# Patient Record
Sex: FEMALE | Race: WHITE | NOT HISPANIC OR LATINO | Employment: FULL TIME | URBAN - NONMETROPOLITAN AREA
[De-identification: names, ages, dates, MRNs, and addresses within clinical notes are randomized per-mention and may not be internally consistent; named-entity substitution may affect disease eponyms.]

---

## 2017-01-02 ENCOUNTER — OFFICE VISIT (OUTPATIENT)
Dept: RETAIL CLINIC | Facility: CLINIC | Age: 62
End: 2017-01-02

## 2017-01-02 VITALS
SYSTOLIC BLOOD PRESSURE: 120 MMHG | TEMPERATURE: 97.7 F | OXYGEN SATURATION: 99 % | DIASTOLIC BLOOD PRESSURE: 66 MMHG | HEART RATE: 63 BPM

## 2017-01-02 DIAGNOSIS — J01.00 ACUTE MAXILLARY SINUSITIS, RECURRENCE NOT SPECIFIED: Primary | ICD-10-CM

## 2017-01-02 PROCEDURE — 99213 OFFICE O/P EST LOW 20 MIN: CPT | Performed by: NURSE PRACTITIONER

## 2017-01-02 RX ORDER — AZITHROMYCIN 250 MG/1
TABLET, FILM COATED ORAL
Qty: 6 TABLET | Refills: 0 | Status: SHIPPED | OUTPATIENT
Start: 2017-01-02 | End: 2017-04-28

## 2017-01-02 RX ORDER — FLUTICASONE PROPIONATE 50 MCG
2 SPRAY, SUSPENSION (ML) NASAL DAILY
Qty: 1 EACH | Refills: 0 | Status: SHIPPED | OUTPATIENT
Start: 2017-01-02 | End: 2017-02-01

## 2017-01-02 NOTE — MR AVS SNAPSHOT
Kenyatta JOZEF Jay   1/2/2017 4:45 PM   Office Visit    Dept Phone:  [phone]   Encounter #:  [id number]    Provider:  PROVIDER B[facility] PAD ELIZABETH CARVER   Department:  [facility]                Your Full Care Plan              Today's Medication Changes          These changes are accurate as of: 1/2/17  5:09 PM.  If you have any questions, ask your nurse or doctor.               New Medication(s)Ordered:     fluticasone 50 MCG/ACT nasal spray   Commonly known as:  FLONASE   2 sprays into each nostril Daily for 30 days.            Where to Get Your Medications      These medications were sent to [facility] - [place], [place] - [address] - [phone]  - [phone]   [address], [facility] [address]     Phone:  [phone]     azithromycin 250 MG tablet    fluticasone 50 MCG/ACT nasal spray                  Your Updated Medication List          This list is accurate as of: 1/2/17  5:09 PM.  Always use your most recent med list.                albuterol 108 (90 BASE) MCG/ACT inhaler   Commonly known as:  PROVENTIL HFA;VENTOLIN HFA   Inhale 2 puffs Every 4 (Four) Hours As Needed for wheezing.       azithromycin 250 MG tablet   Commonly known as:  ZITHROMAX Z-ISIDORO   Take 2 tablets the first day, then 1 tablet daily for 4 days.       busPIRone 5 MG tablet   Commonly known as:  BUSPAR   Take 1 tablet by mouth 3 (Three) Times a Day.       diclofenac 50 MG EC tablet   Commonly known as:  VOLTAREN   Take 1 tablet by mouth 2 (Two) Times a Day As Needed (mild pain). MUST MAKE AN APPT FOR LAB WORK AND OFFICE VISIT.       fluticasone 50 MCG/ACT nasal spray   Commonly known as:  FLONASE   2 sprays into each nostril Daily for 30 days.       glimepiride 2 MG tablet   Commonly known as:  AMARYL   Take 1 tablet by mouth Every Morning Before Breakfast. MUST MAKE AN APPT FOR LAB WORK AND OFFICE VISIT.       levothyroxine 137 MCG tablet   Commonly known as:  SYNTHROID,  LEVOTHROID   Take 1 tablet by mouth Daily.       lisinopril 20 MG tablet   Commonly known as:  PRINIVIL,ZESTRIL   Take 1 tablet by mouth Daily.       metFORMIN 1000 MG tablet   Commonly known as:  GLUCOPHAGE   Take 1 tablet by mouth 2 (Two) Times a Day With Meals.       sertraline 100 MG tablet   Commonly known as:  ZOLOFT   Take 1 tablet by mouth Daily.       simvastatin 10 MG tablet   Commonly known as:  ZOCOR   Take 1 tablet by mouth Every Night.               You Were Diagnosed With        Codes Comments    Acute maxillary sinusitis, recurrence not specified    -  Primary ICD-10-CM: J01.00  ICD-9-CM: 461.0       Instructions    Increase fluid intake  Warm salt water gargles as needed for sore throat  Do not over suppress cough  If no improvement over next 2-3 days or symptoms worsen, follow up with PCP  Sinusitis, Adult  Sinusitis is redness, soreness, and inflammation of the paranasal sinuses. Paranasal sinuses are air pockets within the bones of your face. They are located beneath your eyes, in the middle of your forehead, and above your eyes. In healthy paranasal sinuses, mucus is able to drain out, and air is able to circulate through them by way of your nose. However, when your paranasal sinuses are inflamed, mucus and air can become trapped. This can allow bacteria and other germs to grow and cause infection.  Sinusitis can develop quickly and last only a short time (acute) or continue over a long period (chronic). Sinusitis that lasts for more than 12 weeks is considered chronic.  CAUSES  Causes of sinusitis include:  · Allergies.  · Structural abnormalities, such as displacement of the cartilage that separates your nostrils (deviated septum), which can decrease the air flow through your nose and sinuses and affect sinus drainage.  · Functional abnormalities, such as when the small hairs (cilia) that line your sinuses and help remove mucus do not work properly or are not present.  SIGNS AND  SYMPTOMS  Symptoms of acute and chronic sinusitis are the same. The primary symptoms are pain and pressure around the affected sinuses. Other symptoms include:  · Upper toothache.  · Earache.  · Headache.  · Bad breath.  · Decreased sense of smell and taste.  · A cough, which worsens when you are lying flat.  · Fatigue.  · Fever.  · Thick drainage from your nose, which often is green and may contain pus (purulent).  · Swelling and warmth over the affected sinuses.  DIAGNOSIS  Your health care provider will perform a physical exam. During your exam, your health care provider may perform any of the following to help determine if you have acute sinusitis or chronic sinusitis:  · Look in your nose for signs of abnormal growths in your nostrils (nasal polyps).  · Tap over the affected sinus to check for signs of infection.  · View the inside of your sinuses using an imaging device that has a light attached (endoscope).  If your health care provider suspects that you have chronic sinusitis, one or more of the following tests may be recommended:  · Allergy tests.  · Nasal culture. A sample of mucus is taken from your nose, sent to a lab, and screened for bacteria.  · Nasal cytology. A sample of mucus is taken from your nose and examined by your health care provider to determine if your sinusitis is related to an allergy.  TREATMENT  Most cases of acute sinusitis are related to a viral infection and will resolve on their own within 10 days. Sometimes, medicines are prescribed to help relieve symptoms of both acute and chronic sinusitis. These may include pain medicines, decongestants, nasal steroid sprays, or saline sprays.  However, for sinusitis related to a bacterial infection, your health care provider will prescribe antibiotic medicines. These are medicines that will help kill the bacteria causing the infection.  Rarely, sinusitis is caused by a fungal infection. In these cases, your health care provider will prescribe  antifungal medicine.  For some cases of chronic sinusitis, surgery is needed. Generally, these are cases in which sinusitis recurs more than 3 times per year, despite other treatments.  HOME CARE INSTRUCTIONS  · Drink plenty of water. Water helps thin the mucus so your sinuses can drain more easily.  · Use a humidifier.  · Inhale steam 3-4 times a day (for example, sit in the bathroom with the shower running).  · Apply a warm, moist washcloth to your face 3-4 times a day, or as directed by your health care provider.  · Use saline nasal sprays to help moisten and clean your sinuses.  · Take medicines only as directed by your health care provider.  · If you were prescribed either an antibiotic or antifungal medicine, finish it all even if you start to feel better.  SEEK IMMEDIATE MEDICAL CARE IF:  · You have increasing pain or severe headaches.  · You have nausea, vomiting, or drowsiness.  · You have swelling around your face.  · You have vision problems.  · You have a stiff neck.  · You have difficulty breathing.     This information is not intended to replace advice given to you by your health care provider. Make sure you discuss any questions you have with your health care provider.     Document Released: 12/18/2006 Document Revised: 01/08/2016 Document Reviewed: 01/01/2013  [employer] Interactive Patient Education ©2016 [employer]         Patient Instructions History      Upcoming Appointments     Visit Type Date Time Department    OFFICE VISIT 1/2/2017  4:45 PM [facility] P[facility] ELIZABETH CARVER    FOLLOW UP 4/28/2017  1:30 PM [facility] BENJA WILDE      [email or web address] Signup     [employer] allows you to send messages to your doctor, view your test results, renew your prescriptions, schedule appointments, and more. To sign up, go to [employer] and click on the Sign Up Now link in the New User? box. Enter your [employer] Activation Code exactly as it appears below along with the last four digits of your Social  Security Number and your Date of Birth () to complete the sign-up process. If you do not sign up before the expiration date, you must request a new code.    [employer] Activation Code: [email or web address]  Expires: 2017  5:09 PM    If you have questions, you can email [email or web address] or call [phone] to talk to our [employer] staff. Remember, [employer] is NOT to be used for urgent needs. For medical emergencies, dial 911.               Other Info from Your Visit           Your Appointments     2017  1:30 PM CDT   Follow Up with TAMARA Mcdonald   [facility] (--)    [address] [place] [id number] [phone]           Arrive 15 minutes prior to appointment.              Allergies     Penicillins        Reason for Visit     Sinusitis           Vital Signs     Blood Pressure Pulse Temperature Oxygen Saturation Smoking Status       120/66 (BP Location: Left arm, Patient Position: Sitting, Cuff Size: Large Adult) 63 97.7 °F (36.5 °C) (Temporal Artery ) 99% Never Smoker       Problems and Diagnoses Noted     Acute maxillary sinusitis    -  Primary

## 2017-01-02 NOTE — PATIENT INSTRUCTIONS
Increase fluid intake  Warm salt water gargles as needed for sore throat  Do not over suppress cough  If no improvement over next 2-3 days or symptoms worsen, follow up with PCP  Sinusitis, Adult  Sinusitis is redness, soreness, and inflammation of the paranasal sinuses. Paranasal sinuses are air pockets within the bones of your face. They are located beneath your eyes, in the middle of your forehead, and above your eyes. In healthy paranasal sinuses, mucus is able to drain out, and air is able to circulate through them by way of your nose. However, when your paranasal sinuses are inflamed, mucus and air can become trapped. This can allow bacteria and other germs to grow and cause infection.  Sinusitis can develop quickly and last only a short time (acute) or continue over a long period (chronic). Sinusitis that lasts for more than 12 weeks is considered chronic.  CAUSES  Causes of sinusitis include:  · Allergies.  · Structural abnormalities, such as displacement of the cartilage that separates your nostrils (deviated septum), which can decrease the air flow through your nose and sinuses and affect sinus drainage.  · Functional abnormalities, such as when the small hairs (cilia) that line your sinuses and help remove mucus do not work properly or are not present.  SIGNS AND SYMPTOMS  Symptoms of acute and chronic sinusitis are the same. The primary symptoms are pain and pressure around the affected sinuses. Other symptoms include:  · Upper toothache.  · Earache.  · Headache.  · Bad breath.  · Decreased sense of smell and taste.  · A cough, which worsens when you are lying flat.  · Fatigue.  · Fever.  · Thick drainage from your nose, which often is green and may contain pus (purulent).  · Swelling and warmth over the affected sinuses.  DIAGNOSIS  Your health care provider will perform a physical exam. During your exam, your health care provider may perform any of the following to help determine if you have acute  sinusitis or chronic sinusitis:  · Look in your nose for signs of abnormal growths in your nostrils (nasal polyps).  · Tap over the affected sinus to check for signs of infection.  · View the inside of your sinuses using an imaging device that has a light attached (endoscope).  If your health care provider suspects that you have chronic sinusitis, one or more of the following tests may be recommended:  · Allergy tests.  · Nasal culture. A sample of mucus is taken from your nose, sent to a lab, and screened for bacteria.  · Nasal cytology. A sample of mucus is taken from your nose and examined by your health care provider to determine if your sinusitis is related to an allergy.  TREATMENT  Most cases of acute sinusitis are related to a viral infection and will resolve on their own within 10 days. Sometimes, medicines are prescribed to help relieve symptoms of both acute and chronic sinusitis. These may include pain medicines, decongestants, nasal steroid sprays, or saline sprays.  However, for sinusitis related to a bacterial infection, your health care provider will prescribe antibiotic medicines. These are medicines that will help kill the bacteria causing the infection.  Rarely, sinusitis is caused by a fungal infection. In these cases, your health care provider will prescribe antifungal medicine.  For some cases of chronic sinusitis, surgery is needed. Generally, these are cases in which sinusitis recurs more than 3 times per year, despite other treatments.  HOME CARE INSTRUCTIONS  · Drink plenty of water. Water helps thin the mucus so your sinuses can drain more easily.  · Use a humidifier.  · Inhale steam 3-4 times a day (for example, sit in the bathroom with the shower running).  · Apply a warm, moist washcloth to your face 3-4 times a day, or as directed by your health care provider.  · Use saline nasal sprays to help moisten and clean your sinuses.  · Take medicines only as directed by your health care  provider.  · If you were prescribed either an antibiotic or antifungal medicine, finish it all even if you start to feel better.  SEEK IMMEDIATE MEDICAL CARE IF:  · You have increasing pain or severe headaches.  · You have nausea, vomiting, or drowsiness.  · You have swelling around your face.  · You have vision problems.  · You have a stiff neck.  · You have difficulty breathing.     This information is not intended to replace advice given to you by your health care provider. Make sure you discuss any questions you have with your health care provider.     Document Released: 12/18/2006 Document Revised: 01/08/2016 Document Reviewed: 01/01/2013  [employer] Interactive Patient Education ©2016 [employer]

## 2017-01-02 NOTE — PROGRESS NOTES
"Subjective   Kenyatta Jay is a 61 y.o. female.     Sinusitis   This is a new problem. The current episode started in the past 7 days (2 days). There has been no fever. Associated symptoms include congestion, coughing (Worse at night), ear pain, sinus pressure and a sore throat. Treatments tried: Dayquil yesterday. The treatment provided no relief.        The following portions of the patient's history were reviewed and updated as appropriate: allergies, current medications, past family history, past medical history, past social history, past surgical history and problem list.    Review of Systems   Constitutional: Positive for activity change. Negative for fever.   HENT: Positive for congestion, ear pain, postnasal drip, rhinorrhea, sinus pressure and sore throat.    Eyes: Positive for discharge (Watery/burning).   Respiratory: Positive for cough (Worse at night). Negative for wheezing.    Cardiovascular: Positive for chest pain (\"Burns with cough\"). Negative for palpitations.   Gastrointestinal: Negative for diarrhea, nausea and vomiting.   Allergic/Immunologic: Negative for environmental allergies.       Objective   Physical Exam   Constitutional: She appears well-developed and well-nourished. No distress.   HENT:   Right Ear: External ear normal. Tympanic membrane is not erythematous and not bulging. A middle ear effusion (Cloudy appearance) is present.   Left Ear: External ear normal. Tympanic membrane is not erythematous and not bulging. A middle ear effusion (Cloudy appearance) is present.   Nose: Right sinus exhibits maxillary sinus tenderness and frontal sinus tenderness. Left sinus exhibits maxillary sinus tenderness and frontal sinus tenderness.   Mouth/Throat: Oropharynx is clear and moist. No oropharyngeal exudate or posterior oropharyngeal erythema (Some PND noted ).   Eyes: Conjunctivae are normal. Pupils are equal, round, and reactive to light. Right eye exhibits no discharge. Left eye exhibits " no discharge.   Neck: Neck supple.   Cardiovascular: Normal rate and regular rhythm.  Exam reveals no gallop and no friction rub.    Murmur heard.   Systolic murmur is present with a grade of 1/6   Patient states not new finding   Pulmonary/Chest: Effort normal and breath sounds normal. No respiratory distress. She has no wheezes. She has no rales. She exhibits no tenderness.   Lymphadenopathy:     She has no cervical adenopathy (Tenderness with palpation).   Neurological: She is alert.   Skin: Skin is warm. She is not diaphoretic.   Psychiatric: She has a normal mood and affect. Her behavior is normal.       Assessment/Plan   Kenyatta was seen today for sinusitis.    Diagnoses and all orders for this visit:    Acute maxillary sinusitis, recurrence not specified  -     fluticasone (FLONASE) 50 MCG/ACT nasal spray; 2 sprays into each nostril Daily for 30 days.  -     azithromycin (ZITHROMAX Z-ISIDORO) 250 MG tablet; Take 2 tablets the first day, then 1 tablet daily for 4 days.      Increase fluid intake  Warm salt water gargles as needed for sore throat  Do not over suppress cough  If no improvement over next 2-3 days or symptoms worsen, follow up with PCP

## 2017-01-11 ENCOUNTER — APPOINTMENT (OUTPATIENT)
Dept: GENERAL RADIOLOGY | Facility: HOSPITAL | Age: 62
End: 2017-01-11

## 2017-01-11 PROCEDURE — 71020 HC CHEST PA AND LATERAL: CPT

## 2017-03-18 DIAGNOSIS — E03.9 HYPOTHYROIDISM, UNSPECIFIED TYPE: ICD-10-CM

## 2017-03-20 RX ORDER — LEVOTHYROXINE SODIUM 137 UG/1
TABLET ORAL
Qty: 30 TABLET | Refills: 0 | Status: SHIPPED | OUTPATIENT
Start: 2017-03-20 | End: 2017-04-20 | Stop reason: SDUPTHER

## 2017-04-02 DIAGNOSIS — F41.9 ANXIETY AND DEPRESSION: ICD-10-CM

## 2017-04-02 DIAGNOSIS — E11.9 CONTROLLED TYPE 2 DIABETES MELLITUS WITHOUT COMPLICATION, WITHOUT LONG-TERM CURRENT USE OF INSULIN (HCC): ICD-10-CM

## 2017-04-02 DIAGNOSIS — F32.A ANXIETY AND DEPRESSION: ICD-10-CM

## 2017-04-02 RX ORDER — GLIMEPIRIDE 2 MG/1
TABLET ORAL
Qty: 30 TABLET | Refills: 0 | Status: CANCELLED | OUTPATIENT
Start: 2017-04-02

## 2017-04-02 RX ORDER — BUSPIRONE HYDROCHLORIDE 5 MG/1
TABLET ORAL
Qty: 90 TABLET | Refills: 0 | Status: CANCELLED | OUTPATIENT
Start: 2017-04-02

## 2017-04-06 DIAGNOSIS — F41.9 ANXIETY AND DEPRESSION: ICD-10-CM

## 2017-04-06 DIAGNOSIS — E11.9 CONTROLLED TYPE 2 DIABETES MELLITUS WITHOUT COMPLICATION, WITHOUT LONG-TERM CURRENT USE OF INSULIN (HCC): ICD-10-CM

## 2017-04-06 DIAGNOSIS — F32.A ANXIETY AND DEPRESSION: ICD-10-CM

## 2017-04-06 RX ORDER — GLIMEPIRIDE 2 MG/1
2 TABLET ORAL
Qty: 30 TABLET | Refills: 3 | Status: SHIPPED | OUTPATIENT
Start: 2017-04-06 | End: 2017-08-21 | Stop reason: SDUPTHER

## 2017-04-06 RX ORDER — BUSPIRONE HYDROCHLORIDE 5 MG/1
5 TABLET ORAL 3 TIMES DAILY
Qty: 90 TABLET | Refills: 3 | Status: SHIPPED | OUTPATIENT
Start: 2017-04-06 | End: 2017-08-10 | Stop reason: SDUPTHER

## 2017-04-20 DIAGNOSIS — E03.9 HYPOTHYROIDISM, UNSPECIFIED TYPE: ICD-10-CM

## 2017-04-20 RX ORDER — LEVOTHYROXINE SODIUM 137 UG/1
137 TABLET ORAL DAILY
Qty: 30 TABLET | Refills: 1 | Status: SHIPPED | OUTPATIENT
Start: 2017-04-20 | End: 2017-06-23 | Stop reason: SDUPTHER

## 2017-04-28 ENCOUNTER — OFFICE VISIT (OUTPATIENT)
Dept: INTERNAL MEDICINE | Facility: CLINIC | Age: 62
End: 2017-04-28

## 2017-04-28 VITALS
WEIGHT: 246.2 LBS | HEART RATE: 66 BPM | HEIGHT: 65 IN | OXYGEN SATURATION: 98 % | RESPIRATION RATE: 16 BRPM | BODY MASS INDEX: 41.02 KG/M2 | SYSTOLIC BLOOD PRESSURE: 110 MMHG | DIASTOLIC BLOOD PRESSURE: 74 MMHG

## 2017-04-28 DIAGNOSIS — F32.A ANXIETY AND DEPRESSION: ICD-10-CM

## 2017-04-28 DIAGNOSIS — I10 ESSENTIAL HYPERTENSION: ICD-10-CM

## 2017-04-28 DIAGNOSIS — F41.9 ANXIETY AND DEPRESSION: ICD-10-CM

## 2017-04-28 DIAGNOSIS — Z12.31 ENCOUNTER FOR SCREENING MAMMOGRAM FOR MALIGNANT NEOPLASM OF BREAST: ICD-10-CM

## 2017-04-28 DIAGNOSIS — E78.2 MIXED HYPERLIPIDEMIA: ICD-10-CM

## 2017-04-28 DIAGNOSIS — E66.01 MORBID OBESITY DUE TO EXCESS CALORIES (HCC): ICD-10-CM

## 2017-04-28 DIAGNOSIS — F33.41 RECURRENT MAJOR DEPRESSIVE DISORDER, IN PARTIAL REMISSION (HCC): ICD-10-CM

## 2017-04-28 DIAGNOSIS — Z78.0 POSTMENOPAUSAL: ICD-10-CM

## 2017-04-28 DIAGNOSIS — E11.42 TYPE 2 DIABETES MELLITUS WITH DIABETIC POLYNEUROPATHY, WITHOUT LONG-TERM CURRENT USE OF INSULIN (HCC): Primary | ICD-10-CM

## 2017-04-28 LAB — HBA1C MFR BLD: 6.3 %

## 2017-04-28 PROCEDURE — 83036 HEMOGLOBIN GLYCOSYLATED A1C: CPT | Performed by: INTERNAL MEDICINE

## 2017-04-28 PROCEDURE — 99214 OFFICE O/P EST MOD 30 MIN: CPT | Performed by: INTERNAL MEDICINE

## 2017-04-28 RX ORDER — SERTRALINE HYDROCHLORIDE 100 MG/1
100 TABLET, FILM COATED ORAL DAILY
Qty: 30 TABLET | Refills: 5 | Status: SHIPPED | OUTPATIENT
Start: 2017-04-28 | End: 2017-10-30 | Stop reason: SDUPTHER

## 2017-04-28 RX ORDER — MELATONIN
1000 DAILY
COMMUNITY

## 2017-04-28 RX ORDER — SIMVASTATIN 10 MG
10 TABLET ORAL NIGHTLY
Qty: 30 TABLET | Refills: 5 | Status: SHIPPED | OUTPATIENT
Start: 2017-04-28 | End: 2017-10-30 | Stop reason: SDUPTHER

## 2017-04-28 RX ORDER — LISINOPRIL 20 MG/1
20 TABLET ORAL DAILY
Qty: 30 TABLET | Refills: 5 | Status: SHIPPED | OUTPATIENT
Start: 2017-04-28 | End: 2017-10-30 | Stop reason: SDUPTHER

## 2017-04-28 NOTE — PROGRESS NOTES
CC: follow-up for diabetes and hypertension    History:  Kenyatta Jay is a 61 y.o. female who presents today for follow-up for evaluation of the above:  She reports she has been doing reasonably well without any acute illness.  She continues on metformin and glimepiride related to her diabetes and reports no symptomatic hyperglycemia or hypoglycemia, though she admits she checks her blood sugars only intermittently.  She continues on simvastatin, which has controlled her blood sugars well.  She continues on levothyroxine and reports no symptoms of dysfunctional thyroid at this time and no side effects on medication.  She continues on Zoloft which she feels is controlling her mood reasonably well without side effects.    ROS:  Review of Systems   Constitutional: Negative for chills and fever.   HENT: Negative for congestion and sore throat.    Respiratory: Negative for cough and shortness of breath.    Cardiovascular: Negative for chest pain and palpitations.   Gastrointestinal: Negative for abdominal pain, constipation and nausea.       Ms. Jay  reports that she has never smoked. She has never used smokeless tobacco. She reports that she does not drink alcohol or use illicit drugs.      Current Outpatient Prescriptions:   •  busPIRone (BUSPAR) 5 MG tablet, Take 1 tablet by mouth 3 (Three) Times a Day., Disp: 90 tablet, Rfl: 3  •  cholecalciferol (VITAMIN D3) 1000 UNITS tablet, Take 1,000 Units by mouth Daily., Disp: , Rfl:   •  diclofenac (VOLTAREN) 50 MG EC tablet, Take 1 tablet by mouth 2 (Two) Times a Day As Needed (mild pain). MUST MAKE AN APPT FOR LAB WORK AND OFFICE VISIT., Disp: 60 tablet, Rfl: 3  •  glimepiride (AMARYL) 2 MG tablet, Take 1 tablet by mouth Every Morning Before Breakfast. MUST MAKE AN APPT FOR LAB WORK AND OFFICE VISIT., Disp: 30 tablet, Rfl: 3  •  levothyroxine (SYNTHROID, LEVOTHROID) 137 MCG tablet, Take 1 tablet by mouth Daily., Disp: 30 tablet, Rfl: 1  •  lisinopril  "(PRINIVIL,ZESTRIL) 20 MG tablet, Take 1 tablet by mouth Daily., Disp: 30 tablet, Rfl: 3  •  metFORMIN (GLUCOPHAGE) 1000 MG tablet, Take 1 tablet by mouth 2 (Two) Times a Day With Meals., Disp: 60 tablet, Rfl: 3  •  sertraline (ZOLOFT) 100 MG tablet, Take 1 tablet by mouth Daily., Disp: 30 tablet, Rfl: 3  •  simvastatin (ZOCOR) 10 MG tablet, Take 1 tablet by mouth Every Night., Disp: 30 tablet, Rfl: 3  •  traMADol (ULTRAM) 50 MG tablet, Take 50 mg by mouth Every 6 (Six) Hours As Needed for moderate pain (4-6)., Disp: , Rfl:       OBJECTIVE:  /74 (BP Location: Left arm, Patient Position: Sitting, Cuff Size: Adult)  Pulse 66  Resp 16  Ht 65\" (165.1 cm)  Wt 246 lb 3.2 oz (112 kg)  SpO2 98%  BMI 40.97 kg/m2   Physical Exam   Constitutional: She is oriented to person, place, and time. She appears well-nourished. No distress.   Cardiovascular: Normal rate, regular rhythm and normal heart sounds.    No murmur heard.  Pulmonary/Chest: Effort normal and breath sounds normal. She has no wheezes.   Abdominal: Soft. There is no tenderness.   Neurological: She is alert and oriented to person, place, and time.   Psychiatric: She has a normal mood and affect.       Assessment/Plan    Diagnoses and all orders for this visit:    Type 2 diabetes mellitus with diabetic polyneuropathy, without long-term current use of insulin  -     POC Glycosylated Hemoglobin (Hb A1C)  A1c is 6.3% in the office today, which represents an elevation from 5.7% on her most recent visit in October 2016.  However, this remains well-controlled on her current medications, which we will continue.  She is on an ACE inhibitor and statin therapy, though based on future lipid numbers, we could consider increasing the intensity of her therapy.    Morbid obesity due to excess calories  Recommended ongoing attention to portion control and being careful about the types and timing of her meals for the purpose of weight and glycemic management.  She admits " she could do better with this.    Essential hypertension  -     lisinopril (PRINIVIL,ZESTRIL) 20 MG tablet; Take 1 tablet by mouth Daily.  Well controlled, BP goal for age is <140/90 per JNC 8 guidelines and continue current medications    Recurrent major depressive disorder, in partial remission  Stable and well-controlled on Zoloft, which we will continue.  -     sertraline (ZOLOFT) 100 MG tablet; Take 1 tablet by mouth Daily.    Encounter for screening mammogram for malignant neoplasm of breast  -     Mammo Screening Digital Tomosynthesis Bilateral With CAD; Future  -     DEXA Bone Density Axial; Future  We have ordered mammogram and DEXA for evaluation at [facility].  She declines colonoscopy, so she was given stool cards for completion.    Mixed hyperlipidemia  -     simvastatin (ZOCOR) 10 MG tablet; Take 1 tablet by mouth Every Night.  Stable on simvastatin, which we will continue at this time.  We will check lipids on a yearly basis and consider increasing intensity of her therapy per ACC/AHA guidelines based on her 10 year ASCVD risk.    An After Visit Summary was printed and given to the patient at discharge.  Return in about 6 months (around 10/28/2017) for Recheck. Sooner if problems arise.         Kyle Cole D.O. 4/28/2017

## 2017-04-30 PROBLEM — M81.0 OSTEOPOROSIS: Status: ACTIVE | Noted: 2017-04-30

## 2017-05-16 ENCOUNTER — SPECIMEN COLLECTION (OUTPATIENT)
Dept: INTERNAL MEDICINE | Facility: CLINIC | Age: 62
End: 2017-05-16

## 2017-05-19 DIAGNOSIS — Z12.11 COLON CANCER SCREENING: Primary | ICD-10-CM

## 2017-05-19 LAB — HEMOCCULT STL QL IA: NEGATIVE

## 2017-05-19 PROCEDURE — 82270 OCCULT BLOOD FECES: CPT | Performed by: INTERNAL MEDICINE

## 2017-06-06 ENCOUNTER — TELEPHONE (OUTPATIENT)
Dept: INTERNAL MEDICINE | Facility: CLINIC | Age: 62
End: 2017-06-06

## 2017-06-08 DIAGNOSIS — G89.4 CHRONIC PAIN SYNDROME: ICD-10-CM

## 2017-06-21 DIAGNOSIS — E03.9 HYPOTHYROIDISM, UNSPECIFIED TYPE: ICD-10-CM

## 2017-06-21 RX ORDER — LEVOTHYROXINE SODIUM 137 UG/1
TABLET ORAL
Qty: 30 TABLET | Refills: 0 | Status: CANCELLED | OUTPATIENT
Start: 2017-06-21

## 2017-06-23 DIAGNOSIS — E03.9 HYPOTHYROIDISM, UNSPECIFIED TYPE: ICD-10-CM

## 2017-06-23 RX ORDER — LEVOTHYROXINE SODIUM 137 UG/1
137 TABLET ORAL DAILY
Qty: 30 TABLET | Refills: 11 | Status: SHIPPED | OUTPATIENT
Start: 2017-06-23

## 2017-08-09 DIAGNOSIS — F32.A ANXIETY AND DEPRESSION: ICD-10-CM

## 2017-08-09 DIAGNOSIS — F41.9 ANXIETY AND DEPRESSION: ICD-10-CM

## 2017-08-09 DIAGNOSIS — E11.9 CONTROLLED TYPE 2 DIABETES MELLITUS WITHOUT COMPLICATION, WITHOUT LONG-TERM CURRENT USE OF INSULIN (HCC): ICD-10-CM

## 2017-08-09 RX ORDER — BUSPIRONE HYDROCHLORIDE 5 MG/1
TABLET ORAL
Qty: 90 TABLET | Refills: 3 | Status: CANCELLED | OUTPATIENT
Start: 2017-08-09

## 2017-08-10 DIAGNOSIS — E11.9 CONTROLLED TYPE 2 DIABETES MELLITUS WITHOUT COMPLICATION, WITHOUT LONG-TERM CURRENT USE OF INSULIN (HCC): ICD-10-CM

## 2017-08-10 DIAGNOSIS — F41.9 ANXIETY AND DEPRESSION: ICD-10-CM

## 2017-08-10 DIAGNOSIS — F32.A ANXIETY AND DEPRESSION: ICD-10-CM

## 2017-08-10 RX ORDER — BUSPIRONE HYDROCHLORIDE 5 MG/1
5 TABLET ORAL 3 TIMES DAILY
Qty: 90 TABLET | Refills: 5 | Status: SHIPPED | OUTPATIENT
Start: 2017-08-10 | End: 2017-10-30 | Stop reason: SDUPTHER

## 2017-08-19 DIAGNOSIS — E11.9 CONTROLLED TYPE 2 DIABETES MELLITUS WITHOUT COMPLICATION, WITHOUT LONG-TERM CURRENT USE OF INSULIN (HCC): ICD-10-CM

## 2017-08-19 RX ORDER — GLIMEPIRIDE 2 MG/1
TABLET ORAL
Qty: 30 TABLET | Refills: 3 | Status: CANCELLED | OUTPATIENT
Start: 2017-08-19

## 2017-08-21 DIAGNOSIS — E11.9 CONTROLLED TYPE 2 DIABETES MELLITUS WITHOUT COMPLICATION, WITHOUT LONG-TERM CURRENT USE OF INSULIN (HCC): ICD-10-CM

## 2017-08-21 RX ORDER — GLIMEPIRIDE 2 MG/1
2 TABLET ORAL
Qty: 30 TABLET | Refills: 5 | Status: SHIPPED | OUTPATIENT
Start: 2017-08-21

## 2017-10-06 DIAGNOSIS — G89.4 CHRONIC PAIN SYNDROME: ICD-10-CM

## 2017-10-10 DIAGNOSIS — G89.4 CHRONIC PAIN SYNDROME: ICD-10-CM

## 2017-10-30 ENCOUNTER — OFFICE VISIT (OUTPATIENT)
Dept: INTERNAL MEDICINE | Facility: CLINIC | Age: 62
End: 2017-10-30

## 2017-10-30 ENCOUNTER — LAB (OUTPATIENT)
Dept: LAB | Facility: HOSPITAL | Age: 62
End: 2017-10-30
Attending: INTERNAL MEDICINE

## 2017-10-30 VITALS
RESPIRATION RATE: 16 BRPM | WEIGHT: 233.2 LBS | BODY MASS INDEX: 38.85 KG/M2 | SYSTOLIC BLOOD PRESSURE: 128 MMHG | HEIGHT: 65 IN | DIASTOLIC BLOOD PRESSURE: 88 MMHG | OXYGEN SATURATION: 98 % | HEART RATE: 66 BPM

## 2017-10-30 DIAGNOSIS — F33.41 RECURRENT MAJOR DEPRESSIVE DISORDER, IN PARTIAL REMISSION (HCC): ICD-10-CM

## 2017-10-30 DIAGNOSIS — E03.9 ACQUIRED HYPOTHYROIDISM: ICD-10-CM

## 2017-10-30 DIAGNOSIS — I10 ESSENTIAL HYPERTENSION: ICD-10-CM

## 2017-10-30 DIAGNOSIS — E78.2 MIXED HYPERLIPIDEMIA: ICD-10-CM

## 2017-10-30 DIAGNOSIS — E66.01 MORBID OBESITY DUE TO EXCESS CALORIES (HCC): ICD-10-CM

## 2017-10-30 DIAGNOSIS — E11.42 TYPE 2 DIABETES MELLITUS WITH DIABETIC POLYNEUROPATHY, WITHOUT LONG-TERM CURRENT USE OF INSULIN (HCC): ICD-10-CM

## 2017-10-30 DIAGNOSIS — E11.42 TYPE 2 DIABETES MELLITUS WITH DIABETIC POLYNEUROPATHY, WITHOUT LONG-TERM CURRENT USE OF INSULIN (HCC): Primary | ICD-10-CM

## 2017-10-30 DIAGNOSIS — Z00.00 ENCOUNTER FOR PREVENTIVE HEALTH EXAMINATION: ICD-10-CM

## 2017-10-30 DIAGNOSIS — M79.641 RIGHT HAND PAIN: ICD-10-CM

## 2017-10-30 DIAGNOSIS — F43.21 GRIEF: ICD-10-CM

## 2017-10-30 DIAGNOSIS — F41.9 ANXIETY AND DEPRESSION: ICD-10-CM

## 2017-10-30 DIAGNOSIS — F32.A ANXIETY AND DEPRESSION: ICD-10-CM

## 2017-10-30 LAB
ALBUMIN SERPL-MCNC: 4.4 G/DL (ref 3.5–5)
ALBUMIN/GLOB SERPL: 1.4 G/DL (ref 1.1–2.5)
ALP SERPL-CCNC: 90 U/L (ref 24–120)
ALT SERPL W P-5'-P-CCNC: 39 U/L (ref 0–54)
ANION GAP SERPL CALCULATED.3IONS-SCNC: 13 MMOL/L (ref 4–13)
ARTICHOKE IGE QN: 113 MG/DL (ref 0–99)
AST SERPL-CCNC: 33 U/L (ref 7–45)
BILIRUB SERPL-MCNC: 0.4 MG/DL (ref 0.1–1)
BUN BLD-MCNC: 27 MG/DL (ref 5–21)
BUN/CREAT SERPL: 33.8 (ref 7–25)
CALCIUM SPEC-SCNC: 9.8 MG/DL (ref 8.4–10.4)
CHLORIDE SERPL-SCNC: 101 MMOL/L (ref 98–110)
CHOLEST SERPL-MCNC: 186 MG/DL (ref 130–200)
CO2 SERPL-SCNC: 28 MMOL/L (ref 24–31)
CREAT BLD-MCNC: 0.8 MG/DL (ref 0.5–1.4)
GFR SERPL CREATININE-BSD FRML MDRD: 73 ML/MIN/1.73
GLOBULIN UR ELPH-MCNC: 3.1 GM/DL
GLUCOSE BLD-MCNC: 123 MG/DL (ref 70–100)
HBA1C MFR BLD: 6.9 %
HCV AB SER DONR QL: NEGATIVE
HCV S/C RATIO: 0.08 (ref 0–0.99)
HDLC SERPL-MCNC: 56 MG/DL
LDLC/HDLC SERPL: 2 {RATIO}
POTASSIUM BLD-SCNC: 4.4 MMOL/L (ref 3.5–5.3)
PROT SERPL-MCNC: 7.5 G/DL (ref 6.3–8.7)
SODIUM BLD-SCNC: 142 MMOL/L (ref 135–145)
TRIGL SERPL-MCNC: 89 MG/DL (ref 0–149)
TSH SERPL DL<=0.05 MIU/L-ACNC: 2.11 MIU/ML (ref 0.47–4.68)

## 2017-10-30 PROCEDURE — 80061 LIPID PANEL: CPT | Performed by: INTERNAL MEDICINE

## 2017-10-30 PROCEDURE — 83036 HEMOGLOBIN GLYCOSYLATED A1C: CPT | Performed by: INTERNAL MEDICINE

## 2017-10-30 PROCEDURE — 99214 OFFICE O/P EST MOD 30 MIN: CPT | Performed by: INTERNAL MEDICINE

## 2017-10-30 PROCEDURE — 86803 HEPATITIS C AB TEST: CPT | Performed by: INTERNAL MEDICINE

## 2017-10-30 PROCEDURE — 84443 ASSAY THYROID STIM HORMONE: CPT | Performed by: INTERNAL MEDICINE

## 2017-10-30 PROCEDURE — 36415 COLL VENOUS BLD VENIPUNCTURE: CPT

## 2017-10-30 PROCEDURE — 80053 COMPREHEN METABOLIC PANEL: CPT | Performed by: INTERNAL MEDICINE

## 2017-10-30 RX ORDER — SERTRALINE HYDROCHLORIDE 100 MG/1
100 TABLET, FILM COATED ORAL DAILY
Qty: 30 TABLET | Refills: 5 | Status: SHIPPED | OUTPATIENT
Start: 2017-10-30

## 2017-10-30 RX ORDER — LISINOPRIL 20 MG/1
20 TABLET ORAL DAILY
Qty: 30 TABLET | Refills: 5 | Status: SHIPPED | OUTPATIENT
Start: 2017-10-30

## 2017-10-30 RX ORDER — SIMVASTATIN 10 MG
10 TABLET ORAL NIGHTLY
Qty: 30 TABLET | Refills: 5 | Status: SHIPPED | OUTPATIENT
Start: 2017-10-30 | End: 2018-07-05 | Stop reason: SDUPTHER

## 2017-10-30 RX ORDER — BUSPIRONE HYDROCHLORIDE 10 MG/1
10 TABLET ORAL 2 TIMES DAILY
Qty: 60 TABLET | Refills: 5 | Status: SHIPPED | OUTPATIENT
Start: 2017-10-30 | End: 2018-07-05 | Stop reason: SDUPTHER

## 2017-10-30 NOTE — PROGRESS NOTES
"CC: anxiety    History:  Kenyatta Jay is a 61 y.o. female who presents today for follow-up for evaluation of the above:  \"Emotions and nerves are everywhere\"; she found her boyfriend of 4 years dead on her couch 4 weeks ago of liver failure and  her uncle passed away 2 weeks after that incident. States her mood/depression is doing well given the circumstances. HTN is doing well on today's visit, states she does not measure her BP on a regular basis at home, but is in the 120s/80s whenever she measures it at [facility]. Diabetes is fairly well controlled, states she does not measure her glucose levels regularly but notes 190 as being the highest reading and 117 the lowest. Sinuses have been an issue for her this season, but is controlled well with over the counter medicine and is getting better with the colder weather. Would like to restart tramadol; states she has been diagnosed with RA and her hands are numb in the morning and her right foot is swollen after her work shift.   a  ROS:  Review of Systems   Eyes: Negative for photophobia, pain and visual disturbance.   Respiratory: Positive for cough. Negative for shortness of breath and wheezing.    Endocrine: Positive for polydipsia and polyuria. Negative for polyphagia.   Psychiatric/Behavioral: Positive for agitation. Negative for decreased concentration. The patient is nervous/anxious.        Ms. Jay  reports that she has never smoked. She has never used smokeless tobacco. She reports that she does not drink alcohol or use illicit drugs.      Current Outpatient Prescriptions:   •  busPIRone (BUSPAR) 5 MG tablet, Take 1 tablet by mouth 3 (Three) Times a Day., Disp: 90 tablet, Rfl: 5  •  cholecalciferol (VITAMIN D3) 1000 UNITS tablet, Take 1,000 Units by mouth Daily., Disp: , Rfl:   •  diclofenac (VOLTAREN) 50 MG EC tablet, Take 1 tablet by mouth 2 (Two) Times a Day As Needed (mild pain). MUST MAKE AN APPT FOR LAB WORK AND OFFICE VISIT., Disp: 60 tablet, " "Rfl: 3  •  glimepiride (AMARYL) 2 MG tablet, Take 1 tablet by mouth Every Morning Before Breakfast. MUST MAKE AN APPT FOR LAB WORK AND OFFICE VISIT., Disp: 30 tablet, Rfl: 5  •  levothyroxine (SYNTHROID, LEVOTHROID) 137 MCG tablet, Take 1 tablet by mouth Daily., Disp: 30 tablet, Rfl: 11  •  lisinopril (PRINIVIL,ZESTRIL) 20 MG tablet, Take 1 tablet by mouth Daily., Disp: 30 tablet, Rfl: 5  •  metFORMIN (GLUCOPHAGE) 1000 MG tablet, Take 1 tablet by mouth 2 (Two) Times a Day With Meals., Disp: 60 tablet, Rfl: 5  •  sertraline (ZOLOFT) 100 MG tablet, Take 1 tablet by mouth Daily., Disp: 30 tablet, Rfl: 5  •  simvastatin (ZOCOR) 10 MG tablet, Take 1 tablet by mouth Every Night., Disp: 30 tablet, Rfl: 5  •  traMADol (ULTRAM) 50 MG tablet, Take 50 mg by mouth Every 6 (Six) Hours As Needed for moderate pain (4-6)., Disp: , Rfl:       OBJECTIVE:  /88 (BP Location: Left arm, Patient Position: Sitting, Cuff Size: Adult)  Pulse 66  Resp 16  Ht 65\" (165.1 cm)  Wt 233 lb 3.2 oz (106 kg)  SpO2 98%  BMI 38.81 kg/m2   Physical Exam   Constitutional: She appears well-developed and well-nourished.   Cardiovascular: Normal rate and regular rhythm.  Exam reveals no gallop and no friction rub.    No murmur heard.  Pulmonary/Chest: Effort normal. No respiratory distress. She has no wheezes. She has no rales. She exhibits no tenderness.   Abdominal: Bowel sounds are normal. She exhibits no distension. There is no tenderness.    Kenyatta had a diabetic foot exam performed today.   During the foot exam she had a monofilament test performed.    Neurological Sensory Findings -  No right ankle/foot altered proprioception and no left ankle/foot altered proprioception    Vascular Status -  Her exam exhibits right foot vasculature normal. Her exam exhibits no right foot edema. Her exam exhibits left foot vasculature normal. Her exam exhibits no left foot edema.   Skin Integrity  -  Her right foot skin is intact.     Kenyatta 's left foot " skin is intact. .      Assessment/Plan    Diagnoses and all orders for this visit:    Type 2 diabetes mellitus with diabetic polyneuropathy, without long-term current use of insulin  -     Hemoglobin A1c; Future  -     Ambulatory Referral to Optometry  We will check A1c, though she is previously very well controlled.  She may continue metformin and glimepiride at this time.  She is on an ACE inhibitor and statin therapy.  Foot exam performed today and she will be referred for an eye exam.  Blood pressure is well controlled.    Anxiety and depression  Grief  Recurrent major depressive disorder, in partial remission  -     sertraline (ZOLOFT) 100 MG tablet; Take 1 tablet by mouth Daily.  -     busPIRone (BUSPAR) 10 MG tablet; Take 1 tablet by mouth 2 (Two) Times a Day.  She reports she is having normal grief regarding the losses she has had recently.  Given uncontrolled anxiety, we will increase BuSpar to 10 mg, though she admits she is only using this morning and evening.  Continue Zoloft.    Essential hypertension  -     lisinopril (PRINIVIL,ZESTRIL) 20 MG tablet; Take 1 tablet by mouth Daily.  -     Comprehensive Metabolic Panel; Future  Well controlled, BP goal for age is <140/90 per JNC 8 guidelines and continue current medications    Mixed hyperlipidemia  -     simvastatin (ZOCOR) 10 MG tablet; Take 1 tablet by mouth Every Night.  -     Lipid panel; Future  Continue statin therapy.    Acquired hypothyroidism  -     TSH; Future  We will check TSH to determine the effectiveness of her replacement therapy.     Morbid obesity due to excess calories  Recommended attention to portion control and being careful about the types and timing of meals for the purpose of weight management.    Right hand pain  I suspect arthritis at the base of her thumb.  We will perform x-ray for further evaluation.  I do not believe the chronic opioid therapy would be of benefit to her for suspected arthritis, but if this is present and her  symptoms remain uncontrolled, we would consider referral for orthopedic evaluation and possible intervention.    Encounter for preventive health examination  -     Hepatitis C Antibody; Future    An After Visit Summary was printed and given to the patient at discharge.  Return in about 6 months (around 4/30/2018). Sooner if problems arise.         Kyle Cole D.O. 10/30/2017

## 2018-02-23 DIAGNOSIS — G89.4 CHRONIC PAIN SYNDROME: ICD-10-CM

## 2018-03-05 ENCOUNTER — OFFICE VISIT (OUTPATIENT)
Dept: PRIMARY CARE CLINIC | Age: 63
End: 2018-03-05
Payer: COMMERCIAL

## 2018-03-05 VITALS
DIASTOLIC BLOOD PRESSURE: 78 MMHG | OXYGEN SATURATION: 98 % | SYSTOLIC BLOOD PRESSURE: 132 MMHG | HEART RATE: 63 BPM | TEMPERATURE: 97.7 F | BODY MASS INDEX: 39.82 KG/M2 | WEIGHT: 239 LBS | HEIGHT: 65 IN | RESPIRATION RATE: 18 BRPM

## 2018-03-05 DIAGNOSIS — Z13.1 SCREENING FOR DIABETES MELLITUS: ICD-10-CM

## 2018-03-05 DIAGNOSIS — M17.12 PRIMARY LOCALIZED OSTEOARTHROSIS OF LEFT LOWER LEG: ICD-10-CM

## 2018-03-05 DIAGNOSIS — Z79.899 MEDICATION MANAGEMENT: ICD-10-CM

## 2018-03-05 DIAGNOSIS — R00.1 BRADYCARDIA: ICD-10-CM

## 2018-03-05 DIAGNOSIS — E11.9 TYPE 2 DIABETES MELLITUS WITHOUT COMPLICATION, WITHOUT LONG-TERM CURRENT USE OF INSULIN (HCC): Primary | ICD-10-CM

## 2018-03-05 DIAGNOSIS — Z83.3 FAMILY HISTORY OF DIABETES MELLITUS IN BROTHER: ICD-10-CM

## 2018-03-05 DIAGNOSIS — Z13.220 ENCOUNTER FOR LIPID SCREENING FOR CARDIOVASCULAR DISEASE: ICD-10-CM

## 2018-03-05 DIAGNOSIS — Z00.00 ENCOUNTER FOR MEDICAL EXAMINATION TO ESTABLISH CARE: ICD-10-CM

## 2018-03-05 DIAGNOSIS — Z13.6 ENCOUNTER FOR LIPID SCREENING FOR CARDIOVASCULAR DISEASE: ICD-10-CM

## 2018-03-05 LAB
BILIRUBIN, POC: NORMAL
BLOOD URINE, POC: NORMAL
CLARITY, POC: CLEAR
COLOR, POC: YELLOW
GLUCOSE BLD-MCNC: 115 MG/DL
GLUCOSE URINE, POC: NORMAL
KETONES, POC: NORMAL
LEUKOCYTE EST, POC: NORMAL
NITRITE, POC: NORMAL
PH, POC: 5
PROTEIN, POC: NORMAL
SPECIFIC GRAVITY, POC: 1010
UROBILINOGEN, POC: 0.2

## 2018-03-05 PROCEDURE — 93000 ELECTROCARDIOGRAM COMPLETE: CPT | Performed by: NURSE PRACTITIONER

## 2018-03-05 PROCEDURE — 81002 URINALYSIS NONAUTO W/O SCOPE: CPT | Performed by: NURSE PRACTITIONER

## 2018-03-05 PROCEDURE — 99204 OFFICE O/P NEW MOD 45 MIN: CPT | Performed by: NURSE PRACTITIONER

## 2018-03-05 PROCEDURE — 82962 GLUCOSE BLOOD TEST: CPT | Performed by: NURSE PRACTITIONER

## 2018-03-05 RX ORDER — BUSPIRONE HYDROCHLORIDE 10 MG/1
10 TABLET ORAL 2 TIMES DAILY
COMMUNITY
End: 2018-09-06 | Stop reason: SDUPTHER

## 2018-03-05 NOTE — PROGRESS NOTES
History of blood transfusion     1976 post childbirth    Hypertension     Thyroid disease       Past Surgical History:   Procedure Laterality Date    CHOLECYSTECTOMY      HYSTERECTOMY      TOTAL KNEE ARTHROPLASTY Left 8/30/2016    KNEE TOTAL ARTHROPLASTY performed by More Navarro MD at [address] 3/16/2018 3/5/2018 8/30/2016 8/30/2016 8/30/2016 5/80/0569   SYSTOLIC 198 [phone]   DIASTOLIC 83 78 [phone]   Site Right Arm Right Arm - - - -   Position Sitting Sitting - - - -   Cuff Size Medium Adult Large Adult - - - -   Pulse 54 63 - - - -   Temp 97.6 97.7 - - - -   Resp 18 18 3 5 17 21   Weight 239 lb 239 lb - - - -   Height 5' 5\" 5' 5\" - - - -   BMI (wt*703/ht~2) 39.77 kg/m2 39.77 kg/m2 - - - -   Some recent data might be hidden       Family History   Problem Relation Age of Onset    Cancer Mother      uterine    High Blood Pressure Father      aneurysm abd    Other Sister      aneurysm    Diabetes Brother     Breast Cancer Paternal Aunt        Social History   Substance Use Topics    Smoking status: Never Smoker    Smokeless tobacco: Never Used    Alcohol use No      Current Outpatient Prescriptions   Medication Sig Dispense Refill    busPIRone (BUSPAR) 10 MG tablet Take 10 mg by mouth 2 times daily      metFORMIN (GLUCOPHAGE) 1000 MG tablet Take 1 tablet by mouth 2 times daily (with meals) 60 tablet 5    tiZANidine (ZANAFLEX) 4 MG tablet Take 4 mg by mouth nightly Indications: Reversal of Nondepolarizing Muscle Relaxants      glimepiride (AMARYL) 2 MG tablet Take 2 mg by mouth every morning (before breakfast) Indications: Diabetes      simvastatin (ZOCOR) 10 MG tablet Take 10 mg by mouth nightly Indications: High Amount of Cholesterol in the Blood      lisinopril (PRINIVIL;ZESTRIL) 20 MG tablet Take 20 mg by mouth daily Indications: High Blood Pressure       levothyroxine (SYNTHROID) 125 MCG tablet Take 125 mcg by mouth Daily Indications: Underactive Thyroid  sertraline (ZOLOFT) 100 MG tablet Take 100 mg by mouth daily Indications: Depression       indomethacin (INDOCIN) 50 MG capsule Take 1 capsule by mouth 3 times daily for 7 days For gout 21 capsule 1     No current facility-administered medications for this visit. Allergies   Allergen Reactions    Pcn [Penicillins] Rash       Health Maintenance   Topic Date Due    Hepatitis C screen  1955    Diabetic foot exam  11/22/1967    Diabetic retinal exam  11/22/1967    Lipid screen  11/22/1967    HIV screen  11/22/1972    Diabetic microalbuminuria test  11/22/1975    DTaP/Tdap/Td vaccine (1 - Tdap) 11/22/1976    Pneumococcal med risk (1 of 1 - PPSV23) 11/22/1976    Cervical cancer screen  11/22/1978    Breast cancer screen  11/22/2007    Shingles Vaccine (1 of 2 - 2 Dose Series) 11/22/2007    Colon cancer screen colonoscopy  11/22/2007    A1C test (Diabetic or Prediabetic)  08/31/2017    Potassium monitoring  09/02/2017    Creatinine monitoring  09/02/2017    Flu vaccine (1) 03/16/2019 (Originally 9/1/2017)       Subjective:      Review of Systems   Endocrine:        Diabetes   Neurological: Positive for dizziness and light-headedness. Objective:     Physical Exam   Constitutional: She is oriented to person, place, and time. She appears well-developed and well-nourished. HENT:   Head: Normocephalic. Right Ear: External ear normal.   Left Ear: External ear normal.   Eyes: Pupils are equal, round, and reactive to light. Neck: Normal range of motion. Cardiovascular: Normal rate, regular rhythm, normal heart sounds and intact distal pulses. Pulmonary/Chest: Effort normal and breath sounds normal.   Neurological: She is alert and oriented to person, place, and time. Skin: Skin is warm and dry. Psychiatric: She has a normal mood and affect. Her behavior is normal. Judgment and thought content normal.   Nursing note and vitals reviewed.     /78 (Site: Right Arm, Position:

## 2018-03-16 ENCOUNTER — OFFICE VISIT (OUTPATIENT)
Dept: PRIMARY CARE CLINIC | Age: 63
End: 2018-03-16
Payer: COMMERCIAL

## 2018-03-16 VITALS
SYSTOLIC BLOOD PRESSURE: 132 MMHG | TEMPERATURE: 97.6 F | HEART RATE: 54 BPM | HEIGHT: 65 IN | WEIGHT: 239 LBS | RESPIRATION RATE: 18 BRPM | DIASTOLIC BLOOD PRESSURE: 83 MMHG | BODY MASS INDEX: 39.82 KG/M2 | OXYGEN SATURATION: 98 %

## 2018-03-16 DIAGNOSIS — Z13.220 ENCOUNTER FOR LIPID SCREENING FOR CARDIOVASCULAR DISEASE: ICD-10-CM

## 2018-03-16 DIAGNOSIS — Z13.6 ENCOUNTER FOR LIPID SCREENING FOR CARDIOVASCULAR DISEASE: ICD-10-CM

## 2018-03-16 DIAGNOSIS — M10.9 ACUTE GOUT INVOLVING TOE OF RIGHT FOOT, UNSPECIFIED CAUSE: Primary | ICD-10-CM

## 2018-03-16 DIAGNOSIS — Z13.1 SCREENING FOR DIABETES MELLITUS: ICD-10-CM

## 2018-03-16 DIAGNOSIS — Z12.11 ENCOUNTER FOR SCREENING COLONOSCOPY: ICD-10-CM

## 2018-03-16 DIAGNOSIS — Z12.31 SCREENING MAMMOGRAM, ENCOUNTER FOR: ICD-10-CM

## 2018-03-16 DIAGNOSIS — E11.9 TYPE 2 DIABETES MELLITUS WITHOUT COMPLICATION, WITHOUT LONG-TERM CURRENT USE OF INSULIN (HCC): ICD-10-CM

## 2018-03-16 DIAGNOSIS — Z79.899 MEDICATION MANAGEMENT: ICD-10-CM

## 2018-03-16 PROCEDURE — 99213 OFFICE O/P EST LOW 20 MIN: CPT | Performed by: NURSE PRACTITIONER

## 2018-03-16 RX ORDER — TRIAMCINOLONE ACETONIDE 40 MG/ML
40 INJECTION, SUSPENSION INTRA-ARTICULAR; INTRAMUSCULAR ONCE
Status: COMPLETED | OUTPATIENT
Start: 2018-03-16 | End: 2018-03-16

## 2018-03-16 RX ORDER — INDOMETHACIN 50 MG/1
50 CAPSULE ORAL 3 TIMES DAILY
Qty: 21 CAPSULE | Refills: 1 | Status: SHIPPED | OUTPATIENT
Start: 2018-03-16 | End: 2018-05-08 | Stop reason: SDUPTHER

## 2018-03-16 RX ADMIN — TRIAMCINOLONE ACETONIDE 40 MG: 40 INJECTION, SUSPENSION INTRA-ARTICULAR; INTRAMUSCULAR at 14:39

## 2018-03-16 NOTE — PROGRESS NOTES
1 [facility]  [address]  [address]  Dept: [phone]  Dept Fax: [phone]  Loc: [phone]    Rain Kellogg is a 63 y.o. female who presents today for her medical conditions/complaints as noted below. Rain Kellogg is c/o of Foot Pain (patient presents today with c/o right foot pain. Patient states that she rolled over in bed this morning and had a sharp pain. She states that she got up and noticied there was some swelling so she layed on the couch and propped it up. Patient states that she tried to give it a while to go away but it hasnt. She is a  and has to work tomorrow so she decided she needed to come in. Patient describes the pain as throbbing pain. )        HPI:     HPI   Chief Complaint   Patient presents with    Foot Pain     patient presents today with c/o right foot pain. Patient states that she rolled over in bed this morning and had a sharp pain. She states that she got up and noticied there was some swelling so she layed on the couch and propped it up. Patient states that she tried to give it a while to go away but it hasnt. She is a  and has to work tomorrow so she decided she needed to come in. Patient describes the pain as throbbing pain. she has never had gout before but is diabetic . She says her sugars are good. She has not had any injury.     Past Medical History:   Diagnosis Date    Diabetes mellitus ([address]     History of blood transfusion     1976 post childbirth    Hypertension     Thyroid disease       Past Surgical History:   Procedure Laterality Date    CHOLECYSTECTOMY      HYSTERECTOMY      TOTAL KNEE ARTHROPLASTY Left 8/30/2016    KNEE TOTAL ARTHROPLASTY performed by Amee Purcell MD at [address] [facility] 3/16/2018 3/5/2018 8/30/2016 8/30/2016 8/30/2016 5/80/9480   SYSTOLIC [phone]   DIASTOLIC 83 78 [phone]   Site Right Arm Right Arm - - - -   Position unspecified cause  Uric Acid   2. Screening mammogram, encounter for  Mammography screening bilateral   3. Encounter for screening colonoscopy  [facility] [place], [place],    4. Encounter for lipid screening for cardiovascular disease  Lipid Panel   5. Screening for diabetes mellitus  Comprehensive Metabolic Panel   6. Medication management  CBC   7. Type 2 diabetes mellitus without complication, without long-term current use of insulin (HCC)  Hemoglobin A1C       Plan:        Patient given educational materials - see patient instructions. Discussed use, benefit, and side effects of prescribed medications. All patient questions answered. Pt voiced understanding. Reviewed health maintenance. Instructed to continue current medications, diet and exercise. Patient agreed with treatment plan. Follow up as directed. MEDICATIONS:  Orders Placed This Encounter   Medications    triamcinolone acetonide (KENALOG-40) injection 40 mg    indomethacin (INDOCIN) 50 MG capsule     Sig: Take 1 capsule by mouth 3 times daily for 7 days For gout     Dispense:  21 capsule     Refill:  1         ORDERS:  Orders Placed This Encounter   Procedures    Mammography screening bilateral    Uric Acid    CBC    Comprehensive Metabolic Panel    Hemoglobin A1C    Lipid Panel   [facility]- Julio Post, DO       Follow-up:  Return if symptoms worsen or fail to improve. PATIENT INSTRUCTIONS:  Patient Instructions       Patient Education      start the indomethacin tomorrow  Return next week for labs  We may need to put you a prevention meds. Gout: Care Instructions  Your Care Instructions    Gout is a form of arthritis caused by a buildup of uric acid crystals in a joint. It causes sudden attacks of pain, swelling, redness, and stiffness, usually in one joint, especially the big toe. Gout usually comes on without a cause.  But it can be brought on by drinking alcohol (especially beer) or eating seafood

## 2018-03-16 NOTE — PATIENT INSTRUCTIONS
care for yourself at home? · Plan your meals and snacks around foods that are low in purines and are safe for you to eat. These foods include:  ¨ Green vegetables and tomatoes. ¨ Fruits. ¨ Whole-grain breads, rice, and cereals. ¨ Eggs, peanut butter, and nuts. ¨ Low-fat milk, cheese, and other milk products. ¨ Popcorn. ¨ Gelatin desserts, chocolate, cocoa, and cakes and sweets, in small amounts. · You can eat certain foods that are medium-high in purines, but eat them only once in a while. These foods include:  ¨ Legumes, such as dried beans and dried peas. You can have 1 cup cooked legumes each day. ¨ Asparagus, cauliflower, spinach, mushrooms, and green peas. ¨ Fish and seafood (other than very high-purine seafood). ¨ Oatmeal, wheat bran, and wheat germ. · Limit very high-purine foods, including:  ¨ Organ meats, such as liver, kidneys, sweetbreads, and brains. ¨ Meats, including clemens, beef, pork, and lamb. ¨ Game meats and any other meats in large amounts. ¨ Anchovies, sardines, herring, mackerel, and scallops. ¨ Gravy. ¨ Beer. Where can you learn more? Go to [email or web address] and sign in to your [employer] account. Enter F448 in the [facility] box to learn more about \"Purine-Restricted Diet: Care Instructions. \"     If you do not have an account, please click on the \"Sign Up Now\" link. Current as of: May 12, 2017  Content Version: 11.5  © 0064-1051 [employer], [employer]. Care instructions adapted under license by [facility] ([facility]). If you have questions about a medical condition or this instruction, always ask your healthcare professional. Shan Grant any warranty or liability for your use of this information.

## 2018-03-27 ENCOUNTER — TELEPHONE (OUTPATIENT)
Dept: GASTROENTEROLOGY | Age: 63
End: 2018-03-27

## 2018-03-29 ENCOUNTER — NURSE ONLY (OUTPATIENT)
Dept: PRIMARY CARE CLINIC | Age: 63
End: 2018-03-29
Payer: COMMERCIAL

## 2018-03-29 DIAGNOSIS — Z79.899 MEDICATION MANAGEMENT: ICD-10-CM

## 2018-03-29 DIAGNOSIS — M10.9 ACUTE GOUT INVOLVING TOE OF RIGHT FOOT, UNSPECIFIED CAUSE: ICD-10-CM

## 2018-03-29 DIAGNOSIS — Z13.220 ENCOUNTER FOR LIPID SCREENING FOR CARDIOVASCULAR DISEASE: ICD-10-CM

## 2018-03-29 DIAGNOSIS — E11.9 TYPE 2 DIABETES MELLITUS WITHOUT COMPLICATION, WITHOUT LONG-TERM CURRENT USE OF INSULIN (HCC): ICD-10-CM

## 2018-03-29 DIAGNOSIS — Z13.1 SCREENING FOR DIABETES MELLITUS: ICD-10-CM

## 2018-03-29 DIAGNOSIS — Z13.6 ENCOUNTER FOR LIPID SCREENING FOR CARDIOVASCULAR DISEASE: ICD-10-CM

## 2018-03-29 LAB
ALBUMIN SERPL-MCNC: 4.2 G/DL (ref 3.5–5.2)
ALP BLD-CCNC: 90 U/L (ref 35–104)
ALT SERPL-CCNC: 19 U/L (ref 5–33)
ANION GAP SERPL CALCULATED.3IONS-SCNC: 13 MMOL/L (ref 7–19)
AST SERPL-CCNC: 16 U/L (ref 5–32)
BILIRUB SERPL-MCNC: 0.4 MG/DL (ref 0.2–1.2)
BUN BLDV-MCNC: 26 MG/DL (ref 8–23)
CALCIUM SERPL-MCNC: 9.2 MG/DL (ref 8.8–10.2)
CHLORIDE BLD-SCNC: 101 MMOL/L (ref 98–111)
CHOLESTEROL, TOTAL: 151 MG/DL (ref 160–199)
CO2: 26 MMOL/L (ref 22–29)
CREAT SERPL-MCNC: 0.7 MG/DL (ref 0.5–0.9)
GFR NON-AFRICAN AMERICAN: >60
GLUCOSE BLD-MCNC: 144 MG/DL (ref 74–109)
HBA1C MFR BLD: 6.8 %
HCT VFR BLD CALC: 45.6 % (ref 37–47)
HDLC SERPL-MCNC: 64 MG/DL (ref 65–121)
HEMOGLOBIN: 14.6 G/DL (ref 12–16)
LDL CHOLESTEROL CALCULATED: 75 MG/DL
MCH RBC QN AUTO: 28.1 PG (ref 27–31)
MCHC RBC AUTO-ENTMCNC: 32 G/DL (ref 33–37)
MCV RBC AUTO: 87.9 FL (ref 81–99)
PDW BLD-RTO: 13.8 % (ref 11.5–14.5)
PLATELET # BLD: 300 K/UL (ref 130–400)
PMV BLD AUTO: 10.3 FL (ref 9.4–12.3)
POTASSIUM SERPL-SCNC: 4.5 MMOL/L (ref 3.5–5)
RBC # BLD: 5.19 M/UL (ref 4.2–5.4)
SODIUM BLD-SCNC: 140 MMOL/L (ref 136–145)
TOTAL PROTEIN: 7.2 G/DL (ref 6.6–8.7)
TRIGL SERPL-MCNC: 60 MG/DL (ref 0–149)
URIC ACID, SERUM: 4.1 MG/DL (ref 2.4–5.7)
WBC # BLD: 7.8 K/UL (ref 4.8–10.8)

## 2018-03-29 PROCEDURE — 36415 COLL VENOUS BLD VENIPUNCTURE: CPT | Performed by: NURSE PRACTITIONER

## 2018-04-10 ENCOUNTER — APPOINTMENT (OUTPATIENT)
Dept: GENERAL RADIOLOGY | Age: 63
End: 2018-04-10
Payer: COMMERCIAL

## 2018-04-10 ENCOUNTER — HOSPITAL ENCOUNTER (EMERGENCY)
Age: 63
Discharge: HOME | End: 2018-04-10
Payer: COMMERCIAL

## 2018-04-10 VITALS
BODY MASS INDEX: 39.82 KG/M2 | OXYGEN SATURATION: 96 % | SYSTOLIC BLOOD PRESSURE: 132 MMHG | WEIGHT: 239 LBS | DIASTOLIC BLOOD PRESSURE: 77 MMHG | TEMPERATURE: 98.1 F | RESPIRATION RATE: 18 BRPM | HEIGHT: 65 IN | HEART RATE: 71 BPM

## 2018-04-10 DIAGNOSIS — S80.02XA CONTUSION OF LEFT KNEE, INITIAL ENCOUNTER: Primary | ICD-10-CM

## 2018-04-10 PROCEDURE — 73560 X-RAY EXAM OF KNEE 1 OR 2: CPT

## 2018-04-10 PROCEDURE — 99283 EMERGENCY DEPT VISIT LOW MDM: CPT

## 2018-04-10 PROCEDURE — 99283 EMERGENCY DEPT VISIT LOW MDM: CPT | Performed by: NURSE PRACTITIONER

## 2018-04-10 ASSESSMENT — ENCOUNTER SYMPTOMS
GASTROINTESTINAL NEGATIVE: 1
RESPIRATORY NEGATIVE: 1
COLOR CHANGE: 1

## 2018-04-10 ASSESSMENT — PAIN SCALES - GENERAL: PAINLEVEL_OUTOF10: 10

## 2018-04-10 ASSESSMENT — PAIN DESCRIPTION - LOCATION: LOCATION: KNEE

## 2018-04-10 ASSESSMENT — PAIN DESCRIPTION - ORIENTATION: ORIENTATION: LEFT

## 2018-04-10 ASSESSMENT — PAIN DESCRIPTION - DESCRIPTORS: DESCRIPTORS: CONSTANT

## 2018-05-08 ENCOUNTER — OFFICE VISIT (OUTPATIENT)
Dept: PRIMARY CARE CLINIC | Age: 63
End: 2018-05-08
Payer: COMMERCIAL

## 2018-05-08 VITALS
SYSTOLIC BLOOD PRESSURE: 110 MMHG | DIASTOLIC BLOOD PRESSURE: 70 MMHG | TEMPERATURE: 96.6 F | HEIGHT: 65 IN | OXYGEN SATURATION: 96 % | WEIGHT: 239 LBS | HEART RATE: 65 BPM | BODY MASS INDEX: 39.82 KG/M2 | RESPIRATION RATE: 16 BRPM

## 2018-05-08 DIAGNOSIS — Z82.49 FAMILY HISTORY OF ABDOMINAL AORTIC ANEURYSM: ICD-10-CM

## 2018-05-08 DIAGNOSIS — M54.6 LEFT-SIDED THORACIC BACK PAIN, UNSPECIFIED CHRONICITY: Primary | ICD-10-CM

## 2018-05-08 LAB
BILIRUBIN, POC: NORMAL
BLOOD URINE, POC: NORMAL
CLARITY, POC: CLEAR
COLOR, POC: YELLOW
GLUCOSE URINE, POC: NORMAL
KETONES, POC: NORMAL
LEUKOCYTE EST, POC: NORMAL
NITRITE, POC: NORMAL
PH, POC: 5
PROTEIN, POC: NORMAL
SPECIFIC GRAVITY, POC: 1010
UROBILINOGEN, POC: 0.2

## 2018-05-08 PROCEDURE — 81002 URINALYSIS NONAUTO W/O SCOPE: CPT | Performed by: NURSE PRACTITIONER

## 2018-05-08 PROCEDURE — 99213 OFFICE O/P EST LOW 20 MIN: CPT | Performed by: NURSE PRACTITIONER

## 2018-05-08 RX ORDER — BLOOD-GLUCOSE METER
1 KIT MISCELLANEOUS
Qty: 1 KIT | Refills: 0 | Status: SHIPPED | OUTPATIENT
Start: 2018-05-08 | End: 2019-08-20 | Stop reason: SDUPTHER

## 2018-05-08 RX ORDER — INDOMETHACIN 50 MG/1
50 CAPSULE ORAL 3 TIMES DAILY
Qty: 21 CAPSULE | Refills: 1 | Status: SHIPPED | OUTPATIENT
Start: 2018-05-08 | End: 2018-09-06

## 2018-05-08 RX ORDER — TRIAMCINOLONE ACETONIDE 40 MG/ML
40 INJECTION, SUSPENSION INTRA-ARTICULAR; INTRAMUSCULAR ONCE
Status: COMPLETED | OUTPATIENT
Start: 2018-05-08 | End: 2018-05-08

## 2018-05-08 RX ADMIN — TRIAMCINOLONE ACETONIDE 40 MG: 40 INJECTION, SUSPENSION INTRA-ARTICULAR; INTRAMUSCULAR at 16:30

## 2018-05-08 ASSESSMENT — ENCOUNTER SYMPTOMS: BACK PAIN: 1

## 2018-05-09 DIAGNOSIS — M54.6 LEFT-SIDED THORACIC BACK PAIN, UNSPECIFIED CHRONICITY: ICD-10-CM

## 2018-05-09 DIAGNOSIS — Z82.49 FAMILY HISTORY OF ABDOMINAL AORTIC ANEURYSM: ICD-10-CM

## 2018-05-13 ASSESSMENT — ENCOUNTER SYMPTOMS
CONSTIPATION: 0
NAUSEA: 0
BLOOD IN STOOL: 0
DIARRHEA: 0
ABDOMINAL PAIN: 1
RECTAL PAIN: 0
VOMITING: 0
ABDOMINAL DISTENTION: 0
ANAL BLEEDING: 0

## 2018-05-15 DIAGNOSIS — E11.9 CONTROLLED TYPE 2 DIABETES MELLITUS WITHOUT COMPLICATION, WITHOUT LONG-TERM CURRENT USE OF INSULIN (HCC): ICD-10-CM

## 2018-05-28 DIAGNOSIS — I10 ESSENTIAL HYPERTENSION: ICD-10-CM

## 2018-05-29 RX ORDER — LISINOPRIL 20 MG/1
20 TABLET ORAL DAILY
Qty: 30 TABLET | Refills: 5 | OUTPATIENT
Start: 2018-05-29

## 2018-05-29 RX ORDER — LISINOPRIL 20 MG/1
20 TABLET ORAL DAILY
Qty: 30 TABLET | Refills: 3 | Status: SHIPPED | OUTPATIENT
Start: 2018-05-29 | End: 2018-09-06 | Stop reason: SDUPTHER

## 2018-05-29 RX ORDER — SERTRALINE HYDROCHLORIDE 100 MG/1
100 TABLET, FILM COATED ORAL DAILY
Qty: 30 TABLET | Refills: 3 | Status: SHIPPED | OUTPATIENT
Start: 2018-05-29 | End: 2018-09-06 | Stop reason: SDUPTHER

## 2018-07-05 DIAGNOSIS — F32.A ANXIETY AND DEPRESSION: ICD-10-CM

## 2018-07-05 DIAGNOSIS — F41.9 ANXIETY AND DEPRESSION: ICD-10-CM

## 2018-07-05 DIAGNOSIS — E78.2 MIXED HYPERLIPIDEMIA: ICD-10-CM

## 2018-07-05 RX ORDER — BUSPIRONE HYDROCHLORIDE 10 MG/1
10 TABLET ORAL 2 TIMES DAILY
Qty: 60 TABLET | Refills: 0 | Status: SHIPPED | OUTPATIENT
Start: 2018-07-05

## 2018-07-05 RX ORDER — SIMVASTATIN 10 MG
10 TABLET ORAL EVERY EVENING
Qty: 30 TABLET | Refills: 0 | Status: SHIPPED | OUTPATIENT
Start: 2018-07-05

## 2018-07-11 RX ORDER — LEVOTHYROXINE SODIUM 137 UG/1
137 TABLET ORAL DAILY
Qty: 30 TABLET | Refills: 3 | Status: SHIPPED | OUTPATIENT
Start: 2018-07-11 | End: 2018-12-31 | Stop reason: SDUPTHER

## 2018-07-11 RX ORDER — LEVOTHYROXINE SODIUM 137 UG/1
137 TABLET ORAL DAILY
COMMUNITY
End: 2018-07-11 | Stop reason: SDUPTHER

## 2018-08-06 RX ORDER — DICLOFENAC SODIUM 75 MG/1
75 TABLET, DELAYED RELEASE ORAL 2 TIMES DAILY
Qty: 60 TABLET | Refills: 3 | Status: SHIPPED | OUTPATIENT
Start: 2018-08-06 | End: 2018-09-06 | Stop reason: ALTCHOICE

## 2018-08-06 RX ORDER — DICLOFENAC SODIUM 75 MG/1
75 TABLET, DELAYED RELEASE ORAL 2 TIMES DAILY
COMMUNITY
End: 2018-08-06 | Stop reason: SDUPTHER

## 2018-09-06 ENCOUNTER — OFFICE VISIT (OUTPATIENT)
Dept: PRIMARY CARE CLINIC | Age: 63
End: 2018-09-06
Payer: COMMERCIAL

## 2018-09-06 VITALS
WEIGHT: 249 LBS | SYSTOLIC BLOOD PRESSURE: 126 MMHG | BODY MASS INDEX: 41.48 KG/M2 | HEART RATE: 52 BPM | HEIGHT: 65 IN | TEMPERATURE: 96.9 F | DIASTOLIC BLOOD PRESSURE: 82 MMHG | RESPIRATION RATE: 16 BRPM | OXYGEN SATURATION: 98 %

## 2018-09-06 DIAGNOSIS — E11.9 TYPE 2 DIABETES MELLITUS WITHOUT COMPLICATION, WITHOUT LONG-TERM CURRENT USE OF INSULIN (HCC): Primary | ICD-10-CM

## 2018-09-06 DIAGNOSIS — E78.00 HYPERCHOLESTEREMIA: ICD-10-CM

## 2018-09-06 DIAGNOSIS — M19.90 OSTEOARTHRITIS, UNSPECIFIED OSTEOARTHRITIS TYPE, UNSPECIFIED SITE: ICD-10-CM

## 2018-09-06 DIAGNOSIS — Z79.899 MEDICATION MANAGEMENT: ICD-10-CM

## 2018-09-06 DIAGNOSIS — M13.811 OTHER SPECIFIED ARTHRITIS, RIGHT SHOULDER: ICD-10-CM

## 2018-09-06 DIAGNOSIS — R25.2 LEG CRAMPS: ICD-10-CM

## 2018-09-06 DIAGNOSIS — E03.9 ACQUIRED HYPOTHYROIDISM: ICD-10-CM

## 2018-09-06 LAB
ALBUMIN SERPL-MCNC: 4.3 G/DL (ref 3.5–5.2)
ALP BLD-CCNC: 96 U/L (ref 35–104)
ALT SERPL-CCNC: 22 U/L (ref 5–33)
ANION GAP SERPL CALCULATED.3IONS-SCNC: 18 MMOL/L (ref 7–19)
AST SERPL-CCNC: 18 U/L (ref 5–32)
BILIRUB SERPL-MCNC: 0.4 MG/DL (ref 0.2–1.2)
BUN BLDV-MCNC: 19 MG/DL (ref 8–23)
CALCIUM SERPL-MCNC: 10.1 MG/DL (ref 8.8–10.2)
CHLORIDE BLD-SCNC: 98 MMOL/L (ref 98–111)
CHOLESTEROL, TOTAL: 165 MG/DL (ref 160–199)
CO2: 24 MMOL/L (ref 22–29)
CREAT SERPL-MCNC: 0.7 MG/DL (ref 0.5–0.9)
CREATININE URINE: 89.2 MG/DL (ref 4.2–622)
GFR NON-AFRICAN AMERICAN: >60
GLUCOSE BLD-MCNC: 186 MG/DL (ref 74–109)
HBA1C MFR BLD: 8.7 % (ref 4–6)
HDLC SERPL-MCNC: 55 MG/DL (ref 65–121)
LDL CHOLESTEROL CALCULATED: 92 MG/DL
MAGNESIUM: 1.9 MG/DL (ref 1.6–2.4)
MICROALBUMIN UR-MCNC: <1.2 MG/DL (ref 0–19)
MICROALBUMIN/CREAT UR-RTO: NORMAL MG/G
POTASSIUM SERPL-SCNC: 4.6 MMOL/L (ref 3.5–5)
SODIUM BLD-SCNC: 140 MMOL/L (ref 136–145)
T4 FREE: 1.5 NG/DL (ref 0.9–1.7)
TOTAL CK: 128 U/L (ref 26–192)
TOTAL PROTEIN: 7.2 G/DL (ref 6.6–8.7)
TRIGL SERPL-MCNC: 92 MG/DL (ref 0–149)
TSH SERPL DL<=0.05 MIU/L-ACNC: 3.78 UIU/ML (ref 0.27–4.2)

## 2018-09-06 PROCEDURE — 20610 DRAIN/INJ JOINT/BURSA W/O US: CPT | Performed by: NURSE PRACTITIONER

## 2018-09-06 PROCEDURE — 99214 OFFICE O/P EST MOD 30 MIN: CPT | Performed by: NURSE PRACTITIONER

## 2018-09-06 PROCEDURE — 36415 COLL VENOUS BLD VENIPUNCTURE: CPT | Performed by: NURSE PRACTITIONER

## 2018-09-06 RX ORDER — BUSPIRONE HYDROCHLORIDE 10 MG/1
10 TABLET ORAL 2 TIMES DAILY
Qty: 60 TABLET | Refills: 5 | Status: SHIPPED | OUTPATIENT
Start: 2018-09-06 | End: 2019-03-07 | Stop reason: SDUPTHER

## 2018-09-06 RX ORDER — TIZANIDINE 4 MG/1
4 TABLET ORAL NIGHTLY
Qty: 30 TABLET | Refills: 5 | Status: SHIPPED | OUTPATIENT
Start: 2018-09-06 | End: 2020-01-15

## 2018-09-06 RX ORDER — CELECOXIB 100 MG/1
100 CAPSULE ORAL 2 TIMES DAILY
Qty: 60 CAPSULE | Refills: 5 | Status: SHIPPED | OUTPATIENT
Start: 2018-09-06 | End: 2018-11-05

## 2018-09-06 RX ORDER — SERTRALINE HYDROCHLORIDE 100 MG/1
100 TABLET, FILM COATED ORAL DAILY
Qty: 30 TABLET | Refills: 5 | Status: SHIPPED | OUTPATIENT
Start: 2018-09-06 | End: 2019-03-07 | Stop reason: SDUPTHER

## 2018-09-06 RX ORDER — LISINOPRIL 20 MG/1
20 TABLET ORAL DAILY
Qty: 30 TABLET | Refills: 5 | Status: SHIPPED | OUTPATIENT
Start: 2018-09-06 | End: 2019-03-07 | Stop reason: SDUPTHER

## 2018-09-06 ASSESSMENT — PATIENT HEALTH QUESTIONNAIRE - PHQ9
SUM OF ALL RESPONSES TO PHQ9 QUESTIONS 1 & 2: 0
1. LITTLE INTEREST OR PLEASURE IN DOING THINGS: 0
SUM OF ALL RESPONSES TO PHQ QUESTIONS 1-9: 0
SUM OF ALL RESPONSES TO PHQ QUESTIONS 1-9: 0
2. FEELING DOWN, DEPRESSED OR HOPELESS: 0

## 2018-09-06 ASSESSMENT — ENCOUNTER SYMPTOMS
EYES NEGATIVE: 1
GASTROINTESTINAL NEGATIVE: 1
RESPIRATORY NEGATIVE: 1

## 2018-09-06 NOTE — PATIENT INSTRUCTIONS
We will base labs on thyroid meds  Stop the diclofenac we will try to get Celebrex approved for arthritis  If labs normal we will hold the cholesterol medicine to see if cramps go away

## 2018-09-06 NOTE — PROGRESS NOTES
screen  03/29/2019    Potassium monitoring  03/29/2019    Creatinine monitoring  03/29/2019    Breast cancer screen  05/04/2020       Subjective:      Review of Systems   Constitutional: Positive for activity change (Right shoulder pain). HENT: Negative. Eyes: Negative. Respiratory: Negative. Cardiovascular: Negative. Gastrointestinal: Negative. Endocrine: Negative. Diabetes, thyroid problems   Genitourinary: Negative. Musculoskeletal: Positive for myalgias. Shoulder pain   Neurological: Negative. Hematological: Negative. Psychiatric/Behavioral: Negative. Objective:     Physical Exam   Constitutional: She is oriented to person, place, and time. She appears well-developed and well-nourished. HENT:   Head: Normocephalic. Right Ear: External ear normal.   Left Ear: External ear normal.   Eyes: Pupils are equal, round, and reactive to light. Neck: Normal range of motion. Cardiovascular: Normal rate, regular rhythm, normal heart sounds and intact distal pulses. Pulmonary/Chest: Effort normal and breath sounds normal.   Abdominal: Soft. Bowel sounds are normal.   Genitourinary:   Genitourinary Comments: declined  exam   Musculoskeletal:        Right shoulder: She exhibits decreased range of motion and tenderness. She exhibits no bony tenderness, no swelling, no effusion, no crepitus, no deformity, no laceration, no pain, no spasm, normal pulse and normal strength. Neurological: She is alert and oriented to person, place, and time. Skin: Skin is warm and dry. Psychiatric: She has a normal mood and affect. Her behavior is normal. Judgment and thought content normal.   Nursing note and vitals reviewed.     /82   Pulse 52   Temp 96.9 °F (36.1 °C)   Resp 16   Ht 5' 5\" (1.651 m)   Wt 249 lb (112.9 kg)   SpO2 98%   BMI 41.44 kg/m²    Procedure: Shoulder Injection  Indication: right Shoulder pain related to arthritis  Consent: Verbal consent obtained from the patient    Palpation of posterior shoulder and area marked when the sulcus was identified between the humerus and acromion. Using sterile technique the area was cleansed with betadine. Ethyl Chloride was used for topical anesthetic. Using Kenalog 40mg and lidocaine 1% plain 2ml, 18 ga 1.5 inch needle, inserted approx 2 cm inferior and medial to posterolateral corner of the acromion and directed it anteriorly toward the coracoid process. Aspirated with no return of blood and the medication was injected into the joint space. Pt tolerated well. Pressure held for 2 min after injection. I discussed with the patient that she may have some temporary relief followed by 1-2 days of worsening pain. She should get some relief over the next few days from the arthritic pain. She may continue to take anti-inflammatories for the pain. Assessment:       Diagnosis Orders   1. Type 2 diabetes mellitus without complication, without long-term current use of insulin ([facility])  Microalbumin / Creatinine Urine Ratio    Hemoglobin A1C   2. Acquired hypothyroidism  TSH without Reflex    T4, Free   3. Medication management  Comprehensive Metabolic Panel    Lipid Panel   4. Hypercholesteremia  Lipid Panel   5. Leg cramps  Magnesium    CK   6. Osteoarthritis, unspecified osteoarthritis type, unspecified site  Cyclic Citrul Peptide Antibody, IgG   7. Other specified arthritis, right shoulder  MO ARTHROCENTESIS ASPIR&/INJ MAJOR JT/BURSA W/O US       Plan:   More than 50% of the time was spent counseling and coordinating care for a total time of 25 min face to face. Patient given educational materials - see patient instructions. Discussed use, benefit, and side effects of prescribed medications. All patient questions answered. Pt voiced understanding. Reviewed health maintenance. Instructed to continue current medications, diet and exercise. Patient agreed with treatment plan. Follow up as directed.

## 2018-09-08 LAB — CCP IGG ANTIBODIES: 15 UNITS (ref 0–19)

## 2018-11-05 ENCOUNTER — OFFICE VISIT (OUTPATIENT)
Dept: PRIMARY CARE CLINIC | Age: 63
End: 2018-11-05
Payer: COMMERCIAL

## 2018-11-05 VITALS
TEMPERATURE: 96.5 F | DIASTOLIC BLOOD PRESSURE: 93 MMHG | HEIGHT: 65 IN | HEART RATE: 70 BPM | RESPIRATION RATE: 18 BRPM | OXYGEN SATURATION: 98 % | BODY MASS INDEX: 41.15 KG/M2 | SYSTOLIC BLOOD PRESSURE: 142 MMHG | WEIGHT: 247 LBS

## 2018-11-05 DIAGNOSIS — M54.42 ACUTE LEFT-SIDED LOW BACK PAIN WITH LEFT-SIDED SCIATICA: Primary | ICD-10-CM

## 2018-11-05 PROCEDURE — 99213 OFFICE O/P EST LOW 20 MIN: CPT | Performed by: NURSE PRACTITIONER

## 2018-11-05 RX ORDER — PREDNISONE 10 MG/1
TABLET ORAL
Qty: 18 TABLET | Refills: 0 | Status: SHIPPED | OUTPATIENT
Start: 2018-11-05 | End: 2019-03-07 | Stop reason: CLARIF

## 2018-11-05 RX ORDER — TIZANIDINE 4 MG/1
TABLET ORAL
Qty: 30 TABLET | Refills: 0 | Status: SHIPPED | OUTPATIENT
Start: 2018-11-05 | End: 2019-03-07 | Stop reason: CLARIF

## 2018-11-05 ASSESSMENT — ENCOUNTER SYMPTOMS: BACK PAIN: 1

## 2018-11-05 NOTE — PROGRESS NOTES
[address] [place] [address]  [address] [address] [place]  [id number]  Dept: [phone]  Dept Fax: [phone]  Loc: [phone]    Raheel Fragoso is a 63 y.o. female who presents today for her medical conditions/complaints as noted below. Raheel Fragoso is c/o of Hip Pain (Patient presents today with c/o pain in L hip. )        HPI:     HPI   Chief Complaint   Patient presents with    Hip Pain     Patient presents today with c/o pain in L hip. She is a  and is on her feet a lot. The pain starts in her left lower back and goes down her left hip and around the front of her left eye. It has been bothering her a lot especially when she is up trying to walk.   sHe is not taking anything other than the diclofenac she is been on in the past    Past Medical History:   Diagnosis Date    Diabetes mellitus ([address]     History of blood transfusion     1976 post childbirth    Hypertension     Thyroid disease       Past Surgical History:   Procedure Laterality Date    CHOLECYSTECTOMY      HYSTERECTOMY      TOTAL KNEE ARTHROPLASTY Left 8/30/2016    KNEE TOTAL ARTHROPLASTY performed by Talita Arroyo MD at [facility] 11/5/2018 9/6/2018 5/8/2018 4/10/2018 3/16/2018 9/0/2503   SYSTOLIC [phone] [phone]   DIASTOLIC 93 82 70 77 83 78   Site - - Left Arm - Right Arm Right Arm   Position - - Sitting - Sitting Sitting   Cuff Size - - Large Adult - Medium Adult Large Adult   Pulse 70 52 65 71 54 63   Temp 96.5 96.9 96.6 98.1 97.6 97.7   Resp 18 16 16 18 18 18   Weight 247 lb 249 lb 239 lb 239 lb 239 lb 239 lb   Height 5' 5\" 5' 5\" 5' 5\" 5' 5\" 5' 5\" 5' 5\"   BMI (wt*703/ht~2) 41.1 kg/m2 41.43 kg/m2 39.77 kg/m2 39.77 kg/m2 39.77 kg/m2 39.77 kg/m2   Pain Level - - - 10 - -   Some recent data might be hidden       Family History   Problem Relation Age of Onset    Cancer Mother         uterine    High Blood Pressure Father         aneurysm abd    Other Sister         aneurysm brain    Diabetes Brother     Breast Cancer Paternal Aunt        Social History   Substance Use Topics    Smoking status: Never Smoker    Smokeless tobacco: Never Used    Alcohol use No      Current Outpatient Prescriptions   Medication Sig Dispense Refill    predniSONE (DELTASONE) 10 MG tablet Days 1-3 take 1 PO TID, days 4-6 take 1 PO BID, days 7-9 take 1 PO daily. Stop diclofenac 18 tablet 0    tiZANidine (ZANAFLEX) 4 MG tablet 1 PO HS muscle relaxer prn 30 tablet 0    lisinopril (PRINIVIL;ZESTRIL) 20 MG tablet Take 1 tablet by mouth daily 30 tablet 5    sertraline (ZOLOFT) 100 MG tablet Take 1 tablet by mouth daily 30 tablet 5    busPIRone (BUSPAR) 10 MG tablet Take 1 tablet by mouth 2 times daily 60 tablet 5    metFORMIN (GLUCOPHAGE) 1000 MG tablet Take 1 tablet by mouth 2 times daily (with meals) 60 tablet 5    tiZANidine (ZANAFLEX) 4 MG tablet Take 1 tablet by mouth nightly 30 tablet 5    levothyroxine (SYNTHROID) 137 MCG tablet Take 1 tablet by mouth Daily 30 tablet 3    glucose monitoring kit (FREESTYLE) monitoring kit 1 kit by Does not apply route 3 times daily (before meals) 1 kit 0    glucose blood VI test strips (ASCENSIA AUTODISC VI;ONE TOUCH ULTRA TEST VI) strip Test blood sugar 3 times a day before meals and as needed 100 each 3    vitamin D (CHOLECALCIFEROL) 1000 UNIT TABS tablet Take 1,000 Units by mouth daily       No current facility-administered medications for this visit.       Allergies   Allergen Reactions    Pcn [Penicillins] Rash       Health Maintenance   Topic Date Due    Hepatitis C screen  1955    Diabetic foot exam  11/22/1967    Diabetic retinal exam  11/22/1967    HIV screen  11/22/1972    DTaP/Tdap/Td vaccine (1 - Tdap) 11/22/1976    Pneumococcal med risk (1 of 1 - PPSV23) 11/22/1976    Cervical cancer screen  11/22/1978    Shingles Vaccine (1 of 2 - 2 Dose Series) 11/22/2007    Colon cancer screen colonoscopy  11/22/2007 have any pain. The pain is actually originating from her left lower back     Patient given educational materials -see patient instructions. Discussed use, benefit, and side effects of prescribed medications. All patient questions answered. Pt voiced understanding. Reviewed health maintenance. Instructed to continue currentmedications, diet and exercise. Patient agreed with treatment plan. Follow up as directed. MEDICATIONS:  Orders Placed This Encounter   Medications    predniSONE (DELTASONE) 10 MG tablet     Sig: Days 1-3 take 1 PO TID, days 4-6 take 1 PO BID, days 7-9 take 1 PO daily. Stop diclofenac     Dispense:  18 tablet     Refill:  0    tiZANidine (ZANAFLEX) 4 MG tablet     Si PO HS muscle relaxer prn     Dispense:  30 tablet     Refill:  0         ORDERS:  No orders of the defined types were placed in this encounter. Follow-up:  No Follow-up on file. PATIENT INSTRUCTIONS:  Patient Instructions   See exercises below  Start steroids and hold diclofenac while taking steroids. If sugar gets over 300 cut the steroid in half and if still high will have to stop them. See exercises below, use warm moist heat to back and stretch as much as possible  If not better plain xray and Phyiscal therapy. Patient Education        Sciatica: Exercises  Your Care Instructions  Here are some examples of typical rehabilitation exercises for your condition. Start each exercise slowly. Ease off the exercise if you start to have pain. Your doctor or physical therapist will tell you when you can start these exercises and which ones will work best for you. When you are not being active, find a comfortable position for rest. Some people are comfortable on the floor or a medium-firm bed with a small pillow under their head and another under their knees. Some people prefer to lie on their side with a pillow between their knees. Don't stay in one position for too long.   Take short walks (10 to 20 minutes) every 2 feel a stretch in the upper thigh of your rear leg. 3. Hold the stretch for at least 15 to 30 seconds. Repeat with your other leg. 4. Do 2 to 4 times on each side. Wall sit    1. Stand with your back 10 to 12 inches away from a wall. 2. Lean into the wall until your back is flat against it. 3. Slowly slide down until your knees are slightly bent, pressing your lower back into the wall. 4. Hold for about 6 seconds, then slide back up the wall. 5. Repeat 8 to 12 times. Follow-up care is a key part of your treatment and safety. Be sure to make and go to all appointments, and call your doctor if you are having problems. It's also a good idea to know your test results and keep a list of the medicines you take. Where can you learn more? Go to [email or web address] and sign in to your [employer] account. Enter Y027 in the [employer] box to learn more about \"Low Back Pain: Exercises. \"     If you do not have an account, please click on the \"Sign Up Now\" link. Current as of: November 29, 2017  Content Version: 11.7  © 3663-7357 [employer], [employer]. Care instructions adapted under license by [facility] ([facility]). If you have questions about a medical condition or this instruction, always ask your healthcare professional. [address] any warranty or liability for your use of this information. Electronically signed by MIKEY Austin CNP on 11/5/2018 at 5:48 PM    EMR Dragon/transcription disclaimer:  Much of thisencounter note is electronic transcription/translation of spoken language to printed texts. The electronic translation of spoken language may be erroneous, or at times, nonsensical words or phrases may be inadvertentlytranscribed.   Although I have reviewed the note for such errors, some may still exist.

## 2018-12-31 RX ORDER — DICLOFENAC SODIUM 75 MG/1
TABLET, DELAYED RELEASE ORAL
Qty: 60 TABLET | Refills: 3 | Status: SHIPPED | OUTPATIENT
Start: 2018-12-31 | End: 2019-03-07 | Stop reason: SDUPTHER

## 2018-12-31 RX ORDER — LEVOTHYROXINE SODIUM 137 UG/1
TABLET ORAL
Qty: 30 TABLET | Refills: 3 | Status: SHIPPED | OUTPATIENT
Start: 2018-12-31 | End: 2019-03-07 | Stop reason: SDUPTHER

## 2019-03-07 ENCOUNTER — OFFICE VISIT (OUTPATIENT)
Dept: PRIMARY CARE CLINIC | Age: 64
End: 2019-03-07
Payer: COMMERCIAL

## 2019-03-07 VITALS
TEMPERATURE: 96.7 F | WEIGHT: 243 LBS | BODY MASS INDEX: 40.48 KG/M2 | HEIGHT: 65 IN | DIASTOLIC BLOOD PRESSURE: 77 MMHG | HEART RATE: 60 BPM | SYSTOLIC BLOOD PRESSURE: 129 MMHG | OXYGEN SATURATION: 98 %

## 2019-03-07 DIAGNOSIS — Z12.31 SCREENING MAMMOGRAM, ENCOUNTER FOR: ICD-10-CM

## 2019-03-07 DIAGNOSIS — E03.9 ACQUIRED HYPOTHYROIDISM: ICD-10-CM

## 2019-03-07 DIAGNOSIS — E11.9 TYPE 2 DIABETES MELLITUS WITHOUT COMPLICATION, WITHOUT LONG-TERM CURRENT USE OF INSULIN (HCC): Primary | ICD-10-CM

## 2019-03-07 DIAGNOSIS — B37.31 VAGINAL YEAST INFECTION: ICD-10-CM

## 2019-03-07 LAB
ALBUMIN SERPL-MCNC: 4.2 G/DL (ref 3.5–5.2)
ALP BLD-CCNC: 124 U/L (ref 35–104)
ALT SERPL-CCNC: 29 U/L (ref 5–33)
ANION GAP SERPL CALCULATED.3IONS-SCNC: 15 MMOL/L (ref 7–19)
AST SERPL-CCNC: 18 U/L (ref 5–32)
BILIRUB SERPL-MCNC: 0.3 MG/DL (ref 0.2–1.2)
BUN BLDV-MCNC: 19 MG/DL (ref 8–23)
CALCIUM SERPL-MCNC: 9.5 MG/DL (ref 8.8–10.2)
CHLORIDE BLD-SCNC: 98 MMOL/L (ref 98–111)
CHOLESTEROL, TOTAL: 183 MG/DL (ref 160–199)
CO2: 25 MMOL/L (ref 22–29)
CREAT SERPL-MCNC: 0.6 MG/DL (ref 0.5–0.9)
GFR NON-AFRICAN AMERICAN: >60
GLUCOSE BLD-MCNC: 256 MG/DL (ref 74–109)
HDLC SERPL-MCNC: 47 MG/DL (ref 65–121)
LDL CHOLESTEROL CALCULATED: 116 MG/DL
POTASSIUM SERPL-SCNC: 4.7 MMOL/L (ref 3.5–5)
SODIUM BLD-SCNC: 138 MMOL/L (ref 136–145)
T4 FREE: 0.8 NG/DL (ref 0.9–1.7)
TOTAL PROTEIN: 7.3 G/DL (ref 6.6–8.7)
TRIGL SERPL-MCNC: 101 MG/DL (ref 0–149)
TSH SERPL DL<=0.05 MIU/L-ACNC: 19.9 UIU/ML (ref 0.27–4.2)

## 2019-03-07 PROCEDURE — 36415 COLL VENOUS BLD VENIPUNCTURE: CPT | Performed by: NURSE PRACTITIONER

## 2019-03-07 PROCEDURE — 99213 OFFICE O/P EST LOW 20 MIN: CPT | Performed by: NURSE PRACTITIONER

## 2019-03-07 RX ORDER — LISINOPRIL 20 MG/1
20 TABLET ORAL DAILY
Qty: 30 TABLET | Refills: 5 | Status: SHIPPED | OUTPATIENT
Start: 2019-03-07 | End: 2019-09-19 | Stop reason: ALTCHOICE

## 2019-03-07 RX ORDER — BUSPIRONE HYDROCHLORIDE 10 MG/1
10 TABLET ORAL 2 TIMES DAILY
Qty: 60 TABLET | Refills: 5 | Status: SHIPPED | OUTPATIENT
Start: 2019-03-07 | End: 2020-05-08

## 2019-03-07 RX ORDER — LEVOTHYROXINE SODIUM 137 UG/1
TABLET ORAL
Qty: 30 TABLET | Refills: 3 | Status: SHIPPED | OUTPATIENT
Start: 2019-03-07 | End: 2019-03-08 | Stop reason: DRUGHIGH

## 2019-03-07 RX ORDER — SERTRALINE HYDROCHLORIDE 100 MG/1
100 TABLET, FILM COATED ORAL DAILY
Qty: 30 TABLET | Refills: 5 | Status: SHIPPED | OUTPATIENT
Start: 2019-03-07 | End: 2019-10-04 | Stop reason: SDUPTHER

## 2019-03-07 RX ORDER — DICLOFENAC SODIUM 75 MG/1
TABLET, DELAYED RELEASE ORAL
Qty: 60 TABLET | Refills: 3 | Status: ON HOLD | OUTPATIENT
Start: 2019-03-07 | End: 2019-11-21 | Stop reason: HOSPADM

## 2019-03-07 RX ORDER — FLUCONAZOLE 150 MG/1
TABLET ORAL
Qty: 2 TABLET | Refills: 0 | Status: SHIPPED | OUTPATIENT
Start: 2019-03-07 | End: 2019-08-20

## 2019-03-07 ASSESSMENT — PATIENT HEALTH QUESTIONNAIRE - PHQ9
2. FEELING DOWN, DEPRESSED OR HOPELESS: 0
SUM OF ALL RESPONSES TO PHQ QUESTIONS 1-9: 0
1. LITTLE INTEREST OR PLEASURE IN DOING THINGS: 0
SUM OF ALL RESPONSES TO PHQ QUESTIONS 1-9: 0
SUM OF ALL RESPONSES TO PHQ9 QUESTIONS 1 & 2: 0

## 2019-03-07 ASSESSMENT — ENCOUNTER SYMPTOMS: RESPIRATORY NEGATIVE: 1

## 2019-03-08 LAB — HBA1C MFR BLD: 11.3 % (ref 4–6)

## 2019-03-08 RX ORDER — LEVOTHYROXINE SODIUM 0.15 MG/1
150 TABLET ORAL DAILY
Qty: 30 TABLET | Refills: 5 | Status: SHIPPED | OUTPATIENT
Start: 2019-03-08 | End: 2019-10-04 | Stop reason: SDUPTHER

## 2019-08-16 ENCOUNTER — APPOINTMENT (OUTPATIENT)
Dept: CT IMAGING | Facility: HOSPITAL | Age: 64
End: 2019-08-16

## 2019-08-16 ENCOUNTER — HOSPITAL ENCOUNTER (EMERGENCY)
Facility: HOSPITAL | Age: 64
Discharge: HOME | End: 2019-08-16
Admitting: EMERGENCY MEDICINE

## 2019-08-16 ENCOUNTER — APPOINTMENT (OUTPATIENT)
Dept: GENERAL RADIOLOGY | Facility: HOSPITAL | Age: 64
End: 2019-08-16

## 2019-08-16 VITALS
HEIGHT: 65 IN | WEIGHT: 224 LBS | OXYGEN SATURATION: 96 % | TEMPERATURE: 98.1 F | RESPIRATION RATE: 16 BRPM | HEART RATE: 66 BPM | SYSTOLIC BLOOD PRESSURE: 108 MMHG | DIASTOLIC BLOOD PRESSURE: 42 MMHG | BODY MASS INDEX: 37.32 KG/M2

## 2019-08-16 DIAGNOSIS — J21.9 BRONCHIOLITIS: Primary | ICD-10-CM

## 2019-08-16 LAB
ACETONE BLD QL: NEGATIVE
ALBUMIN SERPL-MCNC: 4.3 G/DL (ref 3.5–5)
ALBUMIN/GLOB SERPL: 1.2 G/DL (ref 1.1–2.5)
ALP SERPL-CCNC: 178 U/L (ref 24–120)
ALT SERPL W P-5'-P-CCNC: 26 U/L (ref 0–54)
ANION GAP SERPL CALCULATED.3IONS-SCNC: 10 MMOL/L (ref 4–13)
ARTERIAL PATENCY WRIST A: ABNORMAL
AST SERPL-CCNC: 24 U/L (ref 7–45)
ATMOSPHERIC PRESS: 749 MMHG
BASE EXCESS BLDA CALC-SCNC: 5.7 MMOL/L (ref 0–2)
BASOPHILS # BLD AUTO: 0.05 10*3/MM3 (ref 0–0.2)
BASOPHILS NFR BLD AUTO: 0.5 % (ref 0–1.5)
BDY SITE: ABNORMAL
BILIRUB SERPL-MCNC: 0.5 MG/DL (ref 0.1–1)
BILIRUB UR QL STRIP: NEGATIVE
BODY TEMPERATURE: 37 C
BUN BLD-MCNC: 22 MG/DL (ref 5–21)
BUN/CREAT SERPL: 28.9 (ref 7–25)
CALCIUM SPEC-SCNC: 9.9 MG/DL (ref 8.4–10.4)
CHLORIDE SERPL-SCNC: 96 MMOL/L (ref 98–110)
CLARITY UR: CLEAR
CO2 SERPL-SCNC: 29 MMOL/L (ref 24–31)
COLOR UR: YELLOW
CREAT BLD-MCNC: 0.76 MG/DL (ref 0.5–1.4)
D DIMER PPP FEU-MCNC: 0.58 MG/L (FEU) (ref 0–0.5)
D-LACTATE SERPL-SCNC: 1.3 MMOL/L (ref 0.5–2)
DEPRECATED RDW RBC AUTO: 38.9 FL (ref 37–54)
EOSINOPHIL # BLD AUTO: 0.17 10*3/MM3 (ref 0–0.4)
EOSINOPHIL NFR BLD AUTO: 1.7 % (ref 0.3–6.2)
ERYTHROCYTE [DISTWIDTH] IN BLOOD BY AUTOMATED COUNT: 13 % (ref 12.3–15.4)
GFR SERPL CREATININE-BSD FRML MDRD: 77 ML/MIN/1.73
GLOBULIN UR ELPH-MCNC: 3.5 GM/DL
GLUCOSE BLD-MCNC: 410 MG/DL (ref 70–100)
GLUCOSE BLDC GLUCOMTR-MCNC: 324 MG/DL (ref 70–130)
GLUCOSE UR STRIP-MCNC: ABNORMAL MG/DL
HCO3 BLDA-SCNC: 29.4 MMOL/L (ref 20–26)
HCT VFR BLD AUTO: 43.3 % (ref 34–46.6)
HGB BLD-MCNC: 14.6 G/DL (ref 12–15.9)
HGB UR QL STRIP.AUTO: NEGATIVE
IMM GRANULOCYTES # BLD AUTO: 0.06 10*3/MM3 (ref 0–0.05)
IMM GRANULOCYTES NFR BLD AUTO: 0.6 % (ref 0–0.5)
KETONES UR QL STRIP: NEGATIVE
LEUKOCYTE ESTERASE UR QL STRIP.AUTO: NEGATIVE
LIPASE SERPL-CCNC: 99 U/L (ref 23–203)
LYMPHOCYTES # BLD AUTO: 1.53 10*3/MM3 (ref 0.7–3.1)
LYMPHOCYTES NFR BLD AUTO: 15.5 % (ref 19.6–45.3)
Lab: ABNORMAL
MCH RBC QN AUTO: 28.1 PG (ref 26.6–33)
MCHC RBC AUTO-ENTMCNC: 33.7 G/DL (ref 31.5–35.7)
MCV RBC AUTO: 83.3 FL (ref 79–97)
MODALITY: ABNORMAL
MONOCYTES # BLD AUTO: 0.8 10*3/MM3 (ref 0.1–0.9)
MONOCYTES NFR BLD AUTO: 8.1 % (ref 5–12)
NEUTROPHILS # BLD AUTO: 7.25 10*3/MM3 (ref 1.7–7)
NEUTROPHILS NFR BLD AUTO: 73.6 % (ref 42.7–76)
NITRITE UR QL STRIP: NEGATIVE
NRBC BLD AUTO-RTO: 0 /100 WBC (ref 0–0.2)
NT-PROBNP SERPL-MCNC: 247 PG/ML (ref 0–900)
PCO2 BLDA: 38.5 MM HG (ref 35–45)
PH BLDA: 7.49 PH UNITS (ref 7.35–7.45)
PH UR STRIP.AUTO: 8 [PH] (ref 5–8)
PLATELET # BLD AUTO: 326 10*3/MM3 (ref 140–450)
PMV BLD AUTO: 10.2 FL (ref 6–12)
PO2 BLDA: 73.8 MM HG (ref 83–108)
POTASSIUM BLD-SCNC: 4.6 MMOL/L (ref 3.5–5.3)
PROT SERPL-MCNC: 7.8 G/DL (ref 6.3–8.7)
PROT UR QL STRIP: NEGATIVE
RBC # BLD AUTO: 5.2 10*6/MM3 (ref 3.77–5.28)
SAO2 % BLDCOA: 95.3 % (ref 94–99)
SODIUM BLD-SCNC: 135 MMOL/L (ref 135–145)
SP GR UR STRIP: >1.03 (ref 1–1.03)
TROPONIN I SERPL-MCNC: <0.012 NG/ML (ref 0–0.03)
UROBILINOGEN UR QL STRIP: ABNORMAL
VENTILATOR MODE: ABNORMAL
WBC NRBC COR # BLD: 9.86 10*3/MM3 (ref 3.4–10.8)

## 2019-08-16 PROCEDURE — 36600 WITHDRAWAL OF ARTERIAL BLOOD: CPT

## 2019-08-16 PROCEDURE — 85379 FIBRIN DEGRADATION QUANT: CPT | Performed by: NURSE PRACTITIONER

## 2019-08-16 PROCEDURE — 81003 URINALYSIS AUTO W/O SCOPE: CPT | Performed by: NURSE PRACTITIONER

## 2019-08-16 PROCEDURE — 70450 CT HEAD/BRAIN W/O DYE: CPT

## 2019-08-16 PROCEDURE — 84484 ASSAY OF TROPONIN QUANT: CPT | Performed by: NURSE PRACTITIONER

## 2019-08-16 PROCEDURE — 71046 X-RAY EXAM CHEST 2 VIEWS: CPT

## 2019-08-16 PROCEDURE — 93010 ELECTROCARDIOGRAM REPORT: CPT | Performed by: INTERNAL MEDICINE

## 2019-08-16 PROCEDURE — 99283 EMERGENCY DEPT VISIT LOW MDM: CPT

## 2019-08-16 PROCEDURE — 83690 ASSAY OF LIPASE: CPT | Performed by: NURSE PRACTITIONER

## 2019-08-16 PROCEDURE — 93005 ELECTROCARDIOGRAM TRACING: CPT | Performed by: NURSE PRACTITIONER

## 2019-08-16 PROCEDURE — 82803 BLOOD GASES ANY COMBINATION: CPT

## 2019-08-16 PROCEDURE — 0 IOPAMIDOL PER 1 ML: Performed by: NURSE PRACTITIONER

## 2019-08-16 PROCEDURE — 85025 COMPLETE CBC W/AUTO DIFF WBC: CPT | Performed by: NURSE PRACTITIONER

## 2019-08-16 PROCEDURE — 71275 CT ANGIOGRAPHY CHEST: CPT

## 2019-08-16 PROCEDURE — 80053 COMPREHEN METABOLIC PANEL: CPT | Performed by: NURSE PRACTITIONER

## 2019-08-16 PROCEDURE — 96374 THER/PROPH/DIAG INJ IV PUSH: CPT

## 2019-08-16 PROCEDURE — 82962 GLUCOSE BLOOD TEST: CPT

## 2019-08-16 PROCEDURE — 82009 KETONE BODYS QUAL: CPT | Performed by: NURSE PRACTITIONER

## 2019-08-16 PROCEDURE — 83880 ASSAY OF NATRIURETIC PEPTIDE: CPT | Performed by: NURSE PRACTITIONER

## 2019-08-16 PROCEDURE — 25010000002 ONDANSETRON PER 1 MG: Performed by: NURSE PRACTITIONER

## 2019-08-16 PROCEDURE — 83605 ASSAY OF LACTIC ACID: CPT | Performed by: NURSE PRACTITIONER

## 2019-08-16 RX ORDER — ONDANSETRON 2 MG/ML
4 INJECTION INTRAMUSCULAR; INTRAVENOUS ONCE
Status: COMPLETED | OUTPATIENT
Start: 2019-08-16 | End: 2019-08-16

## 2019-08-16 RX ORDER — AZITHROMYCIN 250 MG/1
TABLET, FILM COATED ORAL
Qty: 6 TABLET | Refills: 0 | Status: SHIPPED | OUTPATIENT
Start: 2019-08-16

## 2019-08-16 RX ORDER — ALBUTEROL SULFATE 90 UG/1
2 AEROSOL, METERED RESPIRATORY (INHALATION) EVERY 4 HOURS PRN
Qty: 1 INHALER | Refills: 0 | Status: SHIPPED | OUTPATIENT
Start: 2019-08-16 | End: 2019-08-23

## 2019-08-16 RX ORDER — BENZONATATE 200 MG/1
200 CAPSULE ORAL 3 TIMES DAILY PRN
Qty: 9 CAPSULE | Refills: 0 | Status: SHIPPED | OUTPATIENT
Start: 2019-08-16 | End: 2019-08-19

## 2019-08-16 RX ORDER — SODIUM CHLORIDE 0.9 % (FLUSH) 0.9 %
10 SYRINGE (ML) INJECTION AS NEEDED
Status: DISCONTINUED | OUTPATIENT
Start: 2019-08-16 | End: 2019-08-16 | Stop reason: HOSPADM

## 2019-08-16 RX ADMIN — SODIUM CHLORIDE 1000 ML: 9 INJECTION, SOLUTION INTRAVENOUS at 17:46

## 2019-08-16 RX ADMIN — IOPAMIDOL 74 ML: 755 INJECTION, SOLUTION INTRAVENOUS at 18:53

## 2019-08-16 RX ADMIN — ONDANSETRON HYDROCHLORIDE 4 MG: 2 SOLUTION INTRAMUSCULAR; INTRAVENOUS at 17:47

## 2019-08-16 NOTE — ED PROVIDER NOTES
Subjective   Patient is a 63-year-old female that presents to the ER today with complaint of cough, congestion, shortness of breath, and vomiting as well as dizziness.  Patient reports that she has had a nonproductive cough for the past 3 weeks.  She reports that she has had some sinus congestion as well.  She denies any earache or sore throat.  She reports that today she went to work became dizzy.  States that she was having to hold onto objects in order to walk around.  She states that she did vomit twice a day.  She denies any abdominal pain or chest pain.  She denies fever.  Patient is a non-smoker.  She denies any previous history of COPD or asthma in the past.  Patient has no previous history of PE or DVT in the past.  The patient reports that she has not followed up with her primary care provider for her symptoms.  She has not taken any medications for this in the past.  She reports that she has been breaking out in sweats at home.  The patient states that her friend convinced her to come to the ER today to be evaluated.  She reports that she is a type II diabetic and has hypertension.  She denies any other medical history.  She presents the ER today for further evaluation.        History provided by:  Patient   used: No    Shortness of Breath   Severity:  Mild  Onset quality:  Sudden  Duration:  3 weeks  Timing:  Intermittent  Progression:  Unchanged  Chronicity:  New  Context: not activity, not animal exposure, not emotional upset, not fumes, not known allergens, not occupational exposure, not pollens, not smoke exposure, not strong odors, not URI and not weather changes    Relieved by:  Nothing  Worsened by:  Nothing  Ineffective treatments:  None tried  Associated symptoms: no abdominal pain, no chest pain, no claudication, no cough, no diaphoresis, no ear pain, no fever, no headaches, no hemoptysis, no neck pain, no PND, no rash, no sore throat, no sputum production, no syncope, no  swollen glands, no vomiting and no wheezing    Risk factors: no recent alcohol use, no family hx of DVT, no hx of cancer, no hx of PE/DVT, no obesity, no oral contraceptive use, no prolonged immobilization, no recent surgery and no tobacco use        Review of Systems   Constitutional: Negative for diaphoresis and fever.   HENT: Negative for ear pain and sore throat.    Respiratory: Positive for shortness of breath. Negative for cough, hemoptysis, sputum production and wheezing.    Cardiovascular: Negative for chest pain, claudication, syncope and PND.   Gastrointestinal: Negative for abdominal pain and vomiting.   Musculoskeletal: Negative for neck pain.   Skin: Negative for rash.   Neurological: Negative for headaches.   All other systems reviewed and are negative.      Past Medical History:   Diagnosis Date   • Cervical spondylosis with radiculopathy    • Cervicalgia    • Diabetes mellitus, type 2 (CMS/HCC)    • Hypertension    • Obesity due to excess calories    • Prescription refill    • Radiculopathy, cervical        Allergies   Allergen Reactions   • Penicillins        Past Surgical History:   Procedure Laterality Date   • CHOLECYSTECTOMY     • HYSTERECTOMY     • REPLACEMENT TOTAL KNEE Left 08/2016       Family History   Problem Relation Age of Onset   • Cancer Mother    • Hypertension Father    • Stroke Father    • Arthritis Paternal Grandfather        Social History     Socioeconomic History   • Marital status:      Spouse name: Not on file   • Number of children: Not on file   • Years of education: Not on file   • Highest education level: Not on file   Tobacco Use   • Smoking status: Never Smoker   • Smokeless tobacco: Never Used   Substance and Sexual Activity   • Alcohol use: No   • Drug use: No   • Sexual activity: Yes     Partners: Male     Birth control/protection: None           Objective   Physical Exam   Constitutional: She is oriented to person, place, and time. She appears well-developed  and well-nourished.   HENT:   Head: Normocephalic and atraumatic.   Eyes: Conjunctivae are normal. Pupils are equal, round, and reactive to light.   Cardiovascular: Normal rate, regular rhythm and normal heart sounds.   Pulmonary/Chest: Effort normal and breath sounds normal.   Abdominal: Soft. Bowel sounds are normal.   Neurological: She is alert and oriented to person, place, and time.   Skin: Skin is warm and dry. Capillary refill takes less than 2 seconds.   Psychiatric: She has a normal mood and affect.   Nursing note and vitals reviewed.      Procedures           ED Course  ED Course as of Aug 21 0837   Wed Aug 21, 2019   0826 Patient's labs were reviewed.  Her white blood cell count was normal.  Lactate was normal.  The patient's ABG showed a pH 7.4, PCO2 of 38.5, PO2 of 73, bicarb of 29.  The patient did have a CT chest ordered that showed bronchiolitis, no evidence of Pe.  CT head shows no acute findings.  Patient was ambulated and her oxygen remained between 96 to 98% on room air.  She did become short of breath.  [LF]   0827 I did offer the patient admission on 2 occasions.  She has declined this.  The patient will be discharged home at this time in stable condition.  I will give her prescription for Zithromax, pro-air inhaler, Tessalon Perles.  Would not want to give steroids at this time as the patient has uncontrolled blood sugars with her Dm.   [LF]   0836 Advised patient to push p.o. fluids at home.  Advised her to follow-up with her primary care provider on Monday.  Patient advised return to ER for any new or worsening symptoms.  To be discharged home at this time in stable condition.  [LF]      ED Course User Index  [LF] Lily Smith, APRN        CT Angiogram Chest With Contrast   Final Result   1. No evidence of pulmonary embolus.   2. Left heart enlargement with evidence for pulmonary arterial   hypertension.   3. Inflammatory nodularity in the left lower lobe with peribronchial    thickening along the left lower lobe basal segmental airways, suspect an   infectious bronchiolitis.           This report was finalized on 08/16/2019 19:18 by Dr Rosendo Bright, .      CT Head Without Contrast   Final Result   Impression:     1. No acute intracranial process.   2. Acute on chronic paranasal sinus disease.   This report was finalized on 08/16/2019 19:13 by Dr Rosendo Bright, .      XR Chest 2 View   Final Result   1. Stable chest exam without acute process. No visualized infiltrate.           This report was finalized on 08/16/2019 17:38 by Dr Rosendo Bright, .        Labs Reviewed   COMPREHENSIVE METABOLIC PANEL - Abnormal; Notable for the following components:       Result Value    Glucose 410 (*)     BUN 22 (*)     Chloride 96 (*)     Alkaline Phosphatase 178 (*)     BUN/Creatinine Ratio 28.9 (*)     All other components within normal limits    Narrative:     GFR Normal >60  Chronic Kidney Disease <60  Kidney Failure <15   URINALYSIS W/ CULTURE IF INDICATED - Abnormal; Notable for the following components:    Specific Gravity, UA >1.030 (*)     Glucose, UA >=1000 mg/dL (3+) (*)     All other components within normal limits    Narrative:     Urine microscopic not indicated.   CBC WITH AUTO DIFFERENTIAL - Abnormal; Notable for the following components:    Lymphocyte % 15.5 (*)     Immature Grans % 0.6 (*)     Neutrophils, Absolute 7.25 (*)     Immature Grans, Absolute 0.06 (*)     All other components within normal limits   D-DIMER, QUANTITATIVE - Abnormal; Notable for the following components:    D-Dimer, Quantitative 0.58 (*)     All other components within normal limits    Narrative:     Reference Range is 0-0.50 mg/L FEU. However, results <0.50 mg/L FEU tends to rule out DVT or PE. Results >0.50 mg/L FEU are not useful in predicting absence or presence of DVT or PE.   BLOOD GAS, ARTERIAL - Abnormal; Notable for the following components:    pH, Arterial 7.491 (*)     pO2, Arterial 73.8 (*)      HCO3, Arterial 29.4 (*)     Base Excess, Arterial 5.7 (*)     All other components within normal limits   POCT GLUCOSE FINGERSTICK - Abnormal; Notable for the following components:    Glucose 324 (*)     All other components within normal limits   LIPASE - Normal   ACETONE - Normal   TROPONIN (IN-HOUSE) - Normal   BNP (IN-HOUSE) - Normal   LACTIC ACID, PLASMA - Normal   BLOOD GAS, ARTERIAL   POCT GLUCOSE FINGERSTICK   CBC AND DIFFERENTIAL    Narrative:     The following orders were created for panel order CBC & Differential.  Procedure                               Abnormality         Status                     ---------                               -----------         ------                     CBC Auto Differential[[id number]]        Abnormal            Final result                 Please view results for these tests on the individual orders.               MDM  Number of Diagnoses or Management Options  Bronchiolitis: new and requires workup     Amount and/or Complexity of Data Reviewed  Clinical lab tests: ordered and reviewed  Tests in the radiology section of CPT®: ordered and reviewed  Tests in the medicine section of CPT®: ordered and reviewed  Discuss the patient with other providers: yes    Patient Progress  Patient progress: stable        Final diagnoses:   Bronchiolitis            Lily Smith, APRN  08/21/19 0837

## 2019-08-17 NOTE — ED NOTES
PT AMBULATED WITH O2 PROBE. WAITING FOR FURTHER TREATMENT PLAN     Sylvia Whitfield, RN  08/16/19 [id number]

## 2019-08-20 ENCOUNTER — OFFICE VISIT (OUTPATIENT)
Dept: PRIMARY CARE CLINIC | Age: 64
End: 2019-08-20
Payer: COMMERCIAL

## 2019-08-20 VITALS
HEART RATE: 81 BPM | BODY MASS INDEX: 36.82 KG/M2 | SYSTOLIC BLOOD PRESSURE: 90 MMHG | HEIGHT: 65 IN | WEIGHT: 221 LBS | TEMPERATURE: 96.9 F | DIASTOLIC BLOOD PRESSURE: 60 MMHG | RESPIRATION RATE: 16 BRPM | OXYGEN SATURATION: 98 %

## 2019-08-20 DIAGNOSIS — R42 DIZZINESS: ICD-10-CM

## 2019-08-20 DIAGNOSIS — E03.9 ACQUIRED HYPOTHYROIDISM: ICD-10-CM

## 2019-08-20 DIAGNOSIS — J20.9 ACUTE BRONCHITIS, UNSPECIFIED ORGANISM: ICD-10-CM

## 2019-08-20 DIAGNOSIS — E11.9 TYPE 2 DIABETES MELLITUS WITHOUT COMPLICATION, WITHOUT LONG-TERM CURRENT USE OF INSULIN (HCC): Primary | ICD-10-CM

## 2019-08-20 LAB
ALBUMIN SERPL-MCNC: 4.3 G/DL (ref 3.5–5.2)
ALP BLD-CCNC: 106 U/L (ref 35–104)
ALT SERPL-CCNC: 19 U/L (ref 5–33)
ANION GAP SERPL CALCULATED.3IONS-SCNC: 16 MMOL/L (ref 7–19)
AST SERPL-CCNC: 17 U/L (ref 5–32)
BILIRUB SERPL-MCNC: 0.5 MG/DL (ref 0.2–1.2)
BUN BLDV-MCNC: 15 MG/DL (ref 8–23)
CALCIUM SERPL-MCNC: 9.8 MG/DL (ref 8.8–10.2)
CHLORIDE BLD-SCNC: 95 MMOL/L (ref 98–111)
CO2: 24 MMOL/L (ref 22–29)
CREAT SERPL-MCNC: 1 MG/DL (ref 0.5–0.9)
GFR NON-AFRICAN AMERICAN: 56
GLUCOSE BLD-MCNC: 383 MG/DL (ref 74–109)
HBA1C MFR BLD: 11.1 % (ref 4–6)
HCT VFR BLD CALC: 48 % (ref 37–47)
HEMOGLOBIN: 15.5 G/DL (ref 12–16)
MCH RBC QN AUTO: 28.2 PG (ref 27–31)
MCHC RBC AUTO-ENTMCNC: 32.3 G/DL (ref 33–37)
MCV RBC AUTO: 87.3 FL (ref 81–99)
PDW BLD-RTO: 13.3 % (ref 11.5–14.5)
PLATELET # BLD: 367 K/UL (ref 130–400)
PMV BLD AUTO: 10.6 FL (ref 9.4–12.3)
POTASSIUM SERPL-SCNC: 4.3 MMOL/L (ref 3.5–5)
RBC # BLD: 5.5 M/UL (ref 4.2–5.4)
SODIUM BLD-SCNC: 135 MMOL/L (ref 136–145)
T4 FREE: 1.3 NG/DL (ref 0.9–1.7)
TOTAL PROTEIN: 8 G/DL (ref 6.6–8.7)
TSH SERPL DL<=0.05 MIU/L-ACNC: 6.19 UIU/ML (ref 0.27–4.2)
WBC # BLD: 9.5 K/UL (ref 4.8–10.8)

## 2019-08-20 PROCEDURE — 36415 COLL VENOUS BLD VENIPUNCTURE: CPT | Performed by: NURSE PRACTITIONER

## 2019-08-20 PROCEDURE — 99214 OFFICE O/P EST MOD 30 MIN: CPT | Performed by: NURSE PRACTITIONER

## 2019-08-20 PROCEDURE — 82962 GLUCOSE BLOOD TEST: CPT | Performed by: NURSE PRACTITIONER

## 2019-08-20 RX ORDER — GUAIFENESIN 600 MG/1
600 TABLET, EXTENDED RELEASE ORAL 2 TIMES DAILY
Qty: 60 TABLET | Refills: 2 | Status: SHIPPED | OUTPATIENT
Start: 2019-08-20 | End: 2019-09-19

## 2019-08-20 RX ORDER — BLOOD-GLUCOSE METER
1 KIT MISCELLANEOUS
Qty: 1 KIT | Refills: 0 | Status: SHIPPED | OUTPATIENT
Start: 2019-08-20

## 2019-08-20 RX ORDER — AZITHROMYCIN 250 MG/1
TABLET, FILM COATED ORAL
COMMUNITY
Start: 2019-08-16 | End: 2019-09-19

## 2019-08-20 RX ORDER — METHYLPREDNISOLONE 4 MG/1
TABLET ORAL
COMMUNITY
Start: 2017-11-20 | End: 2019-08-20

## 2019-08-20 RX ORDER — BENZONATATE 200 MG/1
CAPSULE ORAL
Refills: 0 | COMMUNITY
Start: 2019-08-17 | End: 2019-08-23

## 2019-08-20 RX ORDER — ALBUTEROL SULFATE 90 UG/1
2 AEROSOL, METERED RESPIRATORY (INHALATION)
COMMUNITY
Start: 2019-08-16 | End: 2019-08-20

## 2019-08-20 NOTE — PROGRESS NOTES
MEDICATIONS:  Orders Placed This Encounter   Medications    glucose monitoring kit (FREESTYLE) monitoring kit     Si kit by Does not apply route 3 times daily (before meals) Dx E11.8     Dispense:  1 kit     Refill:  0     One touch if covered.  Dulaglutide (TRULICITY) 6.65 FD/4.2GM SOPN     Sig: Inject 0.75 mg into the skin once a week     Dispense:  4 pen     Refill:  3    guaiFENesin (MUCINEX) 600 MG extended release tablet     Sig: Take 1 tablet by mouth 2 times daily     Dispense:  60 tablet     Refill:  2         ORDERS:  Orders Placed This Encounter   Procedures    CBC    Comprehensive Metabolic Panel    TSH without Reflex    T4, Free    Hemoglobin A1C    POCT Glucose       Follow-up:  Return for 3 days. PATIENT INSTRUCTIONS:  Patient Instructions   dulera 2 puffs twice a day  Add mucinex twice a day for secretions and lots of water  Discuss colonscopy next visit. Metformin is twice a day  Add trulicity 4.78 weekly to meds  Work hard on diet and exercise. Cut the lisinopril in half to 10 mg    Electronically signed by MIKEY Pak CNP on 2019 at 11:35 AM    EMR Dragon/transcription disclaimer:  Much of thisencounter note is electronic transcription/translation of spoken language to printed texts. The electronic translation of spoken language may be erroneous, or at times, nonsensical words or phrases may be inadvertentlytranscribed.   Although I have reviewed the note for such errors, some may still exist.

## 2019-08-21 ASSESSMENT — ENCOUNTER SYMPTOMS
COUGH: 1
GASTROINTESTINAL NEGATIVE: 1

## 2019-08-22 RX ORDER — GLUCOSAMINE HCL/CHONDROITIN SU 500-400 MG
CAPSULE ORAL
Qty: 100 STRIP | Refills: 3 | Status: SHIPPED | OUTPATIENT
Start: 2019-08-22

## 2019-08-22 NOTE — ED NOTES
"ED Call Back Questions    1. How are you doing since leaving the Emergency Department?  I am doing ok    2. Do you have any questions about your discharge instructions? No     3. Have you filled your new prescriptions yet? Yes   a. Do you have any questions about those medications? No     4. Were you able to make a follow-up appointment with the physician? Yes     5. Do you have a primary care physician? Yes   a. If No, would you like for me to set you up with one? No   i. If Yes, “I will have our ED  give you a call right back at this number to work with you on the best time for an appointment.”    6. We are always looking to get better at what we do. Do you have any suggestions for what we can do to be even better? N/A  a. If Yes, \"Thank you for sharing your concerns. I apologize. I will follow up with our manager and patient . Would you like someone to call you back?\" N/A    7. Is there anything else I can do for you? N/A visit was Chau Davalos  08/22/19 1203    "

## 2019-08-23 ENCOUNTER — OFFICE VISIT (OUTPATIENT)
Dept: PRIMARY CARE CLINIC | Age: 64
End: 2019-08-23
Payer: COMMERCIAL

## 2019-08-23 VITALS
HEART RATE: 78 BPM | RESPIRATION RATE: 16 BRPM | DIASTOLIC BLOOD PRESSURE: 80 MMHG | HEIGHT: 65 IN | SYSTOLIC BLOOD PRESSURE: 124 MMHG | WEIGHT: 218 LBS | TEMPERATURE: 97 F | BODY MASS INDEX: 36.32 KG/M2

## 2019-08-23 DIAGNOSIS — R63.4 WEIGHT LOSS: ICD-10-CM

## 2019-08-23 DIAGNOSIS — E11.9 TYPE 2 DIABETES MELLITUS WITHOUT COMPLICATION, WITHOUT LONG-TERM CURRENT USE OF INSULIN (HCC): Primary | ICD-10-CM

## 2019-08-23 LAB
CHP ED QC CHECK: ABNORMAL
GLUCOSE BLD-MCNC: 218 MG/DL

## 2019-08-23 PROCEDURE — 99213 OFFICE O/P EST LOW 20 MIN: CPT | Performed by: NURSE PRACTITIONER

## 2019-08-23 PROCEDURE — 82962 GLUCOSE BLOOD TEST: CPT | Performed by: NURSE PRACTITIONER

## 2019-08-23 RX ORDER — ONDANSETRON 4 MG/1
TABLET, FILM COATED ORAL
Qty: 60 TABLET | Refills: 1 | Status: SHIPPED | OUTPATIENT
Start: 2019-08-23 | End: 2020-07-07

## 2019-08-23 RX ORDER — ONDANSETRON 4 MG/1
TABLET, FILM COATED ORAL
Qty: 60 TABLET | Refills: 1 | Status: SHIPPED | OUTPATIENT
Start: 2019-08-23 | End: 2019-08-23 | Stop reason: SDUPTHER

## 2019-08-23 RX ORDER — BLOOD-GLUCOSE METER
1 KIT MISCELLANEOUS DAILY
Refills: 0 | COMMUNITY
Start: 2019-08-21

## 2019-08-23 ASSESSMENT — ENCOUNTER SYMPTOMS
COUGH: 1
NAUSEA: 1
CONSTIPATION: 0
DIARRHEA: 0

## 2019-08-23 NOTE — PROGRESS NOTES
Test blood sugar 3 times a day before meals and as needed 100 each 3     No current facility-administered medications for this visit. Allergies   Allergen Reactions    Pcn [Penicillins] Rash       Health Maintenance   Topic Date Due    Hepatitis C screen  1955    Diabetic retinal exam  11/22/1967    HIV screen  11/22/1972    DTaP/Tdap/Td vaccine (1 - Tdap) 11/22/1976    Cervical cancer screen  11/22/1978    Colon cancer screen colonoscopy  11/22/2007    Diabetic microalbuminuria test  09/06/2019    Pneumococcal 0-64 years Vaccine (1 of 1 - PPSV23) 03/02/2020 (Originally 11/22/1961)    Shingles Vaccine (1 of 2) 03/07/2020 (Originally 11/22/2007)    Flu vaccine (1) 09/01/2019    A1C test (Diabetic or Prediabetic)  11/20/2019    Diabetic foot exam  03/07/2020    Lipid screen  03/07/2020    TSH testing  08/20/2020    Potassium monitoring  08/20/2020    Creatinine monitoring  08/20/2020    Breast cancer screen  05/06/2021       Subjective:      Review of Systems   Constitutional: Positive for fatigue. Negative for fever. HENT: Negative. Respiratory: Positive for cough. Gastrointestinal: Positive for nausea. Negative for constipation and diarrhea. Endocrine:        Diabetes   Psychiatric/Behavioral: Negative. Objective:     Physical Exam   Constitutional: She is oriented to person, place, and time. She appears well-developed and well-nourished. HENT:   Head: Normocephalic. Cardiovascular: Normal rate, regular rhythm, normal heart sounds and intact distal pulses. Pulmonary/Chest: Effort normal and breath sounds normal.   Neurological: She is alert and oriented to person, place, and time. Skin: Skin is warm and dry. Psychiatric: She has a normal mood and affect. Her behavior is normal. Judgment and thought content normal.   Nursing note and vitals reviewed.     /80   Pulse 78   Temp 97 °F (36.1 °C) (Temporal)   Resp 16   Ht 5' 5\" (1.651 m)   Wt 218 lb (98.9

## 2019-09-19 ENCOUNTER — OFFICE VISIT (OUTPATIENT)
Dept: PRIMARY CARE CLINIC | Age: 64
End: 2019-09-19
Payer: COMMERCIAL

## 2019-09-19 VITALS
DIASTOLIC BLOOD PRESSURE: 88 MMHG | WEIGHT: 223.2 LBS | BODY MASS INDEX: 37.19 KG/M2 | SYSTOLIC BLOOD PRESSURE: 125 MMHG | HEIGHT: 65 IN | TEMPERATURE: 97 F | RESPIRATION RATE: 18 BRPM | HEART RATE: 74 BPM | OXYGEN SATURATION: 97 %

## 2019-09-19 DIAGNOSIS — E11.9 TYPE 2 DIABETES MELLITUS WITHOUT COMPLICATION, WITHOUT LONG-TERM CURRENT USE OF INSULIN (HCC): Primary | ICD-10-CM

## 2019-09-19 LAB
CREATININE URINE: 121.7 MG/DL (ref 4.2–622)
MICROALBUMIN UR-MCNC: <1.2 MG/DL (ref 0–19)
MICROALBUMIN/CREAT UR-RTO: NORMAL MG/G

## 2019-09-19 PROCEDURE — 99213 OFFICE O/P EST LOW 20 MIN: CPT | Performed by: NURSE PRACTITIONER

## 2019-09-19 RX ORDER — AMLODIPINE BESYLATE 5 MG/1
5 TABLET ORAL DAILY
Qty: 30 TABLET | Refills: 3 | Status: SHIPPED | OUTPATIENT
Start: 2019-09-19 | End: 2020-02-03

## 2019-10-04 RX ORDER — DICLOFENAC SODIUM 75 MG/1
TABLET, DELAYED RELEASE ORAL
Qty: 60 TABLET | Refills: 3 | Status: SHIPPED | OUTPATIENT
Start: 2019-10-04 | End: 2019-11-13

## 2019-10-04 RX ORDER — LEVOTHYROXINE SODIUM 0.15 MG/1
TABLET ORAL
Qty: 30 TABLET | Refills: 5 | Status: SHIPPED | OUTPATIENT
Start: 2019-10-04 | End: 2020-05-08

## 2019-10-04 RX ORDER — SERTRALINE HYDROCHLORIDE 100 MG/1
TABLET, FILM COATED ORAL
Qty: 30 TABLET | Refills: 5 | Status: SHIPPED | OUTPATIENT
Start: 2019-10-04 | End: 2020-04-16

## 2019-11-07 ENCOUNTER — NURSE ONLY (OUTPATIENT)
Dept: PRIMARY CARE CLINIC | Age: 64
End: 2019-11-07
Payer: COMMERCIAL

## 2019-11-07 DIAGNOSIS — E03.9 ACQUIRED HYPOTHYROIDISM: ICD-10-CM

## 2019-11-07 DIAGNOSIS — E11.9 TYPE 2 DIABETES MELLITUS WITHOUT COMPLICATION, WITHOUT LONG-TERM CURRENT USE OF INSULIN (HCC): Primary | ICD-10-CM

## 2019-11-07 LAB
ALBUMIN SERPL-MCNC: 4.3 G/DL (ref 3.5–5.2)
ALP BLD-CCNC: 93 U/L (ref 35–104)
ALT SERPL-CCNC: 18 U/L (ref 5–33)
ANION GAP SERPL CALCULATED.3IONS-SCNC: 15 MMOL/L (ref 7–19)
AST SERPL-CCNC: 16 U/L (ref 5–32)
BASOPHILS ABSOLUTE: 0.1 K/UL (ref 0–0.2)
BASOPHILS RELATIVE PERCENT: 0.7 % (ref 0–1)
BILIRUB SERPL-MCNC: 0.3 MG/DL (ref 0.2–1.2)
BUN BLDV-MCNC: 20 MG/DL (ref 8–23)
CALCIUM SERPL-MCNC: 9.6 MG/DL (ref 8.8–10.2)
CHLORIDE BLD-SCNC: 101 MMOL/L (ref 98–111)
CHOLESTEROL, TOTAL: 174 MG/DL (ref 160–199)
CO2: 26 MMOL/L (ref 22–29)
CREAT SERPL-MCNC: 0.7 MG/DL (ref 0.5–0.9)
EOSINOPHILS ABSOLUTE: 0.2 K/UL (ref 0–0.6)
EOSINOPHILS RELATIVE PERCENT: 2.8 % (ref 0–5)
GFR NON-AFRICAN AMERICAN: >60
GLUCOSE BLD-MCNC: 116 MG/DL (ref 74–109)
HBA1C MFR BLD: 7.6 % (ref 4–6)
HCT VFR BLD CALC: 45.7 % (ref 37–47)
HDLC SERPL-MCNC: 47 MG/DL (ref 65–121)
HEMOGLOBIN: 14.1 G/DL (ref 12–16)
IMMATURE GRANULOCYTES #: 0 K/UL
LDL CHOLESTEROL CALCULATED: 109 MG/DL
LYMPHOCYTES ABSOLUTE: 1.8 K/UL (ref 1.1–4.5)
LYMPHOCYTES RELATIVE PERCENT: 24 % (ref 20–40)
MCH RBC QN AUTO: 28.1 PG (ref 27–31)
MCHC RBC AUTO-ENTMCNC: 30.9 G/DL (ref 33–37)
MCV RBC AUTO: 91 FL (ref 81–99)
MONOCYTES ABSOLUTE: 0.8 K/UL (ref 0–0.9)
MONOCYTES RELATIVE PERCENT: 10.7 % (ref 0–10)
NEUTROPHILS ABSOLUTE: 4.6 K/UL (ref 1.5–7.5)
NEUTROPHILS RELATIVE PERCENT: 61.5 % (ref 50–65)
PDW BLD-RTO: 14.1 % (ref 11.5–14.5)
PLATELET # BLD: 293 K/UL (ref 130–400)
PMV BLD AUTO: 10.3 FL (ref 9.4–12.3)
POTASSIUM SERPL-SCNC: 4.2 MMOL/L (ref 3.5–5)
RBC # BLD: 5.02 M/UL (ref 4.2–5.4)
SODIUM BLD-SCNC: 142 MMOL/L (ref 136–145)
T4 FREE: 1.7 NG/DL (ref 0.9–1.7)
TOTAL PROTEIN: 7.3 G/DL (ref 6.6–8.7)
TRIGL SERPL-MCNC: 90 MG/DL (ref 0–149)
TSH SERPL DL<=0.05 MIU/L-ACNC: 1.57 UIU/ML (ref 0.27–4.2)
WBC # BLD: 7.5 K/UL (ref 4.8–10.8)

## 2019-11-07 PROCEDURE — 36415 COLL VENOUS BLD VENIPUNCTURE: CPT | Performed by: NURSE PRACTITIONER

## 2019-11-13 ENCOUNTER — OFFICE VISIT (OUTPATIENT)
Dept: GASTROENTEROLOGY | Age: 64
End: 2019-11-13
Payer: COMMERCIAL

## 2019-11-13 VITALS
BODY MASS INDEX: 36.92 KG/M2 | DIASTOLIC BLOOD PRESSURE: 82 MMHG | HEIGHT: 65 IN | SYSTOLIC BLOOD PRESSURE: 140 MMHG | WEIGHT: 221.6 LBS | HEART RATE: 62 BPM | OXYGEN SATURATION: 99 %

## 2019-11-13 DIAGNOSIS — R63.4 WEIGHT LOSS: Primary | ICD-10-CM

## 2019-11-13 DIAGNOSIS — K59.1 FUNCTIONAL DIARRHEA: ICD-10-CM

## 2019-11-13 DIAGNOSIS — R12 HEARTBURN: ICD-10-CM

## 2019-11-13 PROCEDURE — 99204 OFFICE O/P NEW MOD 45 MIN: CPT | Performed by: NURSE PRACTITIONER

## 2019-11-13 RX ORDER — GUAIFENESIN 400 MG/1
400 TABLET ORAL 2 TIMES DAILY
COMMUNITY
End: 2020-07-07 | Stop reason: ALTCHOICE

## 2019-11-13 ASSESSMENT — ENCOUNTER SYMPTOMS
BLOOD IN STOOL: 0
ABDOMINAL PAIN: 0
COUGH: 0
CHOKING: 0
SHORTNESS OF BREATH: 0
CONSTIPATION: 0
ABDOMINAL DISTENTION: 0
NAUSEA: 0
TROUBLE SWALLOWING: 0
ANAL BLEEDING: 0
RECTAL PAIN: 0
DIARRHEA: 1
VOMITING: 0

## 2019-11-21 ENCOUNTER — ANESTHESIA EVENT (OUTPATIENT)
Dept: OPERATING ROOM | Age: 64
End: 2019-11-21

## 2019-11-21 ENCOUNTER — HOSPITAL ENCOUNTER (OUTPATIENT)
Age: 64
Setting detail: SPECIMEN
Discharge: HOME | End: 2019-11-21
Payer: COMMERCIAL

## 2019-11-21 ENCOUNTER — HOSPITAL ENCOUNTER (OUTPATIENT)
Age: 64
Setting detail: OUTPATIENT SURGERY
Discharge: HOME | End: 2019-11-21
Attending: INTERNAL MEDICINE | Admitting: INTERNAL MEDICINE
Payer: COMMERCIAL

## 2019-11-21 ENCOUNTER — ANESTHESIA (OUTPATIENT)
Dept: OPERATING ROOM | Age: 64
End: 2019-11-21

## 2019-11-21 ENCOUNTER — APPOINTMENT (OUTPATIENT)
Dept: OPERATING ROOM | Age: 64
End: 2019-11-21

## 2019-11-21 VITALS — SYSTOLIC BLOOD PRESSURE: 117 MMHG | DIASTOLIC BLOOD PRESSURE: 72 MMHG | OXYGEN SATURATION: 98 %

## 2019-11-21 VITALS
OXYGEN SATURATION: 100 % | SYSTOLIC BLOOD PRESSURE: 139 MMHG | DIASTOLIC BLOOD PRESSURE: 78 MMHG | TEMPERATURE: 98 F | RESPIRATION RATE: 16 BRPM | WEIGHT: 217 LBS | HEART RATE: 60 BPM | BODY MASS INDEX: 36.15 KG/M2 | HEIGHT: 65 IN

## 2019-11-21 PROCEDURE — 43239 EGD BIOPSY SINGLE/MULTIPLE: CPT | Performed by: INTERNAL MEDICINE

## 2019-11-21 PROCEDURE — G8918 PT W/O PREOP ORDER IV AB PRO: HCPCS

## 2019-11-21 PROCEDURE — 45380 COLONOSCOPY AND BIOPSY: CPT

## 2019-11-21 PROCEDURE — 43239 EGD BIOPSY SINGLE/MULTIPLE: CPT

## 2019-11-21 PROCEDURE — G8907 PT DOC NO EVENTS ON DISCHARG: HCPCS

## 2019-11-21 PROCEDURE — 88342 IMHCHEM/IMCYTCHM 1ST ANTB: CPT

## 2019-11-21 PROCEDURE — 45380 COLONOSCOPY AND BIOPSY: CPT | Performed by: INTERNAL MEDICINE

## 2019-11-21 PROCEDURE — 88305 TISSUE EXAM BY PATHOLOGIST: CPT

## 2019-11-21 RX ORDER — SODIUM CHLORIDE 9 MG/ML
INJECTION, SOLUTION INTRAVENOUS CONTINUOUS PRN
Status: DISCONTINUED | OUTPATIENT
Start: 2019-11-21 | End: 2019-11-21 | Stop reason: SDUPTHER

## 2019-11-21 RX ORDER — PROPOFOL 10 MG/ML
INJECTION, EMULSION INTRAVENOUS PRN
Status: DISCONTINUED | OUTPATIENT
Start: 2019-11-21 | End: 2019-11-21 | Stop reason: SDUPTHER

## 2019-11-21 RX ORDER — GLYCOPYRROLATE 0.2 MG/ML
INJECTION INTRAMUSCULAR; INTRAVENOUS PRN
Status: DISCONTINUED | OUTPATIENT
Start: 2019-11-21 | End: 2019-11-21 | Stop reason: SDUPTHER

## 2019-11-21 RX ORDER — LIDOCAINE HYDROCHLORIDE 10 MG/ML
INJECTION, SOLUTION INFILTRATION; PERINEURAL PRN
Status: DISCONTINUED | OUTPATIENT
Start: 2019-11-21 | End: 2019-11-21 | Stop reason: SDUPTHER

## 2019-11-21 RX ORDER — SODIUM CHLORIDE 9 MG/ML
INJECTION, SOLUTION INTRAVENOUS CONTINUOUS
Status: DISCONTINUED | OUTPATIENT
Start: 2019-11-21 | End: 2019-11-21 | Stop reason: HOSPADM

## 2019-11-21 RX ORDER — FENTANYL CITRATE 50 UG/ML
INJECTION, SOLUTION INTRAMUSCULAR; INTRAVENOUS PRN
Status: DISCONTINUED | OUTPATIENT
Start: 2019-11-21 | End: 2019-11-21 | Stop reason: SDUPTHER

## 2019-11-21 RX ADMIN — SODIUM CHLORIDE: 9 INJECTION, SOLUTION INTRAVENOUS at 10:50

## 2019-11-21 RX ADMIN — LIDOCAINE HYDROCHLORIDE 40 MG: 10 INJECTION, SOLUTION INFILTRATION; PERINEURAL at 10:53

## 2019-11-21 RX ADMIN — PROPOFOL 300 MG: 10 INJECTION, EMULSION INTRAVENOUS at 10:53

## 2019-11-21 RX ADMIN — SODIUM CHLORIDE: 9 INJECTION, SOLUTION INTRAVENOUS at 10:46

## 2019-11-21 RX ADMIN — FENTANYL CITRATE 50 MCG: 50 INJECTION, SOLUTION INTRAMUSCULAR; INTRAVENOUS at 10:53

## 2019-11-21 RX ADMIN — GLYCOPYRROLATE 0.2 MG: 0.2 INJECTION INTRAMUSCULAR; INTRAVENOUS at 10:53

## 2019-11-21 ASSESSMENT — PAIN SCALES - GENERAL
PAINLEVEL_OUTOF10: 0
PAINLEVEL_OUTOF10: 0

## 2019-11-22 ENCOUNTER — HOSPITAL ENCOUNTER (OUTPATIENT)
Age: 64
Setting detail: SPECIMEN
Discharge: HOME | End: 2019-11-22
Payer: COMMERCIAL

## 2019-12-27 RX ORDER — DULAGLUTIDE 0.75 MG/.5ML
INJECTION, SOLUTION SUBCUTANEOUS
Qty: 4 PEN | Refills: 3 | Status: SHIPPED | OUTPATIENT
Start: 2019-12-27 | End: 2020-02-04 | Stop reason: SDUPTHER

## 2020-01-15 ENCOUNTER — OFFICE VISIT (OUTPATIENT)
Dept: PRIMARY CARE CLINIC | Age: 65
End: 2020-01-15
Payer: COMMERCIAL

## 2020-01-15 VITALS
DIASTOLIC BLOOD PRESSURE: 78 MMHG | OXYGEN SATURATION: 95 % | WEIGHT: 215 LBS | TEMPERATURE: 97.5 F | BODY MASS INDEX: 35.82 KG/M2 | RESPIRATION RATE: 22 BRPM | HEIGHT: 65 IN | HEART RATE: 62 BPM | SYSTOLIC BLOOD PRESSURE: 114 MMHG

## 2020-01-15 PROCEDURE — 99213 OFFICE O/P EST LOW 20 MIN: CPT | Performed by: FAMILY MEDICINE

## 2020-01-15 RX ORDER — AZITHROMYCIN 250 MG/1
TABLET, FILM COATED ORAL
Qty: 6 TABLET | Refills: 0 | Status: SHIPPED | OUTPATIENT
Start: 2020-01-15 | End: 2020-02-04 | Stop reason: ALTCHOICE

## 2020-01-15 RX ORDER — CEFDINIR 300 MG/1
300 CAPSULE ORAL 2 TIMES DAILY
Qty: 14 CAPSULE | Refills: 0 | Status: SHIPPED | OUTPATIENT
Start: 2020-01-15 | End: 2020-01-22

## 2020-01-15 ASSESSMENT — ENCOUNTER SYMPTOMS
DIARRHEA: 0
EYE DISCHARGE: 0
EYE REDNESS: 0
SINUS PRESSURE: 0
CHEST TIGHTNESS: 0
ABDOMINAL PAIN: 0
RHINORRHEA: 0
NAUSEA: 0
WHEEZING: 0
COUGH: 1
EYE ITCHING: 0
COLOR CHANGE: 0
VOMITING: 0

## 2020-01-15 ASSESSMENT — PATIENT HEALTH QUESTIONNAIRE - PHQ9
1. LITTLE INTEREST OR PLEASURE IN DOING THINGS: 0
SUM OF ALL RESPONSES TO PHQ QUESTIONS 1-9: 0
2. FEELING DOWN, DEPRESSED OR HOPELESS: 0
SUM OF ALL RESPONSES TO PHQ9 QUESTIONS 1 & 2: 0
SUM OF ALL RESPONSES TO PHQ QUESTIONS 1-9: 0

## 2020-01-16 ENCOUNTER — TELEPHONE (OUTPATIENT)
Dept: PRIMARY CARE CLINIC | Age: 65
End: 2020-01-16

## 2020-01-16 RX ORDER — LEVOFLOXACIN 500 MG/1
500 TABLET, FILM COATED ORAL DAILY
Qty: 7 TABLET | Refills: 0 | Status: SHIPPED | OUTPATIENT
Start: 2020-01-16 | End: 2020-01-23

## 2020-02-03 RX ORDER — AMLODIPINE BESYLATE 5 MG/1
TABLET ORAL
Qty: 30 TABLET | Refills: 3 | Status: SHIPPED | OUTPATIENT
Start: 2020-02-03 | End: 2020-02-04 | Stop reason: SDUPTHER

## 2020-02-04 ENCOUNTER — OFFICE VISIT (OUTPATIENT)
Dept: PRIMARY CARE CLINIC | Age: 65
End: 2020-02-04
Payer: COMMERCIAL

## 2020-02-04 VITALS
DIASTOLIC BLOOD PRESSURE: 90 MMHG | HEART RATE: 57 BPM | BODY MASS INDEX: 36.99 KG/M2 | WEIGHT: 222 LBS | OXYGEN SATURATION: 98 % | TEMPERATURE: 96.8 F | SYSTOLIC BLOOD PRESSURE: 173 MMHG | RESPIRATION RATE: 16 BRPM | HEIGHT: 65 IN

## 2020-02-04 PROCEDURE — 20610 DRAIN/INJ JOINT/BURSA W/O US: CPT | Performed by: NURSE PRACTITIONER

## 2020-02-04 RX ORDER — AMLODIPINE BESYLATE 5 MG/1
TABLET ORAL
Qty: 30 TABLET | Refills: 11 | Status: SHIPPED | OUTPATIENT
Start: 2020-02-04 | End: 2020-07-07 | Stop reason: SDUPTHER

## 2020-02-04 RX ORDER — DICLOFENAC SODIUM 75 MG/1
TABLET, DELAYED RELEASE ORAL
COMMUNITY
Start: 2020-01-19 | End: 2020-02-24

## 2020-02-04 RX ORDER — POLYETHYLENE GLYCOL 3350, SODIUM CHLORIDE, SODIUM BICARBONATE, POTASSIUM CHLORIDE 420; 11.2; 5.72; 1.48 G/4L; G/4L; G/4L; G/4L
POWDER, FOR SOLUTION ORAL
Refills: 0 | COMMUNITY
Start: 2019-11-20 | End: 2020-07-07

## 2020-02-04 NOTE — PROGRESS NOTES
-   Pain Level - - - 0 0 -   Some recent data might be hidden       Family History   Problem Relation Age of Onset    Cancer Mother         uterine    High Blood Pressure Father         aneurysm abd    Other Sister         aneurysm brain    Diabetes Brother     Breast Cancer Paternal Aunt     Colon Cancer Neg Hx     Colon Polyps Neg Hx     Esophageal Cancer Neg Hx     Liver Disease Neg Hx     Liver Cancer Neg Hx     Rectal Cancer Neg Hx     Stomach Cancer Neg Hx        Social History     Tobacco Use    Smoking status: Never Smoker    Smokeless tobacco: Never Used   Substance Use Topics    Alcohol use: No      Current Outpatient Medications   Medication Sig Dispense Refill    amLODIPine (NORVASC) 5 MG tablet TAKE 1 TABLET BY MOUTH ONCE DAILY 30 tablet 11    Dulaglutide (TRULICITY) 9.98 WALSH/1.7NL SOPN INJECT 0.75 MG INTO THE SKIN ONCE A WEEK 4 pen 11    diclofenac (VOLTAREN) 75 MG EC tablet TAKE 1 TABLET BY MOUTH TWICE DAILY      polyethylene glycol-electrolytes (NULYTELY) 420 g solution USE AS DIRECTED PER PHYSICIAN INSTRUCTION  0    metFORMIN (GLUCOPHAGE) 1000 MG tablet TAKE 1 TABLET BY MOUTH TWICE DAILY WITH MEALS FOR DIABETES 60 tablet 5    guaiFENesin 400 MG tablet Take 400 mg by mouth 2 times daily      levothyroxine (SYNTHROID) 150 MCG tablet TAKE 1 TABLET BY MOUTH ONCE DAILY 30 tablet 5    sertraline (ZOLOFT) 100 MG tablet TAKE 1 TABLET BY MOUTH ONCE DAILY FOR DEPRESSION 30 tablet 5    Blood Glucose Monitoring Suppl (FREESTYLE LITE) ROBEL USE TO CHECK GLUCOSE THREE TIMES DAILY BEFORE MEAL(S)  0    ondansetron (ZOFRAN) 4 MG tablet 1 PO BID prn nausea 60 tablet 1    blood glucose monitor strips Test 3 times a day & as needed for symptoms of irregular blood glucose.  Freestyle lite 100 strip 3    glucose monitoring kit (FREESTYLE) monitoring kit 1 kit by Does not apply route 3 times daily (before meals) Dx E11.8 1 kit 0    busPIRone (BUSPAR) 10 MG tablet Take 1 tablet by mouth 2 times Using sterile technique the area was cleansed with betadine. Ethyl Chloride was used for topical anesthetic. Using Kenalog 40mg and lidocaine 1% plain 2ml, 18 ga 1.5 inch needle, inserted approx 2 cm inferior and medial to posterolateral corner of the acromion and directed it anteriorly toward the coracoid process. Aspirated with no return of blood and the medication was injected into the joint space. Pt tolerated well. Pressure held for 2 min after injection. I discussed with the patient that she may have some temporary relief followed by 1-2 days of worsening pain. She should get some relief over the next few days from the arthritic pain. She may continue to take anti-inflammatories for the pain. Assessment:       Diagnosis Orders   1. Arthritis of right shoulder region  VT ARTHROCENTESIS ASPIR&/INJ MAJOR JT/BURSA W/O US         Plan:          Patient given educational materials -see patient instructions. Discussed use, benefit, and side effects of prescribed medications. All patient questions answered. Pt voiced understanding. Reviewed health maintenance. Instructed to continue currentmedications, diet and exercise. Patient agreed with treatment plan. Follow up as directed. MEDICATIONS:  Orders Placed This Encounter   Medications    amLODIPine (NORVASC) 5 MG tablet     Sig: TAKE 1 TABLET BY MOUTH ONCE DAILY     Dispense:  30 tablet     Refill:  11    Dulaglutide (TRULICITY) 4.41 JM/5.8JD SOPN     Sig: INJECT 0.75 MG INTO THE SKIN ONCE A WEEK     Dispense:  4 pen     Refill:  11         ORDERS:  Orders Placed This Encounter   Procedures    VT ARTHROCENTESIS ASPIR&/INJ MAJOR JT/BURSA W/O US       Follow-up:  Return for april for diabetes and labs. PATIENT INSTRUCTIONS:  There are no Patient Instructions on file for this visit.   Electronically signed by MIKEY Diaz CNP on 2/4/2020 at 10:35 AM    EMR Dragon/transcription disclaimer:  Much of thisencounter note is electronic transcription/translation of spoken language to printed texts. The electronic translation of spoken language may be erroneous, or at times, nonsensical words or phrases may be inadvertentlytranscribed.   Although I have reviewed the note for such errors, some may still exist.

## 2020-04-16 RX ORDER — SERTRALINE HYDROCHLORIDE 100 MG/1
TABLET, FILM COATED ORAL
Qty: 30 TABLET | Refills: 0 | Status: SHIPPED | OUTPATIENT
Start: 2020-04-16 | End: 2020-05-18

## 2020-05-08 RX ORDER — LEVOTHYROXINE SODIUM 150 UG/1
TABLET ORAL
Qty: 30 TABLET | Refills: 0 | Status: SHIPPED | OUTPATIENT
Start: 2020-05-08 | End: 2020-06-17

## 2020-05-08 RX ORDER — BUSPIRONE HYDROCHLORIDE 10 MG/1
TABLET ORAL
Qty: 60 TABLET | Refills: 0 | Status: SHIPPED | OUTPATIENT
Start: 2020-05-08 | End: 2020-07-07 | Stop reason: SDUPTHER

## 2020-05-12 ENCOUNTER — TELEPHONE (OUTPATIENT)
Dept: PRIMARY CARE CLINIC | Age: 65
End: 2020-05-12

## 2020-05-12 NOTE — TELEPHONE ENCOUNTER
Patient called inquiring about starting Gabapentin, I told her since that is a new controlled medication she would have to have an in office or virtual appt with Gabbie and explanied how the procedure worked for taking it and she decided it wasn't for her.  A friend of hers took it and recommended she try it

## 2020-05-18 RX ORDER — SERTRALINE HYDROCHLORIDE 100 MG/1
TABLET, FILM COATED ORAL
Qty: 30 TABLET | Refills: 5 | Status: SHIPPED | OUTPATIENT
Start: 2020-05-18 | End: 2020-11-30

## 2020-07-07 ENCOUNTER — OFFICE VISIT (OUTPATIENT)
Dept: PRIMARY CARE CLINIC | Age: 65
End: 2020-07-07
Payer: COMMERCIAL

## 2020-07-07 VITALS
HEART RATE: 67 BPM | DIASTOLIC BLOOD PRESSURE: 86 MMHG | WEIGHT: 234.4 LBS | BODY MASS INDEX: 39.05 KG/M2 | RESPIRATION RATE: 18 BRPM | SYSTOLIC BLOOD PRESSURE: 130 MMHG | HEIGHT: 65 IN | OXYGEN SATURATION: 98 % | TEMPERATURE: 97.3 F

## 2020-07-07 LAB
ALBUMIN SERPL-MCNC: 4.2 G/DL (ref 3.5–5.2)
ALP BLD-CCNC: 95 U/L (ref 35–104)
ALT SERPL-CCNC: 25 U/L (ref 5–33)
ANION GAP SERPL CALCULATED.3IONS-SCNC: 11 MMOL/L (ref 7–19)
AST SERPL-CCNC: 19 U/L (ref 5–32)
BILIRUB SERPL-MCNC: 0.4 MG/DL (ref 0.2–1.2)
BUN BLDV-MCNC: 17 MG/DL (ref 8–23)
CALCIUM SERPL-MCNC: 9.6 MG/DL (ref 8.8–10.2)
CHLORIDE BLD-SCNC: 99 MMOL/L (ref 98–111)
CHOLESTEROL, TOTAL: 174 MG/DL (ref 160–199)
CO2: 27 MMOL/L (ref 22–29)
CREAT SERPL-MCNC: 0.6 MG/DL (ref 0.5–0.9)
GFR NON-AFRICAN AMERICAN: >60
GLUCOSE BLD-MCNC: 188 MG/DL (ref 74–109)
HBA1C MFR BLD: 8.3 % (ref 4–6)
HDLC SERPL-MCNC: 43 MG/DL (ref 65–121)
LDL CHOLESTEROL CALCULATED: 115 MG/DL
POTASSIUM SERPL-SCNC: 4.2 MMOL/L (ref 3.5–5)
SODIUM BLD-SCNC: 137 MMOL/L (ref 136–145)
TOTAL PROTEIN: 7.2 G/DL (ref 6.6–8.7)
TRIGL SERPL-MCNC: 81 MG/DL (ref 0–149)

## 2020-07-07 PROCEDURE — 36415 COLL VENOUS BLD VENIPUNCTURE: CPT | Performed by: NURSE PRACTITIONER

## 2020-07-07 PROCEDURE — 99214 OFFICE O/P EST MOD 30 MIN: CPT | Performed by: NURSE PRACTITIONER

## 2020-07-07 RX ORDER — DICLOFENAC SODIUM 75 MG/1
TABLET, DELAYED RELEASE ORAL
Qty: 60 TABLET | Refills: 3 | Status: SHIPPED | OUTPATIENT
Start: 2020-07-07 | End: 2020-11-23

## 2020-07-07 RX ORDER — AMLODIPINE BESYLATE 5 MG/1
TABLET ORAL
Qty: 90 TABLET | Refills: 3 | Status: SHIPPED | OUTPATIENT
Start: 2020-07-07 | End: 2021-07-09 | Stop reason: SDUPTHER

## 2020-07-07 RX ORDER — BUSPIRONE HYDROCHLORIDE 10 MG/1
10 TABLET ORAL 3 TIMES DAILY
Qty: 90 TABLET | Refills: 3 | Status: SHIPPED | OUTPATIENT
Start: 2020-07-07 | End: 2021-07-09 | Stop reason: SDUPTHER

## 2020-07-07 RX ORDER — LANCETS 30 GAUGE
1 EACH MISCELLANEOUS 2 TIMES DAILY
Qty: 100 EACH | Refills: 5 | Status: SHIPPED | OUTPATIENT
Start: 2020-07-07

## 2020-07-07 ASSESSMENT — ENCOUNTER SYMPTOMS
GASTROINTESTINAL NEGATIVE: 1
RESPIRATORY NEGATIVE: 1

## 2020-07-07 NOTE — PROGRESS NOTES
[address] [place] [address] [place] [address]  [address] [place] [address]  Dept: [phone]  Dept Fax: [phone]  Loc: [phone]    Angelique Laughlin is a 60 y.o. female who presents today for her medical conditions/complaints as noted below. Angelique Laughlin is c/o of Diabetes and Knee Pain (right knee, been going on for a while but has got worse in the last 2 weeks )        HPI:     HPI   Chief Complaint   Patient presents with    Diabetes    Knee Pain     right knee, been going on for a while but has got worse in the last 2 weeks    she is still on diabetes meds. She needs surgery on her right knee but she is putting it off. She is trying to get closer to custodial. She says she rarely checks her blood sugar but when she has it has been much better. She has gained some weight over this COVID-19 staying at home. She was doing well on the Zoloft for a while but thinks it might not be as good. She takes the BuSpar twice a day but has had to take it 3 times a day on occasion.   Past Medical History:   Diagnosis Date    Diabetes mellitus ([address]     History of blood transfusion     1976 post childbirth    Hypertension     Thyroid disease       Past Surgical History:   Procedure Laterality Date    CHOLECYSTECTOMY      COLONOSCOPY N/A 11/21/2019    Dr Patience Ogden    HYSTERECTOMY      TOTAL KNEE ARTHROPLASTY Left 8/30/2016    KNEE TOTAL ARTHROPLASTY performed by Shelli Garland [facility] at [facility] ENDOSCOPY N/A 11/21/2019    Dr KATELYN Rosas-Gastritis, duodenitis       Vitals 7/7/2020 2/4/2020 1/15/2020 1/15/2020 11/21/2019 63/26/2236   SYSTOLIC 129 883 [phone]   DIASTOLIC 86 90 78 86 78 64   Site Left Upper Arm - Right Upper Arm Right Upper Arm - -   Position Sitting - - Sitting - -   Cuff Size Large Adult - - Large Adult - -   Pulse 67 57 - 62 60 62   Temp 97.3 96.8 - 97.5 - 98   Resp 18 16 - 22 16 16   SpO2 98 98 - 95 100 97   Weight 234 lb 6.4 oz 222 lb - 215 lb - -   Height 5' 5\" 5' 5\" - 5' 5\" - -   BMI (wt*703/ht~2) 39 kg/m2 36.94 kg/m2 - 35.77 kg/m2 - -   Pain Level - - - - 0 0   Some recent data might be hidden       Family History   Problem Relation Age of Onset    Cancer Mother         uterine    High Blood Pressure Father         aneurysm abd    Other Sister         aneurysm brain    Diabetes Brother     Breast Cancer Paternal Aunt     Colon Cancer Neg Hx     Colon Polyps Neg Hx     Esophageal Cancer Neg Hx     Liver Disease Neg Hx     Liver Cancer Neg Hx     Rectal Cancer Neg Hx     Stomach Cancer Neg Hx        Social History     Tobacco Use    Smoking status: Never Smoker    Smokeless tobacco: Never Used   Substance Use Topics    Alcohol use: No      Current Outpatient Medications   Medication Sig Dispense Refill    diclofenac (VOLTAREN) 75 MG EC tablet Take 1 tablet by mouth twice daily 60 tablet 3    busPIRone (BUSPAR) 10 MG tablet Take 1 tablet by mouth 3 times daily 90 tablet 3    amLODIPine (NORVASC) 5 MG tablet TAKE 1 TABLET BY MOUTH ONCE DAILY 90 tablet 3    Lancets MISC 1 each by Does not apply route 2 times daily 100 each 5    metFORMIN (GLUCOPHAGE) 1000 MG tablet TAKE 1 TABLET BY MOUTH TWICE DAILY WITH MEALS FOR DIABETES 180 tablet 0    EUTHYROX 150 MCG tablet Take 1 tablet by mouth once daily 30 tablet 3    sertraline (ZOLOFT) 100 MG tablet TAKE 1 TABLET BY MOUTH ONCE DAILY FOR DEPRESSION 30 tablet 5    Dulaglutide (TRULICITY) 2.85 XX/1.9QJ SOPN INJECT 0.75 MG INTO THE SKIN ONCE A WEEK 4 pen 11    Blood Glucose Monitoring Suppl (FREESTYLE LITE) ROBEL USE TO CHECK GLUCOSE THREE TIMES DAILY BEFORE MEAL(S)  0    blood glucose monitor strips Test 3 times a day & as needed for symptoms of irregular blood glucose.  Freestyle lite 100 strip 3    glucose monitoring kit (FREESTYLE) monitoring kit 1 kit by Does not apply route 3 times daily (before meals) Dx E11.8 1 kit 0    glucose blood VI test strips (ASCENSIA AUTODISC VI;ONE TOUCH ULTRA TEST VI) strip Test blood sugar 3 times a day before meals and as needed 100 each 3     No current facility-administered medications for this visit. Allergies   Allergen Reactions    Pcn [Penicillins] Rash       Health Maintenance   Topic Date Due    Hepatitis C screen  1955    Pneumococcal 0-64 years Vaccine (1 of 1 - PPSV23) 11/22/1963    Diabetic retinal exam  11/22/1967    HIV screen  11/22/1972    DTaP/Tdap/Td vaccine (1 - Tdap) 11/22/1976    Cervical cancer screen  11/22/1978    Shingles Vaccine (1 of 2) 11/22/2007    Diabetic foot exam  03/07/2020    Flu vaccine (1) 09/01/2020    Diabetic microalbuminuria test  09/19/2020    A1C test (Diabetic or Prediabetic)  11/07/2020    Lipid screen  11/07/2020    TSH testing  11/07/2020    Breast cancer screen  05/06/2021    Colon cancer screen colonoscopy  11/21/2029    Hepatitis A vaccine  Aged Out    Hib vaccine  Aged Out    Meningococcal (ACWY) vaccine  Aged Out       Subjective:      Review of Systems   Constitutional: Positive for activity change and unexpected weight change. HENT: Negative. Respiratory: Negative. Cardiovascular: Negative. Gastrointestinal: Negative. Endocrine:        Diabetes   Genitourinary: Negative. Musculoskeletal:        Knee pain       Objective:     Physical Exam  Vitals signs and nursing note reviewed. Constitutional:       Appearance: She is well-developed. She is obese. HENT:      Head: Normocephalic. Right Ear: Tympanic membrane and external ear normal.      Left Ear: Tympanic membrane and external ear normal.      Mouth/Throat:      Mouth: Mucous membranes are moist.   Eyes:      Pupils: Pupils are equal, round, and reactive to light. Neck:      Musculoskeletal: Normal range of motion. Thyroid: No thyromegaly. Cardiovascular:      Rate and Rhythm: Normal rate and regular rhythm. Pulses: Normal pulses.       Heart sounds: Normal heart sounds. Pulmonary:      Effort: Pulmonary effort is normal.      Breath sounds: Normal breath sounds. Abdominal:      General: Abdomen is flat. Bowel sounds are normal.   Musculoskeletal: Normal range of motion. Skin:     General: Skin is warm and dry. Neurological:      General: No focal deficit present. Mental Status: She is alert and oriented to person, place, and time. Psychiatric:         Mood and Affect: Mood normal.         Behavior: Behavior normal.         Thought Content: Thought content normal.         Judgment: Judgment normal.       /86 (Site: Left Upper Arm, Position: Sitting, Cuff Size: Large Adult)   Pulse 67   Temp 97.3 °F (36.3 °C) (Temporal)   Resp 18   Ht 5' 5\" (1.651 m)   Wt 234 lb 6.4 oz (106.3 kg)   SpO2 98%   BMI 39.01 kg/m²     Assessment:       Diagnosis Orders   1. Type 2 diabetes mellitus without complication, without long-term current use of insulin ([facility])  Comprehensive Metabolic Panel    Hemoglobin A1C   2. Acquired hypothyroidism     3. Mixed hyperlipidemia  Lipid Panel   4. Primary osteoarthritis of right knee  Freada Hodgkin, MD, Orthopaedic Surgery, [place]         Plan:   More than 50% of the time was spent counseling and coordinating care for a total time of 30 min face to face. Patient given educational materials -see patient instructions. Discussed use, benefit, and side effects of prescribed medications. All patient questions answered. Pt voiced understanding. Reviewed health maintenance. Instructed to continue currentmedications, diet and exercise. Patient agreed with treatment plan. Follow up as directed.    MEDICATIONS:  Orders Placed This Encounter   Medications    busPIRone (BUSPAR) 10 MG tablet     Sig: Take 1 tablet by mouth 3 times daily     Dispense:  90 tablet     Refill:  3    amLODIPine (NORVASC) 5 MG tablet     Sig: TAKE 1 TABLET BY MOUTH ONCE DAILY     Dispense:  90 tablet     Refill:  3    Lancets MISC     Si each by Does not apply route 2 times daily     Dispense:  100 each     Refill:  5         ORDERS:  Orders Placed This Encounter   Procedures    Comprehensive Metabolic Panel    Lipid Panel    Hemoglobin A1C   Jesse Cordero MD, Orthopaedic Surgery, [place]       Follow-up:  Return in about 6 months (around 1/7/2021) for PE . PATIENT INSTRUCTIONS:  Patient Instructions   Keep zoloft at 100mg add the buspar up to 3 times a day. appt with Dr Geena Lopez   Make eye exam    Electronically signed by MIKEY Amaro CNP on 7/7/2020 at 1:21 PM    EMR Dragon/transcription disclaimer:  Much of thisencounter note is electronic transcription/translation of spoken language to printed texts. The electronic translation of spoken language may be erroneous, or at times, nonsensical words or phrases may be inadvertentlytranscribed.   Although I have reviewed the note for such errors, some may still exist.

## 2020-07-28 ENCOUNTER — HOSPITAL ENCOUNTER (OUTPATIENT)
Dept: PREADMISSION TESTING | Age: 65
Discharge: HOME | End: 2020-08-01
Payer: COMMERCIAL

## 2020-07-28 VITALS — BODY MASS INDEX: 38.99 KG/M2 | WEIGHT: 234 LBS | HEIGHT: 65 IN

## 2020-07-28 LAB
ABO/RH: NORMAL
ANTIBODY SCREEN: NORMAL
APTT: 27.2 SEC (ref 26–36.2)
BACTERIA: NEGATIVE /HPF
BASOPHILS ABSOLUTE: 0.1 K/UL (ref 0–0.2)
BASOPHILS RELATIVE PERCENT: 0.9 % (ref 0–1)
BILIRUBIN URINE: NEGATIVE
BLOOD, URINE: NEGATIVE
CLARITY: CLEAR
COLOR: YELLOW
CRYSTALS, UA: ABNORMAL /HPF
EKG P AXIS: 34 DEGREES
EKG P-R INTERVAL: 206 MS
EKG Q-T INTERVAL: 454 MS
EKG QRS DURATION: 88 MS
EKG QTC CALCULATION (BAZETT): 447 MS
EKG T AXIS: 12 DEGREES
EOSINOPHILS ABSOLUTE: 0.2 K/UL (ref 0–0.6)
EOSINOPHILS RELATIVE PERCENT: 2.6 % (ref 0–5)
EPITHELIAL CELLS, UA: 1 /HPF (ref 0–5)
GLUCOSE URINE: 500 MG/DL
HCT VFR BLD CALC: 44 % (ref 37–47)
HEMOGLOBIN: 14.2 G/DL (ref 12–16)
HYALINE CASTS: 0 /HPF (ref 0–8)
IMMATURE GRANULOCYTES #: 0.1 K/UL
INR BLD: 0.97 (ref 0.88–1.18)
KETONES, URINE: NEGATIVE MG/DL
LEUKOCYTE ESTERASE, URINE: ABNORMAL
LYMPHOCYTES ABSOLUTE: 2.2 K/UL (ref 1.1–4.5)
LYMPHOCYTES RELATIVE PERCENT: 28.2 % (ref 20–40)
MCH RBC QN AUTO: 28.6 PG (ref 27–31)
MCHC RBC AUTO-ENTMCNC: 32.3 G/DL (ref 33–37)
MCV RBC AUTO: 88.7 FL (ref 81–99)
MONOCYTES ABSOLUTE: 0.8 K/UL (ref 0–0.9)
MONOCYTES RELATIVE PERCENT: 9.5 % (ref 0–10)
NEUTROPHILS ABSOLUTE: 4.6 K/UL (ref 1.5–7.5)
NEUTROPHILS RELATIVE PERCENT: 58.2 % (ref 50–65)
NITRITE, URINE: NEGATIVE
PDW BLD-RTO: 13.4 % (ref 11.5–14.5)
PH UA: 6 (ref 5–8)
PLATELET # BLD: 308 K/UL (ref 130–400)
PMV BLD AUTO: 10.1 FL (ref 9.4–12.3)
PROTEIN UA: NEGATIVE MG/DL
PROTHROMBIN TIME: 12.8 SEC (ref 12–14.6)
RBC # BLD: 4.96 M/UL (ref 4.2–5.4)
RBC UA: 0 /HPF (ref 0–4)
SPECIFIC GRAVITY UA: 1.02 (ref 1–1.03)
UROBILINOGEN, URINE: 0.2 E.U./DL
WBC # BLD: 7.9 K/UL (ref 4.8–10.8)
WBC UA: 2 /HPF (ref 0–5)

## 2020-07-28 PROCEDURE — 86850 RBC ANTIBODY SCREEN: CPT

## 2020-07-28 PROCEDURE — 81001 URINALYSIS AUTO W/SCOPE: CPT

## 2020-07-28 PROCEDURE — 86901 BLOOD TYPING SEROLOGIC RH(D): CPT

## 2020-07-28 PROCEDURE — 87081 CULTURE SCREEN ONLY: CPT

## 2020-07-28 PROCEDURE — 86900 BLOOD TYPING SEROLOGIC ABO: CPT

## 2020-07-28 PROCEDURE — 85610 PROTHROMBIN TIME: CPT

## 2020-07-28 PROCEDURE — 85730 THROMBOPLASTIN TIME PARTIAL: CPT

## 2020-07-28 PROCEDURE — 93005 ELECTROCARDIOGRAM TRACING: CPT | Performed by: ORTHOPAEDIC SURGERY

## 2020-07-28 PROCEDURE — 85025 COMPLETE CBC W/AUTO DIFF WBC: CPT

## 2020-07-28 PROCEDURE — 93010 ELECTROCARDIOGRAM REPORT: CPT | Performed by: INTERNAL MEDICINE

## 2020-07-29 LAB — MRSA CULTURE ONLY: NORMAL

## 2020-08-07 ENCOUNTER — HOSPITAL ENCOUNTER (OUTPATIENT)
Dept: PREADMISSION TESTING | Age: 65
Discharge: HOME | End: 2020-08-11
Payer: COMMERCIAL

## 2020-08-10 LAB — SARS-COV-2, NAA: NOT DETECTED

## 2020-08-11 ENCOUNTER — HOSPITAL ENCOUNTER (OUTPATIENT)
Age: 65
Setting detail: OBSERVATION
Discharge: HOME | End: 2020-08-12
Attending: ORTHOPAEDIC SURGERY | Admitting: ORTHOPAEDIC SURGERY
Payer: COMMERCIAL

## 2020-08-11 ENCOUNTER — ANESTHESIA (OUTPATIENT)
Dept: OPERATING ROOM | Age: 65
End: 2020-08-11
Payer: COMMERCIAL

## 2020-08-11 ENCOUNTER — ANESTHESIA EVENT (OUTPATIENT)
Dept: OPERATING ROOM | Age: 65
End: 2020-08-11
Payer: COMMERCIAL

## 2020-08-11 VITALS
SYSTOLIC BLOOD PRESSURE: 146 MMHG | TEMPERATURE: 96.4 F | OXYGEN SATURATION: 93 % | RESPIRATION RATE: 21 BRPM | DIASTOLIC BLOOD PRESSURE: 84 MMHG

## 2020-08-11 PROBLEM — K59.03 DRUG-INDUCED CONSTIPATION: Status: ACTIVE | Noted: 2020-08-11

## 2020-08-11 PROBLEM — D50.9 IRON DEFICIENCY ANEMIA: Status: ACTIVE | Noted: 2020-08-11

## 2020-08-11 PROBLEM — M17.11 PRIMARY OSTEOARTHRITIS OF RIGHT KNEE: Status: ACTIVE | Noted: 2020-08-11

## 2020-08-11 LAB
ABO/RH: NORMAL
ANTIBODY SCREEN: NORMAL
GLUCOSE BLD-MCNC: 224 MG/DL (ref 70–99)
GLUCOSE BLD-MCNC: 297 MG/DL (ref 70–99)
GLUCOSE BLD-MCNC: 309 MG/DL (ref 70–99)
GLUCOSE BLD-MCNC: 324 MG/DL (ref 70–99)
PERFORMED ON: ABNORMAL

## 2020-08-11 PROCEDURE — 2580000003 HC RX 258: Performed by: ORTHOPAEDIC SURGERY

## 2020-08-11 PROCEDURE — 2709999900 HC NON-CHARGEABLE SUPPLY: Performed by: ORTHOPAEDIC SURGERY

## 2020-08-11 PROCEDURE — 97116 GAIT TRAINING THERAPY: CPT

## 2020-08-11 PROCEDURE — 76942 ECHO GUIDE FOR BIOPSY: CPT | Performed by: NURSE ANESTHETIST, CERTIFIED REGISTERED

## 2020-08-11 PROCEDURE — 6360000002 HC RX W HCPCS: Performed by: ANESTHESIOLOGY

## 2020-08-11 PROCEDURE — 86901 BLOOD TYPING SEROLOGIC RH(D): CPT

## 2020-08-11 PROCEDURE — G0378 HOSPITAL OBSERVATION PER HR: HCPCS

## 2020-08-11 PROCEDURE — 3600000005 HC SURGERY LEVEL 5 BASE: Performed by: ORTHOPAEDIC SURGERY

## 2020-08-11 PROCEDURE — C1776 JOINT DEVICE (IMPLANTABLE): HCPCS | Performed by: ORTHOPAEDIC SURGERY

## 2020-08-11 PROCEDURE — 97161 PT EVAL LOW COMPLEX 20 MIN: CPT

## 2020-08-11 PROCEDURE — 6360000002 HC RX W HCPCS: Performed by: ORTHOPAEDIC SURGERY

## 2020-08-11 PROCEDURE — 2500000003 HC RX 250 WO HCPCS: Performed by: ORTHOPAEDIC SURGERY

## 2020-08-11 PROCEDURE — 6370000000 HC RX 637 (ALT 250 FOR IP): Performed by: ORTHOPAEDIC SURGERY

## 2020-08-11 PROCEDURE — 36415 COLL VENOUS BLD VENIPUNCTURE: CPT

## 2020-08-11 PROCEDURE — 3700000001 HC ADD 15 MINUTES (ANESTHESIA): Performed by: ORTHOPAEDIC SURGERY

## 2020-08-11 PROCEDURE — 3700000000 HC ANESTHESIA ATTENDED CARE: Performed by: ORTHOPAEDIC SURGERY

## 2020-08-11 PROCEDURE — 3600000015 HC SURGERY LEVEL 5 ADDTL 15MIN: Performed by: ORTHOPAEDIC SURGERY

## 2020-08-11 PROCEDURE — C1713 ANCHOR/SCREW BN/BN,TIS/BN: HCPCS | Performed by: ORTHOPAEDIC SURGERY

## 2020-08-11 PROCEDURE — 86850 RBC ANTIBODY SCREEN: CPT

## 2020-08-11 PROCEDURE — 7100000000 HC PACU RECOVERY - FIRST 15 MIN: Performed by: ORTHOPAEDIC SURGERY

## 2020-08-11 PROCEDURE — 2500000003 HC RX 250 WO HCPCS: Performed by: NURSE ANESTHETIST, CERTIFIED REGISTERED

## 2020-08-11 PROCEDURE — 86900 BLOOD TYPING SEROLOGIC ABO: CPT

## 2020-08-11 PROCEDURE — 82947 ASSAY GLUCOSE BLOOD QUANT: CPT

## 2020-08-11 PROCEDURE — 97530 THERAPEUTIC ACTIVITIES: CPT

## 2020-08-11 PROCEDURE — 6370000000 HC RX 637 (ALT 250 FOR IP): Performed by: PHYSICIAN ASSISTANT

## 2020-08-11 PROCEDURE — 6360000002 HC RX W HCPCS: Performed by: NURSE ANESTHETIST, CERTIFIED REGISTERED

## 2020-08-11 PROCEDURE — 7100000001 HC PACU RECOVERY - ADDTL 15 MIN: Performed by: ORTHOPAEDIC SURGERY

## 2020-08-11 DEVICE — PSN TIB STM 5 DEG SZ D R: Type: IMPLANTABLE DEVICE | Site: TIBIA | Status: FUNCTIONAL

## 2020-08-11 DEVICE — COMPONENT PAT DIA29MM THK8MM KNEE POLY CEM CONVENTIONAL: Type: IMPLANTABLE DEVICE | Site: PATELLA | Status: FUNCTIONAL

## 2020-08-11 DEVICE — IMPL KNEE ASF FB PSN CPS 12MM VE R6-9CD: Type: IMPLANTABLE DEVICE | Site: TIBIA | Status: FUNCTIONAL

## 2020-08-11 DEVICE — CEMENT BNE 40GM HI VISC RADPQ FOR REV SURG: Type: IMPLANTABLE DEVICE | Site: KNEE | Status: FUNCTIONAL

## 2020-08-11 DEVICE — EXTENSION STEM L30MM DIA14MM KNEE TAPR CEM PERSONA: Type: IMPLANTABLE DEVICE | Site: TIBIA | Status: FUNCTIONAL

## 2020-08-11 DEVICE — COMPONENT FEM SZ 7 R KNEE CO CHROM NAR POST STBL CEM: Type: IMPLANTABLE DEVICE | Site: FEMUR | Status: FUNCTIONAL

## 2020-08-11 RX ORDER — MORPHINE SULFATE 4 MG/ML
4 INJECTION, SOLUTION INTRAMUSCULAR; INTRAVENOUS
Status: DISCONTINUED | OUTPATIENT
Start: 2020-08-11 | End: 2020-08-12 | Stop reason: HOSPADM

## 2020-08-11 RX ORDER — NICOTINE POLACRILEX 4 MG
15 LOZENGE BUCCAL PRN
Status: DISCONTINUED | OUTPATIENT
Start: 2020-08-11 | End: 2020-08-12 | Stop reason: HOSPADM

## 2020-08-11 RX ORDER — ACETAMINOPHEN 325 MG/1
650 TABLET ORAL EVERY 6 HOURS
Status: DISCONTINUED | OUTPATIENT
Start: 2020-08-11 | End: 2020-08-12 | Stop reason: HOSPADM

## 2020-08-11 RX ORDER — SODIUM CHLORIDE 0.9 % (FLUSH) 0.9 %
10 SYRINGE (ML) INJECTION EVERY 12 HOURS SCHEDULED
Status: DISCONTINUED | OUTPATIENT
Start: 2020-08-11 | End: 2020-08-11 | Stop reason: HOSPADM

## 2020-08-11 RX ORDER — LEVOTHYROXINE SODIUM 0.07 MG/1
150 TABLET ORAL DAILY
Status: DISCONTINUED | OUTPATIENT
Start: 2020-08-12 | End: 2020-08-12 | Stop reason: HOSPADM

## 2020-08-11 RX ORDER — ENALAPRILAT 2.5 MG/2ML
1.25 INJECTION INTRAVENOUS
Status: DISCONTINUED | OUTPATIENT
Start: 2020-08-11 | End: 2020-08-11 | Stop reason: HOSPADM

## 2020-08-11 RX ORDER — KETAMINE HYDROCHLORIDE 50 MG/ML
INJECTION, SOLUTION, CONCENTRATE INTRAMUSCULAR; INTRAVENOUS PRN
Status: DISCONTINUED | OUTPATIENT
Start: 2020-08-11 | End: 2020-08-11 | Stop reason: SDUPTHER

## 2020-08-11 RX ORDER — OXYCODONE HCL 10 MG/1
10 TABLET, FILM COATED, EXTENDED RELEASE ORAL
Status: COMPLETED | OUTPATIENT
Start: 2020-08-11 | End: 2020-08-11

## 2020-08-11 RX ORDER — METOCLOPRAMIDE HYDROCHLORIDE 5 MG/ML
10 INJECTION INTRAMUSCULAR; INTRAVENOUS ONCE
Status: DISCONTINUED | OUTPATIENT
Start: 2020-08-11 | End: 2020-08-11 | Stop reason: HOSPADM

## 2020-08-11 RX ORDER — LIDOCAINE HYDROCHLORIDE 10 MG/ML
1 INJECTION, SOLUTION EPIDURAL; INFILTRATION; INTRACAUDAL; PERINEURAL
Status: DISCONTINUED | OUTPATIENT
Start: 2020-08-11 | End: 2020-08-11 | Stop reason: HOSPADM

## 2020-08-11 RX ORDER — HYDRALAZINE HYDROCHLORIDE 20 MG/ML
5 INJECTION INTRAMUSCULAR; INTRAVENOUS EVERY 10 MIN PRN
Status: DISCONTINUED | OUTPATIENT
Start: 2020-08-11 | End: 2020-08-11 | Stop reason: HOSPADM

## 2020-08-11 RX ORDER — FENTANYL CITRATE 50 UG/ML
50 INJECTION, SOLUTION INTRAMUSCULAR; INTRAVENOUS
Status: DISCONTINUED | OUTPATIENT
Start: 2020-08-11 | End: 2020-08-11 | Stop reason: HOSPADM

## 2020-08-11 RX ORDER — DEXTROSE MONOHYDRATE 50 MG/ML
100 INJECTION, SOLUTION INTRAVENOUS PRN
Status: DISCONTINUED | OUTPATIENT
Start: 2020-08-11 | End: 2020-08-12 | Stop reason: HOSPADM

## 2020-08-11 RX ORDER — INSULIN GLARGINE 100 [IU]/ML
15 INJECTION, SOLUTION SUBCUTANEOUS ONCE
Status: COMPLETED | OUTPATIENT
Start: 2020-08-11 | End: 2020-08-11

## 2020-08-11 RX ORDER — ROPIVACAINE HYDROCHLORIDE 2 MG/ML
INJECTION, SOLUTION EPIDURAL; INFILTRATION; PERINEURAL PRN
Status: DISCONTINUED | OUTPATIENT
Start: 2020-08-11 | End: 2020-08-11 | Stop reason: HOSPADM

## 2020-08-11 RX ORDER — HYDROMORPHONE HYDROCHLORIDE 1 MG/ML
0.5 INJECTION, SOLUTION INTRAMUSCULAR; INTRAVENOUS; SUBCUTANEOUS EVERY 5 MIN PRN
Status: DISCONTINUED | OUTPATIENT
Start: 2020-08-11 | End: 2020-08-11 | Stop reason: HOSPADM

## 2020-08-11 RX ORDER — PROPOFOL 10 MG/ML
INJECTION, EMULSION INTRAVENOUS CONTINUOUS PRN
Status: DISCONTINUED | OUTPATIENT
Start: 2020-08-11 | End: 2020-08-11 | Stop reason: SDUPTHER

## 2020-08-11 RX ORDER — SODIUM CHLORIDE, SODIUM LACTATE, POTASSIUM CHLORIDE, CALCIUM CHLORIDE 600; 310; 30; 20 MG/100ML; MG/100ML; MG/100ML; MG/100ML
INJECTION, SOLUTION INTRAVENOUS CONTINUOUS
Status: DISCONTINUED | OUTPATIENT
Start: 2020-08-11 | End: 2020-08-11

## 2020-08-11 RX ORDER — 0.9 % SODIUM CHLORIDE 0.9 %
500 INTRAVENOUS SOLUTION INTRAVENOUS PRN
Status: DISCONTINUED | OUTPATIENT
Start: 2020-08-11 | End: 2020-08-12 | Stop reason: HOSPADM

## 2020-08-11 RX ORDER — METOCLOPRAMIDE HYDROCHLORIDE 5 MG/ML
10 INJECTION INTRAMUSCULAR; INTRAVENOUS
Status: DISCONTINUED | OUTPATIENT
Start: 2020-08-11 | End: 2020-08-11 | Stop reason: HOSPADM

## 2020-08-11 RX ORDER — MORPHINE SULFATE 4 MG/ML
2 INJECTION, SOLUTION INTRAMUSCULAR; INTRAVENOUS EVERY 5 MIN PRN
Status: DISCONTINUED | OUTPATIENT
Start: 2020-08-11 | End: 2020-08-11 | Stop reason: HOSPADM

## 2020-08-11 RX ORDER — CELECOXIB 200 MG/1
200 CAPSULE ORAL ONCE
Status: COMPLETED | OUTPATIENT
Start: 2020-08-11 | End: 2020-08-11

## 2020-08-11 RX ORDER — VANCOMYCIN HYDROCHLORIDE 1 G/20ML
INJECTION, POWDER, LYOPHILIZED, FOR SOLUTION INTRAVENOUS PRN
Status: DISCONTINUED | OUTPATIENT
Start: 2020-08-11 | End: 2020-08-11 | Stop reason: HOSPADM

## 2020-08-11 RX ORDER — SENNA AND DOCUSATE SODIUM 50; 8.6 MG/1; MG/1
1 TABLET, FILM COATED ORAL 2 TIMES DAILY
Status: DISCONTINUED | OUTPATIENT
Start: 2020-08-11 | End: 2020-08-12 | Stop reason: HOSPADM

## 2020-08-11 RX ORDER — AMLODIPINE BESYLATE 5 MG/1
5 TABLET ORAL DAILY
Status: DISCONTINUED | OUTPATIENT
Start: 2020-08-12 | End: 2020-08-12 | Stop reason: HOSPADM

## 2020-08-11 RX ORDER — SUCCINYLCHOLINE CHLORIDE 20 MG/ML
INJECTION INTRAMUSCULAR; INTRAVENOUS PRN
Status: DISCONTINUED | OUTPATIENT
Start: 2020-08-11 | End: 2020-08-11 | Stop reason: SDUPTHER

## 2020-08-11 RX ORDER — SCOLOPAMINE TRANSDERMAL SYSTEM 1 MG/1
1 PATCH, EXTENDED RELEASE TRANSDERMAL ONCE
Status: DISCONTINUED | OUTPATIENT
Start: 2020-08-11 | End: 2020-08-11 | Stop reason: ALTCHOICE

## 2020-08-11 RX ORDER — OXYCODONE HYDROCHLORIDE 5 MG/1
5 TABLET ORAL EVERY 4 HOURS PRN
Status: DISCONTINUED | OUTPATIENT
Start: 2020-08-11 | End: 2020-08-12 | Stop reason: HOSPADM

## 2020-08-11 RX ORDER — EPHEDRINE SULFATE 50 MG/ML
INJECTION, SOLUTION INTRAVENOUS PRN
Status: DISCONTINUED | OUTPATIENT
Start: 2020-08-11 | End: 2020-08-11 | Stop reason: SDUPTHER

## 2020-08-11 RX ORDER — PROPOFOL 10 MG/ML
INJECTION, EMULSION INTRAVENOUS PRN
Status: DISCONTINUED | OUTPATIENT
Start: 2020-08-11 | End: 2020-08-11 | Stop reason: SDUPTHER

## 2020-08-11 RX ORDER — BUSPIRONE HYDROCHLORIDE 10 MG/1
10 TABLET ORAL 3 TIMES DAILY
Status: DISCONTINUED | OUTPATIENT
Start: 2020-08-11 | End: 2020-08-12 | Stop reason: HOSPADM

## 2020-08-11 RX ORDER — LIDOCAINE HYDROCHLORIDE 10 MG/ML
INJECTION, SOLUTION EPIDURAL; INFILTRATION; INTRACAUDAL; PERINEURAL PRN
Status: DISCONTINUED | OUTPATIENT
Start: 2020-08-11 | End: 2020-08-11 | Stop reason: SDUPTHER

## 2020-08-11 RX ORDER — FENTANYL CITRATE 50 UG/ML
INJECTION, SOLUTION INTRAMUSCULAR; INTRAVENOUS PRN
Status: DISCONTINUED | OUTPATIENT
Start: 2020-08-11 | End: 2020-08-11 | Stop reason: SDUPTHER

## 2020-08-11 RX ORDER — LABETALOL HYDROCHLORIDE 5 MG/ML
5 INJECTION, SOLUTION INTRAVENOUS EVERY 10 MIN PRN
Status: DISCONTINUED | OUTPATIENT
Start: 2020-08-11 | End: 2020-08-11 | Stop reason: HOSPADM

## 2020-08-11 RX ORDER — SODIUM CHLORIDE 0.9 % (FLUSH) 0.9 %
10 SYRINGE (ML) INJECTION PRN
Status: DISCONTINUED | OUTPATIENT
Start: 2020-08-11 | End: 2020-08-11 | Stop reason: HOSPADM

## 2020-08-11 RX ORDER — PROMETHAZINE HYDROCHLORIDE 25 MG/ML
6.25 INJECTION, SOLUTION INTRAMUSCULAR; INTRAVENOUS
Status: DISCONTINUED | OUTPATIENT
Start: 2020-08-11 | End: 2020-08-11 | Stop reason: HOSPADM

## 2020-08-11 RX ORDER — SCOLOPAMINE TRANSDERMAL SYSTEM 1 MG/1
1 PATCH, EXTENDED RELEASE TRANSDERMAL ONCE
Status: DISCONTINUED | OUTPATIENT
Start: 2020-08-11 | End: 2020-08-11

## 2020-08-11 RX ORDER — FENTANYL CITRATE 50 UG/ML
25 INJECTION, SOLUTION INTRAMUSCULAR; INTRAVENOUS
Status: DISCONTINUED | OUTPATIENT
Start: 2020-08-11 | End: 2020-08-11 | Stop reason: HOSPADM

## 2020-08-11 RX ORDER — HYDROMORPHONE HYDROCHLORIDE 1 MG/ML
0.25 INJECTION, SOLUTION INTRAMUSCULAR; INTRAVENOUS; SUBCUTANEOUS EVERY 5 MIN PRN
Status: DISCONTINUED | OUTPATIENT
Start: 2020-08-11 | End: 2020-08-11 | Stop reason: HOSPADM

## 2020-08-11 RX ORDER — TRANEXAMIC ACID 650 1/1
1950 TABLET ORAL
Status: COMPLETED | OUTPATIENT
Start: 2020-08-11 | End: 2020-08-11

## 2020-08-11 RX ORDER — SODIUM CHLORIDE 9 MG/ML
INJECTION, SOLUTION INTRAVENOUS CONTINUOUS
Status: DISCONTINUED | OUTPATIENT
Start: 2020-08-11 | End: 2020-08-11

## 2020-08-11 RX ORDER — OXYCODONE HYDROCHLORIDE 5 MG/1
10 TABLET ORAL EVERY 4 HOURS PRN
Status: DISCONTINUED | OUTPATIENT
Start: 2020-08-11 | End: 2020-08-12 | Stop reason: HOSPADM

## 2020-08-11 RX ORDER — MORPHINE SULFATE 4 MG/ML
2 INJECTION, SOLUTION INTRAMUSCULAR; INTRAVENOUS
Status: DISCONTINUED | OUTPATIENT
Start: 2020-08-11 | End: 2020-08-12 | Stop reason: HOSPADM

## 2020-08-11 RX ORDER — ACETAMINOPHEN 500 MG
1000 TABLET ORAL ONCE
Status: COMPLETED | OUTPATIENT
Start: 2020-08-11 | End: 2020-08-11

## 2020-08-11 RX ORDER — SERTRALINE HYDROCHLORIDE 100 MG/1
100 TABLET, FILM COATED ORAL DAILY
Status: DISCONTINUED | OUTPATIENT
Start: 2020-08-12 | End: 2020-08-12 | Stop reason: HOSPADM

## 2020-08-11 RX ORDER — ROCURONIUM BROMIDE 10 MG/ML
INJECTION, SOLUTION INTRAVENOUS PRN
Status: DISCONTINUED | OUTPATIENT
Start: 2020-08-11 | End: 2020-08-11 | Stop reason: SDUPTHER

## 2020-08-11 RX ORDER — MORPHINE SULFATE 4 MG/ML
4 INJECTION, SOLUTION INTRAMUSCULAR; INTRAVENOUS EVERY 5 MIN PRN
Status: DISCONTINUED | OUTPATIENT
Start: 2020-08-11 | End: 2020-08-11 | Stop reason: HOSPADM

## 2020-08-11 RX ORDER — SODIUM CHLORIDE 0.9 % (FLUSH) 0.9 %
10 SYRINGE (ML) INJECTION EVERY 12 HOURS SCHEDULED
Status: DISCONTINUED | OUTPATIENT
Start: 2020-08-11 | End: 2020-08-12 | Stop reason: HOSPADM

## 2020-08-11 RX ORDER — DIPHENHYDRAMINE HYDROCHLORIDE 50 MG/ML
12.5 INJECTION INTRAMUSCULAR; INTRAVENOUS
Status: DISCONTINUED | OUTPATIENT
Start: 2020-08-11 | End: 2020-08-11 | Stop reason: HOSPADM

## 2020-08-11 RX ORDER — ROPIVACAINE HYDROCHLORIDE 5 MG/ML
INJECTION, SOLUTION EPIDURAL; INFILTRATION; PERINEURAL
Status: COMPLETED | OUTPATIENT
Start: 2020-08-11 | End: 2020-08-11

## 2020-08-11 RX ORDER — IBUPROFEN 400 MG/1
400 TABLET ORAL
Status: DISCONTINUED | OUTPATIENT
Start: 2020-08-11 | End: 2020-08-11 | Stop reason: SDUPTHER

## 2020-08-11 RX ORDER — MIDAZOLAM HYDROCHLORIDE 1 MG/ML
2 INJECTION INTRAMUSCULAR; INTRAVENOUS
Status: COMPLETED | OUTPATIENT
Start: 2020-08-11 | End: 2020-08-11

## 2020-08-11 RX ORDER — MEPERIDINE HYDROCHLORIDE 50 MG/ML
12.5 INJECTION INTRAMUSCULAR; INTRAVENOUS; SUBCUTANEOUS EVERY 5 MIN PRN
Status: DISCONTINUED | OUTPATIENT
Start: 2020-08-11 | End: 2020-08-11 | Stop reason: HOSPADM

## 2020-08-11 RX ORDER — DEXTROSE MONOHYDRATE 25 G/50ML
12.5 INJECTION, SOLUTION INTRAVENOUS PRN
Status: DISCONTINUED | OUTPATIENT
Start: 2020-08-11 | End: 2020-08-12 | Stop reason: HOSPADM

## 2020-08-11 RX ORDER — SODIUM CHLORIDE 0.9 % (FLUSH) 0.9 %
10 SYRINGE (ML) INJECTION PRN
Status: DISCONTINUED | OUTPATIENT
Start: 2020-08-11 | End: 2020-08-12 | Stop reason: HOSPADM

## 2020-08-11 RX ORDER — OXYCODONE HCL 10 MG/1
10 TABLET, FILM COATED, EXTENDED RELEASE ORAL
Status: DISCONTINUED | OUTPATIENT
Start: 2020-08-11 | End: 2020-08-11 | Stop reason: ALTCHOICE

## 2020-08-11 RX ORDER — SODIUM CHLORIDE, SODIUM LACTATE, POTASSIUM CHLORIDE, CALCIUM CHLORIDE 600; 310; 30; 20 MG/100ML; MG/100ML; MG/100ML; MG/100ML
INJECTION, SOLUTION INTRAVENOUS CONTINUOUS
Status: DISCONTINUED | OUTPATIENT
Start: 2020-08-11 | End: 2020-08-12 | Stop reason: HOSPADM

## 2020-08-11 RX ORDER — IBUPROFEN 400 MG/1
400 TABLET ORAL
Status: DISCONTINUED | OUTPATIENT
Start: 2020-08-11 | End: 2020-08-12 | Stop reason: HOSPADM

## 2020-08-11 RX ADMIN — FENTANYL CITRATE 100 MCG: 50 INJECTION INTRAMUSCULAR; INTRAVENOUS at 10:16

## 2020-08-11 RX ADMIN — HYDROMORPHONE HYDROCHLORIDE 0.5 MG: 1 INJECTION, SOLUTION INTRAMUSCULAR; INTRAVENOUS; SUBCUTANEOUS at 13:00

## 2020-08-11 RX ADMIN — SODIUM CHLORIDE, SODIUM LACTATE, POTASSIUM CHLORIDE, AND CALCIUM CHLORIDE: 600; 310; 30; 20 INJECTION, SOLUTION INTRAVENOUS at 08:56

## 2020-08-11 RX ADMIN — CELECOXIB 200 MG: 200 CAPSULE ORAL at 08:58

## 2020-08-11 RX ADMIN — HYDROMORPHONE HYDROCHLORIDE 0.5 MG: 1 INJECTION, SOLUTION INTRAMUSCULAR; INTRAVENOUS; SUBCUTANEOUS at 12:40

## 2020-08-11 RX ADMIN — SUCCINYLCHOLINE CHLORIDE 120 MG: 20 INJECTION, SOLUTION INTRAMUSCULAR; INTRAVENOUS at 10:16

## 2020-08-11 RX ADMIN — MEPERIDINE HYDROCHLORIDE 12.5 MG: 50 INJECTION, SOLUTION INTRAMUSCULAR; INTRAVENOUS; SUBCUTANEOUS at 12:33

## 2020-08-11 RX ADMIN — ROCURONIUM BROMIDE 50 MG: 10 INJECTION, SOLUTION INTRAVENOUS at 10:24

## 2020-08-11 RX ADMIN — OXYCODONE 10 MG: 5 TABLET ORAL at 19:20

## 2020-08-11 RX ADMIN — IBUPROFEN 400 MG: 400 TABLET, FILM COATED ORAL at 16:46

## 2020-08-11 RX ADMIN — Medication 2 G: at 18:12

## 2020-08-11 RX ADMIN — DOCUSATE SODIUM 50 MG AND SENNOSIDES 8.6 MG 1 TABLET: 8.6; 5 TABLET, FILM COATED ORAL at 20:28

## 2020-08-11 RX ADMIN — BUSPIRONE HYDROCHLORIDE 10 MG: 10 TABLET ORAL at 14:08

## 2020-08-11 RX ADMIN — MEPERIDINE HYDROCHLORIDE 12.5 MG: 50 INJECTION, SOLUTION INTRAMUSCULAR; INTRAVENOUS; SUBCUTANEOUS at 12:28

## 2020-08-11 RX ADMIN — ACETAMINOPHEN 650 MG: 325 TABLET, FILM COATED ORAL at 14:07

## 2020-08-11 RX ADMIN — Medication 2 G: at 10:20

## 2020-08-11 RX ADMIN — INSULIN LISPRO 8 UNITS: 100 INJECTION, SOLUTION INTRAVENOUS; SUBCUTANEOUS at 18:44

## 2020-08-11 RX ADMIN — ASPIRIN 325 MG: 325 TABLET, COATED ORAL at 20:28

## 2020-08-11 RX ADMIN — SODIUM CHLORIDE, SODIUM LACTATE, POTASSIUM CHLORIDE, AND CALCIUM CHLORIDE: 600; 310; 30; 20 INJECTION, SOLUTION INTRAVENOUS at 14:03

## 2020-08-11 RX ADMIN — SODIUM CHLORIDE, SODIUM LACTATE, POTASSIUM CHLORIDE, AND CALCIUM CHLORIDE: 600; 310; 30; 20 INJECTION, SOLUTION INTRAVENOUS at 20:45

## 2020-08-11 RX ADMIN — MIDAZOLAM 2 MG: 1 INJECTION INTRAMUSCULAR; INTRAVENOUS at 09:39

## 2020-08-11 RX ADMIN — HYDROMORPHONE HYDROCHLORIDE 0.5 MG: 1 INJECTION, SOLUTION INTRAMUSCULAR; INTRAVENOUS; SUBCUTANEOUS at 12:50

## 2020-08-11 RX ADMIN — OXYCODONE HYDROCHLORIDE 10 MG: 10 TABLET, FILM COATED, EXTENDED RELEASE ORAL at 08:59

## 2020-08-11 RX ADMIN — FENTANYL CITRATE 100 MCG: 50 INJECTION INTRAMUSCULAR; INTRAVENOUS at 10:40

## 2020-08-11 RX ADMIN — INSULIN GLARGINE 15 UNITS: 100 INJECTION, SOLUTION SUBCUTANEOUS at 20:28

## 2020-08-11 RX ADMIN — ROPIVACAINE HYDROCHLORIDE 20 ML: 5 INJECTION, SOLUTION EPIDURAL; INFILTRATION; PERINEURAL at 09:43

## 2020-08-11 RX ADMIN — PROPOFOL 200 MG: 10 INJECTION, EMULSION INTRAVENOUS at 10:16

## 2020-08-11 RX ADMIN — DOCUSATE SODIUM 50 MG AND SENNOSIDES 8.6 MG 1 TABLET: 8.6; 5 TABLET, FILM COATED ORAL at 14:07

## 2020-08-11 RX ADMIN — ASPIRIN 325 MG: 325 TABLET, COATED ORAL at 14:07

## 2020-08-11 RX ADMIN — EPHEDRINE SULFATE 10 MG: 50 INJECTION INTRAMUSCULAR; INTRAVENOUS; SUBCUTANEOUS at 10:28

## 2020-08-11 RX ADMIN — ACETAMINOPHEN 650 MG: 325 TABLET, FILM COATED ORAL at 19:20

## 2020-08-11 RX ADMIN — LIDOCAINE HYDROCHLORIDE 8 ML: 10 INJECTION, SOLUTION EPIDURAL; INFILTRATION; INTRACAUDAL; PERINEURAL at 10:02

## 2020-08-11 RX ADMIN — INSULIN LISPRO 3 UNITS: 100 INJECTION, SOLUTION INTRAVENOUS; SUBCUTANEOUS at 20:28

## 2020-08-11 RX ADMIN — INSULIN LISPRO 8 UNITS: 100 INJECTION, SOLUTION INTRAVENOUS; SUBCUTANEOUS at 17:06

## 2020-08-11 RX ADMIN — METFORMIN HYDROCHLORIDE 1000 MG: 500 TABLET ORAL at 16:46

## 2020-08-11 RX ADMIN — SUGAMMADEX 220 MG: 100 INJECTION, SOLUTION INTRAVENOUS at 11:49

## 2020-08-11 RX ADMIN — BUSPIRONE HYDROCHLORIDE 10 MG: 10 TABLET ORAL at 20:28

## 2020-08-11 RX ADMIN — TRANEXAMIC ACID 1950 MG: 650 TABLET ORAL at 08:59

## 2020-08-11 RX ADMIN — ACETAMINOPHEN 1000 MG: 500 TABLET, FILM COATED ORAL at 08:58

## 2020-08-11 RX ADMIN — HYDROMORPHONE HYDROCHLORIDE 0.25 MG: 1 INJECTION, SOLUTION INTRAMUSCULAR; INTRAVENOUS; SUBCUTANEOUS at 13:05

## 2020-08-11 RX ADMIN — HYDROMORPHONE HYDROCHLORIDE 0.25 MG: 1 INJECTION, SOLUTION INTRAMUSCULAR; INTRAVENOUS; SUBCUTANEOUS at 12:17

## 2020-08-11 RX ADMIN — SODIUM CHLORIDE, SODIUM LACTATE, POTASSIUM CHLORIDE, AND CALCIUM CHLORIDE: 600; 310; 30; 20 INJECTION, SOLUTION INTRAVENOUS at 10:33

## 2020-08-11 RX ADMIN — PROPOFOL 50 MCG/KG/MIN: 10 INJECTION, EMULSION INTRAVENOUS at 10:16

## 2020-08-11 RX ADMIN — Medication 50 MG: at 10:16

## 2020-08-11 ASSESSMENT — PAIN DESCRIPTION - FREQUENCY: FREQUENCY: INTERMITTENT

## 2020-08-11 ASSESSMENT — PAIN DESCRIPTION - ONSET: ONSET: GRADUAL

## 2020-08-11 ASSESSMENT — PAIN SCALES - GENERAL
PAINLEVEL_OUTOF10: 9
PAINLEVEL_OUTOF10: 7
PAINLEVEL_OUTOF10: 6
PAINLEVEL_OUTOF10: 2
PAINLEVEL_OUTOF10: 9
PAINLEVEL_OUTOF10: 6
PAINLEVEL_OUTOF10: 9
PAINLEVEL_OUTOF10: 9
PAINLEVEL_OUTOF10: 8
PAINLEVEL_OUTOF10: 9
PAINLEVEL_OUTOF10: 0
PAINLEVEL_OUTOF10: 6
PAINLEVEL_OUTOF10: 2

## 2020-08-11 ASSESSMENT — PAIN DESCRIPTION - PROGRESSION: CLINICAL_PROGRESSION: GRADUALLY WORSENING

## 2020-08-11 ASSESSMENT — ENCOUNTER SYMPTOMS: SHORTNESS OF BREATH: 0

## 2020-08-11 ASSESSMENT — PAIN SCALES - WONG BAKER: WONGBAKER_NUMERICALRESPONSE: 0

## 2020-08-11 ASSESSMENT — LIFESTYLE VARIABLES: SMOKING_STATUS: 0

## 2020-08-11 ASSESSMENT — PAIN - FUNCTIONAL ASSESSMENT: PAIN_FUNCTIONAL_ASSESSMENT: PREVENTS OR INTERFERES SOME ACTIVE ACTIVITIES AND ADLS

## 2020-08-11 ASSESSMENT — PAIN DESCRIPTION - PAIN TYPE: TYPE: SURGICAL PAIN

## 2020-08-11 ASSESSMENT — PAIN DESCRIPTION - DESCRIPTORS: DESCRIPTORS: SORE

## 2020-08-11 ASSESSMENT — PAIN DESCRIPTION - LOCATION: LOCATION: KNEE

## 2020-08-11 ASSESSMENT — PAIN DESCRIPTION - ORIENTATION: ORIENTATION: RIGHT

## 2020-08-11 NOTE — OP NOTE
TOTAL KNEE ARTHROPLASTY OPERATIVE NOTE    NAME OF SURGEON / : Luciana Ellis MD  PATIENT:   Natalia Wilson  Date: 8/11/2020        Time: 11:50 AM   Referring Physician: ________________________    PREOP DIAGNOSIS:  right Knee  Primary osteoarthritis   POSTOP DIAGNOSIS:  Same     PROCEDURE:    Right  Cemented Posterior Stabilized Knee arthroplasty    IMPLANTS:   Implant Name Type Inv. Item Serial No.  Lot No. LRB No. Used Action   CEMENT BONE R 1X40 US Cement CEMENT BONE R 1X40 US  Kellen Holter [id number] Right 1 Implanted   CEMENT BONE R 1X40 US Cement CEMENT BONE R 1X40 US  [employer]ellen [employer] [id number] Right 1 Implanted   IMPL KNEE PERSONA POLY PAT 29X8MM Knee IMPL KNEE PERSONA POLY PAT 29X8MM  [employer] [id number] Right 1 Implanted   IMPL KNEE PSN FEM CMT SZ7 R Knee IMPL KNEE PSN FEM CMT SZ7 R  [employer] [id number] Right 1 Implanted   IMPL KNEE PERSONA TIB TAMARA 5 DEG SZ D RT Knee IMPL KNEE PERSONA TIB TAMARA 5 DEG SZ D RT  [employer] [id number] Right 1 Implanted   IMPL KNEE STEM EXT PSN HYB [id number] Knee IMPL KNEE STEM EXT PSN HYB [id number]  [employer] [id number] Right 1 Implanted   IMPL KNEE ASF FB PSN CPS 12MM VE R6-9CD Knee IMPL KNEE ASF FB PSN CPS 12MM VE R6-9CD  [employer] [id number] Right 1 Implanted       FINDINGS:  Preop ROM:  Full except for   - 5 degrees  extension  Alignment:    neutral  Cartilage wear:  Medial - severe  Lateral - severe  Pat-fem -severe  ACL -Absent    ASSISTANT: Alvin Hollins, certified first assistant. Helped with draping, exposure, retraction, essential steps of the procedure, and with wound closure. ANESTHESIA:  General  EBL:  500 mL  TOURNIQUET:  none  FLUIDS: See anesthesia record  BLOOD PRODUCTS:  None  COMPLICATIONS:  None  SPECIMEN:  None            INDICATIONS:  Patient presents for the above procedure having failed conservative treatment.   Patient consents to the procedure above understanding the risks of bleeding, infection, anesthesia, nerve injury, stiffness, and blood clots.    PROCEDURE:  The patient was brought to the operating room and placed in a supine position on the operating table. Anesthesia as specified above was placed. An antiobiotic was given IV. The unoperated extremities were well padded. A tourniquet was placed around the proximal thigh. The lower extremity was prepped with chlorhexidene/alcohol and draped sterilely using ioband barriers. A time out was performed. An anterior knee incision was made and fasciocutaneous flaps were developed. A mini midvastus approach was made and the patella subluxed. The prepatellar fat pad, ACL, and the anterior horns of the menisci were excised. A starter drill was placed down the femoral shaft 1 cm anterior to the PCL origin. An alignment andrew was placed down the femoral shaft. The distal femoral cutting guide, set at 5 degrees of valgus was then placed over the alignment andrew, and pinned perpendicular to the AP axis. The cutting block was then set to remove an extra 2 mm of distal femur and the distal cut made. The medial bone cut thickness was 12 mm. Hohmans and a PCL retractor were placed around the tibia. The external alignment guide set to cut 10 mm off the intact side at 7° degrees posterior slope was pinned in place. I stepped back and verified that the guide followed the anterior cortex of the tibia to the ankle. The tibial cut was made. Its thickness was 11 mm. The tibial fragment and osteophytes were removed. Posterior meniscal horns were resected. The extension gap was satisfactory. The epicondylar and AP femoral axes were marked with electrocautery. Femoral trials were laid on the distal femur to estimate the appropriate size. The femoral sizer, with posterior paddles set at 3 degrees of external rotation, and an anterior stylus was placed. The sizer reading matched the size predicted by the trial.   The pinholes were drilled for the 4 in 1 femoral cutting block.   This block was impacted on the distal femur and sat flush with the proper rotation relative to the AP and epicondylar axes. A drill was advanced through the anterior slot to check for notching. The femoral block was fixed with medial and lateral screws and the remaining femoral cuts were made. Femoral osteophytes were removed with a rongeur. The flexion gap was satisfactory. Posterior femoral osteophytes were removed with a 3/4 inch curved osteotome and rongeur. The appropriate tibial post punch guide covered the tibia without overhang and sat flush. It was lined up with the medial third of the tibial tubercle. The tibia was reamed, then the keel punched. The femoral trial was impacted onto the femur. The box cut was made first with a recip saw and finished with regular saw. The insert for the box was placed. A 10 mm thick, posterior stabilized tibial liner was snapped in place and ROM and stability were checked. The knee had full ROM and symmetric varus/valgus stability in flexion and extension after    *release of  NO RELEASES  * no extra resection was needed   *the appropriate size tibial liner was placed (see above)    Note: Stability to varus/valgus stress(mm):  Extension ( 2  ,  3 ) Mid Flexion (  2  ,  4  ) Flexion (  2  ,  2  )      The maximum patella thickness was 24 mm. The patella as resected with  an oscillating saw and consistent thickness verified with caliper. The trial patella was placed and the overall thickness was restored to 24 mm. A femoral  bone plug placed, the femoral lug holes drilled and the trials were removed. The surfaces were rinsed with pulse lavage and dried. Two batches of cement  were mixed. Appropriate sized implants were cemented in place, a trial tibial liner placed, and the knee held in full extension until cement hardened. The capsule was injected with Ropivacaine. The knee space was filled with diluted, warm betadine while the cement hardened.   Excess cement was removed, and the actual tibial liner was snapped in place. No thumbs patella tracking was checked. No release was needed. Hemostasis was achieved with electrocautery. The wound was copiously irrigated with antibiotic irrigation. The capsule was closed with interrupted #2 vicryl. Subcutaneous tissue was closed with running 2-0 vicryl. Skin was closed with 3-0 vicryl and Prineo. A sterile dressing was applied. The patient was awakened, extubated and transferred to the recovery room in stable condition.             PLAN:  Full weight bearing, ROM, dvt prophylaxis      Electronically signed by Shelby Us MD on 8/11/2020 at 11:50 AM

## 2020-08-11 NOTE — ANESTHESIA PROCEDURE NOTES
Peripheral Block    Patient location during procedure: holding area  Start time: 8/11/2020 9:43 AM  End time: 8/11/2020 9:43 AM  Staffing  Anesthesiologist: Tiffanie Lui DO  Performed: anesthesiologist   Preanesthetic Checklist  Completed: patient identified, site marked, surgical consent, pre-op evaluation, timeout performed, IV checked, risks and benefits discussed, monitors and equipment checked, anesthesia consent given, oxygen available and patient being monitored  Peripheral Block  Patient position: supine  Prep: ChloraPrep  Patient monitoring: cardiac monitor, continuous pulse ox, frequent blood pressure checks and IV access  Block type: Femoral  Laterality: right  Injection technique: single-shot  Procedures: ultrasound guided  Adductor canal  Provider prep: mask and sterile gloves  Needle  Needle type: short-bevel   Needle gauge: 20 G  Needle length: 10 cm  Needle localization: ultrasound guidance  Assessment  Injection assessment: negative aspiration for heme, no paresthesia on injection and local visualized surrounding nerve on ultrasound  Paresthesia pain: none  Slow fractionated injection: yes  Hemodynamics: stable  Additional Notes  Needle was introduced in proximal third of thigh in an  bre-medial to posterior direction. The needle was visualized \" in- plane\" under ultrasound guidance to access the adductor canal in a sub-sartorial fashion. The femoral artery was avoided and 20 cc of 0.5% ropivacaine  was injected and surrounded the nerve plexus. The plexus appeared anatomically normal, no obvious pathology was noted.  A permanent image was obtained   Medications Administered  Ropivacaine (NAROPIN) injection 0.5%, 20 mL  Reason for block: post-op pain management

## 2020-08-11 NOTE — ANESTHESIA PRE PROCEDURE
Department of Anesthesiology  Preprocedure Note       Name:  Rain Henry   Age:  60 y.o.  :  1955                                          MRN:  [id number]         Date:  2020      Surgeon: Kanchan Wyatt):  German Valdez MD    Procedure: Procedure(s):  RIGHT TOTAL KNEE REPLACEMENT    Medications prior to admission:   Prior to Admission medications    Medication Sig Start Date End Date Taking?  Authorizing Provider   diclofenac (VOLTAREN) 75 MG EC tablet Take 1 tablet by mouth twice daily  Patient taking differently: Take 75 mg by mouth 2 times daily  20  Yes Dwayne Rings, APRN - CNP   busPIRone (BUSPAR) 10 MG tablet Take 1 tablet by mouth 3 times daily 20  Yes Gabbie Shah APRN - CNP   amLODIPine (NORVASC) 5 MG tablet TAKE 1 TABLET BY MOUTH ONCE DAILY  Patient taking differently: Take 5 mg by mouth daily TAKE 1 TABLET BY MOUTH ONCE DAILY 20  Yes Dwayne Rings, APRN - CNP   metFORMIN (GLUCOPHAGE) 1000 MG tablet TAKE 1 TABLET BY MOUTH TWICE DAILY WITH MEALS FOR DIABETES  Patient taking differently: Take 1,000 mg by mouth 2 times daily (with meals)  20  Yes Gabbie Garrido APRN - CNP   EUTHYROX 150 MCG tablet Take 1 tablet by mouth once daily  Patient taking differently: Take 150 mcg by mouth Daily  20  Yes Dwayne Rings, APRN - CNP   sertraline (ZOLOFT) 100 MG tablet TAKE 1 TABLET BY MOUTH ONCE DAILY FOR DEPRESSION  Patient taking differently: Take 100 mg by mouth daily  20  Yes Dwayne Rings, APRN - CNP   Lancets MISC 1 each by Does not apply route 2 times daily 20   Dwayne Rings, APRN - CNP   Dulaglutide (TRULICITY) 1.01 DH/5.7YI SOPN INJECT 0.75 MG INTO THE SKIN ONCE A WEEK  Patient taking differently: Inject 0.75 mg into the skin once a week INJECT 0.75 MG INTO THE SKIN ONCE A WEEK (Tuesday @3:00pm) 20   Dwayne Rings, APRN - CNP   Blood Glucose Monitoring Suppl (FREESTYLE LITE) ROBEL 1 Device by Does not apply route daily  19 Historical Provider, MD   blood glucose monitor strips Test 3 times a day & as needed for symptoms of irregular blood glucose. Freestyle lite  Patient taking differently: 1 strip by Other route 3 times daily as needed Test 3 times a day & as needed for symptoms of irregular blood glucose. Freestyle lite 8/22/19   MIKEY Booker CNP   glucose monitoring kit (FREESTYLE) monitoring kit 1 kit by Does not apply route 3 times daily (before meals) Dx E11.8 8/20/19   MIKEY Booker CNP   glucose blood VI test strips (ASCENSIA AUTODISC VI;ONE TOUCH ULTRA TEST VI) strip Test blood sugar 3 times a day before meals and as needed  Patient taking differently: 1 each by Does not apply route 3 times daily as needed Test blood sugar 3 times a day before meals and as needed 5/8/18   MIKEY Booker CNP       Current medications:    Current Facility-Administered Medications   Medication Dose Route Frequency Provider Last Rate Last Dose    scopolamine (TRANSDERM-SCOP) transdermal patch 1 patch  1 patch Transdermal Once Cristiane Holguin MD   1 patch at 08/11/20 0859    lactated ringers infusion   Intravenous Continuous Cristiane Holguin  mL/hr at 08/11/20 0856      ceFAZolin (ANCEF) 2 g in 0.9% sodium chloride 50 mL IVPB  2 g Intravenous On Call to [address], [place]           Allergies:     Allergies   Allergen Reactions    Pcn [Penicillins] Rash     childhood       Problem List:    Patient Active Problem List   Diagnosis Code    Osteoarthrosis, localized, primary, knee M17.10    Type 2 diabetes mellitus without complication, without long-term current use of insulin ([facility] [address] E11.9    Acquired hypothyroidism E03.9       Past Medical History:        Diagnosis Date    Diabetes mellitus ([address]     History of blood transfusion     1976 post childbirth    Hypertension     Thyroid disease        Past Surgical History:        Procedure Laterality Date    CHOLECYSTECTOMY      COLONOSCOPY N/A 11/21/2019    Dr Chris Garcia    HYSTERECTOMY      TOTAL KNEE ARTHROPLASTY Left 8/30/2016    KNEE TOTAL ARTHROPLASTY performed by Carl Jaquez MD at [facility] ENDOSCOPY N/A 11/21/2019    Dr Joe Batista, duodenitis       Social History:    Social History     Tobacco Use    Smoking status: Never Smoker    Smokeless tobacco: Never Used   Substance Use Topics    Alcohol use: No                                Counseling given: Not Answered      Vital Signs (Current):   Vitals:    08/11/20 0853   BP: (!) 145/62   Pulse: 67   Resp: 18   Temp: 97 °F (36.1 °C)   TempSrc: Temporal   SpO2: 98%   Weight: 234 lb (106.1 kg)   Height: 5' 5\" (1.651 m)                                              BP Readings from Last 3 Encounters:   08/11/20 (!) 145/62   07/07/20 130/86   02/04/20 (!) 173/90       NPO Status: Time of last liquid consumption: 0000                        Time of last solid consumption: 0000                        Date of last liquid consumption: 08/10/20                        Date of last solid food consumption: 08/10/20    BMI:   Wt Readings from Last 3 Encounters:   08/11/20 234 lb (106.1 kg)   07/28/20 234 lb (106.1 kg)   07/07/20 234 lb 6.4 oz (106.3 kg)     Body mass index is 38.94 kg/m².     CBC:   Lab Results   Component Value Date    WBC 7.9 07/28/2020    RBC 4.96 07/28/2020    HGB 14.2 07/28/2020    HCT 44.0 07/28/2020    MCV 88.7 07/28/2020    RDW 13.4 07/28/2020     07/28/2020       CMP:   Lab Results   Component Value Date     07/07/2020    K 4.2 07/07/2020    CL 99 07/07/2020    CO2 27 07/07/2020    BUN 17 07/07/2020    CREATININE 0.6 07/07/2020    LABGLOM >60 07/07/2020    GLUCOSE 188 07/07/2020    PROT 7.2 07/07/2020    CALCIUM 9.6 07/07/2020    BILITOT 0.4 07/07/2020    ALKPHOS 95 07/07/2020    AST 19 07/07/2020    ALT 25 07/07/2020       POC Tests:   Recent Labs     08/11/20  0853   POCGLU 224*       Coags:   Lab Results patient.                       Hayedn Bocanegra,    8/11/2020

## 2020-08-11 NOTE — PROGRESS NOTES
Physical Therapy    Facility/Department: [facility] SURG SERVICES  Initial Assessment    NAME: Ki Gaxiola  : 1955  MRN: [id number]    Date of Service: 2020    Discharge Recommendations:  Continue to assess pending progress, Patient would benefit from continued therapy after discharge        Assessment   Body structures, Functions, Activity limitations: Decreased functional mobility ; Decreased ROM; Decreased strength;Decreased endurance; Increased pain  Assessment: ANTICIPATE Pt TO PROGRESS WELL WITH TYPICAL TKR PROTOCOL  Prognosis: Good  Decision Making: Low Complexity  PT Education: Gait Training;General Safety; Functional Mobility Training;Weight-bearing Education;Transfer Training  Patient Education: POSITIONING STRATEGIES TO PROMOTE KNEE EXTENSION  REQUIRES PT FOLLOW UP: Yes  Activity Tolerance  Activity Tolerance: Patient Tolerated treatment well       Patient Diagnosis(es): There were no encounter diagnoses. has a past medical history of Diabetes mellitus ([facility] [address] History of blood transfusion, Hypertension, and Thyroid disease. has a past surgical history that includes Hysterectomy; Cholecystectomy; Total knee arthroplasty (Left, 2016); Upper gastrointestinal endoscopy (N/A, 2019); Colonoscopy (N/A, 2019); and Total knee arthroplasty (Right, 2020). Restrictions  Restrictions/Precautions  Restrictions/Precautions: Weight Bearing  Lower Extremity Weight Bearing Restrictions  Right Lower Extremity Weight Bearing: Weight Bearing As Tolerated  Vision/Hearing        Subjective  General  Diagnosis: R TKR  Follows Commands: Within Functional Limits  Subjective  Subjective: pT STATES HER PAIN IS WELL CONTROLLED AND SHE IS READY TO USE THE BR AND THEN WOULD LIKE TO AMB TO DOORWAY PRIOR TO SITTING UP IN RECLINER.           Orientation     Social/Functional History  Social/Functional History  Lives With: Family  Type of Home: House  Home Access: Stairs to enter with rails  Bathroom Toilet: Standard  Bathroom Equipment: 3-in-1 commode  Home Equipment: Rolling walker  ADL Assistance: Independent  Ambulation Assistance: Independent  Transfer Assistance: Independent  Cognition        Objective          AROM RLE (degrees)  RLE General AROM: ABLE TO SIT EOB AND FLEX KNEE TO GROSSLY 75 DEG  AROM LLE (degrees)  LLE AROM : WFL  Strength RLE  Comment: ABLE TO EXTEND KNEE AGAINST GRAVITY  Strength LLE  Strength LLE: WFL        Bed mobility  Supine to Sit: Stand by assistance  Transfers  Sit to Stand: Contact guard assistance  Stand to sit: Contact guard assistance  Ambulation  Ambulation?: Yes  Ambulation 1  Surface: level tile  Device: Rolling Walker  Assistance: Contact guard assistance  Gait Deviations: Slow Samantha;Decreased step length;Decreased step height  Distance: 10 FT, 25 FT     Balance  Comments: pT ABLE TO STAND AND MANAGE PERSONAL HYGIENE WITHOUT LOB        Plan   Plan  Times per week: 5-7  Plan weeks: 2  Current Treatment Recommendations: Strengthening, ROM, Safety Education & Training, Functional Mobility Training, Transfer Training, Gait Training, Pain Management, Positioning  Plan Comment: WBAT  Safety Devices  Type of devices: Call light within reach, Gait belt, Left in chair    G-Code       OutComes Score                                                  AM-PAC Score             Goals  Short term goals  Time Frame for Short term goals: 2 WKS  Short term goal 1:  FT WITH RW, CGA  Short term goal 2: STEPS, CGA       Therapy Time   Individual Concurrent Group Co-treatment   Time In           Time Out           Minutes                   Scarlet Meyers PT     Electronically signed by Scarlet Meyers PT on 8/11/2020 at 3:54 PM

## 2020-08-11 NOTE — ANESTHESIA POSTPROCEDURE EVALUATION
Department of Anesthesiology  Postprocedure Note    Patient: Ki Gaxiola  MRN: [id number]  YOB: 1955  Date of evaluation: 8/11/2020  Time:  12:08 PM     Procedure Summary     Date:  08/11/20 Room / Location:  [facility] OR [address] [place] [address]    Anesthesia Start:  1283 Anesthesia Stop:      Procedure:  RIGHT TOTAL KNEE REPLACEMENT (Right Knee) Diagnosis:  (M17.11)    Surgeon:  Rex Cuadra MD Responsible Provider:  MIKEY Rg CRNA    Anesthesia Type:  spinal, regional ASA Status:  3          Anesthesia Type: spinal, regional    María Phase I: María Score: 10    María Phase II:      Last vitals: Reviewed and per EMR flowsheets.        Anesthesia Post Evaluation    Patient location during evaluation: PACU  Patient participation: complete - patient participated  Level of consciousness: sleepy but conscious  Pain score: 0  Airway patency: patent  Nausea & Vomiting: no nausea and no vomiting  Complications: no  Cardiovascular status: hemodynamically stable  Respiratory status: acceptable, nasal cannula and face mask  Hydration status: euvolemic

## 2020-08-12 VITALS
HEART RATE: 64 BPM | DIASTOLIC BLOOD PRESSURE: 73 MMHG | RESPIRATION RATE: 14 BRPM | OXYGEN SATURATION: 95 % | SYSTOLIC BLOOD PRESSURE: 126 MMHG | TEMPERATURE: 97.6 F | BODY MASS INDEX: 38.99 KG/M2 | WEIGHT: 234 LBS | HEIGHT: 65 IN

## 2020-08-12 LAB
ANION GAP SERPL CALCULATED.3IONS-SCNC: 9 MMOL/L (ref 7–19)
BUN BLDV-MCNC: 16 MG/DL (ref 8–23)
CALCIUM SERPL-MCNC: 8.8 MG/DL (ref 8.8–10.2)
CHLORIDE BLD-SCNC: 99 MMOL/L (ref 98–111)
CO2: 29 MMOL/L (ref 22–29)
CREAT SERPL-MCNC: 0.7 MG/DL (ref 0.5–0.9)
GFR AFRICAN AMERICAN: >59
GFR NON-AFRICAN AMERICAN: >60
GLUCOSE BLD-MCNC: 199 MG/DL (ref 74–109)
GLUCOSE BLD-MCNC: 210 MG/DL (ref 70–99)
HBA1C MFR BLD: 8.4 % (ref 4–6)
HCT VFR BLD CALC: 32.9 % (ref 37–47)
HEMOGLOBIN: 10.8 G/DL (ref 12–16)
PERFORMED ON: ABNORMAL
POTASSIUM REFLEX MAGNESIUM: 4.4 MMOL/L (ref 3.5–5)
SODIUM BLD-SCNC: 137 MMOL/L (ref 136–145)

## 2020-08-12 PROCEDURE — G0378 HOSPITAL OBSERVATION PER HR: HCPCS

## 2020-08-12 PROCEDURE — 97116 GAIT TRAINING THERAPY: CPT

## 2020-08-12 PROCEDURE — 36415 COLL VENOUS BLD VENIPUNCTURE: CPT

## 2020-08-12 PROCEDURE — 82947 ASSAY GLUCOSE BLOOD QUANT: CPT

## 2020-08-12 PROCEDURE — 6370000000 HC RX 637 (ALT 250 FOR IP): Performed by: ORTHOPAEDIC SURGERY

## 2020-08-12 PROCEDURE — 80048 BASIC METABOLIC PNL TOTAL CA: CPT

## 2020-08-12 PROCEDURE — 85018 HEMOGLOBIN: CPT

## 2020-08-12 PROCEDURE — 85014 HEMATOCRIT: CPT

## 2020-08-12 PROCEDURE — 2580000003 HC RX 258: Performed by: ORTHOPAEDIC SURGERY

## 2020-08-12 PROCEDURE — 6360000002 HC RX W HCPCS: Performed by: ORTHOPAEDIC SURGERY

## 2020-08-12 PROCEDURE — 83036 HEMOGLOBIN GLYCOSYLATED A1C: CPT

## 2020-08-12 PROCEDURE — 97530 THERAPEUTIC ACTIVITIES: CPT

## 2020-08-12 RX ORDER — CEPHALEXIN 500 MG/1
500 CAPSULE ORAL 4 TIMES DAILY
Qty: 28 CAPSULE | Refills: 0 | Status: SHIPPED | OUTPATIENT
Start: 2020-08-12 | End: 2021-07-09

## 2020-08-12 RX ORDER — OXYCODONE HYDROCHLORIDE 5 MG/1
5 TABLET ORAL EVERY 4 HOURS PRN
Qty: 30 TABLET | Refills: 0 | Status: SHIPPED | OUTPATIENT
Start: 2020-08-12 | End: 2020-08-15

## 2020-08-12 RX ORDER — PIOGLITAZONEHYDROCHLORIDE 45 MG/1
45 TABLET ORAL DAILY
Qty: 30 TABLET | Refills: 3 | Status: SHIPPED | OUTPATIENT
Start: 2020-08-12 | End: 2021-01-19 | Stop reason: SDUPTHER

## 2020-08-12 RX ADMIN — INSULIN LISPRO 4 UNITS: 100 INJECTION, SOLUTION INTRAVENOUS; SUBCUTANEOUS at 08:30

## 2020-08-12 RX ADMIN — ACETAMINOPHEN 650 MG: 325 TABLET, FILM COATED ORAL at 01:44

## 2020-08-12 RX ADMIN — SODIUM CHLORIDE, SODIUM LACTATE, POTASSIUM CHLORIDE, AND CALCIUM CHLORIDE: 600; 310; 30; 20 INJECTION, SOLUTION INTRAVENOUS at 05:45

## 2020-08-12 RX ADMIN — METFORMIN HYDROCHLORIDE 1000 MG: 500 TABLET ORAL at 07:22

## 2020-08-12 RX ADMIN — SERTRALINE HYDROCHLORIDE 100 MG: 100 TABLET ORAL at 07:22

## 2020-08-12 RX ADMIN — OXYCODONE 10 MG: 5 TABLET ORAL at 01:44

## 2020-08-12 RX ADMIN — OXYCODONE 10 MG: 5 TABLET ORAL at 05:45

## 2020-08-12 RX ADMIN — Medication 2 G: at 01:44

## 2020-08-12 RX ADMIN — LEVOTHYROXINE SODIUM 150 MCG: 75 TABLET ORAL at 05:45

## 2020-08-12 RX ADMIN — IBUPROFEN 400 MG: 400 TABLET, FILM COATED ORAL at 07:22

## 2020-08-12 RX ADMIN — ASPIRIN 325 MG: 325 TABLET, COATED ORAL at 07:22

## 2020-08-12 RX ADMIN — AMLODIPINE BESYLATE 5 MG: 5 TABLET ORAL at 07:23

## 2020-08-12 RX ADMIN — BUSPIRONE HYDROCHLORIDE 10 MG: 10 TABLET ORAL at 07:22

## 2020-08-12 RX ADMIN — ACETAMINOPHEN 650 MG: 325 TABLET, FILM COATED ORAL at 07:22

## 2020-08-12 RX ADMIN — DOCUSATE SODIUM 50 MG AND SENNOSIDES 8.6 MG 1 TABLET: 8.6; 5 TABLET, FILM COATED ORAL at 07:22

## 2020-08-12 ASSESSMENT — PAIN DESCRIPTION - ORIENTATION
ORIENTATION: RIGHT
ORIENTATION: RIGHT

## 2020-08-12 ASSESSMENT — PAIN DESCRIPTION - ONSET
ONSET: ON-GOING
ONSET: ON-GOING

## 2020-08-12 ASSESSMENT — PAIN DESCRIPTION - LOCATION
LOCATION: KNEE
LOCATION: KNEE

## 2020-08-12 ASSESSMENT — PAIN DESCRIPTION - PAIN TYPE
TYPE: SURGICAL PAIN
TYPE: SURGICAL PAIN

## 2020-08-12 ASSESSMENT — PAIN DESCRIPTION - DESCRIPTORS
DESCRIPTORS: SORE
DESCRIPTORS: SORE

## 2020-08-12 ASSESSMENT — PAIN DESCRIPTION - FREQUENCY
FREQUENCY: INTERMITTENT
FREQUENCY: INTERMITTENT

## 2020-08-12 ASSESSMENT — PAIN SCALES - GENERAL
PAINLEVEL_OUTOF10: 4
PAINLEVEL_OUTOF10: 7
PAINLEVEL_OUTOF10: 7

## 2020-08-12 ASSESSMENT — PAIN DESCRIPTION - PROGRESSION
CLINICAL_PROGRESSION: GRADUALLY IMPROVING
CLINICAL_PROGRESSION: GRADUALLY IMPROVING

## 2020-08-12 ASSESSMENT — PAIN - FUNCTIONAL ASSESSMENT
PAIN_FUNCTIONAL_ASSESSMENT: PREVENTS OR INTERFERES SOME ACTIVE ACTIVITIES AND ADLS
PAIN_FUNCTIONAL_ASSESSMENT: PREVENTS OR INTERFERES SOME ACTIVE ACTIVITIES AND ADLS

## 2020-08-12 NOTE — PROGRESS NOTES
Patient discharged home today with outpatient therapy script. Medications and discharge instructions reviewed with patient. A handout of all new medications was given to the patient for reference with all possible side effects highlighted. Patient verbalized understanding. Patient stable upon discharge.   Electronically signed by Donald Ramirez RN on 8/12/2020 at 10:29 AM

## 2020-08-12 NOTE — CONSULTS
syncope  GASTROINTESTINAL:  negative for abdominal pain, hematemesis, jaundice, melena and rectal bleeding  MUSCULOSKELETAL:  negative for muscle weakness, myalgias and neck pain, chronic joint pain noted  NEUROLOGICAL:   negative for dizziness, headaches, seizures, speech problems, tremors and vertigo  INTEGUMENT: negative for pruritus, rash, skin color change and skin lesion(s)   A Full 14 point review of systems is negative outside those listed above and in the HPI    History    Past Medical History:   Diagnosis Date    Diabetes mellitus ([address]     History of blood transfusion     1976 post childbirth    Hypertension     Thyroid disease      Past Surgical History:   Procedure Laterality Date    CHOLECYSTECTOMY      COLONOSCOPY N/A 11/21/2019    Dr Baljit Dawson    HYSTERECTOMY      TOTAL KNEE ARTHROPLASTY Left 8/30/2016    KNEE TOTAL ARTHROPLASTY performed by José Miguel Vázquez MD at [address] [place] [address] Right 8/11/2020    RIGHT TOTAL KNEE REPLACEMENT performed by José Miguel Vázquez MD at [facility] ENDOSCOPY N/A 11/21/2019    Dr Jessica Rosas-Gastritis, duodenitis     Family History   Problem Relation Age of Onset    Cancer Mother         uterine    High Blood Pressure Father         aneurysm abd    Other Sister         aneurysm brain    Diabetes Brother     Breast Cancer Paternal Aunt     Colon Cancer Neg Hx     Colon Polyps Neg Hx     Esophageal Cancer Neg Hx     Liver Disease Neg Hx     Liver Cancer Neg Hx     Rectal Cancer Neg Hx     Stomach Cancer Neg Hx      Social History     Tobacco Use    Smoking status: Never Smoker    Smokeless tobacco: Never Used   Substance Use Topics    Alcohol use: No    Drug use: No     Medications Prior to Admission: diclofenac (VOLTAREN) 75 MG EC tablet, Take 1 tablet by mouth twice daily (Patient taking differently: Take 75 mg by mouth 2 times daily )  busPIRone (BUSPAR) 10 MG tablet, Take 1 tablet by mouth 3 light.   Neck: Neck supple. Cardiovascular: Regular rhythm and normal heart sounds. No lift or rub appreciated. Pulmonary/Chest: Effort normal and breath sounds normal. CTAB. No labored breating. Abdominal: Soft. Bowel sounds are normal. There is no appreciable  distension. There is no point tenderness. no rebounding or guarding. Musculoskeletal: Normal range of motion on other than surgically repaired joint . no edema or tenderness other than surgical area. Post-op changes noted  Neurological:  alert and oriented to person, place, and time. normal reflexes. No focal deficits  Skin: Skin is warm and dry. No new rashes appreciated. Results Review:   I reviewed the patient's new imaging results and agree with the interpretation. Active Problems: Treatment recommendations    Type 2 diabetes mellitus without complication, without long-term current use of insulin     Acquired hypothyroidism    Primary osteoarthritis of right knee    Iron deficiency anemia    Drug-induced constipation    Morbid obesity    Blood sugars noted. Explained to patient the need for better control to optimize the healing process. Reviewed home medication regimen, IV fluids, and dietary intake over the last 24 hours to help determine the etiology of the hyperglycemia. Adjustments made and discussed with staff, see orders for further details. Hypertension-review pre and post BP's,will restart home BP meds when BP allows. Add Clonidine 0.1 mg q 4 hours prn if bp> 140/90,monitor BP and adjust meds as necessary. Constipation-start with Miralax 1 capful BID until BM, then decrease to 1 x a day,then can step up to prokinetic to aid in opiod induced constipation,and ultimately end up with Relistor 12 mcg sub Q q 48 hours to block the effect of narcotics on the gut. Anemia post-op expected-check iron, B12,and folate if indicated. Replenish substrates as needed. Transfuse at acceptable levels depending on clinical judgement and comorbidities. Recent hospital policy recommends 1 unit at a time. Recheck hemoglobin in AM if patient remains in house, if D/C ed advise close follow up with PCP to ensure levels return to baseline. Medically stable postop day #3. 60year-old poorly controlled diabetic  Preop  postop elevated glucose levels noted A1c 8.4  Early mobilization, maximize efforts with therapy potential discharge home later today  Encourage incentive spirometry every 1 hour while awake  Diabetic education provided need for close follow-up with PCP  Wound care monitor for infection  I discussed the patients findings and my recommendations with patient/family, staff and the Orthopaedic team.  Edna Decree  08/12/20  6:42 AM      Type 2 diabetes poor control-hemoglobin A1c 8.4. Add some bolus and sliding scale insulin for now. Extended conversation with patient concerning ill effects of a hemoglobin A1c greater than 7.0. Hopefully with joint replacement will be more active and more compliant with diabetic diet and get hemoglobin A1c less than 7. We were asked to provide medical consultation by the attending physician during the perioperative phase. They will return to their primary care provider and have been instructed to follow up with them concerning abnormal lab/x-rays. Questions were encouraged and answered to the best of our ability. We will be available for 30 days or until they return to their primary care provider, if they return to the emergency room in the next 30 days with a meri-operative issue we will gladly admit them. Otherwise, they will be admitted to the hospitalist service. All questions and concerns addressed prior to discharge. I have discussed the care of Fisher Pac, including pertinent history and exam findings with the ARNP/PA. I have seen and examined the patient and the key elements of all parts of the encounter have been performed by me.  I agree with the assessment and plan as outlined by the ARNP/PA. Please refer to my separate note for complete documentation.      Electronically signed by Roman Crump MD on 8/12/2020 at 8:38 AM

## 2020-08-12 NOTE — DISCHARGE SUMMARY
[facility]  Yesica Tomas  Discharge Summary    The Anu Villegas is a 60 y.o. female underwent total knee replacement procedure without complication. Anu Villegas was admitted to the floor following her   recovery in the PACU.      Discharge Diagnosis   Right    Knee Replacement    Current Inpatient Medications    Current Facility-Administered Medications: 0.9 % sodium chloride bolus, 500 mL, Intravenous, PRN  lactated ringers infusion, , Intravenous, Continuous  sodium chloride flush 0.9 % injection 10 mL, 10 mL, Intravenous, 2 times per day  sodium chloride flush 0.9 % injection 10 mL, 10 mL, Intravenous, PRN  acetaminophen (TYLENOL) tablet 650 mg, 650 mg, Oral, Q6H  oxyCODONE (ROXICODONE) immediate release tablet 5 mg, 5 mg, Oral, Q4H PRN **OR** oxyCODONE (ROXICODONE) immediate release tablet 10 mg, 10 mg, Oral, Q4H PRN  morphine injection 2 mg, 2 mg, Intravenous, Q1H PRN **OR** morphine injection 4 mg, 4 mg, Intravenous, Q1H PRN  sennosides-docusate sodium (SENOKOT-S) 8.6-50 MG tablet 1 tablet, 1 tablet, Oral, BID  magnesium hydroxide (MILK OF MAGNESIA) 400 MG/5ML suspension 30 mL, 30 mL, Oral, Daily PRN  aspirin EC tablet 325 mg, 325 mg, Oral, BID  ibuprofen (ADVIL;MOTRIN) tablet 400 mg, 400 mg, Oral, TID WC  amLODIPine (NORVASC) tablet 5 mg, 5 mg, Oral, Daily  busPIRone (BUSPAR) tablet 10 mg, 10 mg, Oral, TID  Dulaglutide SOPN 0.75 mg, 0.75 mg, Subcutaneous, Weekly  levothyroxine (SYNTHROID) tablet 150 mcg, 150 mcg, Oral, Daily  metFORMIN (GLUCOPHAGE) tablet 1,000 mg, 1,000 mg, Oral, BID WC  sertraline (ZOLOFT) tablet 100 mg, 100 mg, Oral, Daily  insulin lispro (HUMALOG) injection vial 0-12 Units, 0-12 Units, Subcutaneous, TID WC  insulin lispro (HUMALOG) injection vial 0-6 Units, 0-6 Units, Subcutaneous, Nightly  glucose (GLUTOSE) 40 % oral gel 15 g, 15 g, Oral, PRN  dextrose 50 % IV solution, 12.5 g, Intravenous, PRN  glucagon (rDNA) injection 1 mg, 1 mg,

## 2020-08-12 NOTE — PROGRESS NOTES
Physical Therapy  Brijesh Huber  [id number]     08/12/20 0855   Subjective   Subjective Agrees to work with therapy. Bed Mobility   Supine to Sit Modified independent   Sit to Supine Modified independent   Transfers   Sit to Stand Stand by assistance   Stand to sit Stand by assistance   Ambulation   Ambulation? Yes   Ambulation 1   Surface level tile   Device [address] Stand by assistance   Distance 100'   Stairs/Curb   Stairs? Yes   Stairs   # Steps  10   Stairs Height 6\"  (4\")   Rails Bilateral   Assistance Stand by assistance   Comment patient able to demonstrate safe stair training up and down 5 steps 2x   Other Activities   Comment Patient did well with therapy. Patient able to demonstrate safe stair training, bed mobility, gait and transfers. Assisted patient to the BR , patient able to perform self care and tolerated standing at the sink to wash her hands. Patient requested to return to bed after treatment. Patient positioned for comfort with all needs in reach. Short term goals   Time Frame for Short term goals 2 WKS   Short term goal 1  FT WITH RW, CGA   Short term goal 2 STEPS, CGA   Activity Tolerance   Activity Tolerance Patient Tolerated treatment well   Safety Devices   Type of devices Bed alarm in place;Call light within reach; Left in bed   Electronically signed by Betina Liu PTA on 8/12/2020 at 9:08 AM

## 2020-08-12 NOTE — PROGRESS NOTES
Subjective:     Post-Operative Day: 1 No complaints    Objective:     Patient Vitals for the past 24 hrs:   BP Temp Temp src Pulse Resp SpO2 Height Weight   08/12/20 0729 -- -- -- 64 -- -- -- --   08/12/20 0703 126/73 -- -- 64 -- -- -- --   08/12/20 0628 126/73 97.6 °F (36.4 °C) Temporal 64 14 95 % -- --   08/12/20 0314 (!) 101/58 97.6 °F (36.4 °C) Temporal 68 14 93 % -- --   08/11/20 2234 109/69 97.6 °F (36.4 °C) Temporal 68 14 92 % -- --   08/11/20 1908 108/70 97.1 °F (36.2 °C) Temporal 69 16 92 % -- --   08/11/20 1625 (!) 106/58 96.6 °F (35.9 °C) Temporal 67 18 91 % -- --   08/11/20 1438 139/80 96.8 °F (36 °C) Temporal 73 16 91 % -- --   08/11/20 1358 -- -- -- 73 -- -- -- --   08/11/20 1346 124/76 96.5 °F (35.8 °C) Temporal 73 16 93 % -- --   08/11/20 1314 -- -- -- 67 -- -- -- --   08/11/20 1300 117/70 97.2 °F (36.2 °C) Temporal 73 15 99 % -- --   08/11/20 1245 112/62 -- -- 74 23 95 % -- --   08/11/20 1230 (!) 127/59 -- -- 72 16 93 % -- --   08/11/20 1225 118/78 -- -- 71 16 95 % -- --   08/11/20 1220 123/62 -- -- 73 17 95 % -- --   08/11/20 1215 (!) 143/58 -- -- 75 13 95 % -- --   08/11/20 1210 (!) 151/71 -- -- 79 19 91 % -- --   08/11/20 1206 (!) 151/71 97.2 °F (36.2 °C) Temporal 81 14 91 % -- --   08/11/20 0853 (!) 145/62 97 °F (36.1 °C) Temporal 67 18 98 % 5' 5\" (1.651 m) 234 lb (106.1 kg)       General: Alert cooperative   Wound: Clean dry intact. Moderate swelling   Neurovascular: Exam normal   DVT Exam: No evidence of DVT         Data Review:  Recent Labs     08/12/20  0538   HGB 10.8*     Recent Labs     08/12/20  0538      K 4.4   CREATININE 0.7   GLUCOSE 199*     Recent Labs     08/11/20  0853 08/11/20  1633 08/11/20  1811 08/11/20  2028   POCGLU 224* 324* 309* 297*     No orders to display       Assessment:     Status Post right Total Knee Arthroplasty. Doing well postop without complication. Acute postoperative blood loss anemia being monitored with daily hemoglobin/hematocrit.     Plan:     Pain

## 2020-08-12 NOTE — CARE COORDINATION
Home Health referral received. Per Henry Hutchins,  patient is going outpatient for PT. Will sign off.    Electronically signed by Jocelyn Lopez RN on 8/12/20 at 9:50 AM CDT

## 2020-08-14 ENCOUNTER — TELEPHONE (OUTPATIENT)
Dept: PRIMARY CARE CLINIC | Age: 65
End: 2020-08-14

## 2020-08-14 NOTE — TELEPHONE ENCOUNTER
[facility] Transitions Initial Follow Up Call    Call within 2 business days of discharge: Yes     Patient: Ki Gaxiola Patient : 1955   MRN: [id number]  Reason for Admission: RIGHT KNEE REPLACEMENT. HAVING PROBLEMS WITH GLUCOSE PER HOSP NOTES. Discharge Date: 20 RARS: Readmission Risk Score: 5       Spoke with: Kelli Parker ANSWERED.     Discharge department/facility: [facility]        Follow Up  Future Appointments   Date Time Provider Xavi Mccollum   2020  3:30 PM MIKEY Hogan CNP [facility]   2021 10:45 AM MIKEY Brasher CNP [facility]       Rudi Escobar

## 2020-08-17 ENCOUNTER — TELEPHONE (OUTPATIENT)
Dept: PRIMARY CARE CLINIC | Age: 65
End: 2020-08-17

## 2020-08-17 ENCOUNTER — TELEPHONE (OUTPATIENT)
Dept: INPATIENT UNIT | Age: 65
End: 2020-08-17

## 2020-08-17 NOTE — TELEPHONE ENCOUNTER
Asael 45 Transitions Initial Follow Up Call    Call within 2 business days of discharge: Yes     Patient: Luna Casper Patient : 1955   MRN: [id number]  Reason for Admission: RIGHT KNEE REPLACEMENT. UNCONTROLLED GLUCOSE   Discharge Date: 20 RARS: Readmission Risk Score: 5       Spoke with: [facility]    Discharge department/facility: [facility]    Non-face-to-face services provided:  Scheduled appointment with PCP-2020 AT 3:30PM MIKEY Moss  Scheduled appointment with Specialist-WILL F/U WITH DR Beth. #5 [facility] Final   Obtained and reviewed discharge summary and/or continuity of care documents  Reviewed and followed up on pending diagnostic tests and treatments-PT IS DOING WELL SURGICALLY, BUT HER GLUCOSE IS UNCONTROLLED. IT IS STAYING AROUND 190. SHE IS TRYING TO WORK ON THE DIET TO DECREASE IT. Education of patient/family/caregiver/guardian to support self-management-PT HAS HER FAMILY AT HOME TO HELP WITH HER CARE. Assessment and support for treatment adherence and medication management-PT WAS ON SS INSULIN IN THE HOSPITAL AFTER SURGERY, BUT HAS RESUMED HER MEDS AT HOME NOW. GLUCOSE IS STILL RUNNING HIGH.      Follow Up  Future Appointments   Date Time Provider Xavi Mccollum   2020  3:30 PM MIKEY Burns CNP [facility]   2021 10:45 AM MIKEY Cope CNP [facility]       Karissa Ferreira

## 2020-08-18 ENCOUNTER — OFFICE VISIT (OUTPATIENT)
Dept: PRIMARY CARE CLINIC | Age: 65
End: 2020-08-18
Payer: COMMERCIAL

## 2020-08-18 VITALS
RESPIRATION RATE: 16 BRPM | TEMPERATURE: 96.2 F | OXYGEN SATURATION: 98 % | WEIGHT: 234 LBS | HEIGHT: 65 IN | SYSTOLIC BLOOD PRESSURE: 110 MMHG | BODY MASS INDEX: 38.99 KG/M2 | HEART RATE: 91 BPM | DIASTOLIC BLOOD PRESSURE: 80 MMHG

## 2020-08-18 PROCEDURE — 99213 OFFICE O/P EST LOW 20 MIN: CPT | Performed by: NURSE PRACTITIONER

## 2020-08-18 PROCEDURE — 3052F HG A1C>EQUAL 8.0%<EQUAL 9.0%: CPT | Performed by: NURSE PRACTITIONER

## 2020-08-18 RX ORDER — DULAGLUTIDE 1.5 MG/.5ML
1.5 INJECTION, SOLUTION SUBCUTANEOUS WEEKLY
Qty: 4 PEN | Refills: 0 | OUTPATIENT
Start: 2020-08-18 | End: 2020-09-14 | Stop reason: SDUPTHER

## 2020-08-18 RX ORDER — OXYCODONE HYDROCHLORIDE AND ACETAMINOPHEN 5; 325 MG/1; MG/1
1 TABLET ORAL EVERY 4 HOURS PRN
COMMUNITY
End: 2021-07-09

## 2020-08-18 RX ORDER — ASPIRIN 81 MG/1
81 TABLET ORAL DAILY
COMMUNITY
End: 2021-07-09

## 2020-08-18 NOTE — PATIENT INSTRUCTIONS
Continue metformin at 1000mg twice a day  Increase the trulicity to 1.5LL weekly if not nausea or vomiting then continue this dose weekly .  If unable to tolerate go back to the 0.75  If your are tolerating the 1.5 of trulicity you can stop the actos (pioglizide)   Monitor your blood sugar , would like fasting to be under 140, after meals 2 hours should be under 170

## 2020-08-18 NOTE — PROGRESS NOTES
[address] [place] [address] [address] [place] [address]  [address] [place] [address]  Dept: [phone]  Dept Fax: [phone]  Loc: [phone]    Jonah Sanders is a 60 y.o. female who presents today for her medical conditions/complaints as noted below. Jonah Sanders is c/o of Follow-Up from Hospital (patient presents today for hospital follow up. )        HPI:     HPI   Chief Complaint   Patient presents with    Follow-Up from Hospital     patient presents today for hospital follow up. On August 11 she underwent total knee surgery by Dr. Remi Giron. She is diabetic and been monitoring her sugars. Today her sugar was a little high in the 200s. In July I checked her hemoglobin A1c it was 8.3. It was checked while she was in the hospital it was 8.4.   she is on actos by Dr Remi Giron .  I had her on metformin 1000mg twice a day and trulicity 4.25  Past Medical History:   Diagnosis Date    Diabetes mellitus ([address]     History of blood transfusion     1976 post childbirth    Hypertension     Thyroid disease       Past Surgical History:   Procedure Laterality Date    CHOLECYSTECTOMY      COLONOSCOPY N/A 11/21/2019    Dr Glenn Tan Mariza Vivien    HYSTERECTOMY      TOTAL KNEE ARTHROPLASTY Left 8/30/2016    KNEE TOTAL ARTHROPLASTY performed by Maryanne Reid MD at [address] Right 8/11/2020    RIGHT TOTAL KNEE REPLACEMENT performed by Maryanne Reid MD at [facility] N/A 11/21/2019    Dr KATELYN Rosas-Gastritis, duodenitis       Vitals 8/18/2020 8/11/2020 8/11/2020 8/11/2020 8/11/2020 9/48/9718   SYSTOLIC 388 - - - - 638   DIASTOLIC 80 - - - - 84   Pulse 91 - - - - -   Temp 96.2 - - - - -   Resp 16 21 28 9 11 17   SpO2 98 93 87 - 89 88   Weight 234 lb - - - - -   Height 5' 5\" - - - - -   BMI (wt*703/ht~2) 38.94 kg/m2 - - - - -   Some recent data might be hidden       Family History   Problem Relation Age of Onset    Cancer Mother uterine    High Blood Pressure Father         aneurysm abd    Other Sister         aneurysm brain    Diabetes Brother     Breast Cancer Paternal Aunt     Colon Cancer Neg Hx     Colon Polyps Neg Hx     Esophageal Cancer Neg Hx     Liver Disease Neg Hx     Liver Cancer Neg Hx     Rectal Cancer Neg Hx     Stomach Cancer Neg Hx        Social History     Tobacco Use    Smoking status: Never Smoker    Smokeless tobacco: Never Used   Substance Use Topics    Alcohol use: No      Current Outpatient Medications   Medication Sig Dispense Refill    oxyCODONE-acetaminophen (PERCOCET) 5-325 MG per tablet Take 1 tablet by mouth every 4 hours as needed for Pain.       aspirin 81 MG EC tablet Take 81 mg by mouth daily      Dulaglutide (TRULICITY) 1.5 OB/5.2SH SOPN Inject 1.5 mg into the skin once a week 4 pen 0    cephALEXin (KEFLEX) 500 MG capsule Take 1 capsule by mouth 4 times daily 28 capsule 0    pioglitazone (ACTOS) 45 MG tablet Take 1 tablet by mouth daily 30 tablet 3    diclofenac (VOLTAREN) 75 MG EC tablet Take 1 tablet by mouth twice daily (Patient taking differently: Take 75 mg by mouth 2 times daily ) 60 tablet 3    busPIRone (BUSPAR) 10 MG tablet Take 1 tablet by mouth 3 times daily 90 tablet 3    amLODIPine (NORVASC) 5 MG tablet TAKE 1 TABLET BY MOUTH ONCE DAILY (Patient taking differently: Take 5 mg by mouth daily TAKE 1 TABLET BY MOUTH ONCE DAILY) 90 tablet 3    Lancets MISC 1 each by Does not apply route 2 times daily 100 each 5    metFORMIN (GLUCOPHAGE) 1000 MG tablet TAKE 1 TABLET BY MOUTH TWICE DAILY WITH MEALS FOR DIABETES (Patient taking differently: Take 1,000 mg by mouth 2 times daily (with meals) ) 180 tablet 0    EUTHYROX 150 MCG tablet Take 1 tablet by mouth once daily (Patient taking differently: Take 150 mcg by mouth Daily ) 30 tablet 3    sertraline (ZOLOFT) 100 MG tablet TAKE 1 TABLET BY MOUTH ONCE DAILY FOR DEPRESSION (Patient taking differently: Take 100 mg by mouth daily ) 30 tablet 5    Blood Glucose Monitoring Suppl (FREESTYLE LITE) ROBEL 1 Device by Does not apply route daily   0    blood glucose monitor strips Test 3 times a day & as needed for symptoms of irregular blood glucose. Freestyle lite (Patient taking differently: 1 strip by Other route 3 times daily as needed Test 3 times a day & as needed for symptoms of irregular blood glucose. Freestyle lite) 100 strip 3    glucose monitoring kit (FREESTYLE) monitoring kit 1 kit by Does not apply route 3 times daily (before meals) Dx E11.8 1 kit 0     No current facility-administered medications for this visit. Allergies   Allergen Reactions    Pcn [Penicillins] Rash     childhood       Health Maintenance   Topic Date Due    Hepatitis C screen  1955    Pneumococcal 0-64 years Vaccine (1 of 1 - PPSV23) 11/22/1963    HIV screen  11/22/1972    DTaP/Tdap/Td vaccine (1 - Tdap) 11/22/1976    Cervical cancer screen  11/22/1978    Shingles Vaccine (1 of 2) 11/22/2007    Diabetic foot exam  03/07/2020    Flu vaccine (1) 09/01/2020    Diabetic microalbuminuria test  09/19/2020    TSH testing  11/07/2020    Breast cancer screen  05/06/2021    Lipid screen  07/07/2021    Diabetic retinal exam  07/31/2021    A1C test (Diabetic or Prediabetic)  08/12/2021    Colon cancer screen colonoscopy  11/21/2029    Hepatitis A vaccine  Aged Out    Hib vaccine  Aged Out    Meningococcal (ACWY) vaccine  Aged Out       Subjective:      Review of Systems   Constitutional: Positive for activity change. HENT: Negative. Eyes: Negative. Respiratory: Negative. Cardiovascular: Negative. Gastrointestinal: Negative. Endocrine:        Diabetes   Genitourinary: Negative. Musculoskeletal: Negative. Kneepain   Skin: Negative. Neurological: Negative. Psychiatric/Behavioral: Negative. Objective:     Physical Exam  Vitals signs and nursing note reviewed.    Constitutional:       Appearance: She is well-developed. HENT:      Head: Normocephalic. Right Ear: External ear normal.      Left Ear: External ear normal.   Eyes:      Pupils: Pupils are equal, round, and reactive to light. Neck:      Musculoskeletal: Normal range of motion. Cardiovascular:      Rate and Rhythm: Normal rate and regular rhythm. Heart sounds: Normal heart sounds. Pulmonary:      Effort: Pulmonary effort is normal.      Breath sounds: Normal breath sounds. Skin:     General: Skin is warm and dry. Neurological:      Mental Status: She is alert and oriented to person, place, and time. Psychiatric:         Behavior: Behavior normal.         Thought Content: Thought content normal.         Judgment: Judgment normal.       /80   Pulse 91   Temp 96.2 °F (35.7 °C) (Temporal)   Resp 16   Ht 5' 5\" (1.651 m)   Wt 234 lb (106.1 kg)   SpO2 98%   Breastfeeding No   BMI 38.94 kg/m²     Assessment:       Diagnosis Orders   1. Type 2 diabetes mellitus without complication, without long-term current use of insulin ([facility] [address]     2. Primary osteoarthritis of right knee           Plan:   More than 50% of the time was spent counseling and coordinating care for a total time of 15 min face to face. Patient given educational materials -see patient instructions. Discussed use, benefit, and side effects of prescribed medications. All patient questions answered. Pt voiced understanding. Reviewed health maintenance. Instructed to continue currentmedications, diet and exercise. Patient agreed with treatment plan. Follow up as directed. MEDICATIONS:  Orders Placed This Encounter   Medications    Dulaglutide (TRULICITY) 1.5 XN/5.1LT SOPN     Sig: Inject 1.5 mg into the skin once a week     Dispense:  4 pen     Refill:  0         ORDERS:  No orders of the defined types were placed in this encounter. Follow-up:  No follow-ups on file.     PATIENT INSTRUCTIONS:  Patient Instructions   Continue metformin at 1000mg twice a day  Increase the trulicity to 7.5FB weekly if not nausea or vomiting then continue this dose weekly . If unable to tolerate go back to the 0.75  If your are tolerating the 1.5 of trulicity you can stop the actos (pioglizide)   Monitor your blood sugar , would like fasting to be under 140, after meals 2 hours should be under 170    Electronically signed by MIKEY Thomas CNP on 8/21/2020 at 8:54 AM    EMR Dragon/transcription disclaimer:  Much of thisencounter note is electronic transcription/translation of spoken language to printed texts. The electronic translation of spoken language may be erroneous, or at times, nonsensical words or phrases may be inadvertentlytranscribed.   Although I have reviewed the note for such errors, some may still exist.

## 2020-08-21 ASSESSMENT — ENCOUNTER SYMPTOMS
RESPIRATORY NEGATIVE: 1
EYES NEGATIVE: 1
GASTROINTESTINAL NEGATIVE: 1

## 2020-09-14 RX ORDER — DULAGLUTIDE 1.5 MG/.5ML
1.5 INJECTION, SOLUTION SUBCUTANEOUS WEEKLY
Qty: 4 PEN | Refills: 3 | Status: SHIPPED | OUTPATIENT
Start: 2020-09-14 | End: 2021-01-08 | Stop reason: SDUPTHER

## 2020-11-23 RX ORDER — DICLOFENAC SODIUM 75 MG/1
TABLET, DELAYED RELEASE ORAL
Qty: 60 TABLET | Refills: 0 | Status: SHIPPED | OUTPATIENT
Start: 2020-11-23 | End: 2020-12-29

## 2020-11-30 RX ORDER — SERTRALINE HYDROCHLORIDE 100 MG/1
TABLET, FILM COATED ORAL
Qty: 30 TABLET | Refills: 0 | Status: SHIPPED | OUTPATIENT
Start: 2020-11-30 | End: 2021-01-08 | Stop reason: SDUPTHER

## 2020-12-29 RX ORDER — DICLOFENAC SODIUM 75 MG/1
TABLET, DELAYED RELEASE ORAL
Qty: 60 TABLET | Refills: 2 | Status: SHIPPED | OUTPATIENT
Start: 2020-12-29 | End: 2021-03-31

## 2021-01-08 ENCOUNTER — OFFICE VISIT (OUTPATIENT)
Dept: PRIMARY CARE CLINIC | Age: 66
End: 2021-01-08
Payer: COMMERCIAL

## 2021-01-08 VITALS
BODY MASS INDEX: 38.92 KG/M2 | HEIGHT: 65 IN | OXYGEN SATURATION: 99 % | HEART RATE: 68 BPM | SYSTOLIC BLOOD PRESSURE: 148 MMHG | DIASTOLIC BLOOD PRESSURE: 92 MMHG | WEIGHT: 233.6 LBS | TEMPERATURE: 97 F

## 2021-01-08 DIAGNOSIS — Z00.00 WELL ADULT EXAM: Primary | ICD-10-CM

## 2021-01-08 DIAGNOSIS — E03.9 ACQUIRED HYPOTHYROIDISM: ICD-10-CM

## 2021-01-08 DIAGNOSIS — E11.9 TYPE 2 DIABETES MELLITUS WITHOUT COMPLICATION, WITHOUT LONG-TERM CURRENT USE OF INSULIN (HCC): ICD-10-CM

## 2021-01-08 DIAGNOSIS — G56.03 BILATERAL CARPAL TUNNEL SYNDROME: ICD-10-CM

## 2021-01-08 DIAGNOSIS — M17.11 PRIMARY OSTEOARTHRITIS OF RIGHT KNEE: ICD-10-CM

## 2021-01-08 DIAGNOSIS — E78.2 MIXED HYPERLIPIDEMIA: ICD-10-CM

## 2021-01-08 DIAGNOSIS — M54.50 LOW BACK PAIN WITH RADIATION: ICD-10-CM

## 2021-01-08 DIAGNOSIS — M1A.0790 CHRONIC GOUT OF FOOT, UNSPECIFIED CAUSE, UNSPECIFIED LATERALITY: ICD-10-CM

## 2021-01-08 DIAGNOSIS — Z13.220 ENCOUNTER FOR SCREENING FOR LIPID DISORDER: ICD-10-CM

## 2021-01-08 LAB
ALBUMIN SERPL-MCNC: 4.4 G/DL (ref 3.5–5.2)
ALP BLD-CCNC: 94 U/L (ref 35–104)
ALT SERPL-CCNC: 14 U/L (ref 5–33)
ANION GAP SERPL CALCULATED.3IONS-SCNC: 10 MMOL/L (ref 7–19)
AST SERPL-CCNC: 17 U/L (ref 5–32)
BILIRUB SERPL-MCNC: 0.3 MG/DL (ref 0.2–1.2)
BUN BLDV-MCNC: 21 MG/DL (ref 8–23)
CALCIUM SERPL-MCNC: 10 MG/DL (ref 8.8–10.2)
CHLORIDE BLD-SCNC: 98 MMOL/L (ref 98–111)
CHOLESTEROL, TOTAL: 192 MG/DL (ref 160–199)
CO2: 30 MMOL/L (ref 22–29)
CREAT SERPL-MCNC: 0.8 MG/DL (ref 0.5–0.9)
GFR AFRICAN AMERICAN: >59
GFR NON-AFRICAN AMERICAN: >60
GLUCOSE BLD-MCNC: 97 MG/DL (ref 74–109)
HBA1C MFR BLD: 6.2 % (ref 4–6)
HDLC SERPL-MCNC: 56 MG/DL (ref 65–121)
LDL CHOLESTEROL CALCULATED: 122 MG/DL
POTASSIUM SERPL-SCNC: 4.4 MMOL/L (ref 3.5–5)
SODIUM BLD-SCNC: 138 MMOL/L (ref 136–145)
T4 FREE: 1.82 NG/DL (ref 0.93–1.7)
TOTAL PROTEIN: 7.7 G/DL (ref 6.6–8.7)
TRIGL SERPL-MCNC: 69 MG/DL (ref 0–149)
TSH SERPL DL<=0.05 MIU/L-ACNC: 0.95 UIU/ML (ref 0.27–4.2)
URIC ACID, SERUM: 3.6 MG/DL (ref 2.4–5.7)

## 2021-01-08 PROCEDURE — 99213 OFFICE O/P EST LOW 20 MIN: CPT | Performed by: NURSE PRACTITIONER

## 2021-01-08 PROCEDURE — 99396 PREV VISIT EST AGE 40-64: CPT | Performed by: NURSE PRACTITIONER

## 2021-01-08 RX ORDER — SERTRALINE HYDROCHLORIDE 100 MG/1
TABLET, FILM COATED ORAL
Qty: 90 TABLET | Refills: 3 | Status: SHIPPED | OUTPATIENT
Start: 2021-01-08 | End: 2021-11-17

## 2021-01-08 RX ORDER — DULAGLUTIDE 1.5 MG/.5ML
1.5 INJECTION, SOLUTION SUBCUTANEOUS WEEKLY
Qty: 4 PEN | Refills: 3 | Status: SHIPPED | OUTPATIENT
Start: 2021-01-08 | End: 2021-05-04

## 2021-01-08 ASSESSMENT — ENCOUNTER SYMPTOMS
RESPIRATORY NEGATIVE: 1
EYES NEGATIVE: 1
GASTROINTESTINAL NEGATIVE: 1
BACK PAIN: 1

## 2021-01-08 ASSESSMENT — PATIENT HEALTH QUESTIONNAIRE - PHQ9
SUM OF ALL RESPONSES TO PHQ QUESTIONS 1-9: 1
SUM OF ALL RESPONSES TO PHQ9 QUESTIONS 1 & 2: 1
1. LITTLE INTEREST OR PLEASURE IN DOING THINGS: 0
SUM OF ALL RESPONSES TO PHQ QUESTIONS 1-9: 1

## 2021-01-08 NOTE — PROGRESS NOTES
[facility] PHYSICIAN SERVICES  [facility]  [address]  [address] [place] [address]  Dept: [phone]  Dept Fax: [phone]  Loc: [phone]    Justice Lala is a 63 y.o. female who presents today for her medical conditions/complaints as noted below. Justice Lala is c/o of Annual Exam (Pt is fasting other than a cup of black coffee, no cream, no sugar. Pt needs mammo scheduled at [facility].), Numbness (Pt states she is having numbness in both hands x 2-3 months. ), Medication Refill (Pt needs refills on Zoloft and Trulicity.), and Foot Problem (Right pain, worsening x 1month. Pain increased when standing for long period of times. Burning sensation at times.)        HPI:     HPI   Chief Complaint   Patient presents with    Annual Exam     Pt is fasting other than a cup of black coffee, no cream, no sugar. Pt needs mammo scheduled at [place] [facility].  Numbness     Pt states she is having numbness in both hands x 2-3 months.  Medication Refill     Pt needs refills on Zoloft and Trulicity.  Foot Problem     Right pain, worsening x 1month. Pain increased when standing for long period of times. Burning sensation at times. Seen her back for diabetes check in August and we continue the Metformin at thousand twice a day and increased her Trulicity to 1.5 weekly. She said the numbness in her hand occurs mainly at night. Her hands will follow sleep. She did start a new job at [employer] and she is doing a lot of different kinds of work with her hands.  Rectal Cancer Neg Hx     Stomach Cancer Neg Hx        Social History     Tobacco Use    Smoking status: Never Smoker    Smokeless tobacco: Never Used   Substance Use Topics    Alcohol use: No      Current Outpatient Medications   Medication Sig Dispense Refill    sertraline (ZOLOFT) 100 MG tablet TAKE 1 TABLET BY MOUTH ONCE DAILY FOR DEPRESSION 90 tablet 3    Dulaglutide (TRULICITY) 1.5 WA/5.6QL SOPN Inject 1.5 mg into the skin once a week 4 pen 3    diclofenac (VOLTAREN) 75 MG EC tablet Take 1 tablet by mouth twice daily 60 tablet 2    metFORMIN (GLUCOPHAGE) 1000 MG tablet TAKE 1 TABLET BY MOUTH TWICE DAILY WITH MEALS FOR DIABETES 180 tablet 0    EUTHYROX 150 MCG tablet Take 1 tablet by mouth once daily 30 tablet 3    indomethacin (INDOCIN) 50 MG capsule TAKE 1 CAPSULE BY MOUTH THREE TIMES DAILY FOR 7 DAYS AS NEEDED FOR  GOUT 21 capsule 1    oxyCODONE-acetaminophen (PERCOCET) 5-325 MG per tablet Take 1 tablet by mouth every 4 hours as needed for Pain.  aspirin 81 MG EC tablet Take 81 mg by mouth daily      cephALEXin (KEFLEX) 500 MG capsule Take 1 capsule by mouth 4 times daily 28 capsule 0    pioglitazone (ACTOS) 45 MG tablet Take 1 tablet by mouth daily 30 tablet 3    busPIRone (BUSPAR) 10 MG tablet Take 1 tablet by mouth 3 times daily 90 tablet 3    amLODIPine (NORVASC) 5 MG tablet TAKE 1 TABLET BY MOUTH ONCE DAILY (Patient taking differently: Take 5 mg by mouth daily TAKE 1 TABLET BY MOUTH ONCE DAILY) 90 tablet 3    Lancets MISC 1 each by Does not apply route 2 times daily 100 each 5    Blood Glucose Monitoring Suppl (FREESTYLE LITE) ROBEL 1 Device by Does not apply route daily   0    blood glucose monitor strips Test 3 times a day & as needed for symptoms of irregular blood glucose. Freestyle lite (Patient taking differently: 1 strip by Other route 3 times daily as needed Test 3 times a day & as needed for symptoms of irregular blood glucose.  Freestyle lite) 100 strip 3  glucose monitoring kit (FREESTYLE) monitoring kit 1 kit by Does not apply route 3 times daily (before meals) Dx E11.8 1 kit 0     No current facility-administered medications for this visit. Allergies   Allergen Reactions    Pcn [Penicillins] Rash     childhood       Health Maintenance   Topic Date Due    Hepatitis C screen  1955    Pneumococcal 0-64 years Vaccine (1 of 1 - PPSV23) 11/22/1963    HIV screen  11/22/1972    DTaP/Tdap/Td vaccine (1 - Tdap) 11/22/1976    Cervical cancer screen  11/22/1978    Shingles Vaccine (1 of 2) 11/22/2007    Flu vaccine (1) 09/01/2020    Diabetic microalbuminuria test  09/19/2020    TSH testing  11/07/2020    Breast cancer screen  05/06/2021    Lipid screen  07/07/2021    Diabetic retinal exam  07/31/2021    A1C test (Diabetic or Prediabetic)  08/12/2021    Diabetic foot exam  01/08/2022    Colon cancer screen colonoscopy  11/21/2029    Hepatitis A vaccine  Aged Out    Hib vaccine  Aged Out    Meningococcal (ACWY) vaccine  Aged Out       Subjective:      Review of Systems   Constitutional: Positive for activity change. HENT: Negative. Eyes: Negative. Respiratory: Negative. Cardiovascular: Negative. Gastrointestinal: Negative. Endocrine:        Diabetes   Genitourinary: Negative. Musculoskeletal: Positive for back pain. Skin: Negative. Neurological: Positive for numbness (hands, right foot). Psychiatric/Behavioral: Negative. Objective:     Physical Exam  Vitals signs and nursing note reviewed. Constitutional:       Appearance: She is well-developed. She is obese. HENT:      Head: Normocephalic. Right Ear: Tympanic membrane and external ear normal.      Left Ear: Tympanic membrane and external ear normal.   Eyes:      Pupils: Pupils are equal, round, and reactive to light. Neck:      Musculoskeletal: Normal range of motion. Cardiovascular:      Rate and Rhythm: Normal rate and regular rhythm. Pulses:           Dorsalis pedis pulses are 1+ on the right side and 1+ on the left side. Posterior tibial pulses are 1+ on the right side and 1+ on the left side. Heart sounds: Normal heart sounds. Pulmonary:      Effort: Pulmonary effort is normal.      Breath sounds: Normal breath sounds. Abdominal:      General: Abdomen is flat. Bowel sounds are normal.   Genitourinary:     Comments: Declined Gu and breast exam  Musculoskeletal:      Lumbar back: She exhibits decreased range of motion, tenderness and pain. She exhibits no bony tenderness, no swelling, no edema, no deformity, no laceration, no spasm and normal pulse. Back:       Right foot: Normal range of motion. No deformity, bunion, Charcot foot, foot drop or prominent metatarsal heads. Left foot: Normal range of motion. No deformity, bunion, Charcot foot, foot drop or prominent metatarsal heads. Feet:      Right foot:      Protective Sensation: 10 sites tested. 10 sites sensed. Skin integrity: Skin integrity normal.      Left foot:      Protective Sensation: 10 sites tested. 10 sites sensed. Skin integrity: Skin integrity normal.   Skin:     General: Skin is warm and dry. Capillary Refill: Capillary refill takes less than 2 seconds. Neurological:      General: No focal deficit present. Mental Status: She is alert and oriented to person, place, and time. Psychiatric:         Mood and Affect: Mood normal.         Behavior: Behavior normal.         Thought Content: Thought content normal.         Judgment: Judgment normal.     Negative straight leg raise. BP (!) 148/92 (Site: Left Upper Arm, Position: Sitting)   Pulse 68   Temp 97 °F (36.1 °C)   Ht 5' 5\" (1.651 m)   Wt 233 lb 9.6 oz (106 kg)   SpO2 99%   BMI 38.87 kg/m²     Assessment:       Diagnosis Orders   1. Well adult exam     2.  Low back pain with radiation  XR LUMBAR SPINE (MIN 4 VIEWS) 3. Chronic gout of foot, unspecified cause, unspecified laterality  Uric Acid   4. Type 2 diabetes mellitus without complication, without long-term current use of insulin ([facility])  Comprehensive Metabolic Panel    Hemoglobin A1C     DIABETES FOOT EXAM   5. Primary osteoarthritis of right knee     6. Acquired hypothyroidism  TSH without Reflex    T4, Free   7. Mixed hyperlipidemia  Lipid Panel   8. Encounter for screening for lipid disorder  Lipid Panel   9. Bilateral carpal tunnel syndrome           Plan:   More than 50% of the time was spent counseling and coordinating care for a total time of 25min face to face. The patient was here for her wellness and diabetic check but she had additional problems that we did work-up including a lumbar x-ray and lab work. See the plan below. She had very good sensation in both feet today especially the right foot which she complains of the numbness I believe it is related to her lower back we are going to do some work-up on it       Patient given educational materials -see patient instructions. Discussed use, benefit, and side effects of prescribed medications. All patient questions answered. Pt voiced understanding. Reviewed health maintenance. Instructed to continue currentmedications, diet and exercise. Patient agreed with treatment plan. Follow up as directed.    MEDICATIONS:  Orders Placed This Encounter   Medications    sertraline (ZOLOFT) 100 MG tablet     Sig: TAKE 1 TABLET BY MOUTH ONCE DAILY FOR DEPRESSION     Dispense:  90 tablet     Refill:  3    Dulaglutide (TRULICITY) 1.5 GT/5.2EZ SOPN     Sig: Inject 1.5 mg into the skin once a week     Dispense:  4 pen     Refill:  3         ORDERS:  Orders Placed This Encounter   Procedures    XR LUMBAR SPINE (MIN 4 VIEWS)    Comprehensive Metabolic Panel    Lipid Panel    Hemoglobin A1C    Uric Acid    TSH without Reflex    T4, Free     DIABETES FOOT EXAM       Follow-up: Return in about 6 months (around 7/8/2021) for diabetes. PATIENT INSTRUCTIONS:  Patient Instructions     Try carpal tunnel braces mainly at night , see hand out on exercise   may use the diclofenac for pain pain  Xray of back today  See stretches, consider chiropractor or physical therapy  May need mri later or if xray abnormal. Or if worse numbness  Patient Education        Carpal Tunnel Syndrome: Exercises  Introduction  Here are some examples of exercises for you to try. The exercises may be suggested for a condition or for rehabilitation. Start each exercise slowly. Ease off the exercises if you start to have pain. You will be told when to start these exercises and which ones will work best for you. Warm-up stretches  When you no longer have pain or numbness, you can do exercises to help prevent carpal tunnel syndrome from coming back. Do not do any stretch or movement that is uncomfortable or painful. 1. Rotate your wrist up, down, and from side to side. Repeat 4 times. 2. Stretch your fingers far apart. Relax them, and then stretch them again. Repeat 4 times. 3. Stretch your thumb by pulling it back gently, holding it, and then releasing it. Repeat 4 times. How to do the exercises  Prayer stretch   1. Start with your palms together in front of your chest just below your chin. 2. Slowly lower your hands toward your waistline, keeping your hands close to your stomach and your palms together until you feel a mild to moderate stretch under your forearms. 3. Hold for at least 15 to 30 seconds. Repeat 2 to 4 times. Wrist flexor stretch   1. Extend your arm in front of you with your palm up. 2. Bend your wrist, pointing your hand toward the floor. 3. With your other hand, gently bend your wrist farther until you feel a mild to moderate stretch in your forearm. 4. Hold for at least 15 to 30 seconds. Repeat 2 to 4 times.     Wrist extensor stretch 1. Repeat steps 1 through 4 of the stretch above, but begin with your extended hand palm down. Follow-up care is a key part of your treatment and safety. Be sure to make and go to all appointments, and call your doctor if you are having problems. It's also a good idea to know your test results and keep a list of the medicines you take. Where can you learn more? Go to https://PetroDEpeXiaozhu.[email or web address].[email or web address]. org and sign in to your "[employer] account. Enter J048 in the [employer]uk[employer] box to learn more about \"Carpal Tunnel Syndrome: Exercises. \"     If you do not have an account, please click on the \"Sign Up Now\" link. Current as of: March 2, 2020               Content Version: 12.6  © 9283-2364 [employer]. Care instructions adapted under license by [facility] ([facility]). If you have questions about a medical condition or this instruction, always ask your healthcare professional. [place] [address] any warranty or liability for your use of this information. Electronically signed by MIKEY Rajput CNP on 1/8/2021 at 5:06 PM    EMR Dragon/transcription disclaimer:  Much of thisencounter note is electronic transcription/translation of spoken language to printed texts. The electronic translation of spoken language may be erroneous, or at times, nonsensical words or phrases may be inadvertentlytranscribed.   Although I have reviewed the note for such errors, some may still exist.

## 2021-01-08 NOTE — PATIENT INSTRUCTIONS
Try carpal tunnel braces mainly at night , see hand out on exercise   may use the diclofenac for pain pain  Xray of back today  See stretches, consider chiropractor or physical therapy  May need mri later or if xray abnormal. Or if worse numbness  Patient Education        Carpal Tunnel Syndrome: Exercises  Introduction  Here are some examples of exercises for you to try. The exercises may be suggested for a condition or for rehabilitation. Start each exercise slowly. Ease off the exercises if you start to have pain. You will be told when to start these exercises and which ones will work best for you. Warm-up stretches  When you no longer have pain or numbness, you can do exercises to help prevent carpal tunnel syndrome from coming back. Do not do any stretch or movement that is uncomfortable or painful. 1. Rotate your wrist up, down, and from side to side. Repeat 4 times. 2. Stretch your fingers far apart. Relax them, and then stretch them again. Repeat 4 times. 3. Stretch your thumb by pulling it back gently, holding it, and then releasing it. Repeat 4 times. How to do the exercises  Prayer stretch   1. Start with your palms together in front of your chest just below your chin. 2. Slowly lower your hands toward your waistline, keeping your hands close to your stomach and your palms together until you feel a mild to moderate stretch under your forearms. 3. Hold for at least 15 to 30 seconds. Repeat 2 to 4 times. Wrist flexor stretch   1. Extend your arm in front of you with your palm up. 2. Bend your wrist, pointing your hand toward the floor. 3. With your other hand, gently bend your wrist farther until you feel a mild to moderate stretch in your forearm. 4. Hold for at least 15 to 30 seconds. Repeat 2 to 4 times. Wrist extensor stretch   1. Repeat steps 1 through 4 of the stretch above, but begin with your extended hand palm down. Follow-up care is a key part of your treatment and safety. Be sure to make and go to all appointments, and call your doctor if you are having problems. It's also a good idea to know your test results and keep a list of the medicines you take. Where can you learn more? Go to [email or web address] and sign in to your [employer] account. Enter F244 in the LIBCAST box to learn more about \"Carpal Tunnel Syndrome: Exercises. \"     If you do not have an account, please click on the \"Sign Up Now\" link. Current as of: March 2, 2020               Content Version: 12.6  © 4510-8432 [employer]. Care instructions adapted under license by [facility] ([facility]). If you have questions about a medical condition or this instruction, always ask your healthcare professional. [address] any warranty or liability for your use of this information.

## 2021-01-19 RX ORDER — PIOGLITAZONEHYDROCHLORIDE 45 MG/1
45 TABLET ORAL DAILY
Qty: 90 TABLET | Refills: 3 | Status: SHIPPED | OUTPATIENT
Start: 2021-01-19 | End: 2022-01-20

## 2021-06-02 RX ORDER — LEVOTHYROXINE SODIUM 150 UG/1
TABLET ORAL
Qty: 30 TABLET | Refills: 3 | Status: SHIPPED | OUTPATIENT
Start: 2021-06-02 | End: 2021-10-11

## 2021-07-09 ENCOUNTER — OFFICE VISIT (OUTPATIENT)
Dept: PRIMARY CARE CLINIC | Age: 66
End: 2021-07-09
Payer: COMMERCIAL

## 2021-07-09 VITALS
RESPIRATION RATE: 16 BRPM | OXYGEN SATURATION: 99 % | HEIGHT: 65 IN | WEIGHT: 244 LBS | DIASTOLIC BLOOD PRESSURE: 78 MMHG | HEART RATE: 65 BPM | TEMPERATURE: 96.5 F | BODY MASS INDEX: 40.65 KG/M2 | SYSTOLIC BLOOD PRESSURE: 123 MMHG

## 2021-07-09 DIAGNOSIS — E78.2 MIXED HYPERLIPIDEMIA: ICD-10-CM

## 2021-07-09 DIAGNOSIS — E11.9 TYPE 2 DIABETES MELLITUS WITHOUT COMPLICATION, WITHOUT LONG-TERM CURRENT USE OF INSULIN (HCC): Primary | ICD-10-CM

## 2021-07-09 DIAGNOSIS — Z12.31 SCREENING MAMMOGRAM, ENCOUNTER FOR: ICD-10-CM

## 2021-07-09 DIAGNOSIS — E03.9 ACQUIRED HYPOTHYROIDISM: ICD-10-CM

## 2021-07-09 DIAGNOSIS — Z78.0 POST-MENOPAUSAL: ICD-10-CM

## 2021-07-09 LAB
ALBUMIN SERPL-MCNC: 4.2 G/DL (ref 3.5–5.2)
ALP BLD-CCNC: 89 U/L (ref 35–104)
ALT SERPL-CCNC: 17 U/L (ref 5–33)
ANION GAP SERPL CALCULATED.3IONS-SCNC: 8 MMOL/L (ref 7–19)
AST SERPL-CCNC: 20 U/L (ref 5–32)
BILIRUB SERPL-MCNC: 0.4 MG/DL (ref 0.2–1.2)
BUN BLDV-MCNC: 22 MG/DL (ref 8–23)
CALCIUM SERPL-MCNC: 9.9 MG/DL (ref 8.8–10.2)
CHLORIDE BLD-SCNC: 102 MMOL/L (ref 98–111)
CHOLESTEROL, TOTAL: 174 MG/DL (ref 160–199)
CO2: 30 MMOL/L (ref 22–29)
CREAT SERPL-MCNC: 0.9 MG/DL (ref 0.5–0.9)
CREATININE URINE: 66.3 MG/DL (ref 4.2–622)
GFR AFRICAN AMERICAN: >59
GFR NON-AFRICAN AMERICAN: >60
GLUCOSE BLD-MCNC: 96 MG/DL (ref 74–109)
HBA1C MFR BLD: 5.3 % (ref 4–6)
HDLC SERPL-MCNC: 51 MG/DL (ref 65–121)
LDL CHOLESTEROL CALCULATED: 108 MG/DL
MICROALBUMIN UR-MCNC: <1.2 MG/DL (ref 0–19)
MICROALBUMIN/CREAT UR-RTO: NORMAL MG/G
POTASSIUM SERPL-SCNC: 4.3 MMOL/L (ref 3.5–5)
SODIUM BLD-SCNC: 140 MMOL/L (ref 136–145)
T4 FREE: 1.56 NG/DL (ref 0.93–1.7)
TOTAL PROTEIN: 7.3 G/DL (ref 6.6–8.7)
TRIGL SERPL-MCNC: 75 MG/DL (ref 0–149)
TSH SERPL DL<=0.05 MIU/L-ACNC: 2.48 UIU/ML (ref 0.27–4.2)

## 2021-07-09 PROCEDURE — 99213 OFFICE O/P EST LOW 20 MIN: CPT | Performed by: NURSE PRACTITIONER

## 2021-07-09 RX ORDER — BUSPIRONE HYDROCHLORIDE 10 MG/1
10 TABLET ORAL 3 TIMES DAILY
Qty: 90 TABLET | Refills: 3 | Status: SHIPPED | OUTPATIENT
Start: 2021-07-09 | End: 2022-07-27

## 2021-07-09 RX ORDER — AMLODIPINE BESYLATE 5 MG/1
TABLET ORAL
Qty: 90 TABLET | Refills: 3 | Status: SHIPPED | OUTPATIENT
Start: 2021-07-09 | End: 2022-07-27

## 2021-07-09 RX ORDER — INDOMETHACIN 50 MG/1
CAPSULE ORAL
Qty: 21 CAPSULE | Refills: 1 | Status: SHIPPED | OUTPATIENT
Start: 2021-07-09 | End: 2022-08-23 | Stop reason: ALTCHOICE

## 2021-07-09 NOTE — PROGRESS NOTES
[facility] PHYSICIAN SERVICES  [facility]  [address] [place] [address]  [address] [place] [address]  Dept: [phone]  Dept Fax: [phone]  Loc: [phone]    Lencho Engle is a 63 y.o. female who presents today for her medical conditions/complaints as noted below. Lencho Engle is c/o of 6 Month Follow-Up (Pt presents today for 6 month follow up on diabetes. )        HPI:     HPI   Chief Complaint   Patient presents with    6 Month Follow-Up     Pt presents today for 6 month follow up on diabetes. That she is not checking her sugar every day but thinks it is better. She is working hard to lose weight and is back to working a full-time job and this is helping her get some exercise.   Past Medical History:   Diagnosis Date    Diabetes mellitus ([address]     History of blood transfusion     1976 post childbirth    Hypertension     Thyroid disease       Past Surgical History:   Procedure Laterality Date    CHOLECYSTECTOMY      COLONOSCOPY N/A 11/21/2019    Dr Freddy Sim    HYSTERECTOMY      TOTAL KNEE ARTHROPLASTY Left 8/30/2016    KNEE TOTAL ARTHROPLASTY performed by Apolinar Crouch MD at [address] [facility] Right 8/11/2020    RIGHT TOTAL KNEE REPLACEMENT performed by Apolinar Crouch MD at [facility] N/A 11/21/2019    Dr KATELYN Rosas-Gastritis, duodenitis       Vitals 7/9/2021 1/8/2021 8/18/2020 8/11/2020 8/11/2020 7/64/4160   SYSTOLIC 652 394 472 - - -   DIASTOLIC 78 92 80 - - -   Site - Left Upper Arm - - - -   Position - Sitting - - - -   Pulse 65 68 91 - - -   Temp 96.5 97 96.2 - - -   Resp 16 - 16 21 28 9   SpO2 99 99 98 93 87 -   Weight 244 lb 233 lb 9.6 oz 234 lb - - -   Height 5' 5\" 5' 5\" 5' 5\" - - -   Body mass index 40.6 kg/m2 38.87 kg/m2 38.94 kg/m2 - - -   Some recent data might be hidden       Family History   Problem Relation Age of Onset    Cancer Mother         uterine    High Blood Pressure Father         aneurysm abd    Other Sister         aneurysm brain    Diabetes Brother     Breast Cancer Paternal Aunt     Colon Cancer Neg Hx     Colon Polyps Neg Hx     Esophageal Cancer Neg Hx     Liver Disease Neg Hx     Liver Cancer Neg Hx     Rectal Cancer Neg Hx     Stomach Cancer Neg Hx        Social History     Tobacco Use    Smoking status: Never Smoker    Smokeless tobacco: Never Used   Substance Use Topics    Alcohol use: No      Current Outpatient Medications on File Prior to Visit   Medication Sig Dispense Refill    diclofenac (VOLTAREN) 75 MG EC tablet Take 1 tablet by mouth twice daily 60 tablet 3    EUTHYROX 150 MCG tablet Take 1 tablet by mouth once daily 30 tablet 3    TRULICITY 1.5 HE/8.2VK SOPN INJECT 1.5 MG SUB-Q ONCE A WEEK 4 mL 5    metFORMIN (GLUCOPHAGE) 1000 MG tablet TAKE 1 TABLET BY MOUTH TWICE DAILY WITH MEALS FOR DIABETES 180 tablet 3    pioglitazone (ACTOS) 45 MG tablet Take 1 tablet by mouth daily 90 tablet 3    sertraline (ZOLOFT) 100 MG tablet TAKE 1 TABLET BY MOUTH ONCE DAILY FOR DEPRESSION 90 tablet 3    Lancets MISC 1 each by Does not apply route 2 times daily 100 each 5    Blood Glucose Monitoring Suppl (FREESTYLE LITE) ROBEL 1 Device by Does not apply route daily   0    blood glucose monitor strips Test 3 times a day & as needed for symptoms of irregular blood glucose. Freestyle lite (Patient taking differently: 1 strip by Other route 3 times daily as needed Test 3 times a day & as needed for symptoms of irregular blood glucose. Freestyle lite) 100 strip 3    glucose monitoring kit (FREESTYLE) monitoring kit 1 kit by Does not apply route 3 times daily (before meals) Dx E11.8 1 kit 0     No current facility-administered medications on file prior to visit.      Allergies   Allergen Reactions    Pcn [Penicillins] Rash     childhood       Health Maintenance   Topic Date Due    Hepatitis C screen  Never done    Pneumococcal 0-64 years Vaccine (1 of 2 - PPSV23) Never done    COVID-19 Vaccine (1) Never done    HIV screen  Never done    DTaP/Tdap/Td vaccine (1 - Tdap) Never done    Cervical cancer screen  Never done    Shingles Vaccine (1 of 2) Never done    Breast cancer screen  05/06/2021    Diabetic retinal exam  07/31/2021    Flu vaccine (1) 09/01/2021    Diabetic foot exam  01/08/2022    A1C test (Diabetic or Prediabetic)  07/09/2022    Diabetic microalbuminuria test  07/09/2022    Lipid screen  07/09/2022    TSH testing  07/09/2022    Colon cancer screen colonoscopy  11/21/2029    Hepatitis A vaccine  Aged Out    Hib vaccine  Aged Out    Meningococcal (ACWY) vaccine  Aged Out       Subjective:      Review of Systems   Constitutional: Negative. HENT: Negative. Eyes: Negative. Respiratory: Negative. Cardiovascular: Negative. Gastrointestinal: Negative. Endocrine:        Diabetes   Genitourinary: Negative. Musculoskeletal: Negative. Skin: Negative. Neurological: Negative. Psychiatric/Behavioral: Negative. Objective:     Physical Exam  Vitals and nursing note reviewed. Constitutional:       Appearance: She is well-developed. HENT:      Head: Normocephalic. Right Ear: External ear normal.      Left Ear: External ear normal.   Eyes:      Pupils: Pupils are equal, round, and reactive to light. Cardiovascular:      Rate and Rhythm: Normal rate and regular rhythm. Heart sounds: Normal heart sounds. Pulmonary:      Effort: Pulmonary effort is normal.      Breath sounds: Normal breath sounds. Musculoskeletal:      Cervical back: Normal range of motion. Skin:     General: Skin is warm and dry. Neurological:      Mental Status: She is alert and oriented to person, place, and time. Psychiatric:         Behavior: Behavior normal.         Thought Content:  Thought content normal.         Judgment: Judgment normal.       /78   Pulse 65   Temp 96.5 °F (35.8 °C) (Temporal)   Resp 16   Ht 5' 5\" (1.651 m)   Wt 244 lb (110.7 kg)   SpO2 99%   BMI 40.60 kg/m²     Assessment:       Diagnosis Orders   1. Type 2 diabetes mellitus without complication, without long-term current use of insulin (HCC)  Microalbumin / Creatinine Urine Ratio    Hemoglobin A1C    Comprehensive Metabolic Panel   2. Screening mammogram, encounter for  WIL[facility] DIGITAL SCREEN W OR WO CAD BILATERAL   3. Post-menopausal  DEXA BONE DENSITY AXIAL SKELETON   4. Acquired hypothyroidism  TSH without Reflex    T4, Free   5. Mixed hyperlipidemia  Lipid Panel         Plan:   More than 50% of the time was spent counseling and coordinating care for a total time of 22min face to face. Continue metformin 1183 bid, trulicity and actos. Monitor BS fasting and 2 hours after any meal  Indomethacin for gout flareup or arthritic pain. Based thyroid meds on labs     Patient given educational materials -see patient instructions. Discussed use, benefit, and side effects of prescribed medications. All patient questions answered. Pt voiced understanding. Reviewed health maintenance. Instructed to continue currentmedications, diet and exercise. Patient agreed with treatment plan. Follow up as directed.    MEDICATIONS:  Orders Placed This Encounter   Medications    indomethacin (INDOCIN) 50 MG capsule     Sig: TAKE 1 CAPSULE BY MOUTH THREE TIMES DAILY FOR 7 DAYS AS NEEDED FOR  GOUT     Dispense:  21 capsule     Refill:  1    amLODIPine (NORVASC) 5 MG tablet     Sig: TAKE 1 TABLET BY MOUTH ONCE DAILY     Dispense:  90 tablet     Refill:  3    busPIRone (BUSPAR) 10 MG tablet     Sig: Take 1 tablet by mouth 3 times daily     Dispense:  90 tablet     Refill:  3         ORDERS:  Orders Placed This Encounter   Procedures    WILBUR DIGITAL SCREEN W OR WO CAD BILATERAL    DEXA BONE DENSITY AXIAL SKELETON    Microalbumin / Creatinine Urine Ratio    TSH without Reflex    T4, Free    Hemoglobin A1C    Comprehensive Metabolic Panel    Lipid Panel       Follow-up:  Return in about 6 months (around 1/9/2022) for well exam.    PATIENT INSTRUCTIONS:  There are no Patient Instructions on file for this visit. Electronically signed by IMKEY Rocha CNP on 7/26/2021 at 10:01 AM    EMR Dragon/transcription disclaimer:  Much of thisencounter note is electronic transcription/translation of spoken language to printed texts. The electronic translation of spoken language may be erroneous, or at times, nonsensical words or phrases may be inadvertentlytranscribed.   Although I have reviewed the note for such errors, some may still exist.

## 2021-07-26 ASSESSMENT — ENCOUNTER SYMPTOMS
EYES NEGATIVE: 1
GASTROINTESTINAL NEGATIVE: 1
RESPIRATORY NEGATIVE: 1

## 2021-08-10 RX ORDER — RISEDRONATE SODIUM 150 MG/1
1 TABLET, FILM COATED ORAL
Qty: 3 TABLET | Refills: 3 | Status: SHIPPED | OUTPATIENT
Start: 2021-08-10 | End: 2022-08-23 | Stop reason: ALTCHOICE

## 2021-10-11 RX ORDER — LEVOTHYROXINE SODIUM 150 UG/1
TABLET ORAL
Qty: 30 TABLET | Refills: 0 | Status: SHIPPED | OUTPATIENT
Start: 2021-10-11 | End: 2021-11-11

## 2021-10-11 RX ORDER — DICLOFENAC SODIUM 75 MG/1
TABLET, DELAYED RELEASE ORAL
Qty: 60 TABLET | Refills: 0 | Status: SHIPPED | OUTPATIENT
Start: 2021-10-11 | End: 2021-11-11

## 2022-01-11 ENCOUNTER — OFFICE VISIT (OUTPATIENT)
Dept: PRIMARY CARE CLINIC | Age: 67
End: 2022-01-11
Payer: COMMERCIAL

## 2022-01-11 VITALS
HEIGHT: 65 IN | DIASTOLIC BLOOD PRESSURE: 90 MMHG | BODY MASS INDEX: 41.82 KG/M2 | TEMPERATURE: 96.7 F | WEIGHT: 251 LBS | OXYGEN SATURATION: 97 % | HEART RATE: 66 BPM | SYSTOLIC BLOOD PRESSURE: 150 MMHG

## 2022-01-11 DIAGNOSIS — Z13.220 ENCOUNTER FOR SCREENING FOR LIPID DISORDER: ICD-10-CM

## 2022-01-11 DIAGNOSIS — E78.2 MIXED HYPERLIPIDEMIA: ICD-10-CM

## 2022-01-11 DIAGNOSIS — R20.2 NUMBNESS AND TINGLING OF RIGHT LEG: ICD-10-CM

## 2022-01-11 DIAGNOSIS — M54.41 CHRONIC BILATERAL LOW BACK PAIN WITH RIGHT-SIDED SCIATICA: ICD-10-CM

## 2022-01-11 DIAGNOSIS — Z00.00 WELL ADULT EXAM: Primary | ICD-10-CM

## 2022-01-11 DIAGNOSIS — R20.0 NUMBNESS AND TINGLING OF RIGHT LEG: ICD-10-CM

## 2022-01-11 DIAGNOSIS — G89.29 CHRONIC BILATERAL LOW BACK PAIN WITH RIGHT-SIDED SCIATICA: ICD-10-CM

## 2022-01-11 DIAGNOSIS — E03.9 ACQUIRED HYPOTHYROIDISM: ICD-10-CM

## 2022-01-11 DIAGNOSIS — E11.9 TYPE 2 DIABETES MELLITUS WITHOUT COMPLICATION, WITHOUT LONG-TERM CURRENT USE OF INSULIN (HCC): ICD-10-CM

## 2022-01-11 DIAGNOSIS — M17.11 PRIMARY OSTEOARTHRITIS OF RIGHT KNEE: ICD-10-CM

## 2022-01-11 DIAGNOSIS — Z13.1 SCREENING FOR DIABETES MELLITUS: ICD-10-CM

## 2022-01-11 DIAGNOSIS — M1A.0790 CHRONIC GOUT OF FOOT, UNSPECIFIED CAUSE, UNSPECIFIED LATERALITY: ICD-10-CM

## 2022-01-11 LAB
ALBUMIN SERPL-MCNC: 4.4 G/DL (ref 3.5–5.2)
ALP BLD-CCNC: 100 U/L (ref 35–104)
ALT SERPL-CCNC: 15 U/L (ref 5–33)
ANION GAP SERPL CALCULATED.3IONS-SCNC: 9 MMOL/L (ref 7–19)
AST SERPL-CCNC: 17 U/L (ref 5–32)
BILIRUB SERPL-MCNC: 0.3 MG/DL (ref 0.2–1.2)
BUN BLDV-MCNC: 26 MG/DL (ref 8–23)
CALCIUM SERPL-MCNC: 9.7 MG/DL (ref 8.8–10.2)
CHLORIDE BLD-SCNC: 102 MMOL/L (ref 98–111)
CHOLESTEROL, TOTAL: 199 MG/DL (ref 160–199)
CO2: 29 MMOL/L (ref 22–29)
CREAT SERPL-MCNC: 0.7 MG/DL (ref 0.5–0.9)
GFR AFRICAN AMERICAN: >59
GFR NON-AFRICAN AMERICAN: >60
GLUCOSE BLD-MCNC: 106 MG/DL (ref 74–109)
HBA1C MFR BLD: 5.5 % (ref 4–6)
HDLC SERPL-MCNC: 51 MG/DL (ref 65–121)
LDL CHOLESTEROL CALCULATED: 133 MG/DL
POTASSIUM SERPL-SCNC: 4.2 MMOL/L (ref 3.5–5)
SODIUM BLD-SCNC: 140 MMOL/L (ref 136–145)
T4 FREE: 1.86 NG/DL (ref 0.93–1.7)
TOTAL PROTEIN: 7.4 G/DL (ref 6.6–8.7)
TRIGL SERPL-MCNC: 73 MG/DL (ref 0–149)
TSH SERPL DL<=0.05 MIU/L-ACNC: 1.22 UIU/ML (ref 0.27–4.2)
URIC ACID, SERUM: 3.6 MG/DL (ref 2.4–5.7)

## 2022-01-11 PROCEDURE — 99396 PREV VISIT EST AGE 40-64: CPT | Performed by: NURSE PRACTITIONER

## 2022-01-11 RX ORDER — CELECOXIB 200 MG/1
200 CAPSULE ORAL 2 TIMES DAILY
Qty: 60 CAPSULE | Refills: 3 | Status: SHIPPED | OUTPATIENT
Start: 2022-01-11 | End: 2022-06-02

## 2022-01-11 RX ORDER — CYCLOBENZAPRINE HCL 10 MG
10 TABLET ORAL 3 TIMES DAILY PRN
Qty: 90 TABLET | Refills: 1 | Status: SHIPPED | OUTPATIENT
Start: 2022-01-11 | End: 2022-02-10

## 2022-01-11 ASSESSMENT — PATIENT HEALTH QUESTIONNAIRE - PHQ9
SUM OF ALL RESPONSES TO PHQ QUESTIONS 1-9: 2
SUM OF ALL RESPONSES TO PHQ9 QUESTIONS 1 & 2: 2
1. LITTLE INTEREST OR PLEASURE IN DOING THINGS: 1
SUM OF ALL RESPONSES TO PHQ QUESTIONS 1-9: 2
2. FEELING DOWN, DEPRESSED OR HOPELESS: 1

## 2022-01-11 ASSESSMENT — ENCOUNTER SYMPTOMS
RESPIRATORY NEGATIVE: 1
BACK PAIN: 1
GASTROINTESTINAL NEGATIVE: 1
EYES NEGATIVE: 1

## 2022-01-11 NOTE — PATIENT INSTRUCTIONS
May continue with chiropractor   Stop the diclofenac and try the celebrex  Muscle relaxer at night  Work on stretching and exercise. Continue to work on diabetes and weight loss. Portion control  Labs today  If you get sick come in soon dont wait.

## 2022-01-11 NOTE — PROGRESS NOTES
[facility] PHYSICIAN SERVICES  [facility]  [address] [place] [address]  [address] [place] [address]  Dept: [phone]  Dept Fax: [phone]  Loc: [phone]    Mercedez Conde is a 61 y.o. female who presents today for her medical conditions/complaints as noted below. Mercedez Conde is c/o of Annual Exam (Hysterectomy and Mammogram UTD 7/2021. Fasting. Defers flu vaccine.  ) and Back Pain (lower - still in pain. States @ times radiates to mid-back area and makes breathing difficult. Reports recent X-ray was done and still seeing chiropracter.  )        HPI:     HPI   Chief Complaint   Patient presents with    Annual Exam     Hysterectomy and Mammogram UTD 7/2021. Fasting. Defers flu vaccine.  Back Pain     lower - still in pain. States @ times radiates to mid-back area and makes breathing difficult. Reports recent X-ray was done and still seeing chiropracter. diclofenac does help some. She remembers trying celebrex at one time but was taken off market. She works at [employer] place and can hardly stand on her feet for 4 hours. 1 day last week she could not get out of the bed because her back was hurting so bad. She had to call in sick to work. Her back pain is affecting her doing things that she loves to do. She does have some pain that radiates in the right hip and some numbness and tingling that comes and goes in the right foot. The chiropractor she is sitting does help her for about 1 day and then it is right back to where she started. Her sugar is pretty good.    Past Medical History:   Diagnosis Date    Diabetes mellitus ([address]     History of blood transfusion     1976 post childbirth    Hypertension     Thyroid disease       Past Surgical History:   Procedure Laterality Date    CHOLECYSTECTOMY      COLONOSCOPY N/A 11/21/2019    Dr Thea Vallejo Rinks    HYSTERECTOMY      TOTAL KNEE ARTHROPLASTY Left 8/30/2016    KNEE TOTAL ARTHROPLASTY performed by Naz Vasquez MD at [address] [place] [address]  TOTAL KNEE ARTHROPLASTY Right 8/11/2020    RIGHT TOTAL KNEE REPLACEMENT performed by Nancy Lewis MD at [facility] ENDOSCOPY N/A 11/21/2019    Dr KATELYN Rosas-Gastritis, duodenitis       Vitals 1/11/2022 7/9/2021 1/8/2021 8/18/2020 8/11/2020 5/55/1500   SYSTOLIC 480 175 619 930 - -   DIASTOLIC 90 78 92 80 - -   Site Left Upper Arm - Left Upper Arm - - -   Position Sitting - Sitting - - -   Cuff Size Large Adult - - - - -   Pulse 66 65 68 91 - -   Temp 96.7 96.5 97 96.2 - -   Resp - 16 - 16 21 28   SpO2 97 99 99 98 93 87   Weight 251 lb 244 lb 233 lb 9.6 oz 234 lb - -   Height 5' 5\" 5' 5\" 5' 5\" 5' 5\" - -   Body mass index 41.76 kg/m2 40.6 kg/m2 38.87 kg/m2 38.94 kg/m2 - -   Some recent data might be hidden       Family History   Problem Relation Age of Onset    Cancer Mother         uterine    High Blood Pressure Father         aneurysm abd    Other Sister         aneurysm brain    Diabetes Brother     Breast Cancer Paternal Aunt     Colon Cancer Neg Hx     Colon Polyps Neg Hx     Esophageal Cancer Neg Hx     Liver Disease Neg Hx     Liver Cancer Neg Hx     Rectal Cancer Neg Hx     Stomach Cancer Neg Hx        Social History     Tobacco Use    Smoking status: Never Smoker    Smokeless tobacco: Never Used   Substance Use Topics    Alcohol use: No      Current Outpatient Medications on File Prior to Visit   Medication Sig Dispense Refill    sertraline (ZOLOFT) 100 MG tablet TAKE 1 TABLET BY MOUTH ONCE DAILY FOR DEPRESSION 90 tablet 3    EUTHYROX 150 MCG tablet Take 1 tablet by mouth once daily 30 tablet 1    TRULICITY 1.5 /2.7CC SOPN INJECT 1.5 MG SUB-Q ONCE A WEEK 4 mL 3    Risedronate Sodium (ACTONEL) 150 MG TABS Take 1 tablet by mouth every 30 days 3 tablet 3    indomethacin (INDOCIN) 50 MG capsule TAKE 1 CAPSULE BY MOUTH THREE TIMES DAILY FOR 7 DAYS AS NEEDED FOR  GOUT 21 capsule 1    amLODIPine (NORVASC) 5 MG tablet TAKE 1 TABLET BY MOUTH ONCE DAILY 90 tablet 3    busPIRone (BUSPAR) 10 MG tablet Take 1 tablet by mouth 3 times daily 90 tablet 3    metFORMIN (GLUCOPHAGE) 1000 MG tablet TAKE 1 TABLET BY MOUTH TWICE DAILY WITH MEALS FOR DIABETES 180 tablet 3    pioglitazone (ACTOS) 45 MG tablet Take 1 tablet by mouth daily 90 tablet 3    Lancets MISC 1 each by Does not apply route 2 times daily 100 each 5    Blood Glucose Monitoring Suppl (FREESTYLE LITE) ROBEL 1 Device by Does not apply route daily   0    blood glucose monitor strips Test 3 times a day & as needed for symptoms of irregular blood glucose. Freestyle lite (Patient taking differently: 1 strip by Other route 3 times daily as needed Test 3 times a day & as needed for symptoms of irregular blood glucose. Freestyle lite) 100 strip 3    glucose monitoring kit (FREESTYLE) monitoring kit 1 kit by Does not apply route 3 times daily (before meals) Dx E11.8 1 kit 0     No current facility-administered medications on file prior to visit. Allergies   Allergen Reactions    Pcn [Penicillins] Rash     childhood       Health Maintenance   Topic Date Due    Hepatitis C screen  Never done    COVID-19 Vaccine (1) Never done    Pneumococcal 0-64 years Vaccine (1 of 2 - PPSV23) Never done    HIV screen  Never done    DTaP/Tdap/Td vaccine (1 - Tdap) Never done    Shingles Vaccine (1 of 2) Never done    Diabetic retinal exam  07/31/2021    Flu vaccine (1) Never done    Diabetic foot exam  01/08/2022    Depression Screen  01/08/2022    A1C test (Diabetic or Prediabetic)  07/09/2022    Diabetic microalbuminuria test  07/09/2022    Lipid screen  07/09/2022    TSH testing  07/09/2022    Breast cancer screen  07/29/2023    Colon cancer screen colonoscopy  11/21/2029    Hepatitis A vaccine  Aged Out    Hib vaccine  Aged Out    Meningococcal (ACWY) vaccine  Aged Out       Subjective:      Review of Systems   Constitutional: Negative. HENT: Negative. Eyes: Negative. Respiratory: Negative. Cardiovascular: Negative. Gastrointestinal: Negative. Endocrine:        Diabetes   Genitourinary: Negative. Musculoskeletal: Positive for arthralgias and back pain. Skin: Negative. Neurological: Positive for numbness. Psychiatric/Behavioral: Negative. Objective:     Physical Exam  Vitals and nursing note reviewed. Constitutional:       Appearance: Normal appearance. She is well-developed. HENT:      Head: Normocephalic. Right Ear: Tympanic membrane and external ear normal.      Left Ear: Tympanic membrane and external ear normal.      Nose: Nose normal.   Eyes:      Pupils: Pupils are equal, round, and reactive to light. Cardiovascular:      Rate and Rhythm: Normal rate and regular rhythm. Pulses: Normal pulses. Heart sounds: Normal heart sounds. Pulmonary:      Effort: Pulmonary effort is normal.      Breath sounds: Normal breath sounds. Abdominal:      General: Bowel sounds are normal.   Genitourinary:     Comments: Declined gu exam  Musculoskeletal:      Cervical back: Normal range of motion. Lumbar back: No swelling, edema, deformity, signs of trauma, lacerations, spasms, tenderness or bony tenderness. Decreased range of motion. Positive right straight leg raise test. Negative left straight leg raise test. Scoliosis present. Skin:     General: Skin is warm and dry. Capillary Refill: Capillary refill takes less than 2 seconds. Neurological:      General: No focal deficit present. Mental Status: She is alert and oriented to person, place, and time. Psychiatric:         Mood and Affect: Mood normal.         Behavior: Behavior normal.         Thought Content:  Thought content normal.         Judgment: Judgment normal.       BP (!) 150/90 (Site: Left Upper Arm, Position: Sitting, Cuff Size: Large Adult)   Pulse 66   Temp 96.7 °F (35.9 °C)   Ht 5' 5\" (1.651 m)   Wt 251 lb (113.9 kg)   SpO2 97%   BMI 41.77 kg/m²     Assessment:       Diagnosis Orders   1. Well adult exam     2. Chronic bilateral low back pain with right-sided sciatica  MRI LUMBAR SPINE WO CONTRAST   3. Numbness and tingling of right leg  MRI LUMBAR SPINE WO CONTRAST   4. Encounter for screening for lipid disorder  Lipid Panel    Hemoglobin A1C   5. Screening for diabetes mellitus  Comprehensive Metabolic Panel   6. Type 2 diabetes mellitus without complication, without long-term current use of insulin (HCC)  Hemoglobin A1C   7. Acquired hypothyroidism  TSH without Reflex    T4, Free   8. Mixed hyperlipidemia  Lipid Panel   9. Primary osteoarthritis of right knee     10. Chronic gout of foot, unspecified cause, unspecified laterality  Uric Acid         Plan:     Results of plain x-ray from a year ago reviewed with patient. Given the symptoms and her conservative therapy with medications and chiropractor I think it is time to go ahead with an MRI and she agreed. PDMP Monitoring:    Last PDMP [place] as Reviewed [facility]):  Review User Review Instant Review Result            Urine Drug Screenings (1 yr)    No resulted procedures found. Medication Contract and Consent for Opioid Use Documents Filed      No documents found                 Patient given educational materials -see patient instructions. Discussed use, benefit, and side effects of prescribed medications. All patient questions answered. Pt voiced understanding. Reviewed health maintenance. Instructed to continue currentmedications, diet and exercise. Patient agreed with treatment plan. Follow up as directed.    MEDICATIONS:  Orders Placed This Encounter   Medications    celecoxib (CELEBREX) 200 MG capsule     Sig: Take 1 capsule by mouth 2 times daily To replace diclofenac     Dispense:  60 capsule     Refill:  3    cyclobenzaprine (FLEXERIL) 10 MG tablet     Sig: Take 1 tablet by mouth 3 times daily as needed for Muscle spasms     Dispense:  90 tablet     Refill:  1         ORDERS:  Orders Placed This Encounter   Procedures  MRI LUMBAR SPINE WO CONTRAST    Comprehensive Metabolic Panel    TSH without Reflex    T4, Free    Lipid Panel    Uric Acid    Hemoglobin A1C       Follow-up:  Return in about 6 months (around 7/11/2022) for diabetes. PATIENT INSTRUCTIONS:  Patient Instructions   May continue with chiropractor   Stop the diclofenac and try the celebrex  Muscle relaxer at night  Work on stretching and exercise. Continue to work on diabetes and weight loss. Portion control  Labs today  If you get sick come in soon dont wait. Electronically signed by MIKEY Wilson CNP on 1/11/2022 at 12:19 PM    EMR Dragon/transcription disclaimer:  Much of thisencounter note is electronic transcription/translation of spoken language to printed texts. The electronic translation of spoken language may be erroneous, or at times, nonsensical words or phrases may be inadvertentlytranscribed.   Although I have reviewed the note for such errors, some may still exist.

## 2022-01-14 ENCOUNTER — HOSPITAL ENCOUNTER (OUTPATIENT)
Dept: MRI IMAGING | Age: 67
Discharge: HOME | End: 2022-01-14
Payer: COMMERCIAL

## 2022-01-14 DIAGNOSIS — R20.0 NUMBNESS AND TINGLING OF RIGHT LEG: ICD-10-CM

## 2022-01-14 DIAGNOSIS — M54.41 CHRONIC BILATERAL LOW BACK PAIN WITH RIGHT-SIDED SCIATICA: ICD-10-CM

## 2022-01-14 DIAGNOSIS — G89.29 CHRONIC BILATERAL LOW BACK PAIN WITH RIGHT-SIDED SCIATICA: ICD-10-CM

## 2022-01-14 DIAGNOSIS — R20.2 NUMBNESS AND TINGLING OF RIGHT LEG: ICD-10-CM

## 2022-01-14 PROCEDURE — 72148 MRI LUMBAR SPINE W/O DYE: CPT

## 2022-01-20 RX ORDER — PIOGLITAZONEHYDROCHLORIDE 45 MG/1
TABLET ORAL
Qty: 90 TABLET | Refills: 0 | Status: SHIPPED | OUTPATIENT
Start: 2022-01-20 | End: 2022-02-28

## 2022-01-26 ENCOUNTER — TELEPHONE (OUTPATIENT)
Dept: PRIMARY CARE CLINIC | Age: 67
End: 2022-01-26

## 2022-01-26 ENCOUNTER — TELEPHONE (OUTPATIENT)
Dept: NEUROSURGERY | Age: 67
End: 2022-01-26

## 2022-01-26 DIAGNOSIS — M54.41 CHRONIC BILATERAL LOW BACK PAIN WITH RIGHT-SIDED SCIATICA: Primary | ICD-10-CM

## 2022-01-26 DIAGNOSIS — R20.0 NUMBNESS AND TINGLING OF RIGHT LEG: ICD-10-CM

## 2022-01-26 DIAGNOSIS — R20.2 NUMBNESS AND TINGLING OF RIGHT LEG: ICD-10-CM

## 2022-01-26 DIAGNOSIS — G89.29 CHRONIC BILATERAL LOW BACK PAIN WITH RIGHT-SIDED SCIATICA: Primary | ICD-10-CM

## 2022-01-26 DIAGNOSIS — R93.7 ABNORMAL MRI, LUMBAR SPINE: ICD-10-CM

## 2022-01-26 NOTE — TELEPHONE ENCOUNTER
----- Message from [facility] sent at 1/25/2022  5:10 PM CST -----  Subject: Message to Provider    QUESTIONS  Information for Provider? Patient says she was supposed to follow back up   doctor to let them know if was okay to make a appointment with Dr. Erin Bejarano   a neurosurgeon. Please follow up with patient   ---------------------------------------------------------------------------  --------------  CALL BACK INFO  What is the best way for the office to contact you? OK to leave message on   voicemail  Preferred Call Back Phone Number? [phone]  ---------------------------------------------------------------------------  --------------  SCRIPT ANSWERS  Relationship to Patient?  Self

## 2022-01-26 NOTE — TELEPHONE ENCOUNTER
[facility], [place] Neurosurgery New Patient Questionnaire    1. Diagnosis/Reason for Referral?    ABNORMAL MRI, BACK PAIN     2. Who is completing questionnaire? Patient X Caregiver Family      3. Has the patient had any previous spinal/brain surgeries? NO        A. If yes, what is the name of the facility in which the surgery was performed? B. Procedure/Surgery performed? C. Who was the surgeon? D. When was the surgery? MM/YY       E. Did the patient improve after the surgery? 4. Is this a second opinion? If yes, Dr. Luann Gastelum would like to review patient first before making the appointment. 5. Have MRI Images been obtain within the last year? Yes X No      XR  CT     If yes, where was the imaging performed? [facility]   If yes, what part of the body? Lumbar X Cervical  Thoracic  Brain     If yes, when was it obtained? 01-14-22    Note: if the scan was performed at a facility other than [facility], the disc will need to be brought to the appointment or we need to reach out to obtain the disc. A. Was the patient instructed to provide the disc? Yes   No X      8. Has the patient had a NCV/EMG within the last year? Yes  No X     If yes, where was it performed and date? MM/YY  Location:      9. Has the patient been to Physical Therapy? Yes  No X  ([facility])     If yes, what location, how long attended, and last visit? Location:        Therapy Lasted:    Date of Last Visit:      10. Has the patient been to Pain Management? Yes  No X     If yes, what location and last visit     Location:   Last Visit:   Is it helping?

## 2022-01-31 ENCOUNTER — HOSPITAL ENCOUNTER (OUTPATIENT)
Dept: GENERAL RADIOLOGY | Age: 67
Discharge: HOME | End: 2022-01-31
Payer: COMMERCIAL

## 2022-01-31 ENCOUNTER — OFFICE VISIT (OUTPATIENT)
Dept: NEUROSURGERY | Age: 67
End: 2022-01-31
Payer: COMMERCIAL

## 2022-01-31 VITALS
DIASTOLIC BLOOD PRESSURE: 86 MMHG | BODY MASS INDEX: 42.32 KG/M2 | HEIGHT: 65 IN | WEIGHT: 254 LBS | SYSTOLIC BLOOD PRESSURE: 142 MMHG

## 2022-01-31 DIAGNOSIS — M43.16 SPONDYLOLISTHESIS OF LUMBAR REGION: ICD-10-CM

## 2022-01-31 DIAGNOSIS — G89.29 CHRONIC BILATERAL LOW BACK PAIN WITH RIGHT-SIDED SCIATICA: ICD-10-CM

## 2022-01-31 DIAGNOSIS — R20.0 HAND NUMBNESS: ICD-10-CM

## 2022-01-31 DIAGNOSIS — M48.061 LUMBAR STENOSIS WITHOUT NEUROGENIC CLAUDICATION: ICD-10-CM

## 2022-01-31 DIAGNOSIS — M54.41 CHRONIC BILATERAL LOW BACK PAIN WITH RIGHT-SIDED SCIATICA: ICD-10-CM

## 2022-01-31 DIAGNOSIS — R20.0 NUMBNESS IN FEET: ICD-10-CM

## 2022-01-31 DIAGNOSIS — M47.816 LUMBAR SPONDYLOSIS: ICD-10-CM

## 2022-01-31 DIAGNOSIS — M54.2 NECK PAIN: Primary | ICD-10-CM

## 2022-01-31 DIAGNOSIS — M41.9 SCOLIOSIS OF THORACOLUMBAR SPINE, UNSPECIFIED SCOLIOSIS TYPE: ICD-10-CM

## 2022-01-31 PROCEDURE — 72120 X-RAY BEND ONLY L-S SPINE: CPT

## 2022-01-31 PROCEDURE — 99204 OFFICE O/P NEW MOD 45 MIN: CPT | Performed by: NEUROLOGICAL SURGERY

## 2022-01-31 PROCEDURE — 72082 X-RAY EXAM ENTIRE SPI 2/3 VW: CPT

## 2022-01-31 NOTE — PROGRESS NOTES
[facility] Neurosurgery  Office Visit        Chief Complaint   Patient presents with   [facility]     back pain. HISTORY OF PRESENT ILLNESS:      The patient is a 61 y.o. female who presents for neurosurgical evaluation of worsening lower back pain. She states that this has been going on for ~3 months. She cannot recall an inciting injury or event. She describes mostly axial back pain that is worsened with prolonged standing and activity. She endorses some radiation of pain into her right leg as well as numbness in both feet and both hands. She does have a history of diabetes, but she also states that her hands are clumsy and she will frequently drop her mobile phone. She also complains of poor balance. She denies any associated weakness in her upper or lower extremities. She denies any changes in bowel or bladder control. She states she has had neck pain in the past and was seen by Dr. Mic Chapman some time ago and was told she had \"bone spurs\" and did physical therapy for her neck at that time. She has not done any recent physical therapy but she has been seeing a chiropractor several times a week. She has also been taking NSAIDs and recently changed from diclofenac to celebrex. She has not done any physical therapy for her lower back pain. She has never seen pain management or had injections. She has never had a nerve conduction study to evaluate the numbness in her hands or feet. Lumbar x-rays and an MRI was recently obtained by her PCP and she has been referred to neurosurgery.             MEDICAL HISTORY:             Past Medical History:   Diagnosis Date    Diabetes mellitus ([address]     History of blood transfusion     1976 post childbirth    Hypertension     Thyroid disease        Past Surgical History:   Procedure Laterality Date    CHOLECYSTECTOMY      COLONOSCOPY N/A 11/21/2019    Dr Brody Ramos [facility]    HYSTERECTOMY      TOTAL KNEE ARTHROPLASTY Left 8/30/2016    KNEE TOTAL ARTHROPLASTY performed by Piero Box MD at [address] [facility] Right 8/11/2020    RIGHT TOTAL KNEE REPLACEMENT performed by Piero Box MD at [facility] ENDOSCOPY N/A 11/21/2019    Dr Berta Aviles, duodenitis         Current Outpatient Medications:     pioglitazone (ACTOS) 45 MG tablet, Take 1 tablet by mouth once daily, Disp: 90 tablet, Rfl: 0    metFORMIN (GLUCOPHAGE) 1000 MG tablet, TAKE 1 TABLET BY MOUTH TWICE DAILY WITH MEALS FOR DIABETES, Disp: 180 tablet, Rfl: 0    EUTHYROX 150 MCG tablet, Take 1 tablet by mouth once daily, Disp: 30 tablet, Rfl: 5    celecoxib (CELEBREX) 200 MG capsule, Take 1 capsule by mouth 2 times daily To replace diclofenac, Disp: 60 capsule, Rfl: 3    cyclobenzaprine (FLEXERIL) 10 MG tablet, Take 1 tablet by mouth 3 times daily as needed for Muscle spasms, Disp: 90 tablet, Rfl: 1    sertraline (ZOLOFT) 100 MG tablet, TAKE 1 TABLET BY MOUTH ONCE DAILY FOR DEPRESSION, Disp: 90 tablet, Rfl: 3    TRULICITY 1.5 TB/8.8FC SOPN, INJECT 1.5 MG SUB-Q ONCE A WEEK, Disp: 4 mL, Rfl: 3    Risedronate Sodium (ACTONEL) 150 MG TABS, Take 1 tablet by mouth every 30 days, Disp: 3 tablet, Rfl: 3    indomethacin (INDOCIN) 50 MG capsule, TAKE 1 CAPSULE BY MOUTH THREE TIMES DAILY FOR 7 DAYS AS NEEDED FOR  GOUT, Disp: 21 capsule, Rfl: 1    amLODIPine (NORVASC) 5 MG tablet, TAKE 1 TABLET BY MOUTH ONCE DAILY, Disp: 90 tablet, Rfl: 3    busPIRone (BUSPAR) 10 MG tablet, Take 1 tablet by mouth 3 times daily, Disp: 90 tablet, Rfl: 3    Lancets MISC, 1 each by Does not apply route 2 times daily, Disp: 100 each, Rfl: 5    Blood Glucose Monitoring Suppl (FREESTYLE LITE) ROBEL, 1 Device by Does not apply route daily , Disp: , Rfl: 0    blood glucose monitor strips, Test 3 times a day & as needed for symptoms of irregular blood glucose.  Freestyle lite (Patient taking differently: 1 strip by Other route 3 times daily as needed Test 3 times a day & as needed for symptoms of irregular blood glucose. Freestyle lite), Disp: 100 strip, Rfl: 3    glucose monitoring kit (FREESTYLE) monitoring kit, 1 kit by Does not apply route 3 times daily (before meals) Dx E11.8, Disp: 1 kit, Rfl: 0    Allergies:  Pcn [penicillins]    Social History:   Social History     Tobacco Use   Smoking Status Never Smoker   Smokeless Tobacco Never Used     Social History     Substance and Sexual Activity   Alcohol Use No         Family History:   Family History   Problem Relation Age of Onset    Cancer Mother         uterine    High Blood Pressure Father         aneurysm abd    Other Sister         aneurysm brain    Diabetes Brother     Breast Cancer Paternal Aunt     Colon Cancer Neg Hx     Colon Polyps Neg Hx     Esophageal Cancer Neg Hx     Liver Disease Neg Hx     Liver Cancer Neg Hx     Rectal Cancer Neg Hx     Stomach Cancer Neg Hx        REVIEW OF SYSTEMS:    Constitutional: Negative. HENT: Negative. Eyes: Negative. Respiratory: Negative. Cardiovascular: Negative. Gastrointestinal: Negative. Genitourinary: Negative. Musculoskeletal: Positive for back pain and neck pain. Skin: Negative. Neurological: Negative. Endo/Heme/Allergies: Negative. Psychiatric/Behavioral: Negative. Review of Systems was obtained by the medical assistant and reviewed by myself. PHYSICAL EXAM:    Vitals:    01/31/22 1356   BP: (!) 142/86       Constitutional: Obese, no acute distress.    Eyes - conjunctiva normal.  Pupils react to light  Ear, nose,throat -Normal pinna and tragus, No scars, or lesions over external nose or ears, no obvious atrophy of tongue  Neck- symmetric, trachea midline, no jugular vein distension  Respiration- chest wall symmetric, normal effort without use of accessory muscles  Musculoskeletal - no significant wasting of muscles noted, no bony deformities, symmetric bulk  Extremities- no clubbing, cyanosis or edema  Skin - warm, dry, and intact. No rash,erythema, or pallor. Psychiatric - mood, affect, and behavior appear normal.       NEUROLOGIC EXAM:    MENTAL STATUS: Alert, oriented, thought content appropriate    CRANIAL NERVES: PERRL, EOMI, symmetric facies, tongue midline    MOTOR:     Right Upper Extremity:    Deltoid: 5/5  Biceps: 5/5  Triceps: 5/5  Wrist Extension: 5/5  Finger Abduction: 5/5      Left Upper Extremity:    Deltoid: 5/5  Biceps: 5/5  Triceps: 5/5  Wrist Extension: 5/5  Finger Abduction: 5/5      Right Lower Extremity:    Hip Flexion: 5/5  Knee Extension: 5/5  Ankle Plantarflexion: 5/5  Ankle Dorsiflexion: 5/5      Left Lower Extremity:    Hip Flexion: 5/5  Knee Extension: 5/5  Ankle Plantarflexion: 5/5  Ankle Dorsiflexion: 5/5         SOMATOSENSORY:     Right Upper Extremity: Paresthesias to light touch in an non-dermatomal/non-neuronal distribution  Left Upper Extremity: Paresthesias to light touch in an non-dermatomal/non-neuronal distribution  Right Lower Extremity: normal light touch sensation  Left Lower Extremity: normal light touch sensation      REFLEXES:    Biceps: 2+  Patella: 2+    Dolan's: Negative      COORDINATION and GAIT:    Gait: Antalgic, impaired tandem gait      PROVOCATIVE TESTS:  Tinel's is positive at the bilateral wrists, negative at the elbows    IMAGING:    My interpretation of imaging studies:     X-rays and lumbar MRI scan reviewed. There is modest thoracolumbar scoliosis with an apex at ~L2/3. There is multilevel spondylosis and degenerative disc disease. There is advanced DDD throughout the lumbar spine, most prominent at L2/3. At L3/4 there is a broad-based disc protrusion and facet arthropathy that contributes to mild canal stenosis and L>R lateral recess stenosis. At L4/5 there is grade I spondylolistehsis and bilateral facet hypertrophy that contributes to moderate concentric canal stenosis and R>L foraminal stenosis.         ASSESSMENT AND PLAN:  This is a 61 y.o. female is referred for neurosurgical evaluation of lower back pain, but she presents with multiple complaints. I reviewed her imaging and neurologic exam findings with her. I explained that there was no straight-forward surgical solution to the pathology in her lumbar spine and that I feel she should exhaust conservative measures and consider aggressive weight loss as the main avenues to improve her pain. I placed a referral for a course of physical therapy and recommended she continue her current medications. If her pain persists despite PT, she may benefit from a referral to pain management. I also advised her that the numbness and clumsiness in her hands is unrelated to the issues in her lower back and this deserves further attention. I informed her that she may have neuropathy from her diabetes, however I feel that he abnormal tandem gait would suggest a possible underlying cervical myelopathy and recommended an MRI scan of her cervical spine. She also has a positive Tinel's at the wrist bilaterally that may suggest carpal tunnel syndrome and I have ordered an EMG/NCS to evaluate this. I also recommended that we further image her scoliosis and L4/5 spondylolisthesis with additional x-rays. I will plan to see her again in six weeks to review these additional diagnostic studies and assess her response to physical therapy.             Mary Dyson MD

## 2022-02-10 ENCOUNTER — HOSPITAL ENCOUNTER (OUTPATIENT)
Dept: MRI IMAGING | Age: 67
Discharge: HOME | End: 2022-02-10
Payer: COMMERCIAL

## 2022-02-10 ENCOUNTER — HOSPITAL ENCOUNTER (OUTPATIENT)
Dept: NEUROLOGY | Age: 67
Discharge: HOME | End: 2022-02-10
Payer: COMMERCIAL

## 2022-02-10 DIAGNOSIS — R20.0 HAND NUMBNESS: ICD-10-CM

## 2022-02-10 DIAGNOSIS — M54.2 NECK PAIN: ICD-10-CM

## 2022-02-10 PROCEDURE — 95913 NRV CNDJ TEST 13/> STUDIES: CPT | Performed by: PSYCHIATRY & NEUROLOGY

## 2022-02-10 PROCEDURE — 72141 MRI NECK SPINE W/O DYE: CPT

## 2022-02-10 PROCEDURE — 95913 NRV CNDJ TEST 13/> STUDIES: CPT

## 2022-02-10 PROCEDURE — 95886 MUSC TEST DONE W/N TEST COMP: CPT | Performed by: PSYCHIATRY & NEUROLOGY

## 2022-02-10 PROCEDURE — 95886 MUSC TEST DONE W/N TEST COMP: CPT

## 2022-02-11 ENCOUNTER — HOSPITAL ENCOUNTER (OUTPATIENT)
Dept: PHYSICAL THERAPY | Age: 67
Setting detail: THERAPIES SERIES
Discharge: HOME | End: 2022-02-11
Payer: COMMERCIAL

## 2022-02-11 PROCEDURE — 97162 PT EVAL MOD COMPLEX 30 MIN: CPT

## 2022-02-11 ASSESSMENT — PAIN DESCRIPTION - LOCATION: LOCATION: BACK;LEG

## 2022-02-11 ASSESSMENT — PAIN DESCRIPTION - PAIN TYPE: TYPE: CHRONIC PAIN

## 2022-02-11 ASSESSMENT — PAIN SCALES - GENERAL: PAINLEVEL_OUTOF10: 4

## 2022-02-11 NOTE — PROGRESS NOTES
Physical Therapy  Initial Assessment  Date: 2022  Patient Name: Arden Oglesby  MRN: [id number]  : 1955     Treatment Diagnosis: Low back pain    Restrictions       Subjective   General  Chart Reviewed: Yes  Patient assessed for rehabilitation services?: Yes  Additional Pertinent Hx: 60 y/o F presents with complaint of low back pain. PMH includes DM, bilat TKR, HTN  Response To Previous Treatment: Not applicable  Family / Caregiver Present: No  Referring Practitioner: Everton Aranda MD  Referral Date : 22  Diagnosis: Low back pain  Follows Commands: Within Functional Limits  PT Visit Information  PT Insurance Information: Passport (Benay Silence req'd after 20 visits- none used)  Total # of Visits Approved: 12  Total # of Visits to Date: 1  Plan of Care/Certification Expiration Date: 22  Progress Note Due Date: 22  Subjective  Subjective: Pt presents with complaint of back pain, worsening over the past 3 months. No precipitating factors. Complains of intermittent N/T (worsening) in RLE, worse with standing. Pain is made worse by walking, lifting or carrying weights. Pain is lessened to a degree by sitting, propping feet up. She gets some relief from chiropractor, but only for 2-3 days.   Pain Screening  Patient Currently in Pain: Yes  Pain Assessment  Pain Assessment: 0-10  Pain Level: 4 (10/10 at worst)  Pain Type: Chronic pain  Pain Location: Back;Leg  Pain Descriptors: Stabbing;Numbness;Tightness  Vital Signs  Patient Currently in Pain: Yes    Vision/Hearing  Vision  Vision: Within Functional Limits  Hearing  Hearing: Within functional limits    Orientation  Orientation  Overall Orientation Status: Within Normal Limits    Social/Functional History  Social/Functional History  Lives With: Other (comment) (Granddaughter and great grand child recently moved in)  Type of Home: Mobile home  Home Layout: One level  Home Access: Ramped entrance  ADL Assistance: Independent  Homemaking Assistance: Independent (Mopping, sweeping, vacuuming in particular cause pain)  Ambulation Assistance: Independent  Active : Yes  Occupation: Part time employment  Type of occupation:  at [email or web address]    Objective     Observation/Palpation  Palpation: Increased muscle tension in lumbar paraspinals, with mild TTP at same area  Observation: In supine, LLE 1\" longer than R, with L ASIS anteriorly rotated. Stands with knee recurvatum posture and anterior pelvic tilt    Spine  Lumbar: 48 degrees flex, 12 degrees ext, 25 degrees SB R, 10 degrees L, 7% rotation R, 100% rotation L    Strength RLE  R Hip Flexion: 5/5  R Hip Extension: 5/5  R Hip ABduction: 5/5  R Hip ADduction: 5/5  R Knee Flexion: 5/5  R Knee Extension: 5/5  R Ankle Dorsiflexion: 5/5  R Ankle Plantar flexion: 5/5  Strength LLE  L Hip Flexion: 5/5  L Hip Extension: 5/5  L Hip ABduction: 5/5  L Hip ADduction: 5/5  L Knee Flexion: 5/5  L Knee Extension: 5/5  L Ankle Dorsiflexion: 5/5  L Ankle Plantar Flexion: 5/5  Strength Other  Other: Poor TA contraction and pelvic tilt     Additional Measures  Flexibility: Bilat HS flexibility to 75 degrees. Hip ER limited bilaterally  Special Tests: (-)SLR, (-)HARDEEP  Other: Oswestry Disability Questionnaire: 12/45 = 27% impairment  Sensation  Overall Sensation Status: WFL (Has some intermittent N/T in feet, decreased to light touch over R medial and lateral shin)             Ambulation  Ambulation?: Yes  Ambulation 1  Device: No Device  Assistance: Independent  Quality of Gait: Increased lateral sway     Exercises  Exercise 1: Assess leg length.   (If not correcting with MWM, trial heel lift)  Exercise 2: Isometric L hip extension from [facility] 5 x 5\" (manual)  Exercise 3: Isometric R hip flexion from 90/90 5 x 5\" (manual)  Exercise 4: TA contraction (alone, UE, LE, UE/LE)  Exercise 5: Pelvic tilt  Exercise 6: Bridging/Single leg L  Exercise 7: Hooklying lumbar rotation  Exercise 8: Supine quadratus stretch  Exercise 9: SKTC/DKTC  Exercise 10: Contract/relax bilat HS and hip ER  Exercise 11: Axial traction LLE  Exercise 12: Sitting L hip retraction, R hip protraction  Exercise 13: Repeated sit to stand with TA contraction  Exercise 14: Multifidus row/Paloff press  Exercise 15: Mini squats  Exercise 16: Standing march  Exercise 17: Standing hip abd/ext  Exercise 18: IFC + MH PRN to lumbar area for elevated pain                      Assessment   Conditions Requiring Skilled Therapeutic Intervention  Body structures, Functions, Activity limitations: Decreased functional mobility ; Increased pain;Decreased sensation;Decreased posture;Decreased ROM; Decreased strength;Decreased high-level IADLs  Assessment: Pt presents with complaint of low back pain and demonstrates decreased core strength and ROM. Will benefit from skilled PT intervention to address listed impairments.   Treatment Diagnosis: Low back pain  Prognosis: Good  Decision Making: Medium Complexity  History: See above  Exam: See above  Clinical Presentation: Evolving  REQUIRES PT FOLLOW UP: Yes  Discharge Recommendations: Continue to assess pending progress  Activity Tolerance  Activity Tolerance: Patient Tolerated treatment well         Plan   Plan  Times per week: 2x  Plan weeks: 4-6 weeks  Current Treatment Recommendations: Strengthening,Home Exercise Program,Manual Therapy - Soft Tissue Mobilization,Manual Therapy - Joint Manipulation,Equipment Evaluation, Education, & procurement,Patient/Caregiver Education & Training,Safety Education & Training,ROM,Modalities    Goals  Short term goals  Time Frame for Short term goals: 3-4 weeks  Short term goal 1: Independent with HEP  Short term goal 2: Improve lumbar ROM to at least 60 degrees flexion, 25 degrees SB bilat, 100% rotation bilat  Short term goal 3: Perform 10 Fair quality TA contractions with good breath control  Short term goal 4: Trial heel lift PRN  Long term goals  Time Frame for Long term goals : 4-6 weeks  Long term goal 1: Report less need to stop for rest breaks while walking from register to the bathroom at work  Long term goal 2: Report less pain with lifting/carrying grandchildren  Long term goal 3: Report less pain with household chores  Long term goal 4: Improve Oswestry Disability Questionnaire to 18% impairment or less  Patient Goals   Patient goals : reduce back pain       Therapy Time   Individual Concurrent Group Co-treatment   Time In 1008         Time Out 1106         Minutes 58            Electronically signed by Stephen Bowden PT on 2/11/2022 at 12:58 PM

## 2022-02-14 ENCOUNTER — HOSPITAL ENCOUNTER (OUTPATIENT)
Dept: PHYSICAL THERAPY | Age: 67
Setting detail: THERAPIES SERIES
Discharge: HOME | End: 2022-02-14
Payer: COMMERCIAL

## 2022-02-14 PROCEDURE — 97110 THERAPEUTIC EXERCISES: CPT

## 2022-02-14 PROCEDURE — G0283 ELEC STIM OTHER THAN WOUND: HCPCS

## 2022-02-14 RX ORDER — DULAGLUTIDE 1.5 MG/.5ML
1.5 INJECTION, SOLUTION SUBCUTANEOUS WEEKLY
Qty: 4 ML | Refills: 3 | Status: SHIPPED | OUTPATIENT
Start: 2022-02-14 | End: 2022-06-21

## 2022-02-14 NOTE — PROGRESS NOTES
Physical Therapy  Daily Treatment Note  Date: 2022  Patient Name: Annel Woody  MRN: [id number]     :   1955    Subjective:   General  Chart Reviewed: Yes  Additional Pertinent Hx: 60 y/o F presents with complaint of low back pain. PMH includes DM, bilat TKR, HTN  Response To Previous Treatment: Not applicable  Family / Caregiver Present: No  Referring Practitioner: Hien Allred MD  PT Visit Information  PT Insurance Information: Passport (Angely Aburto req'd after 20 visits- none used)  Total # of Visits Approved: 12  Total # of Visits to Date: 2  Plan of Care/Certification Expiration Date: 22  Progress Note Due Date: 22  Subjective  Subjective: Patient states she has about 6/10 pain today. She says she was hurting a lot at night. Pain Screening  Patient Currently in Pain: Yes (10 pre tx)  Vital Signs  Patient Currently in Pain: Yes (6/10 pre tx)       Treatment Activities:                                    Exercises  Exercise 1: Assess leg length. (If not correcting with MWM, trial heel lift)  Exercise 2: Isometric L hip extension from [facility] 5 x 5\" (manual)  Exercise 3: Isometric R hip flexion from 90/90 5 x 5\" (manual)  Exercise 4: TA contraction (alone 5 sec x 5, UE 1 x 5, LE 1 x 5, UE/LE 1 x 5)  Exercise 5: Pelvic tilt 1 x 10  Exercise 6: Bridging 1 x 10/Single leg L- not today progress towards  Exercise 7: Hooklying lumbar rotation 1 x 10  Exercise 8: Supine quadratus stretch 4 x 15 sec  Exercise 9: SKTC 1 x 5/DKTC 1 x 5  Exercise 10: Contract/relax bilat HS and hip ER 4 x 15 sec ea  Exercise 11: Axial traction LLE 4 x 15 sec  Exercise 12: Sitting L hip retraction, R hip protraction 5 sec x 5  Exercise 13: Repeated sit to stand with TA contraction 1 x 5  Exercise 14: Multifidus row/Paloff press red 1 x 10 ea  Exercise 15:  Mini squats 1 x 10  Exercise 16: Standing march 1x 10  Exercise 17: Standing hip abd/ext 1 x 10 ea  Exercise 18: IFC + MH PRN to lumbar area for elevated pain seated x 20 min  Exercise 19: 6 mm heel lift issued for right shoe                               Assessment:   Conditions Requiring Skilled Therapeutic Intervention  Body structures, Functions, Activity limitations: Decreased functional mobility ; Increased pain;Decreased sensation;Decreased posture;Decreased ROM; Decreased strength;Decreased high-level IADLs  Assessment: Patient did well with tx today with good tolerance to ex's. She did report slight decrease in pain after ex's and even more decreased of 1/0 after estim. She conitnued to have leg length discrepency that was not correctable, therefore heel lift issued. She had good understanding of use of heel lift for home. patient required mod v.c. and demo for proper tech with ex's.   Treatment Diagnosis: Low back pain  REQUIRES PT FOLLOW UP: Yes  Discharge Recommendations: Continue to assess pending progress    Goals:  Short term goals  Time Frame for Short term goals: 3-4 weeks  Short term goal 1: Independent with HEP  Short term goal 2: Improve lumbar ROM to at least 60 degrees flexion, 25 degrees SB bilat, 100% rotation bilat  Short term goal 3: Perform 10 Fair quality TA contractions with good breath control  Short term goal 4: Trial heel lift PRN  Long term goals  Time Frame for Long term goals : 4-6 weeks  Long term goal 1: Report less need to stop for rest breaks while walking from register to the bathroom at work  Long term goal 2: Report less pain with lifting/carrying grandchildren  Long term goal 3: Report less pain with household chores  Long term goal 4: Improve Oswestry Disability Questionnaire to 18% impairment or less  Patient Goals   Patient goals : reduce back pain  Plan:    Plan  Times per week: 2x  Plan weeks: 4-6 weeks  Current Treatment Recommendations: Strengthening,Home Exercise Program,Manual Therapy - Soft Tissue Mobilization,Manual Therapy - Joint Manipulation,Equipment Evaluation, Education, & procurement,Patient/Caregiver Education & Training,Safety Education & Training,ROM,Modalities  Timed Code Treatment Minutes: 52 Minutes     Therapy Time   Individual Concurrent Group Co-treatment   Time In 5690         Time Out 1110         Minutes 72         Timed Code Treatment Minutes: 52 Minutes  Electronically signed by Rachel Giraldo PT on 2/14/2022 at 12:15 PM

## 2022-02-16 ENCOUNTER — HOSPITAL ENCOUNTER (OUTPATIENT)
Dept: PHYSICAL THERAPY | Age: 67
Setting detail: THERAPIES SERIES
Discharge: HOME | End: 2022-02-16
Payer: COMMERCIAL

## 2022-02-16 PROCEDURE — 97110 THERAPEUTIC EXERCISES: CPT

## 2022-02-16 PROCEDURE — G0283 ELEC STIM OTHER THAN WOUND: HCPCS

## 2022-02-16 ASSESSMENT — PAIN DESCRIPTION - LOCATION: LOCATION: BACK;HIP

## 2022-02-16 ASSESSMENT — PAIN SCALES - GENERAL: PAINLEVEL_OUTOF10: 3

## 2022-02-16 ASSESSMENT — PAIN DESCRIPTION - PAIN TYPE: TYPE: CHRONIC PAIN

## 2022-02-16 ASSESSMENT — PAIN DESCRIPTION - ORIENTATION: ORIENTATION: RIGHT

## 2022-02-16 NOTE — PROGRESS NOTES
Physical Therapy  Daily Treatment Note  Date: 2022  Patient Name: Castro Mcgarry  MRN: [id number]     :   1955    Subjective:   General  Referring Practitioner: Ector Acosta MD  PT Insurance Information: Passport ([facility] 1153 req'd after 20 visits- none used)  Total # of Visits Approved: 12  Total # of Visits to Date: 3  Plan of Care/Certification Expiration Date: 22  Progress Note Due Date: 22  Subjective: After i was here on Monday i was sore that night and the next day, i guess working muscles that haven't been worked for a while  Patient Currently in Pain: Yes  Pain Level: 3  Pain Type: Chronic pain  Pain Location: Back; Hip  Pain Orientation: Right       Treatment Activities:     Exercises  Exercise 1: Assess leg length. (If not correcting with MWM, trial heel lift)  : RLE longer  Exercise 2: Isometric L hip extension from [facility] 5 x 5\" (manual)   : R ext  2 sets  Exercise 3: Isometric R hip flexion from 90/90 5 x 5\" (manual)  :  L flex  2 sets  Exercise 4: TA contraction (alone 5 sec x 5, UE 1 x 5, LE 1 x 5, UE/LE 1 x 5)  Exercise 5: Pelvic tilt 1 x 10  Exercise 6: Bridging 1 x 10/Single leg L- not today progress towards  Exercise 7: Hooklying lumbar rotation 1 x 10  Exercise 8: Supine quadratus stretch 4 x 15 sec  Exercise 9: SKTC 1 x 5/DKTC 1 x 5  Exercise 10: Contract/relax bilat HS and hip ER 4 x 15 sec ea  Exercise 11: Axial traction LLE 4 x 15 sec  Exercise 12: Sitting L hip retraction, R hip protraction 5 sec x 5  Exercise 13: Repeated sit to stand with TA contraction 1 x 5  Exercise 14: Multifidus row/Paloff press red 1 x 10 ea  Exercise 15:  Mini squats 1 x 10  Exercise 16: Standing march 1x 10  Exercise 17: Standing hip abd/ext 1 x 10 ea  Exercise 18: IFC + MH PRN to lumbar area for elevated pain seated x 20 min  Exercise 19: 6 mm heel lift issued for right shoe  Exercise 20: : HEP ISSUED     Assessment:   Conditions Requiring Skilled Therapeutic Intervention  Body structures, Functions, Activity limitations: Decreased functional mobility ; Increased pain;Decreased sensation;Decreased posture;Decreased ROM; Decreased strength;Decreased high-level IADLs  Assessment: HEP issued with pictures and written instructions and reviewed during therapy session, her RLE was longer than L today so synergistic techniques were performed in reverse order and required 2 sets to correct her leg length.  As she becomes more familiar with exercise and soreness decreases will increase repetitions with exercises  Treatment Diagnosis: Low back pain  REQUIRES PT FOLLOW UP: Yes      Goals:  Short term goals  Time Frame for Short term goals: 3-4 weeks  Short term goal 1: Independent with HEP  Short term goal 2: Improve lumbar ROM to at least 60 degrees flexion, 25 degrees SB bilat, 100% rotation bilat  Short term goal 3: Perform 10 Fair quality TA contractions with good breath control  Short term goal 4: Trial heel lift PRN  Long term goals  Time Frame for Long term goals : 4-6 weeks  Long term goal 1: Report less need to stop for rest breaks while walking from register to the bathroom at work  Long term goal 2: Report less pain with lifting/carrying grandchildren  Long term goal 3: Report less pain with household chores  Long term goal 4: Improve Oswestry Disability Questionnaire to 18% impairment or less  Patient Goals   Patient goals : reduce back pain    Plan:    Times per week: 2x  Plan weeks: 4-6 weeks  Current Treatment Recommendations: Strengthening,Home Exercise Program,Manual Therapy - Soft Tissue Mobilization,Manual Therapy - Joint Manipulation,Equipment Evaluation, Education, & procurement,Patient/Caregiver Education & Training,Safety Education & Training,ROM,Modalities       Therapy Time   Individual Concurrent Group Co-treatment   Time In 0953         Time Out 1104         Minutes 71         Timed Code Treatment Minutes: [address], PTA    Electronically signed by Jamey Bennett Keara, PTA on 2/16/2022 at 2:28 PM

## 2022-02-21 ENCOUNTER — APPOINTMENT (OUTPATIENT)
Dept: PHYSICAL THERAPY | Age: 67
End: 2022-02-21
Payer: COMMERCIAL

## 2022-02-23 ENCOUNTER — APPOINTMENT (OUTPATIENT)
Dept: PHYSICAL THERAPY | Age: 67
End: 2022-02-23
Payer: COMMERCIAL

## 2022-02-28 ENCOUNTER — HOSPITAL ENCOUNTER (OUTPATIENT)
Dept: PHYSICAL THERAPY | Age: 67
Setting detail: THERAPIES SERIES
Discharge: HOME | End: 2022-02-28
Payer: COMMERCIAL

## 2022-02-28 PROCEDURE — 97110 THERAPEUTIC EXERCISES: CPT

## 2022-02-28 PROCEDURE — G0283 ELEC STIM OTHER THAN WOUND: HCPCS

## 2022-02-28 RX ORDER — PIOGLITAZONEHYDROCHLORIDE 45 MG/1
TABLET ORAL
Qty: 90 TABLET | Refills: 0 | Status: SHIPPED | OUTPATIENT
Start: 2022-02-28 | End: 2022-09-12

## 2022-02-28 ASSESSMENT — PAIN SCALES - GENERAL: PAINLEVEL_OUTOF10: 4

## 2022-02-28 ASSESSMENT — PAIN DESCRIPTION - PAIN TYPE: TYPE: CHRONIC PAIN

## 2022-02-28 ASSESSMENT — PAIN DESCRIPTION - ORIENTATION: ORIENTATION: RIGHT

## 2022-02-28 ASSESSMENT — PAIN DESCRIPTION - LOCATION: LOCATION: BACK;HIP

## 2022-02-28 NOTE — PROGRESS NOTES
limitations: Decreased functional mobility ; Increased pain;Decreased sensation;Decreased posture;Decreased ROM; Decreased strength;Decreased high-level IADLs  Assessment: RLE was longer again today than L (more than the width of my thumb) so synergistic techniques were performed in reverse order and required 2 sets to correct her leg length as with previous visit.  Increased repetitions with abdominal exercises and sktc  Treatment Diagnosis: Low back pain             Goals:  Short term goals  Time Frame for Short term goals: 3-4 weeks  Short term goal 1: Independent with HEP  Short term goal 2: Improve lumbar ROM to at least 60 degrees flexion, 25 degrees SB bilat, 100% rotation bilat  Short term goal 3: Perform 10 Fair quality TA contractions with good breath control  Short term goal 4: Trial heel lift PRN  Long term goals  Time Frame for Long term goals : 4-6 weeks  Long term goal 1: Report less need to stop for rest breaks while walking from register to the bathroom at work  Long term goal 2: Report less pain with lifting/carrying grandchildren  Long term goal 3: Report less pain with household chores  Long term goal 4: Improve Oswestry Disability Questionnaire to 18% impairment or less  Patient Goals   Patient goals : reduce back pain    Plan:    Times per week: 2x  Plan weeks: 4-6 weeks  Current Treatment Recommendations: Strengthening,Home Exercise Program,Manual Therapy - Soft Tissue Mobilization,Manual Therapy - Joint Manipulation,Equipment Evaluation, Education, & procurement,Patient/Caregiver Education & Training,Safety Education & Training,ROM,Modalities  Timed Code Treatment Minutes: [address] [place] [address] \[facility] [place] [facility]\""    Electronically signed by Kavita Galan PTA on 2/28/2022 at 11:42 AM

## 2022-03-03 ENCOUNTER — HOSPITAL ENCOUNTER (OUTPATIENT)
Dept: PHYSICAL THERAPY | Age: 67
Setting detail: THERAPIES SERIES
Discharge: HOME | End: 2022-03-03
Payer: COMMERCIAL

## 2022-03-03 PROCEDURE — G0283 ELEC STIM OTHER THAN WOUND: HCPCS

## 2022-03-03 PROCEDURE — 97110 THERAPEUTIC EXERCISES: CPT

## 2022-03-03 ASSESSMENT — PAIN SCALES - GENERAL: PAINLEVEL_OUTOF10: 4

## 2022-03-03 ASSESSMENT — PAIN DESCRIPTION - LOCATION: LOCATION: BACK

## 2022-03-03 ASSESSMENT — PAIN DESCRIPTION - ORIENTATION: ORIENTATION: LOWER;RIGHT

## 2022-03-03 ASSESSMENT — PAIN DESCRIPTION - PAIN TYPE: TYPE: CHRONIC PAIN

## 2022-03-03 NOTE — PROGRESS NOTES
Physical Therapy  Daily Treatment Note  Date: 3/3/2022  Patient Name: Carmen Jordan  MRN: [id number]     :   1955    Subjective:   General  Referring Practitioner: Marry Lee MD  PT Insurance Information: Passport (Mckenzie 1153 req'd after 20 visits- none used)  Total # of Visits Approved: 12  Total # of Visits to Date: 5  Plan of Care/Certification Expiration Date: 22  Progress Note Due Date: 22  Subjective: i was real sore the day after i was here last time, today the pain is about a 4/10  Patient Currently in Pain: Yes  Pain Level: 4  Pain Type: Chronic pain  Pain Location: Back  Pain Orientation: Lower;Right       Treatment Activities:      Exercises  Exercise 1: Assess leg length. (If not correcting with MWM, trial heel lift)  3/3: LLE longer  Exercise 2: Isometric L hip extension from [address] 5 x 5\" (manual)  Exercise 3: Isometric R hip flexion from 90/90 5 x 5\" (manual)  Exercise 4: TA contraction (alone 5 sec x 10, UE 1 x 10, LE 1 x 10, UE/LE 1 x 10)  Exercise 5: Pelvic tilt 3\" x 10  Exercise 6: Bridging 1 x 10/Single leg L- not today progress towards  Exercise 7: Hooklying lumbar rotation 1 x 10  Exercise 8: Supine quadratus stretch 4 x 15 sec  Exercise 9: SKTC 5\" x 10/DKTC 5\" x 5  with towel  Exercise 10: Contract/relax bilat HS and hip ER 4 x 15 sec ea  Exercise 11: Axial traction LLE 4 x 15 sec  Exercise 12: Sitting L hip retraction, R hip protraction 5 sec x 5  Exercise 13: Repeated sit to stand with TA contraction 1 x 5  Exercise 14: Multifidus row/Paloff press red 1 x 10 ea  Exercise 15: Mini squats 1 x 10  Exercise 16: Standing march 1x 10  Exercise 17: Standing hip abd/ext 1 x 10 ea  Exercise 18: IFC + MH PRN to lumbar area for elevated pain seated x 20 min  Exercise 20: : HEP ISSUED     Assessment:   Conditions Requiring Skilled Therapeutic Intervention  Body structures, Functions, Activity limitations: Decreased functional mobility ; Increased pain;Decreased sensation;Decreased posture;Decreased ROM; Decreased strength;Decreased high-level IADLs  Assessment: LLE longer than R when leg length was assessed and after synergistic techniques her leg length was even, occasional cues required for proper performance of exercises  Treatment Diagnosis: Low back pain  REQUIRES PT FOLLOW UP: Yes      Goals:  Short term goals  Time Frame for Short term goals: 3-4 weeks  Short term goal 1: Independent with HEP  Short term goal 2: Improve lumbar ROM to at least 60 degrees flexion, 25 degrees SB bilat, 100% rotation bilat  Short term goal 3: Perform 10 Fair quality TA contractions with good breath control  Short term goal 4: Trial heel lift PRN  Long term goals  Time Frame for Long term goals : 4-6 weeks  Long term goal 1: Report less need to stop for rest breaks while walking from register to the bathroom at work  Long term goal 2: Report less pain with lifting/carrying grandchildren  Long term goal 3: Report less pain with household chores  Long term goal 4: Improve Oswestry Disability Questionnaire to 18% impairment or less  Patient Goals   Patient goals : reduce back pain    Plan:    Times per week: 2x  Plan weeks: 4-6 weeks  Current Treatment Recommendations: Strengthening,Home Exercise Program,Manual Therapy - Soft Tissue Mobilization,Manual Therapy - Joint Manipulation,Equipment Evaluation, Education, & procurement,Patient/Caregiver Education & Training,Safety Education & Training,ROM,Modalities       Therapy Time   Individual Concurrent Group Co-treatment   Time In 0959         Time Out 1102         Minutes 63         Timed Code Treatment Minutes: Milly Manzano, PTA    Electronically signed by Cadence Yi PTA on 3/3/2022 at 11:32 AM

## 2022-03-07 ENCOUNTER — HOSPITAL ENCOUNTER (OUTPATIENT)
Dept: PHYSICAL THERAPY | Age: 67
Setting detail: THERAPIES SERIES
Discharge: HOME | End: 2022-03-07
Payer: COMMERCIAL

## 2022-03-07 PROCEDURE — 97110 THERAPEUTIC EXERCISES: CPT

## 2022-03-07 PROCEDURE — G0283 ELEC STIM OTHER THAN WOUND: HCPCS

## 2022-03-07 ASSESSMENT — PAIN DESCRIPTION - LOCATION: LOCATION: BACK

## 2022-03-07 ASSESSMENT — PAIN DESCRIPTION - ORIENTATION: ORIENTATION: RIGHT;LOWER

## 2022-03-07 ASSESSMENT — PAIN SCALES - GENERAL: PAINLEVEL_OUTOF10: 6

## 2022-03-07 ASSESSMENT — PAIN DESCRIPTION - PAIN TYPE: TYPE: CHRONIC PAIN

## 2022-03-07 NOTE — PROGRESS NOTES
Physical Therapy  Daily Treatment Note  Date: 3/7/2022  Patient Name: Albertina Schofield  MRN: [id number]     :   1955    Subjective:   General  Additional Pertinent Hx: 62 y/o F presents with complaint of low back pain. PMH includes DM, bilat TKR, HTN  Referring Practitioner: Mario Douglas MD  PT Insurance Information: Passport (Jeff Dyer req'd after 20 visits- none used)  Total # of Visits Approved: 12  Total # of Visits to Date: 6  Plan of Care/Certification Expiration Date: 22  Progress Note Due Date: 22  Subjective: i worked the past 4 days standing 8 to 9 hours a day, the pain is about 6 right now  Pain Assessment  Pain Level: 6  Pain Type: Chronic pain  Pain Location: Back  Pain Orientation: Right; Lower       Treatment Activities:         Exercises  Exercise 1: Assess leg length. (If not correcting with MWM, trial heel lift)  3/7: LLE longer  Exercise 2: Isometric L hip extension from [facility] 5 x 5\" (manual)  Exercise 3: Isometric R hip flexion from 90/90 5 x 5\" (manual)  Exercise 4: TA contraction (alone 5 sec x 10, UE 1 x 10, LE 1 x 10, UE/LE 1 x 10)  Exercise 5: Pelvic tilt 3\" x 10  Exercise 6: Bridging 1 x 10/Single leg L- not today progress towards  Exercise 7: Hooklying lumbar rotation 1 x 10  Exercise 8: Supine quadratus stretch 4 x 15 sec  Exercise 9: SKTC 5\" x 10/DKTC 5\" x 5  with towel  Exercise 10: Contract/relax bilat HS and hip ER 4 x 15 sec ea  Exercise 11: Axial traction LLE 4 x 15 sec  Exercise 12: Sitting L hip retraction, R hip protraction 5 sec x 5  Exercise 13: Repeated sit to stand with TA contraction 1 x 5  Exercise 14: Multifidus row/Paloff press red 1 x 10 ea  Exercise 15:  Mini squats 1 x 10  Exercise 16: Standing march 1x 10  Exercise 17: Standing hip abd/ext 1 x 10 ea  Exercise 18: IFC + MH PRN to lumbar area for elevated pain seated x 20 min  Exercise 19: : HEP ISSUED  Exercise 20: 6 mm heel lift issued for right shoe    Assessment:   Conditions Requiring Skilled Therapeutic Intervention  Body structures, Functions, Activity limitations: Decreased functional mobility ; Increased pain;Decreased sensation;Decreased posture;Decreased ROM; Decreased strength;Decreased high-level IADLs  Assessment: LLE longer than R again today when leg length was assessed and after synergistic techniques her leg length was even, her L h/s and ER's were tighter than r performing manual stretching and continues to require occasional cues required for proper performance of exercises  Treatment Diagnosis: Low back pain  REQUIRES PT FOLLOW UP: Yes     Goals:  Short term goals  Time Frame for Short term goals: 3-4 weeks  Short term goal 1: Independent with HEP  Short term goal 2: Improve lumbar ROM to at least 60 degrees flexion, 25 degrees SB bilat, 100% rotation bilat  Short term goal 3: Perform 10 Fair quality TA contractions with good breath control  Short term goal 4: Trial heel lift PRN  Long term goals  Time Frame for Long term goals : 4-6 weeks  Long term goal 1: Report less need to stop for rest breaks while walking from register to the bathroom at work  Long term goal 2: Report less pain with lifting/carrying grandchildren  Long term goal 3: Report less pain with household chores  Long term goal 4: Improve Oswestry Disability Questionnaire to 18% impairment or less  Patient Goals   Patient goals : reduce back pain    Plan:    Times per week: 2x  Plan weeks: 4-6 weeks  Current Treatment Recommendations: Strengthening,Home Exercise Program,Manual Therapy - Soft Tissue Mobilization,Manual Therapy - Joint Manipulation,Equipment Evaluation, Education, & procurement,Patient/Caregiver Education & Training,Safety Education & Training,ROM,Modalities       Therapy Time   Individual Concurrent Group Co-treatment   Time In 0959         Time Out 1101         Minutes 62         Timed Code Treatment Minutes: MACY Patel    Electronically signed by Darwin Concepcion PTA on 3/7/2022 at 11:40 AM

## 2022-03-09 ENCOUNTER — HOSPITAL ENCOUNTER (OUTPATIENT)
Dept: PHYSICAL THERAPY | Age: 67
Setting detail: THERAPIES SERIES
Discharge: HOME | End: 2022-03-09
Payer: COMMERCIAL

## 2022-03-09 PROCEDURE — 97110 THERAPEUTIC EXERCISES: CPT

## 2022-03-09 PROCEDURE — G0283 ELEC STIM OTHER THAN WOUND: HCPCS

## 2022-03-09 ASSESSMENT — PAIN SCALES - GENERAL: PAINLEVEL_OUTOF10: 4

## 2022-03-09 ASSESSMENT — PAIN DESCRIPTION - PAIN TYPE: TYPE: CHRONIC PAIN

## 2022-03-09 ASSESSMENT — PAIN DESCRIPTION - DESCRIPTORS: DESCRIPTORS: SPASM;TIGHTNESS;ACHING

## 2022-03-09 ASSESSMENT — PAIN DESCRIPTION - LOCATION: LOCATION: BACK

## 2022-03-09 ASSESSMENT — PAIN DESCRIPTION - ORIENTATION: ORIENTATION: LOWER;RIGHT

## 2022-03-09 NOTE — PROGRESS NOTES
Physical Therapy  Daily Treatment Note/Re-assessment  Date: 3/9/2022  Patient Name: Andrea Tovar  MRN: [id number]     :   1955    Subjective:   General  Chart Reviewed: Yes  Additional Pertinent Hx: 62 y/o F presents with complaint of low back pain. PMH includes DM, bilat TKR, HTN  Response To Previous Treatment: Patient with no complaints from previous session. Family / Caregiver Present: No  Referring Practitioner: Adilene Dutton MD  PT Visit Information  PT Insurance Information: Passport (Tori Rehman rekye'd after 20 visits- none used)  Total # of Visits Approved: 12  Total # of Visits to Date: 7  Plan of Care/Certification Expiration Date: 22  Progress Note Due Date: 22  Subjective  Subjective: Notes some improvements in pain and activity tolerance, but continues with symptoms, especially after standing long hrs at work. Pain Screening  Patient Currently in Pain: Yes  Pain Assessment  Pain Assessment: 0-10  Pain Level: 4  Pain Type: Chronic pain  Pain Location: Back  Pain Orientation: Lower;Right  Pain Descriptors: Spasm;Tightness; Aching  Vital Signs  Patient Currently in Pain: Yes       Treatment Activities:                                    Exercises  Exercise 1: Assess leg length.   (If not correcting with MWM, trial heel lift)  3/9: LLE longer  Exercise 2: Isometric L hip extension from [facility] 5 x 5\" (manual)  Exercise 3: Isometric R hip flexion from 90/90 5 x 5\" (manual)  Exercise 4: TA contraction (alone 5 sec x 10, UE 1 x 10, LE 1 x 10, UE/LE 1 x 10)  Exercise 5: Pelvic tilt 3\" x 10  Exercise 6: Bridging 1 x 10/Single leg L- not today progress towards  Exercise 7: Hooklying lumbar rotation 1 x 10  Exercise 8: Supine quadratus stretch 4 x 15 sec  Exercise 9: SKTC 5\" x 10/DKTC 5\" x 5  with towel- not today  Exercise 10: Contract/relax bilat HS and hip ER 4 x 15 sec ea- not today  Exercise 11: Axial traction LLE 4 x 15 sec- not today  Exercise 12: Sitting L hip retraction, R hip protraction 5 sec x 5- not today  Exercise 13: Repeated sit to stand with TA contraction 1 x 5- not today  Exercise 14: Multifidus row/Paloff press red 1 x 10 ea- not today  Exercise 15: Mini squats 1 x 10- not today  Exercise 16: Standing march 1x 10- not today  Exercise 17: Standing hip abd/ext 1 x 10 ea- not today  Exercise 18: IFC + MH PRN to lumbar area for elevated pain seated x 12 min  Exercise 19: 2/16: HEP ISSUED  Exercise 20: 6 mm heel lift issued for right shoe- issued 9 mm heel lift on 3/9. Assessment:   Conditions Requiring Skilled Therapeutic Intervention  Body structures, Functions, Activity limitations: Decreased functional mobility ; Increased pain;Decreased sensation;Decreased posture;Decreased ROM; Decreased strength;Decreased high-level IADLs  Assessment: Re-assessment today. Pt displays improved core strength and improved function, though she does continue with pain and pelvic obliquity, which intermittently corrects with MWM. Issued 9mm heel lift today (and encouraged to revert to 6mm if she noted increased symptoms.)  Will continue to benefit from skilled PT intervention to address listed impairments. Treatment Diagnosis: Low back pain  REQUIRES PT FOLLOW UP: Yes  Discharge Recommendations: Continue to assess pending progress    Goals:  Short term goals  Time Frame for Short term goals: 3-4 weeks  Short term goal 1: Independent with HEP- partially met (3/9: Performing \"some\" exercises daily, though not all)  Short term goal 2: Improve lumbar ROM to at least 60 degrees flexion, 25 degrees SB bilat, 100% rotation bilat- not tested  Short term goal 3: Perform 10 Fair quality TA contractions with good breath control- met (3/9: Pt able to perform 10 Fair quality TA contractions w/o cuing for breathing)  Short term goal 4: Trial heel lift PRN- met (3/9: Previously issued 6mm heel lift, which pt has tolerated well.   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   She does not feel comfortable carrying them.)  Long term goal 3: Report less pain with household chores- progress (3/9: Still with pain, though it has improved)  Long term goal 4: Improve Oswestry Disability Questionnaire to 18% impairment or less- progress (3/9: 29% impairment)  Patient Goals   Patient goals : reduce back pain  Plan:    Plan  Times per week: 2x  Plan weeks: 4-6 weeks  Current Treatment Recommendations: Strengthening,Home Exercise Program,Manual Therapy - Soft Tissue Mobilization,Manual Therapy - Joint Manipulation,Equipment Evaluation, Education, & procurement,Patient/Caregiver Education & Training,Safety Education & Training,ROM,Modalities  Timed Code Treatment Minutes: 19 Minutes     Therapy Time   Individual Concurrent Group Co-treatment   Time In 1026         Time Out 1105         Minutes 39         Timed Code Treatment Minutes: 19 Minutes  Electronically signed by Alex Carson PT on 3/9/2022 at 4:52 PM

## 2022-03-14 ENCOUNTER — OFFICE VISIT (OUTPATIENT)
Dept: NEUROSURGERY | Age: 67
End: 2022-03-14
Payer: COMMERCIAL

## 2022-03-14 VITALS
SYSTOLIC BLOOD PRESSURE: 124 MMHG | HEART RATE: 65 BPM | DIASTOLIC BLOOD PRESSURE: 60 MMHG | WEIGHT: 243 LBS | HEIGHT: 64 IN | BODY MASS INDEX: 41.48 KG/M2

## 2022-03-14 DIAGNOSIS — M48.02 CERVICAL STENOSIS OF SPINAL CANAL: ICD-10-CM

## 2022-03-14 DIAGNOSIS — Z01.812 PRE-OPERATIVE LABORATORY EXAMINATION: ICD-10-CM

## 2022-03-14 DIAGNOSIS — G56.02 LEFT CARPAL TUNNEL SYNDROME: ICD-10-CM

## 2022-03-14 DIAGNOSIS — M50.30 DDD (DEGENERATIVE DISC DISEASE), CERVICAL: Primary | ICD-10-CM

## 2022-03-14 PROCEDURE — 99215 OFFICE O/P EST HI 40 MIN: CPT | Performed by: NEUROLOGICAL SURGERY

## 2022-03-14 ASSESSMENT — ENCOUNTER SYMPTOMS
BACK PAIN: 1
GASTROINTESTINAL NEGATIVE: 1
RESPIRATORY NEGATIVE: 1
EYES NEGATIVE: 1

## 2022-03-14 NOTE — PROGRESS NOTES
[facility] Neurosurgery  Office Visit        Chief Complaint   Patient presents with    Follow-up     neck pain. HISTORY OF PRESENT ILLNESS:      Interval Update:  Planned follow up to review c-spine MRI, EMG/NCS results and scoliosis x-rays, as well as her response to physical therapy. Her complaints are essentially unchanged. She continues to endorse waking at night with pain and numbness in her left hand. Her EMG did reveal severe left and moderate right median nerve entrapment at the wrist.  She also endorses ongoing back and neck pain. She has started physical therapy and notes that the sessions do reduce her pain, but the pain remains significant, particularly when she is on her feet at work. HPI: The patient is a 61 y.o. female who presents for neurosurgical evaluation of worsening lower back pain. She states that this has been going on for ~3 months. She cannot recall an inciting injury or event. She describes mostly axial back pain that is worsened with prolonged standing and activity. She endorses some radiation of pain into her right leg as well as numbness in both feet and both hands. She does have a history of diabetes, but she also states that her hands are clumsy and she will frequently drop her mobile phone. She also complains of poor balance. She denies any associated weakness in her upper or lower extremities. She denies any changes in bowel or bladder control. She states she has had neck pain in the past and was seen by Dr. Rafaela Drew some time ago and was told she had \"bone spurs\" and did physical therapy for her neck at that time. She has not done any recent physical therapy but she has been seeing a chiropractor several times a week. She has also been taking NSAIDs and recently changed from diclofenac to celebrex. She has not done any physical therapy for her lower back pain. She has never seen pain management or had injections.   She has never had a nerve conduction study to evaluate the numbness in her hands or feet. Lumbar x-rays and an MRI was recently obtained by her PCP and she has been referred to neurosurgery.        MEDICAL HISTORY:             Past Medical History:   Diagnosis Date    Diabetes mellitus ([address]     History of blood transfusion     1976 post childbirth    Hypertension     Thyroid disease        Past Surgical History:   Procedure Laterality Date    CHOLECYSTECTOMY      COLONOSCOPY N/A 11/21/2019    Dr Clive Ganser Eldridge Rainbow    HYSTERECTOMY      TOTAL KNEE ARTHROPLASTY Left 8/30/2016    KNEE TOTAL ARTHROPLASTY performed by Nadia Segura MD at [address] [place] [address] Right 8/11/2020    RIGHT TOTAL KNEE REPLACEMENT performed by Nadia Segura MD at [facility] N/A 11/21/2019    Dr Clive Ganser Rosas-Gastritis, duodenitis         Current Outpatient Medications:     pioglitazone (ACTOS) 45 MG tablet, Take 1 tablet by mouth once daily, Disp: 90 tablet, Rfl: 0    Dulaglutide (TRULICITY) 1.5 XH/0.4BH SOPN, Inject 1.5 mg into the skin once a week, Disp: 4 mL, Rfl: 3    metFORMIN (GLUCOPHAGE) 1000 MG tablet, TAKE 1 TABLET BY MOUTH TWICE DAILY WITH MEALS FOR DIABETES, Disp: 180 tablet, Rfl: 0    EUTHYROX 150 MCG tablet, Take 1 tablet by mouth once daily, Disp: 30 tablet, Rfl: 5    celecoxib (CELEBREX) 200 MG capsule, Take 1 capsule by mouth 2 times daily To replace diclofenac, Disp: 60 capsule, Rfl: 3    sertraline (ZOLOFT) 100 MG tablet, TAKE 1 TABLET BY MOUTH ONCE DAILY FOR DEPRESSION, Disp: 90 tablet, Rfl: 3    Risedronate Sodium (ACTONEL) 150 MG TABS, Take 1 tablet by mouth every 30 days, Disp: 3 tablet, Rfl: 3    indomethacin (INDOCIN) 50 MG capsule, TAKE 1 CAPSULE BY MOUTH THREE TIMES DAILY FOR 7 DAYS AS NEEDED FOR  GOUT, Disp: 21 capsule, Rfl: 1    amLODIPine (NORVASC) 5 MG tablet, TAKE 1 TABLET BY MOUTH ONCE DAILY, Disp: 90 tablet, Rfl: 3    busPIRone (BUSPAR) 10 MG tablet, Take 1 tablet by mouth 3 times daily, Disp: 90 tablet, Rfl: 3    Lancets MISC, 1 each by Does not apply route 2 times daily, Disp: 100 each, Rfl: 5    Blood Glucose Monitoring Suppl (FREESTYLE LITE) ROBEL, 1 Device by Does not apply route daily , Disp: , Rfl: 0    blood glucose monitor strips, Test 3 times a day & as needed for symptoms of irregular blood glucose. Freestyle lite (Patient taking differently: 1 strip by Other route 3 times daily as needed Test 3 times a day & as needed for symptoms of irregular blood glucose. Freestyle lite), Disp: 100 strip, Rfl: 3    glucose monitoring kit (FREESTYLE) monitoring kit, 1 kit by Does not apply route 3 times daily (before meals) Dx E11.8, Disp: 1 kit, Rfl: 0    Allergies:  Pcn [penicillins]    Social History:   Social History     Tobacco Use   Smoking Status Never Smoker   Smokeless Tobacco Never Used     Social History     Substance and Sexual Activity   Alcohol Use No         Family History:   Family History   Problem Relation Age of Onset    Cancer Mother         uterine    High Blood Pressure Father         aneurysm abd    Other Sister         aneurysm brain    Diabetes Brother     Breast Cancer Paternal Aunt     Colon Cancer Neg Hx     Colon Polyps Neg Hx     Esophageal Cancer Neg Hx     Liver Disease Neg Hx     Liver Cancer Neg Hx     Rectal Cancer Neg Hx     Stomach Cancer Neg Hx        REVIEW OF SYSTEMS:    Constitutional: Negative. HENT: Negative. Eyes: Negative. Respiratory: Negative. Cardiovascular: Negative. Gastrointestinal: Negative. Genitourinary: Negative. Musculoskeletal: Positive for back pain. Skin: Negative. Neurological: Negative. Endo/Heme/Allergies: Negative. Psychiatric/Behavioral: Negative. Review of systems was obtained by the medical assistant and reviewed by myself. PHYSICAL EXAM:    Vitals:    03/14/22 1031   BP: 124/60   Pulse: 65       Constitutional: Obese, no acute distress.    Eyes - conjunctiva normal.  Pupils react to light  Ear, nose,throat -Normal pinna and tragus, No scars, or lesions over external nose or ears, no obvious atrophy of tongue  Neck- symmetric, trachea midline, no jugular vein distension  Respiration- chest wall symmetric, normal effort without use of accessory muscles  Musculoskeletal - no significant wasting of muscles noted, no bony deformities, symmetric bulk  Extremities- no clubbing, cyanosis or edema  Skin - warm, dry, and intact. No rash,erythema, or pallor. Psychiatric - mood, affect, and behavior appear normal.       NEUROLOGIC EXAM:    MENTAL STATUS: Alert, oriented, thought content appropriate    CRANIAL NERVES: PERRL, EOMI, symmetric facies, tongue midline    MOTOR:     Right Upper Extremity:    Deltoid: 5/5  Biceps: 5/5  Triceps: 5/5  Wrist Extension: 5/5  Finger Abduction: 5/5      Left Upper Extremity:    Deltoid: 5/5  Biceps: 5/5  Triceps: 5/5  Wrist Extension: 5/5  Finger Abduction: 5/5      Right Lower Extremity:    Hip Flexion: 5/5  Knee Extension: 5/5  Ankle Plantarflexion: 5/5  Ankle Dorsiflexion: 5/5      Left Lower Extremity:    Hip Flexion: 5/5  Knee Extension: 5/5  Ankle Plantarflexion: 5/5  Ankle Dorsiflexion: 5/5         SOMATOSENSORY:     Right Upper Extremity: Paresthesias to light touch in an non-dermatomal/non-neuronal distribution  Left Upper Extremity: Paresthesias to light touch in an non-dermatomal/non-neuronal distribution  Right Lower Extremity: normal light touch sensation  Left Lower Extremity: normal light touch sensation      REFLEXES:    Biceps: 2+  Patella: 2+    Dolan's: Negative      COORDINATION and GAIT:    Gait: Antalgic, impaired tandem gait      PROVOCATIVE TESTS:  Tinel's is positive at the bilateral wrists, negative at the elbows      IMAGING:    My interpretation of imaging studies:     MRI of the cervical spine reviewed. There is advanced multilevel spondylosis, most significant at C4/5, C5/6 and C6/7.   There is grade-I anterolisthesis of C7 on

## 2022-03-14 NOTE — H&P
[facility] Neurosurgery  Office Visit        Chief Complaint   Patient presents with    Follow-up     neck pain. HISTORY OF PRESENT ILLNESS:      Interval Update:  Planned follow up to review c-spine MRI, EMG/NCS results and scoliosis x-rays, as well as her response to physical therapy. Her complaints are essentially unchanged. She continues to endorse waking at night with pain and numbness in her left hand. Her EMG did reveal severe left and moderate right median nerve entrapment at the wrist.  She also endorses ongoing back and neck pain. She has started physical therapy and notes that the sessions do reduce her pain, but the pain remains significant, particularly when she is on her feet at work. HPI: The patient is a 61 y.o. female who presents for neurosurgical evaluation of worsening lower back pain. She states that this has been going on for ~3 months. She cannot recall an inciting injury or event. She describes mostly axial back pain that is worsened with prolonged standing and activity. She endorses some radiation of pain into her right leg as well as numbness in both feet and both hands. She does have a history of diabetes, but she also states that her hands are clumsy and she will frequently drop her mobile phone. She also complains of poor balance. She denies any associated weakness in her upper or lower extremities. She denies any changes in bowel or bladder control. She states she has had neck pain in the past and was seen by Dr. Rebecca Benson some time ago and was told she had \"bone spurs\" and did physical therapy for her neck at that time. She has not done any recent physical therapy but she has been seeing a chiropractor several times a week. She has also been taking NSAIDs and recently changed from diclofenac to celebrex. She has not done any physical therapy for her lower back pain. She has never seen pain management or had injections.   She has never had a nerve conduction study to evaluate the numbness in her hands or feet. Lumbar x-rays and an MRI was recently obtained by her PCP and she has been referred to neurosurgery.        MEDICAL HISTORY:             Past Medical History:   Diagnosis Date    Diabetes mellitus ([address]     History of blood transfusion     1976 post childbirth    Hypertension     Thyroid disease        Past Surgical History:   Procedure Laterality Date    CHOLECYSTECTOMY      COLONOSCOPY N/A 11/21/2019    Dr Graham Fragoso Silver    HYSTERECTOMY      TOTAL KNEE ARTHROPLASTY Left 8/30/2016    KNEE TOTAL ARTHROPLASTY performed by Rosendo Carlos MD at [address] [place] [address] Right 8/11/2020    RIGHT TOTAL KNEE REPLACEMENT performed by Rosendo Carlos MD at [facility] N/A 11/21/2019    Dr Graham Rosas-Gastritis, duodenitis         Current Outpatient Medications:     pioglitazone (ACTOS) 45 MG tablet, Take 1 tablet by mouth once daily, Disp: 90 tablet, Rfl: 0    Dulaglutide (TRULICITY) 1.5 FO/7.9HU SOPN, Inject 1.5 mg into the skin once a week, Disp: 4 mL, Rfl: 3    metFORMIN (GLUCOPHAGE) 1000 MG tablet, TAKE 1 TABLET BY MOUTH TWICE DAILY WITH MEALS FOR DIABETES, Disp: 180 tablet, Rfl: 0    EUTHYROX 150 MCG tablet, Take 1 tablet by mouth once daily, Disp: 30 tablet, Rfl: 5    celecoxib (CELEBREX) 200 MG capsule, Take 1 capsule by mouth 2 times daily To replace diclofenac, Disp: 60 capsule, Rfl: 3    sertraline (ZOLOFT) 100 MG tablet, TAKE 1 TABLET BY MOUTH ONCE DAILY FOR DEPRESSION, Disp: 90 tablet, Rfl: 3    Risedronate Sodium (ACTONEL) 150 MG TABS, Take 1 tablet by mouth every 30 days, Disp: 3 tablet, Rfl: 3    indomethacin (INDOCIN) 50 MG capsule, TAKE 1 CAPSULE BY MOUTH THREE TIMES DAILY FOR 7 DAYS AS NEEDED FOR  GOUT, Disp: 21 capsule, Rfl: 1    amLODIPine (NORVASC) 5 MG tablet, TAKE 1 TABLET BY MOUTH ONCE DAILY, Disp: 90 tablet, Rfl: 3    busPIRone (BUSPAR) 10 MG tablet, Take 1 tablet by mouth 3 times daily, Disp: 90 tablet, Rfl: 3    Lancets MISC, 1 each by Does not apply route 2 times daily, Disp: 100 each, Rfl: 5    Blood Glucose Monitoring Suppl (FREESTYLE LITE) ROBEL, 1 Device by Does not apply route daily , Disp: , Rfl: 0    blood glucose monitor strips, Test 3 times a day & as needed for symptoms of irregular blood glucose. Freestyle lite (Patient taking differently: 1 strip by Other route 3 times daily as needed Test 3 times a day & as needed for symptoms of irregular blood glucose. Freestyle lite), Disp: 100 strip, Rfl: 3    glucose monitoring kit (FREESTYLE) monitoring kit, 1 kit by Does not apply route 3 times daily (before meals) Dx E11.8, Disp: 1 kit, Rfl: 0    Allergies:  Pcn [penicillins]    Social History:   Social History     Tobacco Use   Smoking Status Never Smoker   Smokeless Tobacco Never Used     Social History     Substance and Sexual Activity   Alcohol Use No         Family History:   Family History   Problem Relation Age of Onset    Cancer Mother         uterine    High Blood Pressure Father         aneurysm abd    Other Sister         aneurysm brain    Diabetes Brother     Breast Cancer Paternal Aunt     Colon Cancer Neg Hx     Colon Polyps Neg Hx     Esophageal Cancer Neg Hx     Liver Disease Neg Hx     Liver Cancer Neg Hx     Rectal Cancer Neg Hx     Stomach Cancer Neg Hx        REVIEW OF SYSTEMS:    Constitutional: Negative. HENT: Negative. Eyes: Negative. Respiratory: Negative. Cardiovascular: Negative. Gastrointestinal: Negative. Genitourinary: Negative. Musculoskeletal: Positive for back pain. Skin: Negative. Neurological: Negative. Endo/Heme/Allergies: Negative. Psychiatric/Behavioral: Negative. Review of systems was obtained by the medical assistant and reviewed by myself. PHYSICAL EXAM:    Vitals:    03/14/22 1031   BP: 124/60   Pulse: 65       Constitutional: Obese, no acute distress.    Eyes - conjunctiva normal.  Pupils react to light  Ear, nose,throat -Normal pinna and tragus, No scars, or lesions over external nose or ears, no obvious atrophy of tongue  Neck- symmetric, trachea midline, no jugular vein distension  Respiration- chest wall symmetric, normal effort without use of accessory muscles  Musculoskeletal - no significant wasting of muscles noted, no bony deformities, symmetric bulk  Extremities- no clubbing, cyanosis or edema  Skin - warm, dry, and intact. No rash,erythema, or pallor. Psychiatric - mood, affect, and behavior appear normal.       NEUROLOGIC EXAM:    MENTAL STATUS: Alert, oriented, thought content appropriate    CRANIAL NERVES: PERRL, EOMI, symmetric facies, tongue midline    MOTOR:     Right Upper Extremity:    Deltoid: 5/5  Biceps: 5/5  Triceps: 5/5  Wrist Extension: 5/5  Finger Abduction: 5/5      Left Upper Extremity:    Deltoid: 5/5  Biceps: 5/5  Triceps: 5/5  Wrist Extension: 5/5  Finger Abduction: 5/5      Right Lower Extremity:    Hip Flexion: 5/5  Knee Extension: 5/5  Ankle Plantarflexion: 5/5  Ankle Dorsiflexion: 5/5      Left Lower Extremity:    Hip Flexion: 5/5  Knee Extension: 5/5  Ankle Plantarflexion: 5/5  Ankle Dorsiflexion: 5/5         SOMATOSENSORY:     Right Upper Extremity: Paresthesias to light touch in an non-dermatomal/non-neuronal distribution  Left Upper Extremity: Paresthesias to light touch in an non-dermatomal/non-neuronal distribution  Right Lower Extremity: normal light touch sensation  Left Lower Extremity: normal light touch sensation      REFLEXES:    Biceps: 2+  Patella: 2+    Dolan's: Negative      COORDINATION and GAIT:    Gait: Antalgic, impaired tandem gait      PROVOCATIVE TESTS:  Tinel's is positive at the bilateral wrists, negative at the elbows      IMAGING:    My interpretation of imaging studies:     MRI of the cervical spine reviewed. There is advanced multilevel spondylosis, most significant at C4/5, C5/6 and C6/7.   There is grade-I anterolisthesis of C7 on T1.  There is moderate to severe spinal canal stenosis at C4/5, C5/6 and C6/7. I concur with the radiologists interpretation of the study for level-by-level details. Scoliosis x-rays reveal lumbar dextroscoliosis with fairly well-preserved sagittal and coronal balance. ASSESSMENT AND PLAN:  This is a 61 y.o. female who was initially referred for neurosurgical evaluation of lower back pain, but she presented with multiple complaints. She returns today to review the results of her recent c-spine MRI, EMG/NCS and scoliosis x-rays. She does seem to be making some progress with the physical therapy for her lower back. Other than her back pain, her main complaint is the pain and numbness in her left hand that interferes with her sleep and activity. Given her EMG/NCS, this appears to be most-consistent with carpal tunnel syndrome, though I explained that this could also be attributed to pathology in her cervical spine. I further explained that by-far the simplest thing to address surgically would be the carpal tunnel syndrome in her left wrist.  The carpal tunnel release procedure was explained to the patient in detail, including alternatives to surgery and risks of nerve injury, bleeding, infection and poor wound healing. All questions were answered to the patient's stated satisfaction and they requested that we proceed with surgery. I will also plan to obtain flexion/extension x-rays of her cervical spine to assess the stability of the anterolisthesis at C7/T1 as that may impact future decision-making regarding surgery.               Jose Garza MD

## 2022-03-14 NOTE — H&P (VIEW-ONLY)
[facility] Neurosurgery  Office Visit        Chief Complaint   Patient presents with    Follow-up     neck pain. HISTORY OF PRESENT ILLNESS:      Interval Update:  Planned follow up to review c-spine MRI, EMG/NCS results and scoliosis x-rays, as well as her response to physical therapy. Her complaints are essentially unchanged. She continues to endorse waking at night with pain and numbness in her left hand. Her EMG did reveal severe left and moderate right median nerve entrapment at the wrist.  She also endorses ongoing back and neck pain. She has started physical therapy and notes that the sessions do reduce her pain, but the pain remains significant, particularly when she is on her feet at work. HPI: The patient is a 61 y.o. female who presents for neurosurgical evaluation of worsening lower back pain. She states that this has been going on for ~3 months. She cannot recall an inciting injury or event. She describes mostly axial back pain that is worsened with prolonged standing and activity. She endorses some radiation of pain into her right leg as well as numbness in both feet and both hands. She does have a history of diabetes, but she also states that her hands are clumsy and she will frequently drop her mobile phone. She also complains of poor balance. She denies any associated weakness in her upper or lower extremities. She denies any changes in bowel or bladder control. She states she has had neck pain in the past and was seen by Dr. Donita Hernández some time ago and was told she had \"bone spurs\" and did physical therapy for her neck at that time. She has not done any recent physical therapy but she has been seeing a chiropractor several times a week. She has also been taking NSAIDs and recently changed from diclofenac to celebrex. She has not done any physical therapy for her lower back pain. She has never seen pain management or had injections.   She has never had a nerve conduction study daily, Disp: 90 tablet, Rfl: 3    Lancets MISC, 1 each by Does not apply route 2 times daily, Disp: 100 each, Rfl: 5    Blood Glucose Monitoring Suppl (FREESTYLE LITE) ROBEL, 1 Device by Does not apply route daily , Disp: , Rfl: 0    blood glucose monitor strips, Test 3 times a day & as needed for symptoms of irregular blood glucose. Freestyle lite (Patient taking differently: 1 strip by Other route 3 times daily as needed Test 3 times a day & as needed for symptoms of irregular blood glucose. Freestyle lite), Disp: 100 strip, Rfl: 3    glucose monitoring kit (FREESTYLE) monitoring kit, 1 kit by Does not apply route 3 times daily (before meals) Dx E11.8, Disp: 1 kit, Rfl: 0    Allergies:  Pcn [penicillins]    Social History:   Social History     Tobacco Use   Smoking Status Never Smoker   Smokeless Tobacco Never Used     Social History     Substance and Sexual Activity   Alcohol Use No         Family History:   Family History   Problem Relation Age of Onset    Cancer Mother         uterine    High Blood Pressure Father         aneurysm abd    Other Sister         aneurysm brain    Diabetes Brother     Breast Cancer Paternal Aunt     Colon Cancer Neg Hx     Colon Polyps Neg Hx     Esophageal Cancer Neg Hx     Liver Disease Neg Hx     Liver Cancer Neg Hx     Rectal Cancer Neg Hx     Stomach Cancer Neg Hx        REVIEW OF SYSTEMS:    Constitutional: Negative. HENT: Negative. Eyes: Negative. Respiratory: Negative. Cardiovascular: Negative. Gastrointestinal: Negative. Genitourinary: Negative. Musculoskeletal: Positive for back pain. Skin: Negative. Neurological: Negative. Endo/Heme/Allergies: Negative. Psychiatric/Behavioral: Negative. Review of systems was obtained by the medical assistant and reviewed by myself. PHYSICAL EXAM:    Vitals:    03/14/22 1031   BP: 124/60   Pulse: 65       Constitutional: Obese, no acute distress.    Eyes  conjunctiva normal.  Pupils react to light  Ear, nose,throat -Normal pinna and tragus, No scars, or lesions over external nose or ears, no obvious atrophy of tongue  Neck- symmetric, trachea midline, no jugular vein distension  Respiration- chest wall symmetric, normal effort without use of accessory muscles  Musculoskeletal  no significant wasting of muscles noted, no bony deformities, symmetric bulk  Extremities- no clubbing, cyanosis or edema  Skin  warm, dry, and intact. No rash,erythema, or pallor. Psychiatric  mood, affect, and behavior appear normal.       NEUROLOGIC EXAM:    MENTAL STATUS: Alert, oriented, thought content appropriate    CRANIAL NERVES: PERRL, EOMI, symmetric facies, tongue midline    MOTOR:     Right Upper Extremity:    Deltoid: 5/5  Biceps: 5/5  Triceps: 5/5  Wrist Extension: 5/5  Finger Abduction: 5/5      Left Upper Extremity:    Deltoid: 5/5  Biceps: 5/5  Triceps: 5/5  Wrist Extension: 5/5  Finger Abduction: 5/5      Right Lower Extremity:    Hip Flexion: 5/5  Knee Extension: 5/5  Ankle Plantarflexion: 5/5  Ankle Dorsiflexion: 5/5      Left Lower Extremity:    Hip Flexion: 5/5  Knee Extension: 5/5  Ankle Plantarflexion: 5/5  Ankle Dorsiflexion: 5/5         SOMATOSENSORY:     Right Upper Extremity: Paresthesias to light touch in an non-dermatomal/non-neuronal distribution  Left Upper Extremity: Paresthesias to light touch in an non-dermatomal/non-neuronal distribution  Right Lower Extremity: normal light touch sensation  Left Lower Extremity: normal light touch sensation      REFLEXES:    Biceps: 2+  Patella: 2+    Dolan's: Negative      COORDINATION and GAIT:    Gait: Antalgic, impaired tandem gait      PROVOCATIVE TESTS:  Tinel's is positive at the bilateral wrists, negative at the elbows      IMAGING:    My interpretation of imaging studies:     MRI of the cervical spine reviewed. There is advanced multilevel spondylosis, most significant at C4/5, C5/6 and C6/7.   There is grade-I anterolisthesis of C7 on T1.  There is moderate to severe spinal canal stenosis at C4/5, C5/6 and C6/7. I concur with the radiologists interpretation of the study for level-by-level details. Scoliosis x-rays reveal lumbar dextroscoliosis with fairly well-preserved sagittal and coronal balance. ASSESSMENT AND PLAN:  This is a 61 y.o. female who was initially referred for neurosurgical evaluation of lower back pain, but she presented with multiple complaints. She returns today to review the results of her recent c-spine MRI, EMG/NCS and scoliosis x-rays. She does seem to be making some progress with the physical therapy for her lower back. Other than her back pain, her main complaint is the pain and numbness in her left hand that interferes with her sleep and activity. Given her EMG/NCS, this appears to be most-consistent with carpal tunnel syndrome, though I explained that this could also be attributed to pathology in her cervical spine. I further explained that by-far the simplest thing to address surgically would be the carpal tunnel syndrome in her left wrist.  The carpal tunnel release procedure was explained to the patient in detail, including alternatives to surgery and risks of nerve injury, bleeding, infection and poor wound healing. All questions were answered to the patient's stated satisfaction and they requested that we proceed with surgery. I will also plan to obtain flexion/extension x-rays of her cervical spine to assess the stability of the anterolisthesis at C7/T1 as that may impact future decision-making regarding surgery.               Pablito Santamaria MD

## 2022-03-15 ENCOUNTER — TELEPHONE (OUTPATIENT)
Dept: NEUROSURGERY | Age: 67
End: 2022-03-15

## 2022-03-15 NOTE — TELEPHONE ENCOUNTER
I have submitted all clinical information for patients upcoming surgery to [facility]. I filled out this form and faxed this in. Cpt code:  13895  icd-10 code:  G56.02  Awaiting decision. ,

## 2022-03-16 ENCOUNTER — HOSPITAL ENCOUNTER (OUTPATIENT)
Dept: PHYSICAL THERAPY | Age: 67
Setting detail: THERAPIES SERIES
Discharge: HOME | End: 2022-03-16
Payer: COMMERCIAL

## 2022-03-16 PROCEDURE — 97110 THERAPEUTIC EXERCISES: CPT

## 2022-03-16 PROCEDURE — G0283 ELEC STIM OTHER THAN WOUND: HCPCS

## 2022-03-16 ASSESSMENT — PAIN DESCRIPTION - ORIENTATION: ORIENTATION: LOWER;RIGHT

## 2022-03-16 ASSESSMENT — PAIN DESCRIPTION - DESCRIPTORS: DESCRIPTORS: TIGHTNESS

## 2022-03-16 ASSESSMENT — PAIN SCALES - GENERAL: PAINLEVEL_OUTOF10: 7

## 2022-03-16 ASSESSMENT — PAIN DESCRIPTION - FREQUENCY: FREQUENCY: CONTINUOUS

## 2022-03-16 ASSESSMENT — PAIN DESCRIPTION - PAIN TYPE: TYPE: CHRONIC PAIN

## 2022-03-16 ASSESSMENT — PAIN DESCRIPTION - LOCATION: LOCATION: BACK

## 2022-03-16 NOTE — PROGRESS NOTES
Physical Therapy  Daily Treatment Note  Date: 3/16/2022  Patient Name: Mercedez Conde  MRN: [id number]     :   1955    Subjective:   General  Chart Reviewed: Yes  Additional Pertinent Hx: 62 y/o F presents with complaint of low back pain. PMH includes DM, bilat TKR, HTN  Response To Previous Treatment: Patient with no complaints from previous session. Family / Caregiver Present: No  Referring Practitioner: Fanny Barragan MD  PT Visit Information  PT Insurance Information: Passport (Mango Bernardo req'd after 20 visits- none used)  Total # of Visits Approved: 12  Total # of Visits to Date: 8  Plan of Care/Certification Expiration Date: 22  Progress Note Due Date: 22  Subjective  Subjective: I have been working more since I was here last.  I tried the thicker pad in my shoe the therapist gave me but it didnt work for me. Pain Screening  Patient Currently in Pain: Yes  Pain Assessment  Pain Assessment: 0-10  Pain Level: 7  Pain Type: Chronic pain  Pain Location: Back  Pain Orientation: Lower;Right  Pain Descriptors: Tightness  Pain Frequency: Continuous       Treatment Activities:          Exercises  Exercise 1: Assess leg length.   (If not correcting with MWM, trial heel lift)  3/9: RLE longer  Exercise 2: Isometric L hip extension from [address] 5 x 5\" (manual)-performed for RLE longer  Exercise 3: Isometric R hip flexion from 90/90 5 x 5\" (manual)-performed for RLE longer  Exercise 4: TA contraction (alone 5 sec x 10, UE 1 x 10, LE 1 x 10, UE/LE 1 x 10)  Exercise 5: Pelvic tilt 3\" x 10  Exercise 6: Bridging 1 x 10/Single leg L- not today progress towards  Exercise 7: Hooklying lumbar rotation 1 x 10  Exercise 8: Supine quadratus stretch 4 x 15 sec  Exercise 9: SKTC 5\" x 10/DKTC 5\" x 5  with towel  Exercise 10: Contract/relax bilat HS and hip ER 4 x 15 sec ea  Exercise 11: Axial traction LLE 4 x 15 sec  Exercise 12: Sitting L hip retraction, R hip protraction 5 sec x 5  Exercise 13: Repeated sit to stand with TA contraction 1 x 5  Exercise 14: Multifidus row/Paloff press red 1 x 10 ea  Exercise 15: Mini squats 1 x 10  Exercise 16: Standing march 1x 10  Exercise 17: Standing hip abd/ext 1 x 10 ea  Exercise 18: IFC + MH PRN to lumbar area for elevated pain seated x 20 min  Exercise 19: 2/16: HEP ISSUED  Exercise 20: 6 mm heel lift issued for right shoe- issued 9 mm heel lift on 3/9. Assessment:   Conditions Requiring Skilled Therapeutic Intervention  Body structures, Functions, Activity limitations: Decreased functional mobility ; Increased pain;Decreased sensation;Decreased posture;Decreased ROM; Decreased strength;Decreased high-level IADLs  Assessment: Resumed all previous activities as last visit with reassessment. Patient relates she was unable to wear the larger shoe insert given to her. Advised her that was okay and just resume use of smaller one. Patient relates she has worked more since she was here last and really feeling the pain today. She rated pain at 7/10 pre session and 0/10 post session. She was here a bit longer than usual as the estim unit stopped and was reset at 20 mins  Treatment Diagnosis: Low back pain  REQUIRES PT FOLLOW UP: Yes  Discharge Recommendations: Continue to assess pending progress    Goals:  Short term goals  Time Frame for Short term goals: 3-4 weeks  Short term goal 1: Independent with HEP- partially met (3/9: Performing \"some\" exercises daily, though not all)  Short term goal 2: Improve lumbar ROM to at least 60 degrees flexion, 25 degrees SB bilat, 100% rotation bilat- not tested  Short term goal 3: Perform 10 Fair quality TA contractions with good breath control- met (3/9: Pt able to perform 10 Fair quality TA contractions w/o cuing for breathing)  Short term goal 4: Trial heel lift PRN- met (3/9: Previously issued 6mm heel lift, which pt has tolerated well.   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   She does not feel comfortable carrying them.)  Long term goal 3: Report less pain with household chores- progress (3/9: Still with pain, though it has improved)  Long term goal 4: Improve Oswestry Disability Questionnaire to 18% impairment or less- progress (3/9: 29% impairment)  Patient Goals   Patient goals : reduce back pain  Plan:    Plan  Times per week: 2x  Plan weeks: 4-6 weeks  Current Treatment Recommendations: Strengthening,Home Exercise Program,Manual Therapy - Soft Tissue Mobilization,Manual Therapy - Joint Manipulation,Equipment Evaluation, Education, & procurement,Patient/Caregiver Education & Training,Safety Education & Training,ROM,Modalities  Timed Code Treatment Minutes: 45 Minutes (plus 20 mins estim)     Therapy Time   Individual Concurrent Group Co-treatment   Time In 1003         Time Out 1115         Minutes 72         Timed Code Treatment Minutes: 45 Minutes (plus 20 mins estim)  Electronically signed by Prema Claros PTA on 3/16/2022 at 12:24 PM

## 2022-03-17 ENCOUNTER — ANESTHESIA EVENT (OUTPATIENT)
Dept: OPERATING ROOM | Age: 67
End: 2022-03-17

## 2022-03-17 ENCOUNTER — TELEPHONE (OUTPATIENT)
Dept: NEUROSURGERY | Age: 67
End: 2022-03-17

## 2022-03-17 NOTE — TELEPHONE ENCOUNTER
I called patient insurance @ [phone]. And I was able to speak to irma,  She looked at the cpt code and explained that this code does not require pre authorization. ,  Reference # is [id number]

## 2022-03-18 ENCOUNTER — HOSPITAL ENCOUNTER (OUTPATIENT)
Dept: PHYSICAL THERAPY | Age: 67
Setting detail: THERAPIES SERIES
Discharge: HOME | End: 2022-03-18
Payer: COMMERCIAL

## 2022-03-18 PROCEDURE — 97110 THERAPEUTIC EXERCISES: CPT

## 2022-03-18 PROCEDURE — G0283 ELEC STIM OTHER THAN WOUND: HCPCS

## 2022-03-18 ASSESSMENT — PAIN DESCRIPTION - ORIENTATION: ORIENTATION: RIGHT;LOWER

## 2022-03-18 ASSESSMENT — PAIN SCALES - GENERAL: PAINLEVEL_OUTOF10: 3

## 2022-03-18 ASSESSMENT — PAIN DESCRIPTION - LOCATION: LOCATION: BACK

## 2022-03-18 ASSESSMENT — PAIN DESCRIPTION - PAIN TYPE: TYPE: CHRONIC PAIN

## 2022-03-18 NOTE — PROGRESS NOTES
Physical Therapy  Daily Treatment Note  Date: 3/18/2022  Patient Name: Candice Gallardo  MRN: [id number]     :   1955    Subjective:   General  Referring Practitioner: Ulises Garland MD  PT Insurance Information: Passport (Mckenzie [id number] req'd after 20 visits- none used)  Total # of Visits Approved: 12  Total # of Visits to Date: 5  Plan of Care/Certification Expiration Date: 22  Progress Note Due Date: 22  Subjective: not too bad today, pain is about a 3/10. Patient Currently in Pain: Yes  Pain Level: 3  Pain Type: Chronic pain  Pain Location: Back  Pain Orientation: Right; Lower       Treatment Activities:      Exercises  Exercise 1: Assess leg length. (If not correcting with MWM, trial heel lift)  3/18: RLE longer  Exercise 2: Isometric L hip extension from [address] 5 x 5\" (manual)-performed for RLE longer  Exercise 3: Isometric R hip flexion from 90/90 5 x 5\" (manual)-performed for RLE longer  Exercise 4: TA contraction (alone 5 sec x 10, UE 1 x 10, LE 1 x 10, UE/LE 1 x 10)  Exercise 5: Pelvic tilt 3\" x 10  Exercise 6: Bridging 1 x 10/Single leg L- not today progress towards  Exercise 7: Hooklying lumbar rotation 1 x 10  Exercise 8: Supine quadratus stretch 4 x 15 sec  Exercise 9: SKTC 5\" x 10/DKTC 5\" x 5  with towel  Exercise 10: Contract/relax bilat HS and hip ER 4 x 15 sec ea  Exercise 11: Axial traction LLE 4 x 15 sec  Exercise 12: Sitting L hip retraction, R hip protraction 5 sec x 5  Exercise 13: Repeated sit to stand with TA contraction 1 x 5  Exercise 14: Multifidus row/Paloff press red 1 x 10 ea  Exercise 15: Mini squats 1 x 10  Exercise 16: Standing march 1x 10  Exercise 17: Standing hip abd/ext 1 x 10 ea  Exercise 18: IFC + MH PRN to lumbar area for elevated pain seated x 20 min  Exercise 19: : HEP ISSUED     Assessment:   Conditions Requiring Skilled Therapeutic Intervention  Body structures, Functions, Activity limitations: Decreased functional mobility ; Increased pain;Decreased sensation;Decreased posture;Decreased ROM; Decreased strength;Decreased high-level IADLs  Assessment: RLE longer again today and synergistic techniques performed to reflect this, occasional cues for proper technique today. Pain level 6/10 post exercise and after estim 2/10. Will continue to advance her routine per her tolerance. Treatment Diagnosis: Low back pain  REQUIRES PT FOLLOW UP: Yes      Goals:  Short term goals  Time Frame for Short term goals: 3-4 weeks  Short term goal 1: Independent with HEP- partially met (3/9: Performing \"some\" exercises daily, though not all)  Short term goal 2: Improve lumbar ROM to at least 60 degrees flexion, 25 degrees SB bilat, 100% rotation bilat- not tested  Short term goal 3: Perform 10 Fair quality TA contractions with good breath control- met (3/9: Pt able to perform 10 Fair quality TA contractions w/o cuing for breathing)  Short term goal 4: Trial heel lift PRN- met (3/9: Previously issued 6mm heel lift, which pt has tolerated well. 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   She does not feel comfortable carrying them.)  Long term goal 3: Report less pain with household chores- progress (3/9: Still with pain, though it has improved)  Long term goal 4: Improve Oswestry Disability Questionnaire to 18% impairment or less- progress (3/9: 29% impairment)  Patient Goals   Patient goals : reduce back pain    Plan:    Times per week: 2x  Plan weeks: 4-6 weeks  Current Treatment Recommendations: Strengthening,Home Exercise Program,Manual Therapy - Soft Tissue Mobilization,Manual Therapy - Joint Manipulation,Equipment Evaluation, Education, & procurement,Patient/Caregiver Education & Training,Safety Education & Training,ROM,Modalities       Therapy Time   Individual Concurrent Group Co-treatment   Time In 1000         Time Out 1103         Minutes 63         Timed Code Treatment Minutes:   Tesfaye Massey PTA    Electronically signed by Darwin Concepcion PTA on 3/18/2022 at 11:11 AM

## 2022-03-21 ENCOUNTER — APPOINTMENT (OUTPATIENT)
Dept: PHYSICAL THERAPY | Age: 67
End: 2022-03-21
Payer: COMMERCIAL

## 2022-03-21 NOTE — CARE COORDINATION
Pre work completed via phone, discussed Covid testing, and additional lab work CBC and EKG per anesthesia requirements.

## 2022-03-23 ENCOUNTER — APPOINTMENT (OUTPATIENT)
Dept: PHYSICAL THERAPY | Age: 67
End: 2022-03-23
Payer: COMMERCIAL

## 2022-03-23 ENCOUNTER — HOSPITAL ENCOUNTER (OUTPATIENT)
Dept: NON INVASIVE DIAGNOSTICS | Age: 67
Discharge: HOME | End: 2022-03-23
Payer: COMMERCIAL

## 2022-03-23 DIAGNOSIS — Z11.52 ENCOUNTER FOR SCREENING FOR COVID-19: Primary | ICD-10-CM

## 2022-03-23 DIAGNOSIS — G56.02 LEFT CARPAL TUNNEL SYNDROME: ICD-10-CM

## 2022-03-23 DIAGNOSIS — M50.30 DDD (DEGENERATIVE DISC DISEASE), CERVICAL: ICD-10-CM

## 2022-03-23 DIAGNOSIS — Z01.812 PRE-OPERATIVE LABORATORY EXAMINATION: ICD-10-CM

## 2022-03-23 DIAGNOSIS — M48.02 CERVICAL STENOSIS OF SPINAL CANAL: ICD-10-CM

## 2022-03-23 LAB
BASOPHILS ABSOLUTE: 0 K/UL (ref 0–0.2)
BASOPHILS RELATIVE PERCENT: 0.7 % (ref 0–1)
EKG P AXIS: 67 DEGREES
EKG P-R INTERVAL: 194 MS
EKG Q-T INTERVAL: 432 MS
EKG QRS DURATION: 88 MS
EKG QTC CALCULATION (BAZETT): 427 MS
EKG T AXIS: 58 DEGREES
EOSINOPHILS ABSOLUTE: 0.1 K/UL (ref 0–0.6)
EOSINOPHILS RELATIVE PERCENT: 2.4 % (ref 0–5)
HCT VFR BLD CALC: 41.5 % (ref 37–47)
HEMOGLOBIN: 12.5 G/DL (ref 12–16)
IMMATURE GRANULOCYTES #: 0 K/UL
LYMPHOCYTES ABSOLUTE: 1.6 K/UL (ref 1.1–4.5)
LYMPHOCYTES RELATIVE PERCENT: 29.4 % (ref 20–40)
MCH RBC QN AUTO: 28 PG (ref 27–31)
MCHC RBC AUTO-ENTMCNC: 30.1 G/DL (ref 33–37)
MCV RBC AUTO: 92.8 FL (ref 81–99)
MONOCYTES ABSOLUTE: 0.7 K/UL (ref 0–0.9)
MONOCYTES RELATIVE PERCENT: 13.2 % (ref 0–10)
NEUTROPHILS ABSOLUTE: 2.9 K/UL (ref 1.5–7.5)
NEUTROPHILS RELATIVE PERCENT: 53.9 % (ref 50–65)
PDW BLD-RTO: 14.7 % (ref 11.5–14.5)
PLATELET # BLD: 259 K/UL (ref 130–400)
PMV BLD AUTO: 10.3 FL (ref 9.4–12.3)
RBC # BLD: 4.47 M/UL (ref 4.2–5.4)
SARS-COV-2, PCR: NOT DETECTED
WBC # BLD: 5.5 K/UL (ref 4.8–10.8)

## 2022-03-23 PROCEDURE — 93005 ELECTROCARDIOGRAM TRACING: CPT

## 2022-03-25 ENCOUNTER — ANESTHESIA (OUTPATIENT)
Dept: OPERATING ROOM | Age: 67
End: 2022-03-25

## 2022-03-25 ENCOUNTER — HOSPITAL ENCOUNTER (OUTPATIENT)
Age: 67
Setting detail: OUTPATIENT SURGERY
Discharge: HOME | End: 2022-03-25
Attending: NEUROLOGICAL SURGERY | Admitting: NEUROLOGICAL SURGERY
Payer: COMMERCIAL

## 2022-03-25 VITALS
TEMPERATURE: 95.5 F | SYSTOLIC BLOOD PRESSURE: 101 MMHG | RESPIRATION RATE: 12 BRPM | DIASTOLIC BLOOD PRESSURE: 48 MMHG | OXYGEN SATURATION: 99 %

## 2022-03-25 VITALS
WEIGHT: 243 LBS | RESPIRATION RATE: 18 BRPM | DIASTOLIC BLOOD PRESSURE: 57 MMHG | BODY MASS INDEX: 40.48 KG/M2 | SYSTOLIC BLOOD PRESSURE: 117 MMHG | OXYGEN SATURATION: 96 % | TEMPERATURE: 97.8 F | HEIGHT: 65 IN | HEART RATE: 61 BPM

## 2022-03-25 DIAGNOSIS — G56.02 LEFT CARPAL TUNNEL SYNDROME: Primary | ICD-10-CM

## 2022-03-25 PROCEDURE — 64721 CARPAL TUNNEL SURGERY: CPT | Performed by: NEUROLOGICAL SURGERY

## 2022-03-25 PROCEDURE — 64721 CARPAL TUNNEL SURGERY: CPT

## 2022-03-25 RX ORDER — CEFAZOLIN SODIUM 1 G/3ML
INJECTION, POWDER, FOR SOLUTION INTRAMUSCULAR; INTRAVENOUS PRN
Status: DISCONTINUED | OUTPATIENT
Start: 2022-03-25 | End: 2022-03-25 | Stop reason: SDUPTHER

## 2022-03-25 RX ORDER — HYDROCODONE BITARTRATE AND ACETAMINOPHEN 5; 325 MG/1; MG/1
1-2 TABLET ORAL EVERY 6 HOURS PRN
Qty: 24 TABLET | Refills: 0 | Status: SHIPPED | OUTPATIENT
Start: 2022-03-25 | End: 2022-03-28

## 2022-03-25 RX ORDER — DIPHENHYDRAMINE HYDROCHLORIDE 50 MG/ML
12.5 INJECTION INTRAMUSCULAR; INTRAVENOUS
Status: DISCONTINUED | OUTPATIENT
Start: 2022-03-25 | End: 2022-03-25 | Stop reason: HOSPADM

## 2022-03-25 RX ORDER — BUPIVACAINE HYDROCHLORIDE AND EPINEPHRINE 2.5; 5 MG/ML; UG/ML
INJECTION, SOLUTION INFILTRATION; PERINEURAL PRN
Status: DISCONTINUED | OUTPATIENT
Start: 2022-03-25 | End: 2022-03-25 | Stop reason: ALTCHOICE

## 2022-03-25 RX ORDER — SODIUM CHLORIDE, SODIUM LACTATE, POTASSIUM CHLORIDE, CALCIUM CHLORIDE 600; 310; 30; 20 MG/100ML; MG/100ML; MG/100ML; MG/100ML
INJECTION, SOLUTION INTRAVENOUS CONTINUOUS
Status: DISCONTINUED | OUTPATIENT
Start: 2022-03-25 | End: 2022-03-25 | Stop reason: HOSPADM

## 2022-03-25 RX ORDER — LIDOCAINE HYDROCHLORIDE 10 MG/ML
INJECTION, SOLUTION EPIDURAL; INFILTRATION; INTRACAUDAL; PERINEURAL PRN
Status: DISCONTINUED | OUTPATIENT
Start: 2022-03-25 | End: 2022-03-25 | Stop reason: SDUPTHER

## 2022-03-25 RX ORDER — PROPOFOL 10 MG/ML
INJECTION, EMULSION INTRAVENOUS PRN
Status: DISCONTINUED | OUTPATIENT
Start: 2022-03-25 | End: 2022-03-25 | Stop reason: SDUPTHER

## 2022-03-25 RX ORDER — SODIUM CHLORIDE 9 MG/ML
25 INJECTION, SOLUTION INTRAVENOUS PRN
Status: DISCONTINUED | OUTPATIENT
Start: 2022-03-25 | End: 2022-03-25 | Stop reason: HOSPADM

## 2022-03-25 RX ORDER — LIDOCAINE HYDROCHLORIDE 10 MG/ML
1 INJECTION, SOLUTION EPIDURAL; INFILTRATION; INTRACAUDAL; PERINEURAL
Status: DISCONTINUED | OUTPATIENT
Start: 2022-03-25 | End: 2022-03-25 | Stop reason: HOSPADM

## 2022-03-25 RX ORDER — SODIUM CHLORIDE 0.9 % (FLUSH) 0.9 %
5-40 SYRINGE (ML) INJECTION EVERY 12 HOURS SCHEDULED
Status: DISCONTINUED | OUTPATIENT
Start: 2022-03-25 | End: 2022-03-25 | Stop reason: HOSPADM

## 2022-03-25 RX ORDER — MEPERIDINE HYDROCHLORIDE 25 MG/ML
12.5 INJECTION INTRAMUSCULAR; INTRAVENOUS; SUBCUTANEOUS EVERY 5 MIN PRN
Status: DISCONTINUED | OUTPATIENT
Start: 2022-03-25 | End: 2022-03-25 | Stop reason: HOSPADM

## 2022-03-25 RX ORDER — ONDANSETRON 2 MG/ML
4 INJECTION INTRAMUSCULAR; INTRAVENOUS
Status: DISCONTINUED | OUTPATIENT
Start: 2022-03-25 | End: 2022-03-25 | Stop reason: HOSPADM

## 2022-03-25 RX ORDER — SODIUM CHLORIDE 0.9 % (FLUSH) 0.9 %
5-40 SYRINGE (ML) INJECTION PRN
Status: DISCONTINUED | OUTPATIENT
Start: 2022-03-25 | End: 2022-03-25 | Stop reason: HOSPADM

## 2022-03-25 RX ORDER — FENTANYL CITRATE 50 UG/ML
INJECTION, SOLUTION INTRAMUSCULAR; INTRAVENOUS PRN
Status: DISCONTINUED | OUTPATIENT
Start: 2022-03-25 | End: 2022-03-25 | Stop reason: SDUPTHER

## 2022-03-25 RX ORDER — DEXAMETHASONE SODIUM PHOSPHATE 4 MG/ML
INJECTION, SOLUTION INTRA-ARTICULAR; INTRALESIONAL; INTRAMUSCULAR; INTRAVENOUS; SOFT TISSUE PRN
Status: DISCONTINUED | OUTPATIENT
Start: 2022-03-25 | End: 2022-03-25 | Stop reason: SDUPTHER

## 2022-03-25 RX ORDER — FENTANYL CITRATE 50 UG/ML
25 INJECTION, SOLUTION INTRAMUSCULAR; INTRAVENOUS EVERY 5 MIN PRN
Status: DISCONTINUED | OUTPATIENT
Start: 2022-03-25 | End: 2022-03-25 | Stop reason: HOSPADM

## 2022-03-25 RX ORDER — EPHEDRINE SULFATE 50 MG/ML
INJECTION, SOLUTION INTRAVENOUS PRN
Status: DISCONTINUED | OUTPATIENT
Start: 2022-03-25 | End: 2022-03-25 | Stop reason: SDUPTHER

## 2022-03-25 RX ORDER — ONDANSETRON 2 MG/ML
INJECTION INTRAMUSCULAR; INTRAVENOUS PRN
Status: DISCONTINUED | OUTPATIENT
Start: 2022-03-25 | End: 2022-03-25 | Stop reason: SDUPTHER

## 2022-03-25 RX ADMIN — LIDOCAINE HYDROCHLORIDE 30 MG: 10 INJECTION, SOLUTION EPIDURAL; INFILTRATION; INTRACAUDAL; PERINEURAL at 10:13

## 2022-03-25 RX ADMIN — FENTANYL CITRATE 50 MCG: 50 INJECTION, SOLUTION INTRAMUSCULAR; INTRAVENOUS at 10:17

## 2022-03-25 RX ADMIN — EPHEDRINE SULFATE 10 MG: 50 INJECTION, SOLUTION INTRAVENOUS at 10:31

## 2022-03-25 RX ADMIN — ONDANSETRON 4 MG: 2 INJECTION INTRAMUSCULAR; INTRAVENOUS at 10:40

## 2022-03-25 RX ADMIN — EPHEDRINE SULFATE 10 MG: 50 INJECTION, SOLUTION INTRAVENOUS at 10:42

## 2022-03-25 RX ADMIN — CEFAZOLIN SODIUM 3000 MG: 1 INJECTION, POWDER, FOR SOLUTION INTRAMUSCULAR; INTRAVENOUS at 10:20

## 2022-03-25 RX ADMIN — EPHEDRINE SULFATE 10 MG: 50 INJECTION, SOLUTION INTRAVENOUS at 10:35

## 2022-03-25 RX ADMIN — PROPOFOL 200 MG: 10 INJECTION, EMULSION INTRAVENOUS at 10:13

## 2022-03-25 RX ADMIN — SODIUM CHLORIDE, SODIUM LACTATE, POTASSIUM CHLORIDE, CALCIUM CHLORIDE: 600; 310; 30; 20 INJECTION, SOLUTION INTRAVENOUS at 08:49

## 2022-03-25 RX ADMIN — DEXAMETHASONE SODIUM PHOSPHATE 4 MG: 4 INJECTION, SOLUTION INTRA-ARTICULAR; INTRALESIONAL; INTRAMUSCULAR; INTRAVENOUS; SOFT TISSUE at 10:18

## 2022-03-25 ASSESSMENT — PAIN DESCRIPTION - PAIN TYPE
TYPE: SURGICAL PAIN
TYPE: SURGICAL PAIN

## 2022-03-25 ASSESSMENT — PAIN SCALES - GENERAL
PAINLEVEL_OUTOF10: 0
PAINLEVEL_OUTOF10: 0

## 2022-03-25 NOTE — ANESTHESIA PRE PROCEDURE
Department of Anesthesiology  Preprocedure Note       Name:  Roberto Dubose   Age:  61 y.o.  :  1955                                          MRN:  [id number]         Date:  3/25/2022      Surgeon: Silva Plata):  Morgan Snowden MD    Procedure: Procedure(s):  RIGHT TOTAL KNEE REPLACEMENT    Medications prior to admission:   Prior to Admission medications    Medication Sig Start Date End Date Taking?  Authorizing Provider   pioglitazone (ACTOS) 45 MG tablet Take 1 tablet by mouth once daily 22   MIKEY Schneider CNP   Dulaglutide (TRULICITY) 1.5 FC/5.9EO SOPN Inject 1.5 mg into the skin once a week 22   MIKEY Schneider CNP   metFORMIN (GLUCOPHAGE) 1000 MG tablet TAKE 1 TABLET BY MOUTH TWICE DAILY WITH MEALS FOR DIABETES 22   MIKEY Schneider CNP   EUTHYROX 150 MCG tablet Take 1 tablet by mouth once daily 22   MIKEY Schneider CNP   celecoxib (CELEBREX) 200 MG capsule Take 1 capsule by mouth 2 times daily To replace diclofenac 22   MIKEY Schneider CNP   sertraline (ZOLOFT) 100 MG tablet TAKE 1 TABLET BY MOUTH ONCE DAILY FOR DEPRESSION 21   MIKEY Schneider CNP   Risedronate Sodium (ACTONEL) 150 MG TABS Take 1 tablet by mouth every 30 days 8/10/21   MIKEY Schneider CNP   indomethacin (INDOCIN) 50 MG capsule TAKE 1 CAPSULE BY MOUTH THREE TIMES DAILY FOR 7 DAYS AS NEEDED FOR  GOUT 21   MIKEY Schneider CNP   amLODIPine (NORVASC) 5 MG tablet TAKE 1 TABLET BY MOUTH ONCE DAILY 21   MIKEY Schneider CNP   busPIRone (BUSPAR) 10 MG tablet Take 1 tablet by mouth 3 times daily 21   MIKEY Schneider CNP   Lancets MISC 1 each by Does not apply route 2 times daily 20   MIKEY Schneider CNP   Blood Glucose Monitoring Suppl (FREESTYLE LITE) ROBEL 1 Device by Does not apply route daily  19   Historical Provider, MD   blood glucose monitor strips Test 3 times a day & as needed for symptoms of irregular blood glucose. Freestyle lite  Patient taking differently: 1 strip by Other route 3 times daily as needed Test 3 times a day & as needed for symptoms of irregular blood glucose. Freestyle lite 8/22/19   MIKEY Schneider CNP   glucose monitoring kit (FREESTYLE) monitoring kit 1 kit by Does not apply route 3 times daily (before meals) Dx E11.8 8/20/19   MIKEY Schneider CNP       Current medications:    No current facility-administered medications for this visit. No current outpatient medications on file. Facility-Administered Medications Ordered in Other Visits   Medication Dose Route Frequency Provider Last Rate Last Admin    lidocaine PF 1 % injection 1 mL  1 mL IntraDERmal Once PRN Fidelina [place], APRN - CRNA        lactated ringers infusion   IntraVENous Continuous Fidelina Ann-Marie, APRN - CRNA           Allergies:     Allergies   Allergen Reactions    Pcn [Penicillins] Rash     childhood       Problem List:    Patient Active Problem List   Diagnosis Code    Osteoarthrosis, localized, primary, knee M17.10    Type 2 diabetes mellitus without complication, without long-term current use of insulin ([address] E11.9    Acquired hypothyroidism E03.9    Primary osteoarthritis of right knee M17.11    Iron deficiency anemia D50.9    Drug-induced constipation K59.03    Hand numbness R20.0    Numbness in feet R20.0       Past Medical History:        Diagnosis Date    Diabetes mellitus ([address]     History of blood transfusion     1976 post childbirth    Hypertension     Thyroid disease        Past Surgical History:        Procedure Laterality Date    CHOLECYSTECTOMY      COLONOSCOPY N/A 11/21/2019    Dr Clive Ganser [place]    HYSTERECTOMY      TOTAL KNEE ARTHROPLASTY Left 8/30/2016    KNEE TOTAL ARTHROPLASTY performed by Nadia Segura MD at [address] [place] [address] Right 8/11/2020    RIGHT TOTAL KNEE REPLACEMENT performed by Nadia Segura MD at [facility] GASTROINTESTINAL ENDOSCOPY N/A 11/21/2019    Dr Heather Rosas-Gastritis, duodenitis       Social History:    Social History     Tobacco Use    Smoking status: Never Smoker    Smokeless tobacco: Never Used   Substance Use Topics    Alcohol use: No                                Counseling given: Not Answered      Vital Signs (Current): There were no vitals filed for this visit. BP Readings from Last 3 Encounters:   03/14/22 124/60   01/31/22 (!) 142/86   01/11/22 (!) 150/90       NPO Status:                                                                                 BMI:   Wt Readings from Last 3 Encounters:   03/25/22 243 lb (110.2 kg)   03/14/22 243 lb (110.2 kg)   01/31/22 254 lb (115.2 kg)     There is no height or weight on file to calculate BMI.    CBC:   Lab Results   Component Value Date    WBC 5.5 03/23/2022    RBC 4.47 03/23/2022    HGB 12.5 03/23/2022    HCT 41.5 03/23/2022    MCV 92.8 03/23/2022    RDW 14.7 03/23/2022     03/23/2022       CMP:   Lab Results   Component Value Date     01/11/2022    K 4.2 01/11/2022    K 4.4 08/12/2020     01/11/2022    CO2 29 01/11/2022    BUN 26 01/11/2022    CREATININE 0.7 01/11/2022    GFRAA >59 01/11/2022    LABGLOM >60 01/11/2022    GLUCOSE 106 01/11/2022    PROT 7.4 01/11/2022    CALCIUM 9.7 01/11/2022    BILITOT 0.3 01/11/2022    ALKPHOS 100 01/11/2022    AST 17 01/11/2022    ALT 15 01/11/2022       POC Tests:   No results for input(s): POCGLU, POCNA, POCK, POCCL, POCBUN, POCHEMO, POCHCT in the last 72 hours.     Coags:   Lab Results   Component Value Date    PROTIME 12.8 07/28/2020    INR 0.97 07/28/2020    APTT 27.2 07/28/2020       HCG (If Applicable): No results found for: PREGTESTUR, PREGSERUM, HCG, HCGQUANT     ABGs: No results found for: PHART, PO2ART, RNI2VDV, HLC9JPB, BEART, P0OQONDW     Type & Screen (If Applicable):  No results found for: LABABO, LABRH    Drug/Infectious Status (If Applicable):  No results found for: HIV, HEPCAB    COVID-19 Screening (If Applicable):   Lab Results   Component Value Date    COVID19 Not Detected 03/23/2022         Anesthesia Evaluation  Patient summary reviewed and Nursing notes reviewed  Airway: Mallampati: I  TM distance: >3 FB   Neck ROM: full  Mouth opening: > = 3 FB Dental:          Pulmonary:Negative Pulmonary ROS and normal exam                               Cardiovascular:Negative CV ROS  Exercise tolerance: good (>4 METS),   (+) hypertension: mild, past MI:,       ECG reviewed  Rhythm: regular  Rate: normal           Beta Blocker:  Not on Beta Blocker      ROS comment: EKG:  Sinus rhythm with borderline 1st degree A-V block  Possible anterior infarct - age undetermined  Low QRS voltages in precordial leads  Comparison Summary: Descriptive differences only  Summary: Abnormal ECG     Neuro/Psych:   Negative Neuro/Psych ROS              GI/Hepatic/Renal: Neg GI/Hepatic/Renal ROS  (+) morbid obesity          Endo/Other: Negative Endo/Other ROS   (+) DiabetesType II DM, poorly controlled, using insulin, hypothyroidism::., .                 Abdominal:   (+) obese,     Abdomen: soft. Vascular: negative vascular ROS. Other Findings:               Anesthesia Plan      general     ASA 3     (Adductor canal block.)      MIPS: Postoperative opioids intended and Prophylactic antiemetics administered. Anesthetic plan and risks discussed with patient.                       MIKEY Irwin - TREVOR   3/25/2022

## 2022-03-25 NOTE — OP NOTE
NEUROSURGICAL OPERATIVE REPORT    DATE OF PROCEDURE:  3/25/2022    ATTENDING SURGEON:  Kalen Ta MD, PhD    PREOPERATIVE DIAGNOSIS:  Left carpal tunnel syndrome. POSTOPERATIVE DIAGNOSIS:  Left carpal tunnel syndrome. PROCEDURE PERFORMED:  Left open carpal tunnel release. INDICATIONS:  Symptomatic left carpal tunnel syndrome. ESTIMATED BLOOD LOSS:  <10 mL    PROCEDURE IN DETAIL:  The patient was identified in the preoperative area, consent for surgery was confirmed, and the site for surgery was marked. The patient was transferred to the  operating room by the Anesthesia team where general anesthesia was begun through an LMA. The left hand and arm was then prepped and draped in the usual aseptic fashion. A team pause was held according to the preformatted script, all were in agreement and the decision was made to proceed with surgery. The incision was planned from the distal palmar crease to the top of the thenar eminence in line with the median border of the ring finger on the left hand. This incision was infiltrated with local anesthetic. The incision was opened sharply with a skin knife and carried down into the palmar fat, self-retaining retractors were placed and the palmaris brevis was divided sharply until we were able to identify the transverse carpal ligament. A 15-blade was then used to divide the transverse carpal ligament in the distal portion. A Penfield dissector was then placed below the ligament to protect the median nerve and the remainder of the ligament was opened proximally with Metzenbaum scissors. Once the ligament had been completely divided, we inspected the nerve and noted that it was well-decompressed and there were no palpable compression proximally or distally. The nerve was noted to be intact. The  self-retaining retractors were removed and bleeding from the palmaris brevis was controlled with bipolar cautery.   Once we had hemostasis, we irrigated the wound, confirmed hemostasis, turned attention to closure. The palmaris was reapproximated with several inverted interrupted 3-0 Vicryl sutures and the skin was then closed with a running locking 3-0 Nylon stitch. The wound was then cleaned and dried. A xeroform was then used to cover the sutures and the had was dressed with fluffs, Kerlex and an Ace wrap. Once the site was dressed, the patient was allowed to emerge from anesthesia and was transferred to recovery. All sponge, needle and instrument counts were correct at the end of the procedure and there were no apparent complications.

## 2022-03-25 NOTE — INTERVAL H&P NOTE
3/15/2021       RE: Geovanni Mullen  : 1989   MRN: [id number]      Dear Colleague,    Thank you for referring your patient, Geovanni Mullen, to the [facility] EPILEPSY [facility] at [facility]. Please see a copy of my visit note below.    Left several messages to call back for rooming process before video appointment. Not able to reach patient.       No answer, left voicemail message to call ahead of appt for rooming process.  Taylor Gerard CMA      There was no answer.  I left a message explaining the following: Last clinic visit was 2020.  Patient was advised we are not able to renew his medications if he does not have adequate recommended follow-up.  We are not able to provide meaningful epilepsy management if there is not follow-through by Mr. Mullen.     Sirisha Baxter MD       Again, thank you for allowing me to participate in the care of your patient.      Sincerely,    Sirisha Baxter MD       NEUROSURGERY    I have seen and evaluated the patient. There has been no change from their previous H&P. Will proceed with surgery as planned.

## 2022-03-25 NOTE — ANESTHESIA POSTPROCEDURE EVALUATION
Department of Anesthesiology  Postprocedure Note    Patient: Peggy Ryan  MRN: [id number]  YOB: 1955  Date of evaluation: 3/25/2022  Time:  10:56 AM     Procedure Summary     Date: 03/25/22 Room / Location: [facility] OR [address] [place] [address]    Anesthesia Start: 1005 Anesthesia Stop: 3703    Procedure: LEFT CARPAL TUNNEL RELEASE (Left ) Diagnosis: (WRIST PAIN)    Surgeons: Shekhar Garland MD Responsible Provider: MIKEY Rogers CRNA    Anesthesia Type: general ASA Status: 3          Anesthesia Type: general    María Phase I:      María Phase II:      Last vitals: Reviewed and per EMR flowsheets.        Anesthesia Post Evaluation    Patient location during evaluation: bedside  Patient participation: complete - patient participated  Level of consciousness: awake  Pain score: 0  Airway patency: patent  Nausea & Vomiting: no nausea and no vomiting  Complications: no  Cardiovascular status: hemodynamically stable  Respiratory status: acceptable, room air and spontaneous ventilation  Hydration status: euvolemic  Comments: Temp 96.8F

## 2022-04-06 ENCOUNTER — OFFICE VISIT (OUTPATIENT)
Dept: NEUROSURGERY | Age: 67
End: 2022-04-06

## 2022-04-06 VITALS
BODY MASS INDEX: 40.48 KG/M2 | DIASTOLIC BLOOD PRESSURE: 74 MMHG | HEIGHT: 65 IN | SYSTOLIC BLOOD PRESSURE: 144 MMHG | HEART RATE: 61 BPM | WEIGHT: 243 LBS

## 2022-04-06 DIAGNOSIS — G56.02 LEFT CARPAL TUNNEL SYNDROME: Primary | ICD-10-CM

## 2022-04-06 PROCEDURE — 99024 POSTOP FOLLOW-UP VISIT: CPT | Performed by: NEUROLOGICAL SURGERY

## 2022-04-06 NOTE — PROGRESS NOTES
NEUROSURGERY POSTOPERATIVE FOLLOW UP NOTE      Chief Complaint:   Chief Complaint   Patient presents with    Follow-up     wound check for carpal tunnel          Date of Surgery: 3/25/2022    Procedure Performed: Left carpal tunnel release      Interval Update:  First postoperative visit after left carpal tunnel release. She is doing well. She reports significant improvement in the numbness and pain in her left hand. She has been compliant with her postoperative instructions. Objective:    BP (!) 144/74   Pulse 61   Ht 5' 5\" (1.651 m)   Wt 243 lb (110.2 kg)   BMI 40.44 kg/m²         Physical Exam:    General: alert, cooperative, no distress  Cardiorespiratory: unlabored breathing  Wound: Bandages removed. Incision C/D/I with nylon sutures. No swelling, erythema or drainage. Neurologic Exam:    Mental Status: Alert, oriented, thought content appropriate  Cranial Nerves: PERRL, EOMI, symmetric facies, tongue midline  Motor: Motor exam is symmetrical 5 out of 5 all extremities bilaterally  Somatosensory: normal light touch sensation in the L hand, some paresthesias to light touch in the right hand        Assessment and Plan:  61 y.o. F returns for her first follow-up visit after left carpal tunnel release on 3/25/2022. She is doing well. Her preoperative symptoms have improved and her incision is healing well. Her sutures were removed. I advised the patient to apply neosporin ointment to the incision and to cover the wound with a large BandAid dressing during the day and leave her incision open to air at night. I will plan to see the patient again in two weeks for another wound check.           Electronically signed by Katina Locke MD on 4/6/2022 at 11:01 AM

## 2022-04-11 ENCOUNTER — TELEPHONE (OUTPATIENT)
Dept: NEUROSURGERY | Age: 67
End: 2022-04-11

## 2022-04-11 NOTE — TELEPHONE ENCOUNTER
Derek Rizo called and stated she is needing her [place] paperwork filled out and sent back ASAP. She stated her work is giving her until tomorrow to have them turned in, and if she doesn't she will lose her job. I explained our providers have 7-10 business days to fill out, and that I would pass this message along to Dr. Burnice Fleischer and to his MA Tan Ochoa. Please contact patient with info about paperwork.    Ph# [phone]

## 2022-04-12 NOTE — TELEPHONE ENCOUNTER
Dr Issac Coronado has filled out all paperwork. I have faxed and am awaiting confirmation page. Once I receive this I will scan into patients chart. Patient notified of all this,.   I explained to patient that when I receive this , I will mail the original to patient. ,

## 2022-04-20 ENCOUNTER — OFFICE VISIT (OUTPATIENT)
Dept: NEUROSURGERY | Age: 67
End: 2022-04-20

## 2022-04-20 VITALS
BODY MASS INDEX: 40.48 KG/M2 | HEART RATE: 70 BPM | SYSTOLIC BLOOD PRESSURE: 144 MMHG | HEIGHT: 65 IN | DIASTOLIC BLOOD PRESSURE: 74 MMHG | WEIGHT: 243 LBS

## 2022-04-20 DIAGNOSIS — G56.02 LEFT CARPAL TUNNEL SYNDROME: Primary | ICD-10-CM

## 2022-04-20 PROCEDURE — 99024 POSTOP FOLLOW-UP VISIT: CPT | Performed by: NEUROLOGICAL SURGERY

## 2022-04-20 NOTE — PROGRESS NOTES
NEUROSURGERY POSTOPERATIVE FOLLOW UP NOTE      Chief Complaint:   Chief Complaint   Patient presents with    Follow-up     1 month wound check  for carpal surgery. Date of Surgery: 3/25/2022    Procedure Performed: Left carpal tunnel release      Interval Update:  Second postoperative visit after left carpal tunnel release. She continues to report incisional pain but is otherwise doing well. She reports significant improvement in the numbness and pain in her left hand. She has been compliant with her postoperative instructions. Objective:    BP (!) 144/74   Pulse 70   Ht 5' 5\" (1.651 m)   Wt 243 lb (110.2 kg)   BMI 40.44 kg/m²         Physical Exam:    General: alert, cooperative, no distress  Cardiorespiratory: unlabored breathing  Wound: Clean and dry, no fluctuance or drainage. Small scabbed area over the mid-portion of the incision otherwise fully intact. Neurologic Exam:    Mental Status: Alert, oriented, thought content appropriate  Cranial Nerves: PERRL, EOMI, symmetric facies, tongue midline  Motor: Motor exam is symmetrical 5 out of 5 all extremities bilaterally  Somatosensory: normal light touch sensation in the L hand, some paresthesias to light touch in the right hand        Assessment and Plan:  61 y.o. F returns for her second follow-up visit after left carpal tunnel release on 3/25/2022. She is doing well. Her preoperative symptoms have improved and her incision is healing, albeit slowly. I recommended that she not resume working as a  for another two weeks to allow additional time for her incision to heal.  She is now complaining of more carpal tunnel symptoms on the right and I will plan to see her again in two weeks for another wound check and to discuss possible intervention for right carpal tunnel syndrome.           Electronically signed by Heather Thao MD on 4/20/2022 at 11:08 AM

## 2022-04-25 NOTE — PROGRESS NOTES
[facility] Outpatient Physical Therapy  Discharge Summary        Date:2022    Patient Name:Domitila Iyer    SWP:[id number]    :1955    Diagnosis:Diagnosis: Low back pain       Total # of Visits to Date: 9       D/C ASSESSMENT: Pt attended 9 total PT visits, consisting of ther ex to address LE and core strength, with modalities PRN to address pain. She met 2 of 3 STG and made progress towards all LTG. It has been greater than 30 days since her last visit and she does not have any further visits. Will d/c from services at this time. GOALS:    Short Term Goals  Time Frame for Short term goals: 3-4 weeks  Short term goal 1: Independent with HEP- partially met (3/9: Performing \"some\" exercises daily, though not all)  Short term goal 2: Improve lumbar ROM to at least 60 degrees flexion, 25 degrees SB bilat, 100% rotation bilat- not tested  Short term goal 3: Perform 10 Fair quality TA contractions with good breath control- met (3/9: Pt able to perform 10 Fair quality TA contractions w/o cuing for breathing)  Short term goal 4: Trial heel lift PRN- met (3/9: Previously issued 6mm heel lift, which pt has tolerated well. Issued 9mm heel lift today.)      Long Term Goals  Time Frame for Long term goals : 4-6 weeks  Long term goal 1: Report less need to stop for rest breaks while walking from register to the bathroom at work- progress (3/9: Notes she is now able to walk the distance without stopping, only has to slow her pace.)  Long term goal 2: Report less pain with lifting/carrying grandchildren-progress (3/9: Still with pain, though less than initially.   She does not feel comfortable carrying them.)  Long term goal 3: Report less pain with household chores- progress (3/9: Still with pain, though it has improved)  Long term goal 4: Improve Oswestry Disability Questionnaire to 18% impairment or less- progress (3/9: 29% impairment)      PLAN:  D/c from services      Electronically signed by Felix Tee PT on 4/25/2022 at 4:26 PM

## 2022-05-03 ENCOUNTER — TELEPHONE (OUTPATIENT)
Dept: NEUROLOGY | Age: 67
End: 2022-05-03

## 2022-05-23 ENCOUNTER — OFFICE VISIT (OUTPATIENT)
Dept: NEUROSURGERY | Age: 67
End: 2022-05-23

## 2022-05-23 VITALS
SYSTOLIC BLOOD PRESSURE: 151 MMHG | WEIGHT: 243 LBS | BODY MASS INDEX: 40.48 KG/M2 | HEART RATE: 64 BPM | DIASTOLIC BLOOD PRESSURE: 76 MMHG | HEIGHT: 65 IN

## 2022-05-23 DIAGNOSIS — M54.41 CHRONIC BILATERAL LOW BACK PAIN WITH RIGHT-SIDED SCIATICA: ICD-10-CM

## 2022-05-23 DIAGNOSIS — M43.16 SPONDYLOLISTHESIS OF LUMBAR REGION: ICD-10-CM

## 2022-05-23 DIAGNOSIS — G89.29 CHRONIC BILATERAL LOW BACK PAIN WITH RIGHT-SIDED SCIATICA: ICD-10-CM

## 2022-05-23 DIAGNOSIS — M47.816 LUMBAR SPONDYLOSIS: ICD-10-CM

## 2022-05-23 DIAGNOSIS — M48.02 CERVICAL STENOSIS OF SPINAL CANAL: ICD-10-CM

## 2022-05-23 DIAGNOSIS — G56.02 LEFT CARPAL TUNNEL SYNDROME: Primary | ICD-10-CM

## 2022-05-23 PROCEDURE — 99024 POSTOP FOLLOW-UP VISIT: CPT | Performed by: NEUROLOGICAL SURGERY

## 2022-05-23 NOTE — PROGRESS NOTES
NEUROSURGERY POSTOPERATIVE FOLLOW UP NOTE      Chief Complaint:   Chief Complaint   Patient presents with    Follow-up     Follow up for wound check of LT carpal tunnel surgical site and discuss increase sx's of numbness and tingling in the right hand. Date of Surgery: 3/25/2022    Procedure Performed: Left carpal tunnel release      Interval Update: Third postoperative visit after left carpal tunnel release. Overall, she is doing well. She still has some incisional pain at work. She notes significant improvement in the numbness in her left hand. She continues to note intermittent numbness in her right hand. She also complains of ongoing lower back pain and leg pain with prolonged standing and activity. Objective:    BP (!) 151/76   Pulse 64   Ht 5' 5\" (1.651 m)   Wt 243 lb (110.2 kg)   BMI 40.44 kg/m²         Physical Exam:    General: alert, cooperative, no distress  Cardiorespiratory: unlabored breathing  Wound: Healed      Neurologic Exam:    Mental Status: Alert, oriented, thought content appropriate  Cranial Nerves: PERRL, EOMI, symmetric facies, tongue midline  Motor: Motor exam is symmetrical 5 out of 5 all extremities bilaterally  Somatosensory: normal light touch sensation in the L hand, some paresthesias to light touch in the right hand        Assessment and Plan:  61 y.o. F returns for her second follow-up visit after left carpal tunnel release on 3/25/2022. She is doing well. Her preoperative symptoms have improved and her incision has healed well. She continues to complain of ongoing (and intermittent) numbness in her right hand but this is not yet severe enough for her to want to undergo surgery. She also continues to have significant lower back pain. I again advised her that the surgery to address the issues with her lower back would be quite extensive (likely at least a two level spinal fusion). She did not seem ready to consider that at this time.   I will plan to follow up with her in three months.       Electronically signed by Sudarshan Truong MD on 5/23/2022 at 11:28 AM

## 2022-06-02 RX ORDER — CELECOXIB 200 MG/1
CAPSULE ORAL
Qty: 60 CAPSULE | Refills: 3 | Status: SHIPPED | OUTPATIENT
Start: 2022-06-02 | End: 2022-10-20

## 2022-06-21 RX ORDER — DULAGLUTIDE 1.5 MG/.5ML
INJECTION, SOLUTION SUBCUTANEOUS
Qty: 4 ML | Refills: 3 | Status: SHIPPED | OUTPATIENT
Start: 2022-06-21

## 2022-07-12 ENCOUNTER — OFFICE VISIT (OUTPATIENT)
Dept: PRIMARY CARE CLINIC | Age: 67
End: 2022-07-12
Payer: COMMERCIAL

## 2022-07-12 VITALS
RESPIRATION RATE: 16 BRPM | OXYGEN SATURATION: 99 % | BODY MASS INDEX: 42.78 KG/M2 | HEART RATE: 61 BPM | TEMPERATURE: 96.8 F | SYSTOLIC BLOOD PRESSURE: 130 MMHG | WEIGHT: 250.6 LBS | HEIGHT: 64 IN | DIASTOLIC BLOOD PRESSURE: 80 MMHG

## 2022-07-12 DIAGNOSIS — M54.41 CHRONIC BILATERAL LOW BACK PAIN WITH RIGHT-SIDED SCIATICA: ICD-10-CM

## 2022-07-12 DIAGNOSIS — E03.9 ACQUIRED HYPOTHYROIDISM: ICD-10-CM

## 2022-07-12 DIAGNOSIS — E11.9 TYPE 2 DIABETES MELLITUS WITHOUT COMPLICATION, WITHOUT LONG-TERM CURRENT USE OF INSULIN (HCC): Primary | ICD-10-CM

## 2022-07-12 DIAGNOSIS — G89.29 CHRONIC BILATERAL LOW BACK PAIN WITH RIGHT-SIDED SCIATICA: ICD-10-CM

## 2022-07-12 LAB
ALBUMIN SERPL-MCNC: 3.9 G/DL (ref 3.5–5.2)
ALP BLD-CCNC: 103 U/L (ref 35–104)
ALT SERPL-CCNC: 11 U/L (ref 5–33)
ANION GAP SERPL CALCULATED.3IONS-SCNC: 11 MMOL/L (ref 7–19)
AST SERPL-CCNC: 17 U/L (ref 5–32)
BILIRUB SERPL-MCNC: 0.3 MG/DL (ref 0.2–1.2)
BUN BLDV-MCNC: 23 MG/DL (ref 8–23)
CALCIUM SERPL-MCNC: 9.4 MG/DL (ref 8.8–10.2)
CHLORIDE BLD-SCNC: 104 MMOL/L (ref 98–111)
CO2: 26 MMOL/L (ref 22–29)
CREAT SERPL-MCNC: 0.7 MG/DL (ref 0.5–0.9)
CREATININE URINE: 127 MG/DL (ref 4.2–622)
GFR AFRICAN AMERICAN: >59
GFR NON-AFRICAN AMERICAN: >60
GLUCOSE BLD-MCNC: 111 MG/DL (ref 74–109)
HBA1C MFR BLD: 5.7 % (ref 4–6)
MICROALBUMIN UR-MCNC: <1.2 MG/DL (ref 0–19)
MICROALBUMIN/CREAT UR-RTO: NORMAL MG/G
POTASSIUM SERPL-SCNC: 4.2 MMOL/L (ref 3.5–5)
SODIUM BLD-SCNC: 141 MMOL/L (ref 136–145)
T4 FREE: 1.38 NG/DL (ref 0.93–1.7)
TOTAL PROTEIN: 6.7 G/DL (ref 6.6–8.7)
TSH SERPL DL<=0.05 MIU/L-ACNC: 0.51 UIU/ML (ref 0.27–4.2)

## 2022-07-12 PROCEDURE — 3044F HG A1C LEVEL LT 7.0%: CPT | Performed by: NURSE PRACTITIONER

## 2022-07-12 PROCEDURE — 99214 OFFICE O/P EST MOD 30 MIN: CPT | Performed by: NURSE PRACTITIONER

## 2022-07-12 NOTE — PATIENT INSTRUCTIONS
When medicare kicks in may refer to [facility] for back  Refer to Dr Telma Nguyen monitoring your blood sugar  Continue the Actos daily, metformin twice a day, and the Trulicity weekly  You may follow-up with  at any time for neck and back.

## 2022-07-12 NOTE — PROGRESS NOTES
[facility] PHYSICIAN SERVICES  [facility]  [address]  [address] [address]  Dept: [phone]  Dept Fax: [phone]  Loc: [phone]    Buffy Mckeon is a 61 y.o. female who presents today for her medical conditions/complaints as noted below. Buffy Mckeon is c/o of 6 Month Follow-Up        HPI:     HPI   Chief Complaint   Patient presents with    6 Month Follow-Up   still having problems with low back , she is not better after therapy. Blood sugar under good control  On actos, metformin and trulicty.      Lab Results   Component Value Date/Time    GLUCOSE 111 07/12/2022 10:33 AM    GLUCOSE 106 01/11/2022 12:15 PM    GLUCOSE 96 07/09/2021 11:52 AM    LABA1C 5.7 07/12/2022 10:33 AM    LABA1C 5.5 01/11/2022 12:15 PM    LABA1C 5.3 07/09/2021 11:52 AM     Past Medical History:   Diagnosis Date    Diabetes mellitus ([address]     History of blood transfusion     1976 post childbirth    Hypertension     Thyroid disease       Past Surgical History:   Procedure Laterality Date    CARPAL TUNNEL RELEASE Left 3/25/2022    LEFT CARPAL TUNNEL RELEASE performed by Linda Marshall MD at [address] [place] [address] N/A 11/21/2019    Dr Brian Nicole    HYSTERECTOMY (CERVIX STATUS UNKNOWN)      TOTAL KNEE ARTHROPLASTY Left 8/30/2016    KNEE TOTAL ARTHROPLASTY performed by Jose Raul Medina MD at [address] [place] [address] Right 8/11/2020    RIGHT TOTAL KNEE REPLACEMENT performed by Jose Raul Medina MD at [address] [place] [address] N/A 11/21/2019    Dr KATELYN Rosas-Gastritis, duodenitis       Vitals 7/12/2022 5/23/2022 4/20/2022 4/6/2022 3/25/2022 2/81/4373   SYSTOLIC 638 795 178 962 - -   DIASTOLIC 80 76 74 74 - -   Pulse 61 64 70 61 - -   Temp 96.8 - - - - -   Resp 16 - - - 12 10   SpO2 99 - - - 99 99   Weight 250 lb 9.6 oz 243 lb 243 lb 243 lb - -   Height 5' 4\" 5' 5\" 5' 5\" 5' 5\" - -   Body mass index 43.01 kg/m2 40.43 kg/m2 40.43 kg/m2 40.43 kg/m2 - - Some recent data might be hidden       Family History   Problem Relation Age of Onset    Cancer Mother         uterine    High Blood Pressure Father         aneurysm abd    Other Sister         aneurysm brain    Diabetes Brother     Breast Cancer Paternal Aunt     Colon Cancer Neg Hx     Colon Polyps Neg Hx     Esophageal Cancer Neg Hx     Liver Disease Neg Hx     Liver Cancer Neg Hx     Rectal Cancer Neg Hx     Stomach Cancer Neg Hx        Social History     Tobacco Use    Smoking status: Never    Smokeless tobacco: Never   Substance Use Topics    Alcohol use: No      Current Outpatient Medications on File Prior to Visit   Medication Sig Dispense Refill    TRULICITY 1.5 QA/5.7GY SOPN INJECT 1.5 MG SUB-Q ONCE A WEEK 4 mL 3    celecoxib (CELEBREX) 200 MG capsule TAKE 1 CAPSULE BY MOUTH TWICE DAILY TO  REPLACE  DICLOFENAC 60 capsule 3    pioglitazone (ACTOS) 45 MG tablet Take 1 tablet by mouth once daily 90 tablet 0    metFORMIN (GLUCOPHAGE) 1000 MG tablet TAKE 1 TABLET BY MOUTH TWICE DAILY WITH MEALS FOR DIABETES 180 tablet 0    sertraline (ZOLOFT) 100 MG tablet TAKE 1 TABLET BY MOUTH ONCE DAILY FOR DEPRESSION 90 tablet 3    indomethacin (INDOCIN) 50 MG capsule TAKE 1 CAPSULE BY MOUTH THREE TIMES DAILY FOR 7 DAYS AS NEEDED FOR  GOUT 21 capsule 1    amLODIPine (NORVASC) 5 MG tablet TAKE 1 TABLET BY MOUTH ONCE DAILY 90 tablet 3    busPIRone (BUSPAR) 10 MG tablet Take 1 tablet by mouth 3 times daily 90 tablet 3    Lancets MISC 1 each by Does not apply route 2 times daily 100 each 5    Blood Glucose Monitoring Suppl (FREESTYLE LITE) ROBEL 1 Device by Does not apply route daily   0    blood glucose monitor strips Test 3 times a day & as needed for symptoms of irregular blood glucose. Freestyle lite (Patient taking differently: 1 strip by Other route 3 times daily as needed Test 3 times a day & as needed for symptoms of irregular blood glucose.  Freestyle lite) 100 strip 3    glucose monitoring kit (FREESTYLE) monitoring kit 1 kit by Does not apply route 3 times daily (before meals) Dx E11.8 1 kit 0    Risedronate Sodium (ACTONEL) 150 MG TABS Take 1 tablet by mouth every 30 days 3 tablet 3     No current facility-administered medications on file prior to visit. Allergies   Allergen Reactions    Pcn [Penicillins] Rash     childhood       Health Maintenance   Topic Date Due    COVID-19 Vaccine (1) Never done    Pneumococcal 0-64 years Vaccine (1 - PCV) Never done    HIV screen  Never done    Hepatitis C screen  Never done    DTaP/Tdap/Td vaccine (1 - Tdap) Never done    Shingles vaccine (1 of 2) Never done    Diabetic foot exam  01/08/2022    Flu vaccine (1) 09/01/2022    Lipids  01/11/2023    Depression Screen  01/11/2023    Diabetic retinal exam  06/22/2023    A1C test (Diabetic or Prediabetic)  07/12/2023    Diabetic microalbuminuria test  07/12/2023    Breast cancer screen  07/29/2023    Colorectal Cancer Screen  11/21/2029    Hepatitis A vaccine  Aged Out    Hib vaccine  Aged Out    Meningococcal (ACWY) vaccine  Aged Out       Subjective:      Review of Systems   Constitutional:  Positive for activity change. Endocrine:        Diabetes   Musculoskeletal:  Positive for back pain. Psychiatric/Behavioral: Negative. Objective:     Physical Exam  Vitals and nursing note reviewed. Constitutional:       Appearance: Normal appearance. She is well-developed. HENT:      Head: Normocephalic. Right Ear: External ear normal.      Left Ear: External ear normal.   Eyes:      Pupils: Pupils are equal, round, and reactive to light. Cardiovascular:      Rate and Rhythm: Normal rate and regular rhythm. Heart sounds: Normal heart sounds. Pulmonary:      Effort: Pulmonary effort is normal.      Breath sounds: Normal breath sounds. Musculoskeletal:      Cervical back: Normal range of motion. Lumbar back: Tenderness present. No swelling, edema, deformity, signs of trauma, lacerations, spasms or bony tenderness. Decreased range of motion. Negative right straight leg raise test and negative left straight leg raise test. No scoliosis. Skin:     General: Skin is warm and dry. Capillary Refill: Capillary refill takes less than 2 seconds. Neurological:      General: No focal deficit present. Mental Status: She is alert and oriented to person, place, and time. Mental status is at baseline. Psychiatric:         Mood and Affect: Mood normal.         Behavior: Behavior normal.         Thought Content: Thought content normal.         Judgment: Judgment normal.     /80   Pulse 61   Temp 96.8 °F (36 °C)   Resp 16   Ht 5' 4\" (1.626 m)   Wt 250 lb 9.6 oz (113.7 kg)   SpO2 99%   BMI 43.02 kg/m²     Assessment:       Diagnosis Orders   1. Type 2 diabetes mellitus without complication, without long-term current use of insulin ([facility])  Microalbumin / Creatinine Urine Ratio    Comprehensive Metabolic Panel    Hemoglobin A1C      2. Chronic bilateral low back pain with right-sided sciatica  External Referral To Pain Clinic      3. Acquired hypothyroidism  TSH    T4, Free            Plan:   More than 50% of the time was spent counseling and coordinating care for a total time of 35min face to face. She did see Dr. Carlos Gayle and has had an MRI of her lower back about 7 months ago. Below is the MRI. She think she would like to go ahead and do pain management at this time  . The severe lumbar spondylosis. 2. Grade 1 spondylolisthesis L4-5. 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   3. Multilevel prominent disc osteophyte complexes, facetal arthropathy   and ligamentum hypertrophy and resultant multilevel neural foraminal   or spinal canal stenosis as detailed above. 4. A small right adrenal nodule may represent a cyst. This may be   further evaluated with sonography. Signed by Dr Carlos Dale is below and she has been doing very well managing it and keeping her weight off.   Lab Results   Component Value Date/Time    GLUCOSE 111 07/12/2022 10:33 AM    GLUCOSE 106 01/11/2022 12:15 PM    GLUCOSE 96 07/09/2021 11:52 AM    LABA1C 5.7 07/12/2022 10:33 AM    LABA1C 5.5 01/11/2022 12:15 PM    LABA1C 5.3 07/09/2021 11:52 AM       PDMP Monitoring:    Last PDMP Har[place] as Reviewed [facility]):  Review User Review Instant Review Result            Urine Drug Screenings (1 yr)    No resulted procedures found. Medication Contract and Consent for Opioid Use Documents Filed        No documents found                     Patient given educational materials -see patient instructions. Discussed use, benefit, and side effects of prescribed medications. All patient questions answered. Pt voiced understanding. Reviewed health maintenance. Instructed to continue currentmedications, diet and exercise. Patient agreed with treatment plan. Follow up as directed. MEDICATIONS:  No orders of the defined types were placed in this encounter. ORDERS:  Orders Placed This Encounter   Procedures    Microalbumin / Creatinine Urine Ratio    TSH    T4, Free    Comprehensive Metabolic Panel    Hemoglobin A1C    External Referral To Pain Clinic       Follow-up:  Return in about 6 months (around 1/12/2023) for pe with labs. PATIENT INSTRUCTIONS:  Patient Instructions   When medicare kicks in may refer to [place] for back  Refer to Dr Jenn Muhammad monitoring your blood sugar  Continue the Actos daily, metformin twice a day, and the Trulicity weekly  You may follow-up with  at any time for neck and back. Electronically signed by MIKEY Mccurdy CNP on 7/20/2022 at 10:19 AM    EMR Dragon/transcription disclaimer:  Much of thisencounter note is electronic transcription/translation of spoken language to printed texts. The electronic translation of spoken language may be erroneous, or at times, nonsensical words or phrases may be inadvertentlytranscribed.   Although I have reviewed the note for such errors, some may still exist.

## 2022-07-13 RX ORDER — LEVOTHYROXINE SODIUM 0.15 MG/1
TABLET ORAL
Qty: 90 TABLET | Refills: 3 | Status: SHIPPED | OUTPATIENT
Start: 2022-07-13

## 2022-07-20 ASSESSMENT — ENCOUNTER SYMPTOMS: BACK PAIN: 1

## 2022-07-26 DIAGNOSIS — M54.41 CHRONIC BILATERAL LOW BACK PAIN WITH RIGHT-SIDED SCIATICA: Primary | ICD-10-CM

## 2022-07-26 DIAGNOSIS — G89.29 CHRONIC BILATERAL LOW BACK PAIN WITH RIGHT-SIDED SCIATICA: Primary | ICD-10-CM

## 2022-07-27 RX ORDER — BUSPIRONE HYDROCHLORIDE 10 MG/1
TABLET ORAL
Qty: 90 TABLET | Refills: 1 | Status: SHIPPED | OUTPATIENT
Start: 2022-07-27

## 2022-07-27 RX ORDER — AMLODIPINE BESYLATE 5 MG/1
TABLET ORAL
Qty: 90 TABLET | Refills: 1 | Status: SHIPPED | OUTPATIENT
Start: 2022-07-27

## 2022-08-09 ENCOUNTER — HOSPITAL ENCOUNTER (OUTPATIENT)
Dept: PAIN MANAGEMENT | Age: 67
Discharge: HOME | End: 2022-08-09

## 2022-08-23 ENCOUNTER — TELEPHONE (OUTPATIENT)
Dept: PAIN MANAGEMENT | Age: 67
End: 2022-08-23

## 2022-08-23 ENCOUNTER — HOSPITAL ENCOUNTER (OUTPATIENT)
Dept: PAIN MANAGEMENT | Age: 67
Discharge: HOME | End: 2022-08-23
Payer: COMMERCIAL

## 2022-08-23 VITALS
DIASTOLIC BLOOD PRESSURE: 69 MMHG | HEIGHT: 65 IN | WEIGHT: 255 LBS | BODY MASS INDEX: 42.49 KG/M2 | OXYGEN SATURATION: 97 % | TEMPERATURE: 96.7 F | SYSTOLIC BLOOD PRESSURE: 137 MMHG | HEART RATE: 58 BPM | RESPIRATION RATE: 16 BRPM

## 2022-08-23 DIAGNOSIS — M50.30 BULGING OF CERVICAL INTERVERTEBRAL DISC WITHOUT MYELOPATHY: ICD-10-CM

## 2022-08-23 DIAGNOSIS — M54.9 CHRONIC NECK AND BACK PAIN: ICD-10-CM

## 2022-08-23 DIAGNOSIS — M54.50 CHRONIC BILATERAL LOW BACK PAIN WITHOUT SCIATICA: Primary | ICD-10-CM

## 2022-08-23 DIAGNOSIS — R29.2 HOFFMAN SIGN PRESENT: ICD-10-CM

## 2022-08-23 DIAGNOSIS — G89.29 CHRONIC NECK AND BACK PAIN: ICD-10-CM

## 2022-08-23 DIAGNOSIS — M54.2 CHRONIC NECK AND BACK PAIN: ICD-10-CM

## 2022-08-23 DIAGNOSIS — M54.12 CERVICAL RADICULOPATHY: ICD-10-CM

## 2022-08-23 DIAGNOSIS — M47.817 LUMBOSACRAL SPONDYLOSIS WITHOUT MYELOPATHY: ICD-10-CM

## 2022-08-23 DIAGNOSIS — M47.812 FACET ARTHRITIS OF CERVICAL REGION: ICD-10-CM

## 2022-08-23 DIAGNOSIS — G89.29 CHRONIC BILATERAL LOW BACK PAIN WITHOUT SCIATICA: Primary | ICD-10-CM

## 2022-08-23 DIAGNOSIS — M48.00 CENTRAL STENOSIS OF SPINAL CANAL: ICD-10-CM

## 2022-08-23 DIAGNOSIS — M54.16 LUMBAR RADICULOPATHY: ICD-10-CM

## 2022-08-23 DIAGNOSIS — M47.816 LUMBAR FACET ARTHROPATHY: ICD-10-CM

## 2022-08-23 DIAGNOSIS — Z02.89 PAIN MEDICATION AGREEMENT: ICD-10-CM

## 2022-08-23 DIAGNOSIS — M51.36 BULGE OF LUMBAR DISC WITHOUT MYELOPATHY: ICD-10-CM

## 2022-08-23 DIAGNOSIS — M48.061 NEURAL FORAMINAL STENOSIS OF LUMBAR SPINE: ICD-10-CM

## 2022-08-23 PROCEDURE — 99214 OFFICE O/P EST MOD 30 MIN: CPT | Performed by: NURSE PRACTITIONER

## 2022-08-23 PROCEDURE — 99215 OFFICE O/P EST HI 40 MIN: CPT

## 2022-08-23 RX ORDER — HYDROCODONE BITARTRATE AND ACETAMINOPHEN 5; 325 MG/1; MG/1
1 TABLET ORAL EVERY 8 HOURS PRN
Qty: 75 TABLET | Refills: 0 | Status: SHIPPED | OUTPATIENT
Start: 2022-08-23 | End: 2022-09-22

## 2022-08-23 ASSESSMENT — PAIN DESCRIPTION - LOCATION: LOCATION: BACK

## 2022-08-23 ASSESSMENT — PAIN DESCRIPTION - ORIENTATION: ORIENTATION: LOWER

## 2022-08-23 ASSESSMENT — ENCOUNTER SYMPTOMS
BOWEL INCONTINENCE: 1
BACK PAIN: 1
CONSTIPATION: 0

## 2022-08-23 ASSESSMENT — PAIN DESCRIPTION - PAIN TYPE: TYPE: CHRONIC PAIN

## 2022-08-23 ASSESSMENT — PAIN SCALES - GENERAL: PAINLEVEL_OUTOF10: 7

## 2022-08-23 NOTE — H&P
[facility] Physical & Pain Medicine    History and Physical    Patient Name: Jalil Watters    MR #: [id number]    Account [de-identified]    : 1955    Age: 61 y.o. Sex: female    Date: 2022    PCP: MIKEY Myers CNP    Referring Provider:     Chief Complaint:   Chief Complaint   Patient presents with    Back Pain       History of Present Illness: The patient is a 61 y.o. female who was referrred with primary complaints of chronic neck and back pain. Patient states that climbing up stairs causes worsening pain. Patient states that she has more pain getting up from a seated position than from the act of sitting down. Patient stated that she has trouble picking up her grand baby. Patient states that if she stands for too long her right foot goes numb. Patient has trouble climbing into vehicles and lifting her leg. Patient has a tingling sensation in the right LE that follows her L4/L5. Patient states that the numbness has improved to the left side. Back Pain  This is a chronic problem. The current episode started more than 1 year ago. The problem occurs constantly. The problem has been gradually worsening since onset. The pain is present in the lumbar spine. Quality: throbbing, walking goes up towards the middle. The symptoms are aggravated by standing (walking). Associated symptoms include bladder incontinence, bowel incontinence, headaches (mild), numbness, paresthesias and weakness. Neck Pain   This is a chronic problem. The current episode started more than 1 year ago. The problem occurs constantly. The problem has been gradually worsening. The quality of the pain is described as burning (lot of pain). The symptoms are aggravated by bending, twisting and position. Associated symptoms include headaches (mild), numbness and weakness.        Past Imaging  MRI cervical spine (without contrast)   MRI of C-spine 2/10/2022  C2-3: There is moderate facetal arthropathy. C3-4: There is bilateral facet arthropathy. C4-5: There are large posterior osteophytes and moderate diffuse disc bulging. There is bilateral facetal arthropathy. There is mild spinal stenosis. There is bilateral neural foraminal stenosis. C5-6:. There are large posterior osteophytes. Moderate diffuse disc bulging. There is bilateral facetal arthropathy. There is moderately severe spinal stenosis and bilateral neural foraminal stenosis, more towards left. C6-7: There are prominent osteophytes, and disc bulging, more severe towards left. There is bilateral facet arthropathy. There is moderate asymmetrical spinal stenosis and left neural foraminal stenosis. C7-T1: There are posterior osteophytes. There is a disc bulging/disc extrusion. There is bilateral facet arthropathy. There is bilateral neural foraminal stenosis, right more than the left. There is moderate narrowing of compression of the cord opposite the bulging discs at C4-5, C5-6 and C6-7. MRI lumbosacral spine (without contrast)  MRI of Lumbar Spine 1/2021  L1-2: There are prominent posterior osteophytes. There is moderate diffuse disc bulging. There is bilateral facetal arthropathy. There is moderate spinal stenosis. There is moderate narrowing of the neural foramina bilaterally. L2-3: There are prominent posterior osteophytes. There is moderate disc bulging. There is bilateral facet arthropathy. There is moderate spinal stenosis. There is left neural foraminal stenosis. L3-4: There is moderate diffuse disc bulging. There is bilateral facetal arthropathy and ligamentum hypertrophy. There is severe spinal stenosis. L4-5: There is disc disc protrusion/displacement. There is severe facet arthropathy and ligamentum hypertrophy. There is moderate spinal stenosis. There is bilateral neural foraminal stenosis, more severe on the right side. L5-S1: There is moderate diffuse disc bulging.  There is bilateral facet arthropathy      Screening Tools:     PE.7    Past PEG: NA    ORT: 1    PHQ-9: 6    Current Pain Assessment  Pain Assessment  Pain Assessment: 0-10  Pain Level: 7  Pain Location: Back  Pain Orientation: Lower  Pain Type: Chronic pain    Employment: [employer]    Past Medical History  Past Medical History:   Diagnosis Date    Depression     Diabetes mellitus ([facility] [address]     History of blood transfusion     1976 post childbirth    Hypertension     Thyroid disease        Allergies  Pcn [penicillins]    Current Medications  Current Outpatient Medications   Medication Sig Dispense Refill    pioglitazone (ACTOS) 45 MG tablet Take 1 tablet by mouth once daily 90 tablet 1    metFORMIN (GLUCOPHAGE) 1000 MG tablet TAKE 1 TABLET BY MOUTH TWICE DAILY WITH MEALS FOR DIABETES 180 tablet 3    amLODIPine (NORVASC) 5 MG tablet Take 1 tablet by mouth once daily 90 tablet 1    busPIRone (BUSPAR) 10 MG tablet TAKE 1 TABLET BY MOUTH THREE TIMES DAILY 90 tablet 1    levothyroxine (EUTHYROX) 150 MCG tablet Take 1 tablet by mouth once daily 90 tablet 3    TRULICITY 1.5 FU/6.5XE SOPN INJECT 1.5 MG SUB-Q ONCE A WEEK 4 mL 3    celecoxib (CELEBREX) 200 MG capsule TAKE 1 CAPSULE BY MOUTH TWICE DAILY TO  REPLACE  DICLOFENAC 60 capsule 3    sertraline (ZOLOFT) 100 MG tablet TAKE 1 TABLET BY MOUTH ONCE DAILY FOR DEPRESSION 90 tablet 3    Lancets MISC 1 each by Does not apply route 2 times daily 100 each 5    Blood Glucose Monitoring Suppl (FREESTYLE LITE) ROBEL 1 Device by Does not apply route daily   0    blood glucose monitor strips Test 3 times a day & as needed for symptoms of irregular blood glucose. Freestyle lite (Patient taking differently: 1 strip by Other route 3 times daily as needed Test 3 times a day & as needed for symptoms of irregular blood glucose.  Freestyle lite) 100 strip 3    glucose monitoring kit (FREESTYLE) monitoring kit 1 kit by Does not apply route 3 times daily (before meals) Dx E11.8 1 kit 0     No current facility-administered medications for this encounter. Social History    Social History     Socioeconomic History    Marital status:      Spouse name: None    Number of children: 1    Years of education: None    Highest education level: None   Tobacco Use    Smoking status: Never    Smokeless tobacco: Never   Vaping Use    Vaping Use: Never used   Substance and Sexual Activity    Alcohol use: No    Drug use: No    Sexual activity: Not Currently     Comment: patient is          Family History  family history includes Breast Cancer in her paternal aunt; Cancer in her mother; Diabetes in her brother; High Blood Pressure in her father; Other in her sister. Review of Systems:  Review of Systems   Constitutional:  Positive for activity change. Gastrointestinal:  Positive for bowel incontinence. Negative for constipation. Genitourinary:  Positive for bladder incontinence. Musculoskeletal:  Positive for arthralgias, back pain, gait problem, myalgias, neck pain and neck stiffness. Neurological:  Positive for weakness, numbness, headaches (mild) and paresthesias. Psychiatric/Behavioral:  Positive for sleep disturbance. Negative for agitation, self-injury and suicidal ideas. The patient is not nervous/anxious. 14 point ROS negative besides that noted in HPI    Physical exam:     Vitals:    08/23/22 0938   BP: 137/69   Pulse: 58   Resp: 16   Temp: (!) 96.7 °F (35.9 °C)   TempSrc: Temporal   SpO2: 97%   Weight: 255 lb (115.7 kg)   Height: 5' 5\" (1.651 m)       Body mass index is 42.43 kg/m². Physical Exam  Vitals and nursing note reviewed. Constitutional:       General: She is not in acute distress. Appearance: Normal appearance. She is well-developed. HENT:      Head: Normocephalic.       Right Ear: External ear normal.      Left Ear: External ear normal.      Nose: Nose normal.   Eyes:      Conjunctiva/sclera: Conjunctivae normal.      Pupils: Pupils are equal, round, and reactive to light. Neck:      Vascular: No JVD. Trachea: No tracheal deviation. Cardiovascular:      Rate and Rhythm: Normal rate. Pulmonary:      Effort: Pulmonary effort is normal.   Abdominal:      General: There is no distension. Tenderness: There is no abdominal tenderness. Musculoskeletal:      Cervical back: Spasms, tenderness and crepitus present. Pain with movement present. Decreased range of motion. Lumbar back: Spasms, tenderness (bilateral SI TTP) and bony tenderness present. Positive right straight leg raise test and positive left straight leg raise test.      Comments: + Dolan's on right side     Skin:     General: Skin is warm and dry. Neurological:      Mental Status: She is alert and oriented to person, place, and time. Cranial Nerves: No cranial nerve deficit. Psychiatric:         Mood and Affect: Mood normal.         Behavior: Behavior normal.         Thought Content:  Thought content normal.         Judgment: Judgment normal.       Labs:     Lab Results   Component Value Date/Time     07/12/2022 10:33 AM    K 4.2 07/12/2022 10:33 AM    K 4.4 08/12/2020 05:38 AM     07/12/2022 10:33 AM    CO2 26 07/12/2022 10:33 AM    BUN 23 07/12/2022 10:33 AM    CREATININE 0.7 07/12/2022 10:33 AM    GLUCOSE 111 07/12/2022 10:33 AM    CALCIUM 9.4 07/12/2022 10:33 AM        Lab Results   Component Value Date    WBC 5.5 03/23/2022    HGB 12.5 03/23/2022    HCT 41.5 03/23/2022    MCV 92.8 03/23/2022     03/23/2022       Assessment:                                                                                                                                        Active Problems:    Central stenosis of spinal canal    Facet arthritis of cervical region    Bulging of cervical intervertebral disc without myelopathy    Lumbosacral spondylosis without myelopathy    Lumbar facet arthropathy    Neural foraminal stenosis of lumbar spine    Bulge of lumbar disc without myelopathy    Chronic neck and back pain    Chronic bilateral low back pain without sciatica    Pain medication agreement    Lumbar radiculopathy    Cervical radiculopathy    Dolan sign present    Hand numbness    Numbness in feet  Resolved Problems:    * No resolved hospital problems. *      PLAN:    Chronic bilateral low back pain without sciatica  Chronic neck pain    - HYDROcodone-acetaminophen (NORCO) 5-325 MG per tablet; Take 1 tablet by mouth every 8 hours as needed for Pain for up to 30 days. Intended supply: 30 days  Dispense: 75 tablet; Refill: 0    Chronic neck and back pain   Neural foraminal stenosis of lumbar spine  Bulge of lumbar disc without myelopathy  Lumbar Radiculopathy    Schedule LESI of L4/L5 under fluoroscopy with medical director. Due to + SLR and lumbar radiculopathy. Consider HOLLI of C5/C6 in the future. Patient given literature on HOLLI and LESI. Reviewed risk factors of procedure. Pain medication agreement  Reviewed agreement with patient. Nurse reviewed and witness agreement. [x] Follow up    [] 4 weeks   [x] 6-8 weeks   [] 10-12 weeks   [] 3 months  [x] Post procedure to evaluate effectiveness of treatment  [] To evaluate medications changes made at office visit. [] To review diagnostics ordered at last visit. [] To evaluate response to therapy    [x] For management of controlled substance  [x] Random UDS as indicated by ORT score or if indicated by abberent behaviors       Discussion: Discussed exam findings, reviewed imaging yes - , and gave education regarding pathophysiology, exercise, and treatment options yes - , and reviewed plan of care with patient. Strongly reinforced that exercise is exteremly important part of pain management. Exercises should be completed upon awaking and before bed. Patient agreed with POC and questions were asked and answered. See DC instructions.      Activity: discussed exercise as beneficial to pain reduction, encouraged stretching exercise at least twice daily with a focus on torso (core) strengthening. Goal:  Pain Management Goals of Therapy:   [] Resolution in pain  [x] Decrease in pain level  [x] Improvement in ADL's  [x] Increase in activities with less pain  [] Decrease in medication     Controlled substance monitoring:    [x] Discussed medication side effects, risk of tolerance and/or dependence, and/or alternative treatment  [] Discussed the detrimental effects of long term narcotic use in younger patients especially women of childbearing years. [x] No signs and symptoms of potential drug abuse or diversion were identified  [] Signs of potential drug abuse or diversion were identified   [] ORT Score   [] UDS non-compliant   [] See Note  [] Random urine drug screen sent today  [x] Random urine drug screen not completed today   [x] Deferred New Patient  [] Compliant   [] Not Compliant see note  [x] Medication agreement with provider signed today  [] Medication agreement with provider on file under media 8/23/2022  [] Medication regimen effective with no c/o side effects and continued   [x] New patient continuing current medication while developing POC   [] On going assessment and evaluation of medication regimen  [] Medication regimen not effective see plan for changes  [x] Ladarius Montague reviewed & on file under media     CC:  Juan Saxena, [address] [facility], [facility], 9/23/2022 at 10:11 PM    EMR dragon/transcription disclaimer: Much of this encounter note is electronic transcription/translation of spoken language to printed tach. Electronic translation of spoken language may be erroneous, or at times, nonsensical words or phrases may be inadvertently transcribed.  Although, I have reviewed the note for such errors, some may still exist.

## 2022-08-23 NOTE — PROGRESS NOTES
Pain agreement contract reviewed with patient and signed per patient. No questions voiced at the present and patient states understanding of contract. Copy given to patient. Also gave patient a copy of office phone number options.

## 2022-08-23 NOTE — PROGRESS NOTES
Clinic Documentation      Education Provided:  [x] Review of Niranjan Diss  [x] Agreement Review  [x] PEG Score Calculated [x] PHQ Score Calculated [x] ORT Score Calculated    [] Compliance Issues Discussed [] Cognitive Behavior Needs [x] Exercise [] Review of Test [] Financial Issues  [x] Tobacco/Alcohol Use Reviewed [x] Teaching [x] New Patient [] Picture Obtained    Physician Plan:  [] Outgoing Referral  [] Pharmacy Consult  [] Test Ordered [x] Prescription Ordered/Changed   [] Obtained Test Results / Consult Notes        Complete if patient is withholding blood thinner for procedure     Blood Thinner Patient is currently taking:      [] Plavix (Hold for 7 days)  [] Aspirin (Hold for 5 days)     [] Pletal (Hold for 2 days)  [] Pradaxa (Hold for 3 days)    [] Effient (Hold for 7 days)  [] Xarelto (Hold for 2 days)    [] Eliquis (Hold for 2 days)  [] Brilinta (Hold for 7 days)    [] Coumadin (Hold for 5 days) - (INR needs to be drawn the day prior to procedure- INR < 2.0)    [] Aggrenox (Hold for 7 days)        [] Patient will stop medication on their own.    [] Blood Thinner Form Faxed for approval to hold.    Provider form faxed to:     Assessment Completed by:  Electronically signed by Joselin Marshall RN on 8/23/2022 at 11:16 AM

## 2022-09-12 RX ORDER — PIOGLITAZONEHYDROCHLORIDE 45 MG/1
TABLET ORAL
Qty: 90 TABLET | Refills: 1 | Status: SHIPPED | OUTPATIENT
Start: 2022-09-12

## 2022-09-13 ENCOUNTER — HOSPITAL ENCOUNTER (OUTPATIENT)
Dept: PAIN MANAGEMENT | Age: 67
Discharge: HOME | End: 2022-09-13
Payer: COMMERCIAL

## 2022-09-13 ENCOUNTER — TELEPHONE (OUTPATIENT)
Dept: PAIN MANAGEMENT | Age: 67
End: 2022-09-13

## 2022-09-13 VITALS
DIASTOLIC BLOOD PRESSURE: 90 MMHG | RESPIRATION RATE: 16 BRPM | TEMPERATURE: 96.8 F | OXYGEN SATURATION: 100 % | HEART RATE: 62 BPM | SYSTOLIC BLOOD PRESSURE: 167 MMHG

## 2022-09-13 DIAGNOSIS — R52 PAIN MANAGEMENT: ICD-10-CM

## 2022-09-13 PROCEDURE — 3209999900 FLUORO FOR SURGICAL PROCEDURES

## 2022-09-13 PROCEDURE — A4216 STERILE WATER/SALINE, 10 ML: HCPCS

## 2022-09-13 PROCEDURE — 2580000003 HC RX 258

## 2022-09-13 PROCEDURE — 6360000002 HC RX W HCPCS

## 2022-09-13 PROCEDURE — 2500000003 HC RX 250 WO HCPCS

## 2022-09-13 PROCEDURE — 62323 NJX INTERLAMINAR LMBR/SAC: CPT

## 2022-09-13 RX ORDER — METHYLPREDNISOLONE ACETATE 40 MG/ML
80 INJECTION, SUSPENSION INTRA-ARTICULAR; INTRALESIONAL; INTRAMUSCULAR; SOFT TISSUE ONCE
Status: DISCONTINUED | OUTPATIENT
Start: 2022-09-13 | End: 2022-09-15 | Stop reason: HOSPADM

## 2022-09-13 RX ORDER — SODIUM CHLORIDE 9 MG/ML
5 INJECTION INTRAVENOUS ONCE
Status: DISCONTINUED | OUTPATIENT
Start: 2022-09-13 | End: 2022-09-15 | Stop reason: HOSPADM

## 2022-09-13 RX ORDER — LIDOCAINE HYDROCHLORIDE 10 MG/ML
5 INJECTION, SOLUTION EPIDURAL; INFILTRATION; INTRACAUDAL; PERINEURAL ONCE
Status: DISCONTINUED | OUTPATIENT
Start: 2022-09-13 | End: 2022-09-15 | Stop reason: HOSPADM

## 2022-09-13 ASSESSMENT — PAIN - FUNCTIONAL ASSESSMENT: PAIN_FUNCTIONAL_ASSESSMENT: 0-10

## 2022-09-13 NOTE — INTERVAL H&P NOTE
Update History & Physical    The patient's History and Physical was reviewed with the patient and I examined the patient. There was no change. The surgical site was confirmed by the patient and me. Plan: The risks, benefits, expected outcome, and alternative to the recommended procedure have been discussed with the patient. Patient understands and wants to proceed with the procedure.      Electronically signed by Daryle Aris, MD on 9/13/2022 at 12:54 PM

## 2022-09-19 ENCOUNTER — TELEPHONE (OUTPATIENT)
Dept: PAIN MANAGEMENT | Age: 67
End: 2022-09-19

## 2022-09-23 PROBLEM — G89.29 CHRONIC BILATERAL LOW BACK PAIN WITHOUT SCIATICA: Status: ACTIVE | Noted: 2022-09-23

## 2022-09-23 PROBLEM — M54.50 CHRONIC BILATERAL LOW BACK PAIN WITHOUT SCIATICA: Status: ACTIVE | Noted: 2022-09-23

## 2022-09-23 PROBLEM — M54.16 LUMBAR RADICULOPATHY: Status: ACTIVE | Noted: 2022-09-23

## 2022-09-23 PROBLEM — M47.817 LUMBOSACRAL SPONDYLOSIS WITHOUT MYELOPATHY: Status: ACTIVE | Noted: 2022-09-23

## 2022-09-23 PROBLEM — Z02.89 PAIN MEDICATION AGREEMENT: Status: ACTIVE | Noted: 2022-09-23

## 2022-09-23 PROBLEM — M48.00 CENTRAL STENOSIS OF SPINAL CANAL: Status: ACTIVE | Noted: 2022-09-23

## 2022-09-23 PROBLEM — M54.12 CERVICAL RADICULOPATHY: Status: ACTIVE | Noted: 2022-09-23

## 2022-09-23 PROBLEM — M54.2 CHRONIC NECK AND BACK PAIN: Status: ACTIVE | Noted: 2022-09-23

## 2022-09-23 PROBLEM — R29.2 HOFFMAN SIGN PRESENT: Status: ACTIVE | Noted: 2022-09-23

## 2022-09-23 PROBLEM — M50.30 BULGING OF CERVICAL INTERVERTEBRAL DISC WITHOUT MYELOPATHY: Status: ACTIVE | Noted: 2022-09-23

## 2022-09-23 PROBLEM — M51.369 BULGE OF LUMBAR DISC WITHOUT MYELOPATHY: Status: ACTIVE | Noted: 2022-09-23

## 2022-09-23 PROBLEM — M47.816 LUMBAR FACET ARTHROPATHY: Status: ACTIVE | Noted: 2022-09-23

## 2022-09-23 PROBLEM — M51.36 BULGE OF LUMBAR DISC WITHOUT MYELOPATHY: Status: ACTIVE | Noted: 2022-09-23

## 2022-09-23 PROBLEM — M54.9 CHRONIC NECK AND BACK PAIN: Status: ACTIVE | Noted: 2022-09-23

## 2022-09-23 PROBLEM — G89.29 CHRONIC NECK AND BACK PAIN: Status: ACTIVE | Noted: 2022-09-23

## 2022-09-23 PROBLEM — M48.061 NEURAL FORAMINAL STENOSIS OF LUMBAR SPINE: Status: ACTIVE | Noted: 2022-09-23

## 2022-09-23 PROBLEM — M47.812 FACET ARTHRITIS OF CERVICAL REGION: Status: ACTIVE | Noted: 2022-09-23

## 2022-10-03 ENCOUNTER — TELEPHONE (OUTPATIENT)
Dept: PAIN MANAGEMENT | Age: 67
End: 2022-10-03

## 2022-10-03 ENCOUNTER — TELEPHONE (OUTPATIENT)
Dept: PRIMARY CARE CLINIC | Age: 67
End: 2022-10-03

## 2022-10-03 NOTE — TELEPHONE ENCOUNTER
Patient called third floor scheduling line, call forwarded to nurse line. Patient is asking for a letter from [facility] to excuse her from jury duty. I let her know that Agustina Mckeon does not write any letters for excuse from jury. I suggested that she try her PCP to see if they can help. Patient verbalized understanding.

## 2022-10-03 NOTE — LETTER
[place] [address] [place] [address]  Phone: [phone]  Fax: [phone]    MIKEY Cano CNP        October 4, 2022      To whom it may concern,         Anant Salazar is under my medical care. She is a very brittle diabetic and requires frequent checking of blood sugar and medication. She also has urinary incontinence that would impede her from serving on a jury duty. She recently had a knee replacement and is doing rehab for this.   Please excuse her from jury duty      Sincerely,        MIKEY Cano CNP

## 2022-10-11 ENCOUNTER — TELEPHONE (OUTPATIENT)
Dept: PRIMARY CARE CLINIC | Age: 67
End: 2022-10-11

## 2022-10-11 RX ORDER — MECLIZINE HCL 12.5 MG/1
12.5 TABLET ORAL 3 TIMES DAILY PRN
Qty: 15 TABLET | Refills: 0 | Status: SHIPPED | OUTPATIENT
Start: 2022-10-11 | End: 2022-10-21

## 2022-10-11 NOTE — TELEPHONE ENCOUNTER
Pt states she woke up dizzy and with Nausea this AM. Pt states she has had this before and asking for Vertigo med.

## 2022-10-20 RX ORDER — CELECOXIB 200 MG/1
CAPSULE ORAL
Qty: 60 CAPSULE | Refills: 0 | Status: SHIPPED | OUTPATIENT
Start: 2022-10-20

## 2022-11-07 RX ORDER — AMLODIPINE BESYLATE 5 MG/1
TABLET ORAL
Qty: 90 TABLET | Refills: 3 | Status: SHIPPED | OUTPATIENT
Start: 2022-11-07

## 2022-11-08 RX ORDER — DULAGLUTIDE 1.5 MG/.5ML
INJECTION, SOLUTION SUBCUTANEOUS
Qty: 4 ML | Refills: 5 | Status: SHIPPED | OUTPATIENT
Start: 2022-11-08

## 2022-11-10 ENCOUNTER — HOSPITAL ENCOUNTER (OUTPATIENT)
Dept: PAIN MANAGEMENT | Age: 67
Discharge: HOME | End: 2022-11-10
Payer: COMMERCIAL

## 2022-11-10 VITALS
BODY MASS INDEX: 44.65 KG/M2 | SYSTOLIC BLOOD PRESSURE: 133 MMHG | HEIGHT: 65 IN | OXYGEN SATURATION: 96 % | RESPIRATION RATE: 16 BRPM | WEIGHT: 268 LBS | HEART RATE: 67 BPM | TEMPERATURE: 97.6 F | DIASTOLIC BLOOD PRESSURE: 67 MMHG

## 2022-11-10 PROCEDURE — 99213 OFFICE O/P EST LOW 20 MIN: CPT

## 2022-11-10 RX ORDER — HYDROCODONE BITARTRATE AND ACETAMINOPHEN 5; 325 MG/1; MG/1
1 TABLET ORAL EVERY 8 HOURS PRN
COMMUNITY

## 2022-11-10 ASSESSMENT — PAIN DESCRIPTION - LOCATION
LOCATION: BACK
LOCATION_2: NECK

## 2022-11-10 ASSESSMENT — PAIN DESCRIPTION - INTENSITY: RATING_2: 7

## 2022-11-10 ASSESSMENT — PAIN DESCRIPTION - ORIENTATION
ORIENTATION_2: LOWER
ORIENTATION: LOWER

## 2022-11-10 ASSESSMENT — PAIN DESCRIPTION - PAIN TYPE: TYPE: CHRONIC PAIN

## 2022-11-10 ASSESSMENT — PAIN SCALES - GENERAL: PAINLEVEL_OUTOF10: 8

## 2022-11-10 NOTE — PROGRESS NOTES
Clinic Documentation      Education Provided:  [x] Review of Gerson Cortes  [] Agreement Review  [x] PEG Score Calculated [] PHQ Score Calculated [] ORT Score Calculated    [] Compliance Issues Discussed [] Cognitive Behavior Needs [x] Exercise [] Review of Test [] Financial Issues  [x] Tobacco/Alcohol Use Reviewed [x] Teaching [] New Patient [] Picture Obtained    Physician Plan:  [] Outgoing Referral  [] Pharmacy Consult  [] Test Ordered [] Prescription Ordered/Changed   [] Obtained Test Results / Consult Notes        Complete if patient is withholding blood thinner for procedure     Blood Thinner Patient is currently taking:      [] Plavix (Hold for 7 days)  [] Aspirin (Hold for 5 days)     [] Pletal (Hold for 2 days)  [] Pradaxa (Hold for 3 days)    [] Effient (Hold for 7 days)  [] Xarelto (Hold for 2 days)    [] Eliquis (Hold for 2 days)  [] Brilinta (Hold for 7 days)    [] Coumadin (Hold for 5 days) - (INR needs to be drawn the day prior to procedure- INR < 2.0)    [] Aggrenox (Hold for 7 days)        [] Patient will stop medication on their own.    [] Blood Thinner Form Faxed for approval to hold.    Provider form faxed to:     Assessment Completed by:  Electronically signed by Tom Quezada RN on 11/10/2022 at 4:29 PM

## 2022-11-18 RX ORDER — CELECOXIB 200 MG/1
CAPSULE ORAL
Qty: 60 CAPSULE | Refills: 0 | Status: SHIPPED | OUTPATIENT
Start: 2022-11-18

## 2022-11-30 ENCOUNTER — HOSPITAL ENCOUNTER (OUTPATIENT)
Dept: PAIN MANAGEMENT | Age: 67
Discharge: HOME | End: 2022-11-30
Payer: COMMERCIAL

## 2022-11-30 VITALS
DIASTOLIC BLOOD PRESSURE: 54 MMHG | RESPIRATION RATE: 18 BRPM | HEART RATE: 62 BPM | SYSTOLIC BLOOD PRESSURE: 147 MMHG | OXYGEN SATURATION: 98 % | TEMPERATURE: 96 F

## 2022-11-30 DIAGNOSIS — M62.830 SPASM OF THORACIC BACK MUSCLE: ICD-10-CM

## 2022-11-30 DIAGNOSIS — M54.2 MUSCLE PAIN, CERVICAL: ICD-10-CM

## 2022-11-30 PROCEDURE — 20553 NJX 1/MLT TRIGGER POINTS 3/>: CPT

## 2022-11-30 PROCEDURE — 2500000003 HC RX 250 WO HCPCS

## 2022-11-30 RX ORDER — BUPIVACAINE HYDROCHLORIDE 5 MG/ML
10 INJECTION, SOLUTION EPIDURAL; INTRACAUDAL ONCE
Status: DISCONTINUED | OUTPATIENT
Start: 2022-11-30 | End: 2022-12-02 | Stop reason: HOSPADM

## 2022-11-30 RX ORDER — LIDOCAINE HYDROCHLORIDE 10 MG/ML
10 INJECTION, SOLUTION EPIDURAL; INFILTRATION; INTRACAUDAL; PERINEURAL ONCE
Status: DISCONTINUED | OUTPATIENT
Start: 2022-11-30 | End: 2022-12-02 | Stop reason: HOSPADM

## 2022-11-30 NOTE — DISCHARGE INSTRUCTIONS
will be numb for a few hours after the procedure    [] I understand if a steroid was used it will take effect in 3 - 7 days. I understand that as the numbing medication wears off, the pain may return until the steroids start working. [x] I understand that today I will rest for the next 24 hours and drink plenty of water. [] For Botox for Migraines please do not wear anything constricting around the forehead for 7-10 days post injection. Examples headband, hats, or bandana    [] For Botox for Spasticity start therapy for the affected limb in two weeks. [] Additional instructions: ______________________________________________ ___________________________________________________________________    Sedation:  Was oral sedation given? [] Yes  [x] No    I understand that if I took an oral nerve calming medication I will not drive for  [] 24 hours after taking the medication.     [x] I have received a copy of my discharge instructions and understand the above instructions to the best of my knowledge    Patient Discharged:  [x] Home  [] Hospital  [] Other  ____________________________________________    Via:  [x] Ambulatory  [] Wheelchair   [] Stretcher [] EMS       Accompanied By:   [] Significant other  [x] Family Member  [] Friend   [] Hospital Staff  []  Ambulance Staff  [] Other_______________________________________________    Plan:  [x] Will return to the office in   [] 1 month   [] 3 months for:  [] Procedure Follow-up  [] Office Visit   [] Planned Procedure      Patient Signature: _____________________________________________________    Staff Signature: _______________________________________________________        If you have questions, problems or concerns you may call [phone] and follow the prompts    MATT Costa, VA-BC

## 2022-11-30 NOTE — PROCEDURES
[facility] Physical & Pain Medicine    Patient Name: Anthony Floyd    : 1955                    Age: 74 y.o. Sex: female    Date: 2022    Pre-op Diagnosis: Myofascial Pain/ Muscle Spasms/ Cervicalgia    Post-op Diagnosis: Myofascial Pain/ Muscle Spasms/ Cervicalgia    Procedure: Cervical Trigger Point Injections    Performing Procedure: MATT Berrios - BC, VA-BC    Patient Vitals for the past 24 hrs:   BP Temp Temp src Pulse Resp SpO2   22 1050 (!) 147/54 (!) 96 °F (35.6 °C) Temporal 62 18 98 %       Description of Procedure:    After a brief physical assessment and failure to improve with conservative measures the patient presented for Cervical Trigger Point Injections The indications, limitations and possible complications were discussed with the patient and the patient elected to proceed with the procedure. After voluntary, informed and signed consent obtained the patient was placed in a seated position. Appropriate time out was obtained per policy. The area of maximal tenderness was palpated over the   bilaterally Cervical muscles - Splenius  Trapezius  Rhomboid The skin overlying these areas was marked with a skin marker. The skin overlying the proposed injection sites were then sprayed with Gebauer's Solution while protecting patient eyes. The areas were prepped using aseptic technique with CHG prep. Each trigger point of the Cervical muscles - Splenius  Trapezius  Rhomboid was injected with approximately 1-2 ml of a solution of 5 ml of 1% Lidocaine Plain and 5 ml of 0.5% Marcaine Plain after negative aspiration    Discharge: The patient tolerated the procedure well. There were no complications during the procedure and the patient was discharged home with discharge instructions.     The patient has been instructed to contact the office should there be any complications or questions to arise between today and their next appointment. Plan:    Patient to follow up in the office in about 6 weeks.      Carlotta Sandra, MIKEY - CNP, 11/30/2022 at 12:47 PM

## 2022-12-06 ENCOUNTER — TELEPHONE (OUTPATIENT)
Dept: PAIN MANAGEMENT | Age: 67
End: 2022-12-06

## 2022-12-13 ENCOUNTER — HOSPITAL ENCOUNTER (OUTPATIENT)
Dept: PAIN MANAGEMENT | Age: 67
Discharge: HOME | End: 2022-12-13
Payer: MEDICARE

## 2022-12-13 VITALS
DIASTOLIC BLOOD PRESSURE: 75 MMHG | TEMPERATURE: 97.2 F | SYSTOLIC BLOOD PRESSURE: 133 MMHG | OXYGEN SATURATION: 100 % | RESPIRATION RATE: 18 BRPM | HEART RATE: 63 BPM

## 2022-12-13 DIAGNOSIS — R52 PAIN MANAGEMENT: ICD-10-CM

## 2022-12-13 PROCEDURE — 2580000003 HC RX 258

## 2022-12-13 PROCEDURE — A4216 STERILE WATER/SALINE, 10 ML: HCPCS

## 2022-12-13 PROCEDURE — 62323 NJX INTERLAMINAR LMBR/SAC: CPT

## 2022-12-13 PROCEDURE — 2500000003 HC RX 250 WO HCPCS

## 2022-12-13 PROCEDURE — 6360000002 HC RX W HCPCS

## 2022-12-13 RX ORDER — SODIUM CHLORIDE 9 MG/ML
5 INJECTION INTRAVENOUS ONCE
Status: DISCONTINUED | OUTPATIENT
Start: 2022-12-13 | End: 2022-12-15 | Stop reason: HOSPADM

## 2022-12-13 RX ORDER — METHYLPREDNISOLONE ACETATE 80 MG/ML
80 INJECTION, SUSPENSION INTRA-ARTICULAR; INTRALESIONAL; INTRAMUSCULAR; SOFT TISSUE ONCE
Status: DISCONTINUED | OUTPATIENT
Start: 2022-12-13 | End: 2022-12-15 | Stop reason: HOSPADM

## 2022-12-13 RX ORDER — LIDOCAINE HYDROCHLORIDE 10 MG/ML
5 INJECTION, SOLUTION EPIDURAL; INFILTRATION; INTRACAUDAL; PERINEURAL ONCE
Status: DISCONTINUED | OUTPATIENT
Start: 2022-12-13 | End: 2022-12-15 | Stop reason: HOSPADM

## 2022-12-13 ASSESSMENT — PAIN DESCRIPTION - PAIN TYPE: TYPE: CHRONIC PAIN

## 2022-12-13 ASSESSMENT — PAIN DESCRIPTION - LOCATION: LOCATION: BACK

## 2022-12-13 ASSESSMENT — PAIN SCALES - GENERAL: PAINLEVEL_OUTOF10: 6

## 2022-12-13 ASSESSMENT — PAIN - FUNCTIONAL ASSESSMENT: PAIN_FUNCTIONAL_ASSESSMENT: 0-10

## 2022-12-13 ASSESSMENT — PAIN DESCRIPTION - ORIENTATION: ORIENTATION: LOWER

## 2022-12-13 NOTE — INTERVAL H&P NOTE
Update History & Physical    The patient's History and Physical   was reviewed with the patient and I examined the patient. There was no change. The surgical site was confirmed by the patient and me. Plan: The risks, benefits, expected outcome, and alternative to the recommended procedure have been discussed with the patient. Patient understands and wants to proceed with the procedure.      Electronically signed by Sue Riddle MD on 12/13/2022 at 11:53 AM

## 2022-12-18 PROBLEM — Z51.81 ENCOUNTER FOR MONITORING OPIOID MAINTENANCE THERAPY: Status: ACTIVE | Noted: 2022-12-18

## 2022-12-18 PROBLEM — Z79.891 ENCOUNTER FOR MONITORING OPIOID MAINTENANCE THERAPY: Status: ACTIVE | Noted: 2022-12-18

## 2022-12-18 ASSESSMENT — ENCOUNTER SYMPTOMS
CONSTIPATION: 0
BACK PAIN: 1
BOWEL INCONTINENCE: 1

## 2022-12-18 NOTE — PROGRESS NOTES
[facility] Physical & Pain Medicine    Office Visit    Patient Name: Lizett Wise    MR #: [id number]    Account [de-identified]    : 1955    Age: 74 y.o. Sex: female    Date: 11/10/2022    PCP: MIKEY Huff CNP     Chief Complaint:   Chief Complaint   Patient presents with    Back Pain    Neck Pain       History of Present Illness: The patient is a 74 y.o. female who was referrred with primary complaints of chronic neck and back pain. Patient had LESI of L4/L5 on 2022. Patient had at least 60% relief of pain from procedure(s) for at least 6 weeks and was able to increase activity after procedure. Patient received enough pain relief from injections that the patient would like to repeat the injection(s). Patient states that she could tell this was the location of her back pain. Back Pain  This is a chronic problem. The current episode started more than 1 year ago. The problem occurs constantly. The problem has been waxing and waning since onset. The pain is present in the lumbar spine. Quality: throbbing, walking goes up towards the middle. The symptoms are aggravated by standing (walking). Associated symptoms include bladder incontinence, bowel incontinence, headaches (mild), numbness, paresthesias and weakness. Neck Pain   This is a chronic problem. The current episode started more than 1 year ago. The problem occurs constantly. The problem has been waxing and waning. The quality of the pain is described as burning (lot of pain). The symptoms are aggravated by bending, position and twisting. Associated symptoms include headaches (mild), numbness and weakness. Past Imaging  MRI cervical spine (without contrast)   MRI of C-spine 2/10/2022  C2-3: There is moderate facetal arthropathy. C3-4: There is bilateral facet arthropathy. C4-5: There are large posterior osteophytes and moderate diffuse disc bulging. There is bilateral facetal arthropathy.  There is mild spinal stenosis. There is bilateral neural foraminal stenosis. C5-6:. There are large posterior osteophytes. Moderate diffuse disc bulging. There is bilateral facetal arthropathy. There is moderately severe spinal stenosis and bilateral neural foraminal stenosis, more towards left. C6-7: There are prominent osteophytes, and disc bulging, more severe towards left. There is bilateral facet arthropathy. There is moderate asymmetrical spinal stenosis and left neural foraminal stenosis. C7-T1: There are posterior osteophytes. There is a disc bulging/disc extrusion. There is bilateral facet arthropathy. There is bilateral neural foraminal stenosis, right more than the left. There is moderate narrowing of compression of the cord opposite the bulging discs at C4-5, C5-6 and C6-7. MRI lumbosacral spine (without contrast)  MRI of Lumbar Spine 2021  L1-2: There are prominent posterior osteophytes. There is moderate diffuse disc bulging. There is bilateral facetal arthropathy. There is moderate spinal stenosis. There is moderate narrowing of the neural foramina bilaterally. L2-3: There are prominent posterior osteophytes. There is moderate disc bulging. There is bilateral facet arthropathy. There is moderate spinal stenosis. There is left neural foraminal stenosis. L3-4: There is moderate diffuse disc bulging. There is bilateral facetal arthropathy and ligamentum hypertrophy. There is severe spinal stenosis. L4-5: There is disc disc protrusion/displacement. There is severe facet arthropathy and ligamentum hypertrophy. There is moderate spinal stenosis. There is bilateral neural foraminal stenosis, more severe on the right side. L5-S1: There is moderate diffuse disc bulging.  There is bilateral facet arthropathy      Screening Tools:     PE.7    Past PEG: NA    ORT: 1    PHQ-9: 6    Current Pain Assessment  Pain Assessment  Pain Assessment: 0-10  Pain Level: 8  Pain Location: Back  Pain Orientation: Lower  Pain Type: Chronic pain  Multiple Pain Sites: Yes    Past Visit HPI:  8/2022  Patient states that climbing up stairs causes worsening pain. Patient states that she has more pain getting up from a seated position than from the act of sitting down. Patient stated that she has trouble picking up her grand baby. Patient states that if she stands for too long her right foot goes numb. Patient has trouble climbing into vehicles and lifting her leg. Patient has a tingling sensation in the right LE that follows her L4/L5. Patient states that the numbness has improved to the left side. Employment: [employer]    Past Medical History  Past Medical History:   Diagnosis Date    Depression 2022    Diabetes mellitus ([facility] [address]     History of blood transfusion     1976 post childbirth    Hypertension     Thyroid disease        Allergies  Pcn [penicillins]    Current Medications  Current Outpatient Medications   Medication Sig Dispense Refill    HYDROcodone-acetaminophen (NORCO) 5-325 MG per tablet Take 1 tablet by mouth every 8 hours as needed for Pain.       celecoxib (CELEBREX) 200 MG capsule TAKE 1 CAPSULE BY MOUTH TWICE DAILY TO  REPLACE  DICLOFENAC 60 capsule 0    TRULICITY 1.5 UX/3.2VJ SC injection INJECT 1.5 MG SUB-Q ONCE A WEEK 4 mL 5    amLODIPine (NORVASC) 5 MG tablet Take 1 tablet by mouth once daily 90 tablet 3    pioglitazone (ACTOS) 45 MG tablet Take 1 tablet by mouth once daily 90 tablet 1    metFORMIN (GLUCOPHAGE) 1000 MG tablet TAKE 1 TABLET BY MOUTH TWICE DAILY WITH MEALS FOR DIABETES 180 tablet 3    busPIRone (BUSPAR) 10 MG tablet TAKE 1 TABLET BY MOUTH THREE TIMES DAILY 90 tablet 1    levothyroxine (EUTHYROX) 150 MCG tablet Take 1 tablet by mouth once daily 90 tablet 3    sertraline (ZOLOFT) 100 MG tablet TAKE 1 TABLET BY MOUTH ONCE DAILY FOR DEPRESSION 90 tablet 3    Lancets MISC 1 each by Does not apply route 2 times daily 100 each 5    Blood Glucose Monitoring Suppl (FREESTYLE LITE) ROBEL 1 Device by Does not apply route daily   0    blood glucose monitor strips Test 3 times a day & as needed for symptoms of irregular blood glucose. Freestyle lite (Patient taking differently: 1 strip by Other route 3 times daily as needed Test 3 times a day & as needed for symptoms of irregular blood glucose. Freestyle lite) 100 strip 3    glucose monitoring kit (FREESTYLE) monitoring kit 1 kit by Does not apply route 3 times daily (before meals) Dx E11.8 1 kit 0     No current facility-administered medications for this encounter. Social History    Social History     Socioeconomic History    Marital status:      Spouse name: None    Number of children: 1    Years of education: None    Highest education level: None   Tobacco Use    Smoking status: Never    Smokeless tobacco: Never   Vaping Use    Vaping Use: Never used   Substance and Sexual Activity    Alcohol use: No    Drug use: No    Sexual activity: Not Currently     Comment: patient is          Family History  family history includes Breast Cancer in her paternal aunt; Cancer in her mother; Diabetes in her brother; High Blood Pressure in her father; Other in her sister. Review of Systems:  Review of Systems   Constitutional:  Positive for activity change. Gastrointestinal:  Positive for bowel incontinence. Negative for constipation. Genitourinary:  Positive for bladder incontinence. Musculoskeletal:  Positive for arthralgias, back pain, gait problem, myalgias, neck pain and neck stiffness. Neurological:  Positive for weakness, numbness, headaches (mild) and paresthesias. Psychiatric/Behavioral:  Positive for sleep disturbance. Negative for agitation, self-injury and suicidal ideas. The patient is not nervous/anxious.        14 point ROS negative besides that noted in HPI    Physical exam:     Vitals:    11/10/22 1556   BP: 133/67   Pulse: 67   Resp: 16   Temp: 97.6 °F (36.4 °C)   TempSrc: Temporal   SpO2: 96%   Weight: 268 lb (121.6 kg) Height: 5' 5\" (1.651 m)       Body mass index is 44.6 kg/m². Physical Exam  Vitals and nursing note reviewed. Constitutional:       General: She is not in acute distress. Appearance: Normal appearance. She is well-developed. HENT:      Head: Normocephalic. Right Ear: External ear normal.      Left Ear: External ear normal.      Nose: Nose normal.   Eyes:      Conjunctiva/sclera: Conjunctivae normal.      Pupils: Pupils are equal, round, and reactive to light. Neck:      Vascular: No JVD. Trachea: No tracheal deviation. Cardiovascular:      Rate and Rhythm: Normal rate. Pulmonary:      Effort: Pulmonary effort is normal.   Abdominal:      General: There is no distension. Tenderness: There is no abdominal tenderness. Musculoskeletal:      Cervical back: Spasms, tenderness and crepitus present. Pain with movement present. Decreased range of motion. Lumbar back: Spasms, tenderness (bilateral SI TTP) and bony tenderness present. Positive right straight leg raise test and positive left straight leg raise test.      Comments: + Dolan's on right side     Skin:     General: Skin is warm and dry. Neurological:      Mental Status: She is alert and oriented to person, place, and time. Cranial Nerves: No cranial nerve deficit. Psychiatric:         Mood and Affect: Mood normal.         Behavior: Behavior normal.         Thought Content:  Thought content normal.         Judgment: Judgment normal.       Labs:     Lab Results   Component Value Date/Time     07/12/2022 10:33 AM    K 4.2 07/12/2022 10:33 AM    K 4.4 08/12/2020 05:38 AM     07/12/2022 10:33 AM    CO2 26 07/12/2022 10:33 AM    BUN 23 07/12/2022 10:33 AM    CREATININE 0.7 07/12/2022 10:33 AM    GLUCOSE 111 07/12/2022 10:33 AM    CALCIUM 9.4 07/12/2022 10:33 AM        Lab Results   Component Value Date    WBC 5.5 03/23/2022    HGB 12.5 03/23/2022    HCT 41.5 03/23/2022    MCV 92.8 03/23/2022     03/23/2022       Assessment:                                                                                                                                        Active Problems:    Central stenosis of spinal canal    Facet arthritis of cervical region    Bulging of cervical intervertebral disc without myelopathy    Lumbosacral spondylosis without myelopathy    Lumbar facet arthropathy    Bulge of lumbar disc without myelopathy    Chronic neck and back pain    Chronic bilateral low back pain without sciatica    Lumbar radiculopathy    Cervical radiculopathy    Dolan sign present    Muscle pain, cervical    Encounter for monitoring opioid maintenance therapy    Hand numbness  Resolved Problems:    * No resolved hospital problems. *      PLAN:    Chronic bilateral low back pain without sciatica  Chronic neck pain  No refill needed at office appoinmtent  - HYDROcodone-acetaminophen (NORCO) 5-325 MG per tablet; Take 1 tablet by mouth every 8 hours as needed for Pain for up to 30 days. Intended supply: 30 days  Dispense: 75 tablet; Refill: 0    Chronic neck and back pain   Neural foraminal stenosis of lumbar spine  Bulge of lumbar disc without myelopathy  Lumbar Radiculopathy    Re-Schedule LESI of L4/L5 under fluoroscopy with medical director. Due to + SLR and lumbar radiculopathy. Patient did have relief and she could sates that this was the area of pain. Schedule bilateral cervical trigger points for cervical muscle pain. Consider HOLLI of C5/C6 in the future. Patient given literature on HOLLI and LESI. Reviewed risk factors of procedure. Pain medication agreement  Reviewed agreement with patient. Nurse reviewed and witness agreement. Encounter for opioid maintenance monitoring:  UDS today  Patient does take medication sparingly.      [x] Follow up    [] 4 weeks   [x] 6-8 weeks   [] 10-12 weeks   [] 3 months  [x] Post procedure to evaluate effectiveness of treatment  [] To evaluate medications changes made at office visit. [] To review diagnostics ordered at last visit. [] To evaluate response to therapy    [x] For management of controlled substance  [x] Random UDS as indicated by ORT score or if indicated by abberent behaviors       Discussion: Discussed exam findings, reviewed imaging yes - , and gave education regarding pathophysiology, exercise, and treatment options yes - , and reviewed plan of care with patient. Strongly reinforced that exercise is exteremly important part of pain management. Exercises should be completed upon awaking and before bed. Patient agreed with POC and questions were asked and answered. See DC instructions. Activity: discussed exercise as beneficial to pain reduction, encouraged stretching exercise at least twice daily with a focus on torso (core) strengthening. Goal:  Pain Management Goals of Therapy:   [] Resolution in pain  [x] Decrease in pain level  [x] Improvement in ADL's  [x] Increase in activities with less pain  [] Decrease in medication     Controlled substance monitoring:    [x] Discussed medication side effects, risk of tolerance and/or dependence, and/or alternative treatment  [] Discussed the detrimental effects of long term narcotic use in younger patients especially women of childbearing years.   [x] No signs and symptoms of potential drug abuse or diversion were identified  [] Signs of potential drug abuse or diversion were identified   [] ORT Score   [] UDS non-compliant   [] See Note  [x] Random urine drug screen sent today  [] Random urine drug screen not completed today   [] Deferred New Patient  [] Compliant   [] Not Compliant see note  [] Medication agreement with provider signed today  [x] Medication agreement with provider on file under media 8/23/2022  [] Medication regimen effective with no c/o side effects and continued   [] New patient continuing current medication while developing POC   [x] On going assessment and evaluation of medication regimen  [] Medication regimen not effective see plan for changes  [x] Francesca Snyder reviewed & on file under media     CC:  Isauro Calzada, [address] [facility], APRN - [facility], 12/18/2022 at 1:42 PM    EMR dragon/transcription disclaimer: Much of this encounter note is electronic transcription/translation of spoken language to printed tach. Electronic translation of spoken language may be erroneous, or at times, nonsensical words or phrases may be inadvertently transcribed.  Although, I have reviewed the note for such errors, some may still exist.

## 2022-12-19 ENCOUNTER — TELEPHONE (OUTPATIENT)
Dept: PAIN MANAGEMENT | Age: 67
End: 2022-12-19

## 2022-12-19 NOTE — TELEPHONE ENCOUNTER
About 50% improvement so far with a pain score of 4/10 today. This is her third LESI an getting better pain relief after each one.

## 2022-12-22 RX ORDER — CELECOXIB 200 MG/1
CAPSULE ORAL
Qty: 60 CAPSULE | Refills: 3 | Status: SHIPPED | OUTPATIENT
Start: 2022-12-22

## 2023-01-12 ENCOUNTER — OFFICE VISIT (OUTPATIENT)
Dept: PRIMARY CARE CLINIC | Age: 68
End: 2023-01-12
Payer: MEDICARE

## 2023-01-12 VITALS
BODY MASS INDEX: 44.98 KG/M2 | SYSTOLIC BLOOD PRESSURE: 132 MMHG | DIASTOLIC BLOOD PRESSURE: 80 MMHG | RESPIRATION RATE: 16 BRPM | TEMPERATURE: 96.3 F | HEART RATE: 62 BPM | WEIGHT: 270 LBS | OXYGEN SATURATION: 98 % | HEIGHT: 65 IN

## 2023-01-12 DIAGNOSIS — Z00.00 WELCOME TO MEDICARE PREVENTIVE VISIT: Primary | ICD-10-CM

## 2023-01-12 DIAGNOSIS — Z13.220 ENCOUNTER FOR SCREENING FOR LIPID DISORDER: ICD-10-CM

## 2023-01-12 DIAGNOSIS — E11.9 TYPE 2 DIABETES MELLITUS WITHOUT COMPLICATION, WITHOUT LONG-TERM CURRENT USE OF INSULIN (HCC): ICD-10-CM

## 2023-01-12 DIAGNOSIS — Z23 NEED FOR PNEUMOCOCCAL VACCINATION: ICD-10-CM

## 2023-01-12 DIAGNOSIS — E03.9 ACQUIRED HYPOTHYROIDISM: ICD-10-CM

## 2023-01-12 PROBLEM — I10 ESSENTIAL HYPERTENSION: Status: ACTIVE | Noted: 2023-01-12

## 2023-01-12 PROBLEM — E66.01 MORBID OBESITY DUE TO EXCESS CALORIES (HCC): Status: ACTIVE | Noted: 2023-01-12

## 2023-01-12 PROBLEM — M81.0 OSTEOPOROSIS: Status: ACTIVE | Noted: 2017-04-30

## 2023-01-12 PROBLEM — F33.41 RECURRENT MAJOR DEPRESSIVE DISORDER, IN PARTIAL REMISSION (HCC): Status: ACTIVE | Noted: 2017-04-28

## 2023-01-12 PROBLEM — E11.42 TYPE 2 DIABETES MELLITUS WITH DIABETIC POLYNEUROPATHY, WITHOUT LONG-TERM CURRENT USE OF INSULIN (HCC): Status: ACTIVE | Noted: 2018-03-05

## 2023-01-12 LAB
ALBUMIN SERPL-MCNC: 4 G/DL (ref 3.5–5.2)
ALP BLD-CCNC: 105 U/L (ref 35–104)
ALT SERPL-CCNC: 15 U/L (ref 5–33)
ANION GAP SERPL CALCULATED.3IONS-SCNC: 9 MMOL/L (ref 7–19)
AST SERPL-CCNC: 16 U/L (ref 5–32)
BILIRUB SERPL-MCNC: 0.3 MG/DL (ref 0.2–1.2)
BUN BLDV-MCNC: 21 MG/DL (ref 8–23)
CALCIUM SERPL-MCNC: 9.7 MG/DL (ref 8.8–10.2)
CHLORIDE BLD-SCNC: 101 MMOL/L (ref 98–111)
CHOLESTEROL, TOTAL: 197 MG/DL (ref 160–199)
CO2: 29 MMOL/L (ref 22–29)
CREAT SERPL-MCNC: 0.7 MG/DL (ref 0.5–0.9)
GFR SERPL CREATININE-BSD FRML MDRD: >60 ML/MIN/{1.73_M2}
GLUCOSE BLD-MCNC: 107 MG/DL (ref 74–109)
HBA1C MFR BLD: 5.5 % (ref 4–6)
HDLC SERPL-MCNC: 58 MG/DL (ref 65–121)
LDL CHOLESTEROL CALCULATED: 123 MG/DL
POTASSIUM SERPL-SCNC: 4.5 MMOL/L (ref 3.5–5)
SODIUM BLD-SCNC: 139 MMOL/L (ref 136–145)
T4 FREE: 1.38 NG/DL (ref 0.93–1.7)
TOTAL PROTEIN: 6.9 G/DL (ref 6.6–8.7)
TRIGL SERPL-MCNC: 80 MG/DL (ref 0–149)
TSH SERPL DL<=0.05 MIU/L-ACNC: 2.17 UIU/ML (ref 0.27–4.2)

## 2023-01-12 PROCEDURE — 3044F HG A1C LEVEL LT 7.0%: CPT | Performed by: NURSE PRACTITIONER

## 2023-01-12 PROCEDURE — 3075F SYST BP GE 130 - 139MM HG: CPT | Performed by: NURSE PRACTITIONER

## 2023-01-12 PROCEDURE — 90677 PCV20 VACCINE IM: CPT | Performed by: NURSE PRACTITIONER

## 2023-01-12 PROCEDURE — 3079F DIAST BP 80-89 MM HG: CPT | Performed by: NURSE PRACTITIONER

## 2023-01-12 PROCEDURE — G0009 ADMIN PNEUMOCOCCAL VACCINE: HCPCS | Performed by: NURSE PRACTITIONER

## 2023-01-12 PROCEDURE — G0402 INITIAL PREVENTIVE EXAM: HCPCS | Performed by: NURSE PRACTITIONER

## 2023-01-12 PROCEDURE — 1123F ACP DISCUSS/DSCN MKR DOCD: CPT | Performed by: NURSE PRACTITIONER

## 2023-01-12 RX ORDER — CELECOXIB 200 MG/1
CAPSULE ORAL
Qty: 180 CAPSULE | Refills: 3 | Status: SHIPPED | OUTPATIENT
Start: 2023-01-12

## 2023-01-12 RX ORDER — SERTRALINE HYDROCHLORIDE 100 MG/1
TABLET, FILM COATED ORAL
Qty: 90 TABLET | Refills: 3 | Status: SHIPPED | OUTPATIENT
Start: 2023-01-12

## 2023-01-12 RX ORDER — DULAGLUTIDE 1.5 MG/.5ML
INJECTION, SOLUTION SUBCUTANEOUS
Qty: 12 ADJUSTABLE DOSE PRE-FILLED PEN SYRINGE | Refills: 3 | Status: SHIPPED | OUTPATIENT
Start: 2023-01-12

## 2023-01-12 RX ORDER — BLOOD-GLUCOSE METER
1 KIT MISCELLANEOUS DAILY
Qty: 1 EACH | Refills: 0 | Status: SHIPPED | OUTPATIENT
Start: 2023-01-12

## 2023-01-12 RX ORDER — BUSPIRONE HYDROCHLORIDE 10 MG/1
TABLET ORAL
Qty: 90 TABLET | Refills: 3 | Status: SHIPPED | OUTPATIENT
Start: 2023-01-12

## 2023-01-12 SDOH — ECONOMIC STABILITY: FOOD INSECURITY: WITHIN THE PAST 12 MONTHS, YOU WORRIED THAT YOUR FOOD WOULD RUN OUT BEFORE YOU GOT MONEY TO BUY MORE.: NEVER TRUE

## 2023-01-12 SDOH — ECONOMIC STABILITY: FOOD INSECURITY: WITHIN THE PAST 12 MONTHS, THE FOOD YOU BOUGHT JUST DIDN'T LAST AND YOU DIDN'T HAVE MONEY TO GET MORE.: NEVER TRUE

## 2023-01-12 ASSESSMENT — LIFESTYLE VARIABLES
HOW MANY STANDARD DRINKS CONTAINING ALCOHOL DO YOU HAVE ON A TYPICAL DAY: PATIENT DOES NOT DRINK
HOW OFTEN DO YOU HAVE A DRINK CONTAINING ALCOHOL: NEVER

## 2023-01-12 ASSESSMENT — PATIENT HEALTH QUESTIONNAIRE - PHQ9
6. FEELING BAD ABOUT YOURSELF - OR THAT YOU ARE A FAILURE OR HAVE LET YOURSELF OR YOUR FAMILY DOWN: 0
9. THOUGHTS THAT YOU WOULD BE BETTER OFF DEAD, OR OF HURTING YOURSELF: 0
1. LITTLE INTEREST OR PLEASURE IN DOING THINGS: 1
4. FEELING TIRED OR HAVING LITTLE ENERGY: 0
SUM OF ALL RESPONSES TO PHQ QUESTIONS 1-9: 1
8. MOVING OR SPEAKING SO SLOWLY THAT OTHER PEOPLE COULD HAVE NOTICED. OR THE OPPOSITE, BEING SO FIGETY OR RESTLESS THAT YOU HAVE BEEN MOVING AROUND A LOT MORE THAN USUAL: 0
2. FEELING DOWN, DEPRESSED OR HOPELESS: 0
7. TROUBLE CONCENTRATING ON THINGS, SUCH AS READING THE NEWSPAPER OR WATCHING TELEVISION: 0
SUM OF ALL RESPONSES TO PHQ QUESTIONS 1-9: 1
SUM OF ALL RESPONSES TO PHQ QUESTIONS 1-9: 1
10. IF YOU CHECKED OFF ANY PROBLEMS, HOW DIFFICULT HAVE THESE PROBLEMS MADE IT FOR YOU TO DO YOUR WORK, TAKE CARE OF THINGS AT HOME, OR GET ALONG WITH OTHER PEOPLE: 0
SUM OF ALL RESPONSES TO PHQ9 QUESTIONS 1 & 2: 1
3. TROUBLE FALLING OR STAYING ASLEEP: 0
5. POOR APPETITE OR OVEREATING: 0
SUM OF ALL RESPONSES TO PHQ QUESTIONS 1-9: 1

## 2023-01-12 ASSESSMENT — COLUMBIA-SUICIDE SEVERITY RATING SCALE - C-SSRS
6. HAVE YOU EVER DONE ANYTHING, STARTED TO DO ANYTHING, OR PREPARED TO DO ANYTHING TO END YOUR LIFE?: NO
2. HAVE YOU ACTUALLY HAD ANY THOUGHTS OF KILLING YOURSELF?: NO
1. WITHIN THE PAST MONTH, HAVE YOU WISHED YOU WERE DEAD OR WISHED YOU COULD GO TO SLEEP AND NOT WAKE UP?: NO

## 2023-01-12 ASSESSMENT — SOCIAL DETERMINANTS OF HEALTH (SDOH): HOW HARD IS IT FOR YOU TO PAY FOR THE VERY BASICS LIKE FOOD, HOUSING, MEDICAL CARE, AND HEATING?: NOT HARD AT ALL

## 2023-01-12 NOTE — PROGRESS NOTES
Medicare Annual Wellness Visit    Beauford Sever is here for Medicare AWV (Patient presents today for her MAW. She has been fasting for labs.)    Assessment & Plan   Welcome to Medicare preventive visit  Type 2 diabetes mellitus without complication, without long-term current use of insulin (HCC)  -     Hemoglobin A1C  -     Comprehensive Metabolic Panel  -     Blood Glucose Monitoring Suppl (FREESTYLE LITE) Iza Hams; DAILY Starting Thu 1/12/2023, Disp-1 each, R-0, Normal  Acquired hypothyroidism  -     TSH  -     T4, Free  Encounter for screening for lipid disorder  -     Lipid Panel    Recommendations for Preventive Services Due: see orders and patient instructions/AVS.  Recommended screening schedule for the next 5-10 years is provided to the patient in written form: see Patient Instructions/AVS.     Return for Medicare Annual Wellness Visit in 1 year. Subjective   The following acute and/or chronic problems were also addressed today:  Sees pain management about her back. Her last hemoglobin A1c's have been very good. She sees pain management   Lab Results   Component Value Date/Time    GLUCOSE 111 07/12/2022 10:33 AM    GLUCOSE 106 01/11/2022 12:15 PM    GLUCOSE 96 07/09/2021 11:52 AM    LABA1C 5.7 07/12/2022 10:33 AM    LABA1C 5.5 01/11/2022 12:15 PM    LABA1C 5.3 07/09/2021 11:52 AM       Patient's complete Health Risk Assessment and screening values have been reviewed and are found in Flowsheets. The following problems were reviewed today and where indicated follow up appointments were made and/or referrals ordered. Positive Risk Factor Screenings with Interventions:                Opioid Risk: (Low risk score <55) Opioid risk score: 21    Patient is low risk for opioid use disorder or overdose.   Last PDMP Sue Niharika as Reviewed:  Review User Review Instant Review Result                General HRA Questions:  Select all that apply: (!) Stress    Stress Interventions:  Patient advised to follow up in the office for further evaluation and treatment       Weight and Activity:  Physical Activity: Inactive    Days of Exercise per Week: 0 days    Minutes of Exercise per Session: 0 min     On average, how many days per week do you engage in moderate to strenuous exercise (like a brisk walk)?: 0 days  Have you lost any weight without trying in the past 3 months?: No  Body mass index: (!) 44.93    Inactivity Interventions:  Patient declined any further interventions or treatment  Obesity Interventions:  Patient declines any further evaluation or treatment          Dentist Screen:  Have you seen the dentist within the past year?: (!) No    Intervention:  Advised to schedule with their dentist     Vision Screen:  Do you have difficulty driving, watching TV, or doing any of your daily activities because of your eyesight?: No  Have you had an eye exam within the past year?: (!) No  No results found. Interventions:   Patient encouraged to make appointment with their eye specialist    Safety:  Do all of your stairways have a railing or banister?: (!) No  Interventions:  Patient advised to follow up in the office for further evaluation and treatment     Advanced Directives:  Do you have a Living Will?: (!) No  Dicussed living verduzco ,s he says son knows her wishes. Intervention:                         Objective   Vitals:    01/12/23 1002   BP: 132/80   Pulse: 62   Resp: 16   Temp: (!) 96.3 °F (35.7 °C)   TempSrc: Temporal   SpO2: 98%   Weight: 270 lb (122.5 kg)   Height: 5' 5\" (1.651 m)      Body mass index is 44.93 kg/m².       General Appearance: alert and oriented to person, place and time, well developed and well- nourished, in no acute distress  Skin: warm and dry, no rash or erythema  Head: normocephalic and atraumatic  Eyes: pupils equal, round, and reactive to light, extraocular eye movements intact, conjunctivae normal  ENT: tympanic membrane, external ear and ear canal normal bilaterally, nose without deformity, nasal mucosa and turbinates normal without polyps  Neck: supple and non-tender without mass, no thyromegaly or thyroid nodules, no cervical lymphadenopathy  Pulmonary/Chest: clear to auscultation bilaterally- no wheezes, rales or rhonchi, normal air movement, no respiratory distress  Cardiovascular: normal rate, regular rhythm, normal S1 and S2, no murmurs, rubs, clicks, or gallops, distal pulses intact, no carotid bruits  Abdomen: soft, non-tender, non-distended, normal bowel sounds, no masses or organomegaly  Extremities: no cyanosis, clubbing or edema  Musculoskeletal: normal range of motion, no joint swelling, deformity or tenderness  Neurologic: reflexes normal and symmetric, no cranial nerve deficit, gait, coordination and speech normal       Allergies   Allergen Reactions    Pcn [Penicillins] Rash     childhood     Prior to Visit Medications    Medication Sig Taking? Authorizing Provider   Blood Glucose Monitoring Suppl (FREESTYLE LITE) ROBEL 1 Device by Does not apply route daily Yes MIKEY Snell CNP   celecoxib (CELEBREX) 200 MG capsule TAKE 1 CAPSULE BY MOUTH TWICE DAILY REPLACE  DICLOFENAC Yes MIKEY Snell CNP   HYDROcodone-acetaminophen (NORCO) 5-325 MG per tablet Take 1 tablet by mouth every 8 hours as needed for Pain.  Yes Historical Provider, MD   TRULICITY 1.5 JU/0.3IX SC injection INJECT 1.5 MG SUB-Q ONCE A WEEK Yes MIKEY Snell CNP   amLODIPine (NORVASC) 5 MG tablet Take 1 tablet by mouth once daily Yes Yulissa Tiwari MD   pioglitazone (ACTOS) 45 MG tablet Take 1 tablet by mouth once daily Yes MIKEY Lomax NP   metFORMIN (GLUCOPHAGE) 1000 MG tablet TAKE 1 TABLET BY MOUTH TWICE DAILY WITH MEALS FOR DIABETES Yes MIKEY Snell CNP   busPIRone (BUSPAR) 10 MG tablet TAKE 1 TABLET BY MOUTH THREE TIMES DAILY Yes Adama Lucio MD   levothyroxine (EUTHYROX) 150 MCG tablet Take 1 tablet by mouth once daily Yes MIKEY Snell CNP sertraline (ZOLOFT) 100 MG tablet TAKE 1 TABLET BY MOUTH ONCE DAILY FOR DEPRESSION Yes MIKEY Goncalves CNP   Lancets MISC 1 each by Does not apply route 2 times daily Yes MIKEY Goncalves CNP   blood glucose monitor strips Test 3 times a day & as needed for symptoms of irregular blood glucose. Freestyle lite  Patient taking differently: 1 strip by Other route 3 times daily as needed Test 3 times a day & as needed for symptoms of irregular blood glucose.  Freestyle lite Yes MIKEY Goncalves CNP   glucose monitoring kit (FREESTYLE) monitoring kit 1 kit by Does not apply route 3 times daily (before meals) Dx E11.8 Yes MIKEY Goncalves CNP       CareTeam (Including outside providers/suppliers regularly involved in providing care):   Patient Care Team:  MIKEY Goncalves CNP as PCP - General (Family Nurse Practitioner)  MIKEY Goncalves CNP as PCP - [facility] Empaneled Provider  Agus Clifton MD as Consulting Physician (Neurosurgery)     Reviewed and updated this visit:  Tobacco  Allergies  Meds  Med Hx  Surg Hx  Soc Hx  Fam Hx

## 2023-01-12 NOTE — PATIENT INSTRUCTIONS
Learning About Stress  What is stress? Stress is what you feel when you have to handle more than you are used to. Stress is a fact of life for most people, and it affects everyone differently. What causes stress for you may not be stressful for someone else. A lot of things can cause stress. You may feel stress when you go on a job interview, take a test, or run a race. This kind of short-term stress is normal and even useful. It can help you if you need to work hard or react quickly. For example, stress can help you finish an important job on time. Stress also can last a long time. Long-term stress is caused by stressful situations or events. Examples of long-term stress include long-term health problems, ongoing problems at work, or conflicts in your family. Long-term stress can harm your health. How does stress affect your health? When you are stressed, your body responds as though you are in danger. It makes hormones that speed up your heart, make you breathe faster, and give you a burst of energy. This is called the fight-or-flight stress response. If the stress is over quickly, your body goes back to normal and no harm is done. But if stress happens too often or lasts too long, it can have bad effects. Long-term stress can make you more likely to get sick, and it can make symptoms of some diseases worse. If you tense up when you are stressed, you may develop neck, shoulder, or low back pain. Stress is linked to high blood pressure and heart disease. Stress also harms your emotional health. It can make you duff, tense, or depressed. Your relationships may suffer, and you may not do well at work or school. What can you do to manage stress? How to relax your mind   Write. It may help to write about things that are bothering you. This helps you find out how much stress you feel and what is causing it. When you know this, you can find better ways to cope. Let your feelings out.  Talk, laugh, cry, and express anger when you need to. Talking with friends, family, a counselor, or a member of the clergy about your feelings is a healthy way to relieve stress. Do something you enjoy. For example, listen to music or go to a movie. Practice your hobby or do volunteer work. Meditate. This can help you relax, because you are not worrying about what happened before or what may happen in the future. Do guided imagery. Imagine yourself in any setting that helps you feel calm. You can use audiotapes, books, or a teacher to guide you. How to relax your body   Do something active. Exercise or activity can help reduce stress. Walking is a great way to get started. Even everyday activities such as housecleaning or yard work can help. Do breathing exercises. For example:  From a standing position, bend forward from the waist with your knees slightly bent. Let your arms dangle close to the floor. Breathe in slowly and deeply as you return to a standing position. Roll up slowly and lift your head last.  Hold your breath for just a few seconds in the standing position. Breathe out slowly and bend forward from the waist.  Try yoga or ken chi. These techniques combine exercise and meditation. You may need some training at first to learn them. What can you do to prevent stress? Manage your time. This helps you find time to do the things you want and need to do. Get enough sleep. Your body recovers from the stresses of the day while you are sleeping. Get support. Your family, friends, and community can make a difference in how you experience stress. Where can you learn more? Go to http://www.rao.com/ and enter N032 to learn more about \"Learning About Stress. \"  Current as of: October 6, 2021               Content Version: 13.5  © 9993-8768 Healthwise, [employer]. Care instructions adapted under license by [facility] ([facility]).  If you have questions about a medical condition or this instruction, always ask your healthcare professional. [address] any warranty or liability for your use of this information. Learning About Being Active as an Older Adult  Why is being active important as you get older? Being active is one of the best things you can do for your health. And it's never too late to start. Being active--or getting active, if you aren't already--has definite benefits. It can:  Give you more energy,  Keep your mind sharp. Improve balance to reduce your risk of falls. Help you manage chronic illness with fewer medicines. No matter how old you are, how fit you are, or what health problems you have, there is a form of activity that will work for you. And the more physical activity you can do, the better your overall health will be. What kinds of activity can help you stay healthy? Being more active will make your daily activities easier. Physical activity includes planned exercise and things you do in daily life. There are four types of activity:  Aerobic. Doing aerobic activity makes your heart and lungs strong. Includes walking, dancing, and gardening. Aim for at least 2½ hours spread throughout the week. It improves your energy and can help you sleep better. Muscle-strengthening. This type of activity can help maintain muscle and strengthen bones. Includes climbing stairs, using resistance bands, and lifting or carrying heavy loads. Aim for at least twice a week. It can help protect the knees and other joints. Stretching. Stretching gives you better range of motion in joints and muscles. Includes upper arm stretches, calf stretches, and gentle yoga. Aim for at least twice a week, preferably after your muscles are warmed up from other activities. It can help you function better in daily life. Balancing. This helps you stay coordinated and have good posture. Includes heel-to-toe walking, ken chi, and certain types of yoga.   Aim for at least 3 days a week.  It can reduce your risk of falling. Even if you have a hard time meeting the recommendations, it's better to be more active than less active. All activity done in each category counts toward your weekly total. You'd be surprised how daily things like carrying groceries, keeping up with grandchildren, and taking the stairs can add up. What keeps you from being active? If you've had a hard time being more active, you're not alone. Maybe you remember being able to do more. Or maybe you've never thought of yourself as being active. It's frustrating when you can't do the things you want. Being more active can help. What's holding you back? Getting started. Have a goal, but break it into easy tasks. Small steps build into big accomplishments. Staying motivated. If you feel like skipping your activity, remember your goal. Maybe you want to move better and stay independent. Every activity gets you one step closer. Not feeling your best.  Start with 5 minutes of an activity you enjoy. Prove to yourself you can do it. As you get comfortable, increase your time. You may not be where you want to be. But you're in the process of getting there. Everyone starts somewhere. How can you find safe ways to stay active? Talk with your doctor about any physical challenges you're facing. Make a plan with your doctor if you have a health problem or aren't sure how to get started with activity. If you're already active, ask your doctor if there is anything you should change to stay safe as your body and health change. If you tend to feel dizzy after you take medicine, avoid activity at that time. Try being active before you take your medicine. This will reduce your risk of falls. If you plan to be active at home, make sure to clear your space before you get started. Remove things like TV cords, coffee tables, and throw rugs. It's safest to have plenty of space to move freely.   The key to getting more active is to take it slow and steady. Try to improve only a little bit at a time. Pick just one area to improve on at first. And if an activity hurts, stop and talk to your doctor. Where can you learn more? Go to http://www.rao.com/ and enter P600 to learn more about \"Learning About Being Active as an Older Adult. \"  Current as of: October 10, 2022               Content Version: 13.5  © 2006-2022 [employer]. Care instructions adapted under license by [facility] [address] If you have questions about a medical condition or this instruction, always ask your healthcare professional. [place] [address] any warranty or liability for your use of this information. Learning About Dental Care for Older Adults  Dental care for older adults: Overview  Dental care for older people is much the same as for younger adults. But older adults do have concerns that younger adults do not. Older adults may have problems with gum disease and decay on the roots of their teeth. They may need missing teeth replaced or broken fillings fixed. Or they may have dentures that need to be cared for. Some older adults may have trouble holding a toothbrush. You can help remind the person you are caring for to brush and floss their teeth or to clean their dentures. In some cases, you may need to do the brushing and other dental care tasks. People who have trouble using their hands or who have dementia may need this extra help. How can you help with dental care? Normal dental care  To keep the teeth and gums healthy:  Brush the teeth with fluoride toothpaste twice a day--in the morning and at night--and floss at least once a day. Plaque can quickly build up on the teeth of older adults. Watch for the signs of gum disease. These signs include gums that bleed after brushing or after eating hard foods, such as apples. See a dentist regularly. Many experts recommend checkups every 6 months.   Keep the dentist up to date on any new medications the person is taking. Encourage a balanced diet that includes whole grains, vegetables, and fruits, and that is low in saturated fat and sodium. Encourage the person you're caring for not to use tobacco products. They can affect dental and general health. Many older adults have a fixed income and feel that they can't afford dental care. But most towns and cities have programs in which dentists help older adults by lowering fees. Contact your area's public health offices or  for information about dental care in your area. Using a toothbrush  Older adults with arthritis sometimes have trouble brushing their teeth because they can't easily hold the toothbrush. Their hands and fingers may be stiff, painful, or weak. If this is the case, you can: Offer an electric toothbrush. Enlarge the handle of a non-electric toothbrush by wrapping a sponge, an elastic bandage, or adhesive tape around it. Push the toothbrush handle through a ball made of rubber or soft foam.  Make the handle longer and thicker by taping Popsicle sticks or tongue depressors to it. You may also be able to buy special toothbrushes, toothpaste dispensers, and floss holders. Your doctor may recommend a soft-bristle toothbrush if the person you care for bleeds easily. Bleeding can happen because of a health problem or from certain medicines. A toothpaste for sensitive teeth may help if the person you care for has sensitive teeth. How do you brush and floss someone's teeth? If the person you are caring for has a hard time cleaning their teeth on their own, you may need to brush and floss their teeth for them. It may be easiest to have the person sit and face away from you, and to sit or stand behind them. That way you can steady their head against your arm as you reach around to floss and brush their teeth. Choose a place that has good lighting and is comfortable for both of you.   Before you begin, gather your supplies. You will need gloves, floss, a toothbrush, and a container to hold water if you are not near a sink. Wash and dry your hands well and put on gloves. Start by flossing:  Gently work a piece of floss between each of the teeth toward the gums. A plastic flossing tool may make this easier, and they are available at most [facility]. Curve the floss around each tooth into a U-shape and gently slide it under the gum line. Move the floss firmly up and down several times to scrape off the plaque. After you've finished flossing, throw away the used floss and begin brushing:  Wet the brush and apply toothpaste. Place the brush at a 45-degree angle where the teeth meet the gums. Press firmly, and move the brush in small circles over the surface of the teeth. Be careful not to brush too hard. Vigorous brushing can make the gums pull away from the teeth and can scratch the tooth enamel. Brush all surfaces of the teeth, on the tongue side and on the cheek side. Pay special attention to the front teeth and all surfaces of the back teeth. Brush chewing surfaces with short back-and-forth strokes. After you've finished, help the person rinse the remaining toothpaste from their mouth. Where can you learn more? Go to http://www.woods.com/ and enter F944 to learn more about \"Learning About Dental Care for Older Adults. \"  Current as of: June 16, 2022               Content Version: 13.5  © 2006-2022 Healthwise, [employer]. Care instructions adapted under license by [facility] ([facility]). If you have questions about a medical condition or this instruction, always ask your healthcare professional. [place] [address] any warranty or liability for your use of this information. Learning About Vision Tests  What are vision tests? The four most common vision tests are visual acuity tests, refraction, visual field tests, and color vision tests.   Visual acuity (sharpness) tests  These tests are used: To see if you need glasses or contact lenses. To monitor an eye problem. To check an eye injury. Visual acuity tests are done as part of routine exams. You may also have this test when you get your 's license or apply for some types of jobs. Visual field tests  These tests are used: To check for vision loss in any area of your range of vision. To screen for certain eye diseases. To look for nerve damage after a stroke, head injury, or other problem that could reduce blood flow to the brain. Refraction and color tests  A refraction test is done to find the right prescription for glasses and contact lenses. A color vision test is done to check for color blindness. Color vision is often tested as part of a routine exam. You may also have this test when you apply for a job where recognizing different colors is important, such as , electronics, or the [employer]. How are vision tests done? Visual acuity test   You cover one eye at a time. You read aloud from a wall chart across the room. You read aloud from a small card that you hold in your hand. Refraction   You look into a special device. The device puts lenses of different strengths in front of each eye to see how strong your glasses or contact lenses need to be. Visual field tests   Your doctor may have you look through special machines. Or your doctor may simply have you stare straight ahead while they move a finger into and out of your field of vision. Color vision test   You look at pieces of printed test patterns in various colors. You say what number or symbol you see. Your doctor may have you trace the number or symbol using a pointer. How do these tests feel? There is very little chance of having a problem from this test. If dilating drops are used for a vision test, they may make the eyes sting and cause a medicine taste in the mouth.   Follow-up care is a key part of your treatment and safety. Be sure to make and go to all appointments, and call your doctor if you are having problems. It's also a good idea to know your test results and keep a list of the medicines you take. Where can you learn more? Go to http://www.rao.com/ and enter G551 to learn more about \"Learning About Vision Tests. \"  Current as of: October 12, 2022               Content Version: 13.5  © 2006-2022 [employer]. Care instructions adapted under license by [facility] ([facility]). If you have questions about a medical condition or this instruction, always ask your healthcare professional. [place] [address] any warranty or liability for your use of this information. Advance Directives: Care Instructions  Overview  An advance directive is a legal way to state your wishes at the end of your life. It tells your family and your doctor what to do if you can't say what you want. There are two main types of advance directives. You can change them any time your wishes change. Living will. This form tells your family and your doctor your wishes about life support and other treatment. The form is also called a declaration. Medical power of . This form lets you name a person to make treatment decisions for you when you can't speak for yourself. This person is called a health care agent (health care proxy, health care surrogate). The form is also called a durable power of  for health care. If you do not have an advance directive, decisions about your medical care may be made by a family member, or by a doctor or a  who doesn't know you. It may help to think of an advance directive as a gift to the people who care for you. If you have one, they won't have to make tough decisions by themselves. For more information, including forms for your state, see the [address] website (www.caringinfo.org/planning/advance-directives/).   Follow-up care is a key part of your treatment and safety. Be sure to make and go to all appointments, and call your doctor if you are having problems. It's also a good idea to know your test results and keep a list of the medicines you take. What should you include in an advance directive? Many states have a unique advance directive form. (It may ask you to address specific issues.) Or you might use a universal form that's approved by many states. If your form doesn't tell you what to address, it may be hard to know what to include in your advance directive. Use the questions below to help you get started. Who do you want to make decisions about your medical care if you are not able to? What life-support measures do you want if you have a serious illness that gets worse over time or can't be cured? What are you most afraid of that might happen? (Maybe you're afraid of having pain, losing your independence, or being kept alive by machines.)  Where would you prefer to die? (Your home? A hospital? A nursing home?)  Do you want to donate your organs when you die? Do you want certain Buddhism practices performed before you die? When should you call for help? Be sure to contact your doctor if you have any questions. Where can you learn more? Go to http://www.rao.com/ and enter R264 to learn more about \"Advance Directives: Care Instructions. \"  Current as of: June 16, 2022               Content Version: 13.5  © 3713-8416 Healthwise, Incorporated. Care instructions adapted under license by [facility] ([facility]). If you have questions about a medical condition or this instruction, always ask your healthcare professional. [place] [address] any warranty or liability for your use of this information. Personalized Preventive Plan for Fady Hermosillo - 1/12/2023  Medicare offers a range of preventive health benefits.  Some of the tests and screenings are paid in full while other may be subject to a deductible, co-insurance, and/or copay. Some of these benefits include a comprehensive review of your medical history including lifestyle, illnesses that may run in your family, and various assessments and screenings as appropriate. After reviewing your medical record and screening and assessments performed today your provider may have ordered immunizations, labs, imaging, and/or referrals for you. A list of these orders (if applicable) as well as your Preventive Care list are included within your After Visit Summary for your review. Other Preventive Recommendations:    A preventive eye exam performed by an eye specialist is recommended every 1-2 years to screen for glaucoma; cataracts, macular degeneration, and other eye disorders. A preventive dental visit is recommended every 6 months. Try to get at least 150 minutes of exercise per week or 10,000 steps per day on a pedometer . Order or download the FREE \"Exercise & Physical Activity: Your Everyday Guide\" from [employer]. Call 4-763.679.2295 or search [employer] online. You need 6006-4020 mg of calcium and 3268-8482 IU of vitamin D per day. It is possible to meet your calcium requirement with diet alone, but a vitamin D supplement is usually necessary to meet this goal.  When exposed to the sun, use a sunscreen that protects against both UVA and UVB radiation with an SPF of 30 or greater. Reapply every 2 to 3 hours or after sweating, drying off with a towel, or swimming. Always wear a seat belt when traveling in a car. Always wear a helmet when riding a bicycle or motorcycle.

## 2023-02-09 DIAGNOSIS — M54.50 CHRONIC BILATERAL LOW BACK PAIN WITHOUT SCIATICA: ICD-10-CM

## 2023-02-09 DIAGNOSIS — G89.29 CHRONIC BILATERAL LOW BACK PAIN WITHOUT SCIATICA: ICD-10-CM

## 2023-02-10 RX ORDER — HYDROCODONE BITARTRATE AND ACETAMINOPHEN 5; 325 MG/1; MG/1
1 TABLET ORAL EVERY 8 HOURS PRN
Qty: 75 TABLET | Refills: 0 | Status: SHIPPED | OUTPATIENT
Start: 2023-02-10 | End: 2023-03-12

## 2023-03-16 ENCOUNTER — HOSPITAL ENCOUNTER (OUTPATIENT)
Dept: PAIN MANAGEMENT | Age: 68
Discharge: HOME | End: 2023-03-16
Payer: MEDICARE

## 2023-03-16 VITALS
TEMPERATURE: 96.5 F | DIASTOLIC BLOOD PRESSURE: 70 MMHG | BODY MASS INDEX: 45.93 KG/M2 | HEART RATE: 67 BPM | OXYGEN SATURATION: 95 % | RESPIRATION RATE: 18 BRPM | SYSTOLIC BLOOD PRESSURE: 151 MMHG | HEIGHT: 64 IN | WEIGHT: 269 LBS

## 2023-03-16 PROCEDURE — 99213 OFFICE O/P EST LOW 20 MIN: CPT

## 2023-03-16 RX ORDER — BLOOD-GLUCOSE METER
KIT MISCELLANEOUS
COMMUNITY
Start: 2023-01-26

## 2023-03-16 ASSESSMENT — ENCOUNTER SYMPTOMS: CONSTIPATION: 0

## 2023-03-16 ASSESSMENT — PAIN DESCRIPTION - LOCATION
LOCATION_2: NECK
LOCATION: BACK

## 2023-03-16 ASSESSMENT — PAIN DESCRIPTION - ORIENTATION
ORIENTATION: LOWER
ORIENTATION_2: LOWER

## 2023-03-16 ASSESSMENT — PAIN DESCRIPTION - INTENSITY: RATING_2: 5

## 2023-03-16 ASSESSMENT — PAIN DESCRIPTION - PAIN TYPE: TYPE: CHRONIC PAIN

## 2023-03-16 ASSESSMENT — PAIN SCALES - GENERAL: PAINLEVEL_OUTOF10: 7

## 2023-03-16 NOTE — PROGRESS NOTES
[facility]    Office Visit    Patient Name: Beauford Sever    MR #: [id number]    Account [de-identified]    : 1955    Age: 74 y.o. Sex: female    Date: 3/16/2023    PCP: MIKEY Cormier CNP     Chief Complaint:   Chief Complaint   Patient presents with    Back Pain     7/10    Neck Pain     /10       History of Present Illness: The patient is a 74 y.o. female who presents for procedure follow up. Patient had LESI of L4/L5 on ***    Back Pain  This is a chronic problem. The current episode started more than 1 year ago. The problem occurs constantly. The problem has been waxing and waning since onset. The pain is present in the lumbar spine. Quality: throbbing, walking goes up towards the middle. The symptoms are aggravated by standing (walking). Associated symptoms include bladder incontinence, bowel incontinence, headaches (mild), numbness, paresthesias and weakness. Neck Pain   This is a chronic problem. The current episode started more than 1 year ago. The problem occurs constantly. The problem has been waxing and waning. The quality of the pain is described as burning (lot of pain). The symptoms are aggravated by bending, position and twisting. Associated symptoms include headaches (mild), numbness and weakness. Past Imaging  MRI cervical spine (without contrast)   MRI of C-spine 2/10/2022  C2-3: There is moderate facetal arthropathy. C3-4: There is bilateral facet arthropathy. C4-5: There are large posterior osteophytes and moderate diffuse disc bulging. There is bilateral facetal arthropathy. There is mild spinal stenosis. There is bilateral neural foraminal stenosis. C5-6:. There are large posterior osteophytes. Moderate diffuse disc bulging. There is bilateral facetal arthropathy. There is moderately severe spinal stenosis and bilateral neural foraminal stenosis, more towards left.   C6-7: There are prominent osteophytes, and disc bulging, more severe towards left. There is bilateral facet arthropathy. There is moderate asymmetrical spinal stenosis and left neural foraminal stenosis. C7-T1: There are posterior osteophytes. There is a disc bulging/disc extrusion. There is bilateral facet arthropathy. There is bilateral neural foraminal stenosis, right more than the left. There is moderate narrowing of compression of the cord opposite the bulging discs at C4-5, C5-6 and C6-7. MRI lumbosacral spine (without contrast)  MRI of Lumbar Spine 2021  L1-2: There are prominent posterior osteophytes. There is moderate diffuse disc bulging. There is bilateral facetal arthropathy. There is moderate spinal stenosis. There is moderate narrowing of the neural foramina bilaterally. L2-3: There are prominent posterior osteophytes. There is moderate disc bulging. There is bilateral facet arthropathy. There is moderate spinal stenosis. There is left neural foraminal stenosis. L3-4: There is moderate diffuse disc bulging. There is bilateral facetal arthropathy and ligamentum hypertrophy. There is severe spinal stenosis. L4-5: There is disc disc protrusion/displacement. There is severe facet arthropathy and ligamentum hypertrophy. There is moderate spinal stenosis. There is bilateral neural foraminal stenosis, more severe on the right side. L5-S1: There is moderate diffuse disc bulging. There is bilateral facet arthropathy      Screening Tools:     PE.7    Past PEG: NA    ORT: 1    PHQ-9: 6    Current Pain Assessment  Pain Assessment  Pain Assessment: 0-10  Pain Level: 7  Pain Location: Back  Pain Orientation: Lower  Pain Type: Chronic pain    Past Visit HPI:  2022  Patient states that climbing up stairs causes worsening pain. Patient states that she has more pain getting up from a seated position than from the act of sitting down. Patient stated that she has trouble picking up her grand baby.  Patient states that if she stands for too long her right foot goes numb. Patient has trouble climbing into vehicles and lifting her leg. Patient has a tingling sensation in the right LE that follows her L4/L5. Patient states that the numbness has improved to the left side. Employment: [employer]    Past Medical History  Past Medical History:   Diagnosis Date    Depression 2022    Diabetes mellitus ([facility] [address]     History of blood transfusion     1976 post childbirth    Hypertension     Thyroid disease        Allergies  Pcn [penicillins]    Current Medications  Current Outpatient Medications   Medication Sig Dispense Refill    Blood Glucose Monitoring Suppl (FREESTYLE LITE) w/Device KIT USE AS DIRECTED      Blood Glucose Monitoring Suppl (FREESTYLE LITE) ROBEL 1 Device by Does not apply route daily 1 each 0    busPIRone (BUSPAR) 10 MG tablet TAKE 1 TABLET BY MOUTH THREE TIMES DAILY 90 tablet 3    sertraline (ZOLOFT) 100 MG tablet TAKE 1 TABLET BY MOUTH ONCE DAILY FOR DEPRESSION 90 tablet 3    celecoxib (CELEBREX) 200 MG capsule TAKE 1 CAPSULE BY MOUTH TWICE DAILY REPLACE  DICLOFENAC 180 capsule 3    dulaglutide (TRULICITY) 1.5 NR/0.6IP SC injection INJECT 1.5 MG SUB-Q ONCE A WEEK 12 Adjustable Dose Pre-filled Pen Syringe 3    HYDROcodone-acetaminophen (NORCO) 5-325 MG per tablet Take 1 tablet by mouth every 8 hours as needed for Pain. amLODIPine (NORVASC) 5 MG tablet Take 1 tablet by mouth once daily 90 tablet 3    pioglitazone (ACTOS) 45 MG tablet Take 1 tablet by mouth once daily 90 tablet 1    metFORMIN (GLUCOPHAGE) 1000 MG tablet TAKE 1 TABLET BY MOUTH TWICE DAILY WITH MEALS FOR DIABETES 180 tablet 3    levothyroxine (EUTHYROX) 150 MCG tablet Take 1 tablet by mouth once daily 90 tablet 3    Lancets MISC 1 each by Does not apply route 2 times daily 100 each 5    blood glucose monitor strips Test 3 times a day & as needed for symptoms of irregular blood glucose.  Freestyle lite (Patient taking differently: 1 strip by Other route 3 times daily as needed Test 3 times a day & as needed for symptoms of irregular blood glucose. Freestyle lite) 100 strip 3    glucose monitoring kit (FREESTYLE) monitoring kit 1 kit by Does not apply route 3 times daily (before meals) Dx E11.8 1 kit 0     No current facility-administered medications for this encounter.       Social History    Social History     Socioeconomic History    Marital status:      Spouse name: None    Number of children: 1    Years of education: None    Highest education level: None   Tobacco Use    Smoking status: Never    Smokeless tobacco: Never   Vaping Use    Vaping Use: Never used   Substance and Sexual Activity    Alcohol use: No    Drug use: No    Sexual activity: Not Currently     Comment: patient is      Social Determinants of Health     Financial Resource Strain: Low Risk     Difficulty of Paying Living Expenses: Not hard at all   Food Insecurity: No Food Insecurity    Worried About Running Out of Food in the Last Year: Never true    Ran Out of Food in the Last Year: Never true   Physical Activity: Inactive    Days of Exercise per Week: 0 days    Minutes of Exercise per Session: 0 min         Family History  family history includes Breast Cancer in her paternal aunt; Cancer in her mother; Diabetes in her brother; High Blood Pressure in her father; Other in her sister.    Review of Systems:  Review of Systems   Constitutional:  Positive for activity change.   Gastrointestinal:  Positive for bowel incontinence. Negative for constipation.   Genitourinary:  Positive for bladder incontinence.   Musculoskeletal:  Positive for arthralgias, back pain, gait problem, myalgias, neck pain and neck stiffness.   Neurological:  Positive for weakness, numbness, headaches (mild) and paresthesias.   Psychiatric/Behavioral:  Positive for sleep disturbance. Negative for agitation, self-injury and suicidal ideas. The patient is not nervous/anxious.       14 point ROS negative besides that noted in  HPI    Physical exam:     Vitals:    03/16/23 1316   BP: (!) 151/70   Pulse: 67   Resp: 18   Temp: (!) 96.5 °F (35.8 °C)   TempSrc: Temporal   SpO2: 95%   Weight: 269 lb (122 kg)   Height: 5' 4\" (1.626 m)       Body mass index is 46.17 kg/m². Physical Exam  Vitals and nursing note reviewed. Constitutional:       General: She is not in acute distress. Appearance: Normal appearance. She is well-developed. HENT:      Head: Normocephalic. Right Ear: External ear normal.      Left Ear: External ear normal.      Nose: Nose normal.   Eyes:      Conjunctiva/sclera: Conjunctivae normal.      Pupils: Pupils are equal, round, and reactive to light. Neck:      Vascular: No JVD. Trachea: No tracheal deviation. Cardiovascular:      Rate and Rhythm: Normal rate. Pulmonary:      Effort: Pulmonary effort is normal.   Abdominal:      General: There is no distension. Tenderness: There is no abdominal tenderness. Musculoskeletal:      Cervical back: Spasms, tenderness and crepitus present. Pain with movement present. Decreased range of motion. Lumbar back: Spasms, tenderness (bilateral SI TTP) and bony tenderness present. Positive right straight leg raise test and positive left straight leg raise test.      Comments: + Dolan's on right side     Skin:     General: Skin is warm and dry. Neurological:      Mental Status: She is alert and oriented to person, place, and time. Cranial Nerves: No cranial nerve deficit. Psychiatric:         Mood and Affect: Mood normal.         Behavior: Behavior normal.         Thought Content:  Thought content normal.         Judgment: Judgment normal.       Labs:     Lab Results   Component Value Date/Time     01/12/2023 11:33 AM    K 4.5 01/12/2023 11:33 AM    K 4.4 08/12/2020 05:38 AM     01/12/2023 11:33 AM    CO2 29 01/12/2023 11:33 AM    BUN 21 01/12/2023 11:33 AM    CREATININE 0.7 01/12/2023 11:33 AM    GLUCOSE 107 01/12/2023 11:33 AM CALCIUM 9.7 01/12/2023 11:33 AM        Lab Results   Component Value Date    WBC 5.5 03/23/2022    HGB 12.5 03/23/2022    HCT 41.5 03/23/2022    MCV 92.8 03/23/2022     03/23/2022       Assessment:                                                                                                                                        Active Problems:    Central stenosis of spinal canal    Facet arthritis of cervical region    Bulging of cervical intervertebral disc without myelopathy    Lumbosacral spondylosis without myelopathy    Lumbar facet arthropathy    Neural foraminal stenosis of lumbar spine    Bulge of lumbar disc without myelopathy    Chronic neck and back pain    Chronic bilateral low back pain without sciatica    Pain medication agreement    Lumbar radiculopathy    Cervical radiculopathy    Dolan sign present    Muscle pain, cervical    Spasm of thoracic back muscle    Encounter for monitoring opioid maintenance therapy    Morbid obesity due to excess calories (HCC)    Osteoarthrosis, localized, primary, knee    Type 2 diabetes mellitus with diabetic polyneuropathy, without long-term current use of insulin (HCC)    Primary osteoarthritis of right knee    Hand numbness    Numbness in feet  Resolved Problems:    * No resolved hospital problems. *      PLAN:    Chronic bilateral low back pain without sciatica  Chronic neck pain  No refill needed at office appoinmtent  - HYDROcodone-acetaminophen (NORCO) 5-325 MG per tablet; Take 1 tablet by mouth every 8 hours as needed for Pain for up to 30 days. Intended supply: 30 days  Dispense: 75 tablet; Refill: 0    Chronic neck and back pain   Neural foraminal stenosis of lumbar spine  Bulge of lumbar disc without myelopathy  Lumbar Radiculopathy    Re-Schedule LESI of L4/L5 under fluoroscopy with medical director. Due to + SLR and lumbar radiculopathy. Patient did have relief and she could sates that this was the area of pain.      Schedule bilateral cervical trigger points for cervical muscle pain. Consider HOLLI of C5/C6 in the future. Patient given literature on HOLLI and LESI. Reviewed risk factors of procedure. Pain medication agreement  Reviewed agreement with patient. Nurse reviewed and witness agreement. Encounter for opioid maintenance monitoring:  UDS today  Patient does take medication sparingly. Order placed for NexWave 3 in 1 device. Patient suffers from  Central stenosis of spinal canal, Facet arthritis of cervical region, Bulging of cervical intervertebral disc without myelopathy, Cervical radiculopathy, Dolan sign present,   Muscle pain, Lumbosacral spondylosis without myelopathy,  Lumbar facet arthropathy, Neural foraminal stenosis of lumbar spine, Bulge of lumbar disc without myelopathy, Lumbar radiculopathy, Spasm of thoracic back muscle    Zynex NexWave is a prescription only 3-in-1 device with IFC, TENS, and NMES. The device is used to help manage their pain symptoms, re-educate and strengthen muscles, and or hopefully reduce or eliminate their need for pain medications. IFC can help extend pain relief and hopefully reduce need for pain medication. TENS is used to control break through pain. NMES is used to help with muscle spasms and strengthen weak muscles. Electrotherapy is effective and can increase activity levels by over 60% and has shown, in some patients, a reduction of pain medication usage by 50% (1). Luis MCGHEE, Randi C, Keshav A, Shon Keenan (1996) Transcutaneous Electric Nerve Stimulation Treatment Outcome in Long-Term Users. Clinical Journal of Pain, 12(3), 1996, p.201     [x] Follow up    [] 4 weeks   [x] 6-8 weeks   [] 10-12 weeks   [] 3 months  [x] Post procedure to evaluate effectiveness of treatment  [] To evaluate medications changes made at office visit. [] To review diagnostics ordered at last visit.    [] To evaluate response to therapy    [x] For management of controlled substance  [x] Random UDS as indicated by ORT score or if indicated by abberent behaviors       Discussion: Discussed exam findings, reviewed imaging yes - , and gave education regarding pathophysiology, exercise, and treatment options yes - , and reviewed plan of care with patient. Strongly reinforced that exercise is exteremly important part of pain management. Exercises should be completed upon awaking and before bed. Patient agreed with POC and questions were asked and answered. See DC instructions. Activity: discussed exercise as beneficial to pain reduction, encouraged stretching exercise at least twice daily with a focus on torso (core) strengthening. Goal:  Pain Management Goals of Therapy:   [] Resolution in pain  [x] Decrease in pain level  [x] Improvement in ADL's  [x] Increase in activities with less pain  [] Decrease in medication     Controlled substance monitoring:    [x] Discussed medication side effects, risk of tolerance and/or dependence, and/or alternative treatment  [] Discussed the detrimental effects of long term narcotic use in younger patients especially women of childbearing years.   [x] No signs and symptoms of potential drug abuse or diversion were identified  [] Signs of potential drug abuse or diversion were identified   [] ORT Score   [] UDS non-compliant   [] See Note  [x] Random urine drug screen sent today  [] Random urine drug screen not completed today   [] Deferred New Patient  [] Compliant   [] Not Compliant see note  [] Medication agreement with provider signed today  [x] Medication agreement with provider on file under media 8/23/2022  [] Medication regimen effective with no c/o side effects and continued   [] New patient continuing current medication while developing POC   [x] On going assessment and evaluation of medication regimen  [] Medication regimen not effective see plan for changes  [x] Waleska Escamilla reviewed & on file under media     CC:  MIKEY Gutiérrez - ROB Leung, MIKEY - CNP, 3/16/2023 at 1:49 PM    EMR dragon/transcription disclaimer: Much of this encounter note is electronic transcription/translation of spoken language to printed tach. Electronic translation of spoken language may be erroneous, or at times, nonsensical words or phrases may be inadvertently transcribed.  Although, I have reviewed the note for such errors, some may still exist.

## 2023-03-16 NOTE — PROGRESS NOTES
Clinic Documentation      Education Provided:  [x] Review of [place]  [] Agreement Review  [x] PEG Score Calculated [] PHQ Score Calculated [] ORT Score Calculated    [] Compliance Issues Discussed [] Cognitive Behavior Needs [x] Exercise [] Review of Test [] Financial Issues  [x] Tobacco/Alcohol Use Reviewed [x] Teaching [] New Patient [] Picture Obtained    Physician Plan:  [] Outgoing Referral  [] Pharmacy Consult  [] Test Ordered [] Prescription Ordered/Changed   [] Obtained Test Results / Consult Notes        Complete if patient is withholding blood thinner for procedure     Blood Thinner Patient is currently taking:      [] Plavix (Hold for 7 days)  [] Aspirin (Hold for 5 days)     [] Pletal (Hold for 2 days)  [] Pradaxa (Hold for 3 days)    [] Effient (Hold for 7 days)  [] Xarelto (Hold for 2 days)    [] Eliquis (Hold for 2 days)  [] Brilinta (Hold for 7 days)    [] Coumadin (Hold for 5 days) - (INR needs to be drawn the day prior to procedure- INR < 2.0)    [] Aggrenox (Hold for 7 days)        [] Patient will stop medication on their own.    [] Blood Thinner Form Faxed for approval to hold.    Provider form faxed to:     Assessment Completed by:  Electronically signed by Dasia Lopez RN on 3/16/2023 at 2:26 PM

## 2023-03-28 RX ORDER — PIOGLITAZONEHYDROCHLORIDE 45 MG/1
TABLET ORAL
Qty: 90 TABLET | Refills: 1 | Status: SHIPPED | OUTPATIENT
Start: 2023-03-28

## 2023-04-02 ASSESSMENT — ENCOUNTER SYMPTOMS
BACK PAIN: 1
BOWEL INCONTINENCE: 1

## 2023-04-04 ENCOUNTER — HOSPITAL ENCOUNTER (OUTPATIENT)
Dept: PAIN MANAGEMENT | Age: 68
Discharge: HOME | End: 2023-04-04
Payer: MEDICARE

## 2023-04-04 VITALS
DIASTOLIC BLOOD PRESSURE: 99 MMHG | TEMPERATURE: 96 F | HEART RATE: 60 BPM | RESPIRATION RATE: 18 BRPM | SYSTOLIC BLOOD PRESSURE: 123 MMHG | OXYGEN SATURATION: 100 %

## 2023-04-04 DIAGNOSIS — R52 PAIN MANAGEMENT: ICD-10-CM

## 2023-04-04 PROCEDURE — 2580000003 HC RX 258

## 2023-04-04 PROCEDURE — A4216 STERILE WATER/SALINE, 10 ML: HCPCS

## 2023-04-04 PROCEDURE — 62323 NJX INTERLAMINAR LMBR/SAC: CPT

## 2023-04-04 PROCEDURE — 6360000002 HC RX W HCPCS

## 2023-04-04 PROCEDURE — 2500000003 HC RX 250 WO HCPCS

## 2023-04-04 RX ORDER — METHYLPREDNISOLONE ACETATE 80 MG/ML
80 INJECTION, SUSPENSION INTRA-ARTICULAR; INTRALESIONAL; INTRAMUSCULAR; SOFT TISSUE ONCE
Status: DISCONTINUED | OUTPATIENT
Start: 2023-04-04 | End: 2023-04-06 | Stop reason: HOSPADM

## 2023-04-04 RX ORDER — SODIUM CHLORIDE 9 MG/ML
5 INJECTION INTRAVENOUS ONCE
Status: DISCONTINUED | OUTPATIENT
Start: 2023-04-04 | End: 2023-04-06 | Stop reason: HOSPADM

## 2023-04-04 RX ORDER — LIDOCAINE HYDROCHLORIDE 10 MG/ML
5 INJECTION, SOLUTION EPIDURAL; INFILTRATION; INTRACAUDAL; PERINEURAL ONCE
Status: DISCONTINUED | OUTPATIENT
Start: 2023-04-04 | End: 2023-04-06 | Stop reason: HOSPADM

## 2023-04-04 ASSESSMENT — PAIN - FUNCTIONAL ASSESSMENT: PAIN_FUNCTIONAL_ASSESSMENT: 0-10

## 2023-04-04 NOTE — INTERVAL H&P NOTE
Update History & Physical    The patient's History and Physical  was reviewed with the patient and I examined the patient. There was no change. The surgical site was confirmed by the patient and me. Plan: The risks, benefits, expected outcome, and alternative to the recommended procedure have been discussed with the patient. Patient understands and wants to proceed with the procedure.      Electronically signed by Turner Aquino MD on 4/4/2023 at 12:01 PM

## 2023-05-08 DIAGNOSIS — M54.50 CHRONIC BILATERAL LOW BACK PAIN WITHOUT SCIATICA: ICD-10-CM

## 2023-05-08 DIAGNOSIS — G89.29 CHRONIC BILATERAL LOW BACK PAIN WITHOUT SCIATICA: ICD-10-CM

## 2023-05-09 RX ORDER — HYDROCODONE BITARTRATE AND ACETAMINOPHEN 5; 325 MG/1; MG/1
1 TABLET ORAL EVERY 8 HOURS PRN
Qty: 75 TABLET | Refills: 0 | Status: SHIPPED | OUTPATIENT
Start: 2023-05-09 | End: 2023-06-08

## 2023-08-01 ENCOUNTER — HOSPITAL ENCOUNTER (OUTPATIENT)
Dept: PAIN MANAGEMENT | Age: 68
Discharge: HOME | End: 2023-08-01
Payer: MEDICARE

## 2023-08-01 VITALS
HEART RATE: 63 BPM | BODY MASS INDEX: 46.15 KG/M2 | RESPIRATION RATE: 16 BRPM | WEIGHT: 277 LBS | HEIGHT: 65 IN | SYSTOLIC BLOOD PRESSURE: 159 MMHG | OXYGEN SATURATION: 95 % | TEMPERATURE: 96.8 F | DIASTOLIC BLOOD PRESSURE: 73 MMHG

## 2023-08-01 DIAGNOSIS — M54.50 CHRONIC BILATERAL LOW BACK PAIN WITHOUT SCIATICA: ICD-10-CM

## 2023-08-01 DIAGNOSIS — M47.816 LUMBAR FACET ARTHROPATHY: ICD-10-CM

## 2023-08-01 DIAGNOSIS — G89.29 CHRONIC BILATERAL LOW BACK PAIN WITHOUT SCIATICA: ICD-10-CM

## 2023-08-01 DIAGNOSIS — M53.3 SI (SACROILIAC) JOINT DYSFUNCTION: ICD-10-CM

## 2023-08-01 DIAGNOSIS — M48.061 NEURAL FORAMINAL STENOSIS OF LUMBAR SPINE: ICD-10-CM

## 2023-08-01 DIAGNOSIS — M47.812 FACET ARTHRITIS OF CERVICAL REGION: Primary | ICD-10-CM

## 2023-08-01 DIAGNOSIS — M48.00 CENTRAL STENOSIS OF SPINAL CANAL: ICD-10-CM

## 2023-08-01 PROCEDURE — 99215 OFFICE O/P EST HI 40 MIN: CPT

## 2023-08-01 RX ORDER — HYDROCODONE BITARTRATE AND ACETAMINOPHEN 5; 325 MG/1; MG/1
1 TABLET ORAL EVERY 8 HOURS PRN
Qty: 75 TABLET | Refills: 0 | Status: SHIPPED | OUTPATIENT
Start: 2023-08-01 | End: 2023-08-31

## 2023-08-01 ASSESSMENT — PAIN DESCRIPTION - ORIENTATION
ORIENTATION: LOWER
ORIENTATION_2: LOWER

## 2023-08-01 ASSESSMENT — PAIN DESCRIPTION - LOCATION
LOCATION_2: NECK
LOCATION: BACK

## 2023-08-01 ASSESSMENT — PAIN SCALES - GENERAL: PAINLEVEL_OUTOF10: 8

## 2023-08-01 ASSESSMENT — PAIN DESCRIPTION - INTENSITY: RATING_2: 7

## 2023-08-01 ASSESSMENT — PAIN DESCRIPTION - PAIN TYPE: TYPE: CHRONIC PAIN

## 2023-08-01 ASSESSMENT — ENCOUNTER SYMPTOMS
CONSTIPATION: 0
BACK PAIN: 1

## 2023-08-01 NOTE — PROGRESS NOTES
Clinic Documentation      Education Provided:  [x] Review of [place]  [x] Agreement Review  [x] PEG Score Calculated [x] PHQ Score Calculated [x] ORT Score Calculated    [] Compliance Issues Discussed [] Cognitive Behavior Needs [x] Exercise [] Review of Test [] Financial Issues  [x] Tobacco/Alcohol Use Reviewed [x] Teaching [] New Patient [] Picture Obtained    Physician Plan:  [] Outgoing Referral  [] Pharmacy Consult  [x] Test Ordered [x] Prescription Ordered/Changed   [] Obtained Test Results / Consult Notes        Complete if patient is withholding blood thinner for procedure     Blood Thinner Patient is currently taking:      [] Plavix (Hold for 7 days)  [] Aspirin (Hold for 5 days)     [] Pletal (Hold for 2 days)  [] Pradaxa (Hold for 3 days)    [] Effient (Hold for 7 days)  [] Xarelto (Hold for 2 days)    [] Eliquis (Hold for 2 days)  [] Brilinta (Hold for 7 days)    [] Coumadin (Hold for 5 days) - (INR needs to be drawn the day prior to procedure- INR < 2.0)    [] Aggrenox (Hold for 7 days)        [] Patient will stop medication on their own.    [] Blood Thinner Form Faxed for approval to hold.    Provider form faxed to:     Assessment Completed by:  Electronically signed by Alex Parr RN on 8/1/2023 at 11:52 AM

## 2023-08-01 NOTE — PROGRESS NOTES
[facility]    Office Visit    Patient Name: Yeimi Ramirez    MR #: [id number]    Account [de-identified]    : 1955    Age: 74 y.o. Sex: female    Date: 3/16/2023    PCP: MIKEY Velarde CNP     Chief Complaint:   Chief Complaint   Patient presents with    Back Pain     8/10    Neck Pain     /10       History of Present Illness: The patient is a 74 y.o. female who presents for procedure follow up. Patient had LESI of L4/L5 on 2022. Patient had at least 80-85% relief of pain from procedure(s) for at least 60 days. After injection, patient was able to increase activity and had less pain from aggravating factors such as standing and walking  Patient was able to reduce pain medication yes  Patient was able to continue to do daily stretching and exercises as instructed as tolerated yes  Patient received enough pain relief from injections that the patient would like to repeat the injection(s). Yes    Patient had bilateral cervical trigger point injections on 2022. Patient had at least 80-85% relief of pain from procedure(s) for at least 8 weeks. After injection, patient was able to increase activity. Patient had less pain from aggravating factors. Patient was able to decrease pain medication usage. Patient received enough pain relief from injections that the patient would like to repeat the injection(s). Back Pain  This is a chronic problem. The current episode started more than 1 year ago. The problem occurs constantly. The problem has been waxing and waning since onset. The pain is present in the lumbar spine. Quality: throbbing, walking goes up towards the middle. The symptoms are aggravated by standing (walking). Associated symptoms include bladder incontinence, bowel incontinence, headaches (mild), numbness, paresthesias and weakness. Neck Pain   This is a chronic problem. The current episode started more than 1 year ago.  The

## 2023-08-01 NOTE — TELEPHONE ENCOUNTER
1. Facet arthritis of cervical region  - HYDROcodone-acetaminophen (NORCO) 5-325 MG per tablet; Take 1 tablet by mouth every 8 hours as needed for Pain for up to 30 days. Max Daily Amount: 3 tablets  Dispense: 75 tablet; Refill: 0    2. Lumbar facet arthropathy  - HYDROcodone-acetaminophen (NORCO) 5-325 MG per tablet; Take 1 tablet by mouth every 8 hours as needed for Pain for up to 30 days. Max Daily Amount: 3 tablets  Dispense: 75 tablet; Refill: 0    3. Central stenosis of spinal canal  - HYDROcodone-acetaminophen (NORCO) 5-325 MG per tablet; Take 1 tablet by mouth every 8 hours as needed for Pain for up to 30 days. Max Daily Amount: 3 tablets  Dispense: 75 tablet; Refill: 0    4. Neural foraminal stenosis of lumbar spine  - HYDROcodone-acetaminophen (NORCO) 5-325 MG per tablet; Take 1 tablet by mouth every 8 hours as needed for Pain for up to 30 days. Max Daily Amount: 3 tablets  Dispense: 75 tablet; Refill: 0    5. Chronic bilateral low back pain without sciatica  - HYDROcodone-acetaminophen (NORCO) 5-325 MG per tablet; Take 1 tablet by mouth every 8 hours as needed for Pain for up to 30 days. Max Daily Amount: 3 tablets  Dispense: 75 tablet; Refill: 0      Requested Prescriptions     Signed Prescriptions Disp Refills    HYDROcodone-acetaminophen (NORCO) 5-325 MG per tablet 75 tablet 0     Sig: Take 1 tablet by mouth every 8 hours as needed for Pain for up to 30 days.  Max Daily Amount: 3 tablets     Authorizing Provider: Michael Liz       Continue medication with refill sent at appointment yes; refill sent to medical director at appointment NA, see refill encounter dated 8/1/2023    Electronically signed by MIKEY Campos - CNP on 8/1/2023 at 11:43 AM

## 2023-08-15 ENCOUNTER — HOSPITAL ENCOUNTER (OUTPATIENT)
Dept: PAIN MANAGEMENT | Age: 68
Discharge: HOME | End: 2023-08-15
Payer: MEDICARE

## 2023-08-15 VITALS
SYSTOLIC BLOOD PRESSURE: 152 MMHG | HEART RATE: 63 BPM | OXYGEN SATURATION: 99 % | TEMPERATURE: 96.7 F | RESPIRATION RATE: 18 BRPM | DIASTOLIC BLOOD PRESSURE: 59 MMHG

## 2023-08-15 DIAGNOSIS — R52 PAIN MANAGEMENT: ICD-10-CM

## 2023-08-15 PROCEDURE — A4216 STERILE WATER/SALINE, 10 ML: HCPCS

## 2023-08-15 PROCEDURE — 6360000002 HC RX W HCPCS

## 2023-08-15 PROCEDURE — 62323 NJX INTERLAMINAR LMBR/SAC: CPT

## 2023-08-15 PROCEDURE — 2500000003 HC RX 250 WO HCPCS

## 2023-08-15 PROCEDURE — 2580000003 HC RX 258

## 2023-08-15 RX ORDER — LIDOCAINE HYDROCHLORIDE 10 MG/ML
5 INJECTION, SOLUTION EPIDURAL; INFILTRATION; INTRACAUDAL; PERINEURAL ONCE
Status: DISCONTINUED | OUTPATIENT
Start: 2023-08-15 | End: 2023-08-17 | Stop reason: HOSPADM

## 2023-08-15 RX ORDER — SODIUM CHLORIDE 9 MG/ML
5 INJECTION INTRAVENOUS ONCE
Status: DISCONTINUED | OUTPATIENT
Start: 2023-08-15 | End: 2023-08-17 | Stop reason: HOSPADM

## 2023-08-15 RX ORDER — METHYLPREDNISOLONE ACETATE 80 MG/ML
80 INJECTION, SUSPENSION INTRA-ARTICULAR; INTRALESIONAL; INTRAMUSCULAR; SOFT TISSUE ONCE
Status: DISCONTINUED | OUTPATIENT
Start: 2023-08-15 | End: 2023-08-17 | Stop reason: HOSPADM

## 2023-08-15 ASSESSMENT — PAIN DESCRIPTION - DESCRIPTORS
DESCRIPTORS: DISCOMFORT;PRESSURE
DESCRIPTORS: ACHING;DISCOMFORT;THROBBING

## 2023-08-15 ASSESSMENT — PAIN DESCRIPTION - PAIN TYPE: TYPE: CHRONIC PAIN

## 2023-08-15 ASSESSMENT — PAIN DESCRIPTION - LOCATION: LOCATION: BACK

## 2023-08-15 ASSESSMENT — PAIN - FUNCTIONAL ASSESSMENT
PAIN_FUNCTIONAL_ASSESSMENT: 0-10
PAIN_FUNCTIONAL_ASSESSMENT: PREVENTS OR INTERFERES SOME ACTIVE ACTIVITIES AND ADLS
PAIN_FUNCTIONAL_ASSESSMENT: PREVENTS OR INTERFERES SOME ACTIVE ACTIVITIES AND ADLS

## 2023-08-15 ASSESSMENT — PAIN DESCRIPTION - FREQUENCY: FREQUENCY: INTERMITTENT

## 2023-08-15 ASSESSMENT — PAIN DESCRIPTION - ORIENTATION: ORIENTATION: LOWER;RIGHT

## 2023-08-15 ASSESSMENT — PAIN SCALES - GENERAL: PAINLEVEL_OUTOF10: 9

## 2023-08-15 NOTE — PROGRESS NOTES

## 2023-08-15 NOTE — INTERVAL H&P NOTE
Update History & Physical    The patient's History and Physical  was reviewed with the patient and I examined the patient. There was no change. The surgical site was confirmed by the patient and me. Plan: The risks, benefits, expected outcome, and alternative to the recommended procedure have been discussed with the patient. Patient understands and wants to proceed with the procedure.      Electronically signed by Brien Rosado MD on 8/15/2023 at 12:27 PM

## 2023-08-21 ENCOUNTER — TELEPHONE (OUTPATIENT)
Dept: PAIN MANAGEMENT | Age: 68
End: 2023-08-21

## 2023-08-23 ENCOUNTER — TELEPHONE (OUTPATIENT)
Dept: PAIN MANAGEMENT | Age: 68
End: 2023-08-23

## 2023-08-23 NOTE — TELEPHONE ENCOUNTER
The patient called the 3rd floor at 8 am and reported that she bettina up vomiting and had a fever. I re-scheduled her for nest Wednesday at 2 pm.  I did not receive the call until 11:00.

## 2023-09-05 ASSESSMENT — ENCOUNTER SYMPTOMS: BOWEL INCONTINENCE: 1

## 2023-09-13 ENCOUNTER — HOSPITAL ENCOUNTER (OUTPATIENT)
Dept: PAIN MANAGEMENT | Age: 68
Discharge: HOME | End: 2023-09-13
Payer: MEDICARE

## 2023-09-13 VITALS
SYSTOLIC BLOOD PRESSURE: 146 MMHG | TEMPERATURE: 96.7 F | RESPIRATION RATE: 18 BRPM | OXYGEN SATURATION: 100 % | DIASTOLIC BLOOD PRESSURE: 62 MMHG | HEART RATE: 71 BPM

## 2023-09-13 PROCEDURE — 6360000002 HC RX W HCPCS

## 2023-09-13 PROCEDURE — 20553 NJX 1/MLT TRIGGER POINTS 3/>: CPT | Performed by: NURSE PRACTITIONER

## 2023-09-13 PROCEDURE — 20553 NJX 1/MLT TRIGGER POINTS 3/>: CPT

## 2023-09-13 PROCEDURE — 2500000003 HC RX 250 WO HCPCS

## 2023-09-13 RX ORDER — LIDOCAINE HYDROCHLORIDE 10 MG/ML
10 INJECTION, SOLUTION EPIDURAL; INFILTRATION; INTRACAUDAL; PERINEURAL ONCE
Status: DISCONTINUED | OUTPATIENT
Start: 2023-09-13 | End: 2023-09-15 | Stop reason: HOSPADM

## 2023-09-13 RX ORDER — BUPIVACAINE HYDROCHLORIDE 5 MG/ML
10 INJECTION, SOLUTION EPIDURAL; INTRACAUDAL ONCE
Status: DISCONTINUED | OUTPATIENT
Start: 2023-09-13 | End: 2023-09-15 | Stop reason: HOSPADM

## 2023-09-13 NOTE — DISCHARGE INSTRUCTIONS
[address] [facility] Physical And Pain Medicine  Post Procedure Discharge Instructions        YOU HAVE HAD THE FOLLOWING PROCEDURE:                                  [] Occipital Nerve Blocks  [] CTS wrist injection(s)  [] Knee Injection(s)         [] Shoulder Injection(s)   [] Elbow Injection(s)     [] Botox Injection  [x] Cervical Trigger Point Injections    [] Thoracic Trigger Point Injections    [] Lumbar Trigger Point Injections  [] Piriformis Trigger Point Injections  [] SI Joint Injection(s)     [] Trochanteric Bursa Injection(s)       [] Ankle Injection(s)   [] Plantar Fasciitis   []  ______________  Injection(s) [] Botox []  Migraines [] Spasticity    YOU HAVE RECEIVED THE FOLLOWING MEDICATIONS IN YOUR INJECTION(s)  [x] Lidocaine [x] Bupivacaine   [] DepoMedrol (steroid) [] Decadron (steroid)  []  Kenalog (steroid)   [] Toradol  [] Supartz [] Peg Plant    [] Botox        PATIENT INFORMATION:   You may experience the following symptoms after your procedure. These symptoms are normal and should not cause concern: You may have an increase in your pain. This may last 24 - 48 hours after your procedure. You may have no change in the pain that you had prior to your injection(s). You may have weakness or numbness in your affected extremity. If this occurs, this may last until numbing the medication wears off. REPORT THE FOLLOWING SYMPTOMS TO YOUR DOCTOR:  Redness, swelling or drainage at the injection site(s)  Unusual pain that interferes with your normal activities of daily living. OTHER INSTRUCTIONS:    [x] I will apply ice to the injection site(s) for at least 24 hours after the procedure. I will rotate the ice on for 20 minutes and off for 20 minutes for at least 24 hours. [x] I will not apply heat for at least 48 hours and I will not take a hot bath or shower for at least 24 hours.      [x] I understand that if Lidocaine or Bupivacaine was used in my injection(s) that the injection site(s)

## 2023-09-13 NOTE — PROCEDURES
appointment. Plan:    Patient to follow up in the office in about 6 weeks.      Sunny Sanchez, MIKEY - CNP, 9/13/2023 at 5:26 PM

## 2023-10-02 RX ORDER — LEVOTHYROXINE SODIUM 0.15 MG/1
TABLET ORAL
Qty: 90 TABLET | Refills: 1 | Status: SHIPPED | OUTPATIENT
Start: 2023-10-02

## 2023-10-09 RX ORDER — PIOGLITAZONEHYDROCHLORIDE 45 MG/1
TABLET ORAL
Qty: 90 TABLET | Refills: 0 | Status: SHIPPED | OUTPATIENT
Start: 2023-10-09

## 2023-10-10 ENCOUNTER — ANCILLARY PROCEDURE (OUTPATIENT)
Dept: PRIMARY CARE CLINIC | Age: 68
End: 2023-10-10

## 2023-10-10 ENCOUNTER — OFFICE VISIT (OUTPATIENT)
Dept: PRIMARY CARE CLINIC | Age: 68
End: 2023-10-10
Payer: MEDICARE

## 2023-10-10 ENCOUNTER — TELEPHONE (OUTPATIENT)
Dept: PRIMARY CARE CLINIC | Age: 68
End: 2023-10-10

## 2023-10-10 VITALS
BODY MASS INDEX: 43.82 KG/M2 | HEART RATE: 58 BPM | RESPIRATION RATE: 16 BRPM | HEIGHT: 65 IN | DIASTOLIC BLOOD PRESSURE: 84 MMHG | TEMPERATURE: 96.8 F | OXYGEN SATURATION: 98 % | SYSTOLIC BLOOD PRESSURE: 130 MMHG | WEIGHT: 263 LBS

## 2023-10-10 DIAGNOSIS — E11.9 TYPE 2 DIABETES MELLITUS WITHOUT COMPLICATION, WITHOUT LONG-TERM CURRENT USE OF INSULIN (HCC): ICD-10-CM

## 2023-10-10 DIAGNOSIS — M25.551 RIGHT HIP PAIN: Primary | ICD-10-CM

## 2023-10-10 DIAGNOSIS — R19.7 DIARRHEA, UNSPECIFIED TYPE: ICD-10-CM

## 2023-10-10 DIAGNOSIS — E03.9 ACQUIRED HYPOTHYROIDISM: ICD-10-CM

## 2023-10-10 LAB
ALBUMIN SERPL-MCNC: 3.9 G/DL (ref 3.5–5.2)
ALP SERPL-CCNC: 102 U/L (ref 35–104)
ALT SERPL-CCNC: 14 U/L (ref 5–33)
ANION GAP SERPL CALCULATED.3IONS-SCNC: 10 MMOL/L (ref 7–19)
AST SERPL-CCNC: 18 U/L (ref 5–32)
BILIRUB SERPL-MCNC: 0.3 MG/DL (ref 0.2–1.2)
BUN SERPL-MCNC: 16 MG/DL (ref 8–23)
CALCIUM SERPL-MCNC: 9.4 MG/DL (ref 8.8–10.2)
CHLORIDE SERPL-SCNC: 102 MMOL/L (ref 98–111)
CO2 SERPL-SCNC: 28 MMOL/L (ref 22–29)
CREAT SERPL-MCNC: 0.7 MG/DL (ref 0.5–0.9)
ERYTHROCYTE [DISTWIDTH] IN BLOOD BY AUTOMATED COUNT: 14.7 % (ref 11.5–14.5)
GLUCOSE SERPL-MCNC: 97 MG/DL (ref 74–109)
HBA1C MFR BLD: 5.7 % (ref 4–6)
HCT VFR BLD AUTO: 41 % (ref 37–47)
HGB BLD-MCNC: 12.7 G/DL (ref 12–16)
MCH RBC QN AUTO: 28.3 PG (ref 27–31)
MCHC RBC AUTO-ENTMCNC: 31 G/DL (ref 33–37)
MCV RBC AUTO: 91.5 FL (ref 81–99)
PLATELET # BLD AUTO: 239 K/UL (ref 130–400)
PMV BLD AUTO: 10.8 FL (ref 9.4–12.3)
POTASSIUM SERPL-SCNC: 4.3 MMOL/L (ref 3.5–5)
PROT SERPL-MCNC: 7 G/DL (ref 6.6–8.7)
RBC # BLD AUTO: 4.48 M/UL (ref 4.2–5.4)
SODIUM SERPL-SCNC: 140 MMOL/L (ref 136–145)
T4 FREE SERPL-MCNC: 0.99 NG/DL (ref 0.93–1.7)
TSH SERPL DL<=0.005 MIU/L-ACNC: 10.14 UIU/ML (ref 0.27–4.2)
WBC # BLD AUTO: 5.3 K/UL (ref 4.8–10.8)

## 2023-10-10 PROCEDURE — 1123F ACP DISCUSS/DSCN MKR DOCD: CPT | Performed by: NURSE PRACTITIONER

## 2023-10-10 PROCEDURE — 3074F SYST BP LT 130 MM HG: CPT | Performed by: NURSE PRACTITIONER

## 2023-10-10 PROCEDURE — 3044F HG A1C LEVEL LT 7.0%: CPT | Performed by: NURSE PRACTITIONER

## 2023-10-10 PROCEDURE — 3078F DIAST BP <80 MM HG: CPT | Performed by: NURSE PRACTITIONER

## 2023-10-10 PROCEDURE — 99214 OFFICE O/P EST MOD 30 MIN: CPT | Performed by: NURSE PRACTITIONER

## 2023-10-10 RX ORDER — DICYCLOMINE HYDROCHLORIDE 10 MG/1
10 CAPSULE ORAL
Qty: 120 CAPSULE | Refills: 0 | Status: SHIPPED | OUTPATIENT
Start: 2023-10-10

## 2023-10-10 SDOH — ECONOMIC STABILITY: FOOD INSECURITY: WITHIN THE PAST 12 MONTHS, YOU WORRIED THAT YOUR FOOD WOULD RUN OUT BEFORE YOU GOT MONEY TO BUY MORE.: NEVER TRUE

## 2023-10-10 SDOH — ECONOMIC STABILITY: INCOME INSECURITY: HOW HARD IS IT FOR YOU TO PAY FOR THE VERY BASICS LIKE FOOD, HOUSING, MEDICAL CARE, AND HEATING?: NOT VERY HARD

## 2023-10-10 SDOH — ECONOMIC STABILITY: FOOD INSECURITY: WITHIN THE PAST 12 MONTHS, THE FOOD YOU BOUGHT JUST DIDN'T LAST AND YOU DIDN'T HAVE MONEY TO GET MORE.: NEVER TRUE

## 2023-10-10 SDOH — ECONOMIC STABILITY: HOUSING INSECURITY
IN THE LAST 12 MONTHS, WAS THERE A TIME WHEN YOU DID NOT HAVE A STEADY PLACE TO SLEEP OR SLEPT IN A SHELTER (INCLUDING NOW)?: NO

## 2023-10-10 NOTE — PATIENT INSTRUCTIONS
Xray today forward to ryan  Refer back to GI  Try the bentyl before each meal and bedtime for IBS diarrhea,if you get constipated cut back on this  Watch diet see if anything effects your colon

## 2023-10-10 NOTE — PROGRESS NOTES
TSH    T4, Free            Plan: Total time spent today caring for this patient was 30 minutes  Put a referral back into GI I am going to go ahead and do her blood work today. The x-ray just shows arthritis but I will wait for the final reading. XR HIP 2-3 VW W PELVIS RIGHT    Result Date: 10/11/2023  EXAM: RIGHT HIP WITH AP PELVIS (2-3 VIEWS)  HISTORY:  Right hip pain  COMPARISON:  None  FINDINGS:  No acute fracture or dislocation. The femoral acetabular spaces are mildly narrowed bilaterally, left greater than right with marginal osteophyte formation. There is minimal osteophyte formation at the anterior inferior sacroiliac joint spaces. The pubic symphysis is maintained. No localized soft tissue abnormalities are appreciated. 1. Mild femoral acetabular osteoarthritis, left greater than right. 2. Minimal sacroiliac osteoarthritis. 3. No fracture or dislocation. .   ______________________________________ Electronically signed by: Bhaskar Guzman D.O. Date:     10/11/2023 Time:    11:25   PDMP Monitoring:    Last PDMP [place] as Reviewed:  Review User Review Instant Review Result            Urine Drug Screenings (1 yr)    No resulted procedures found. Medication Contract and Consent for Opioid Use Documents Filed        No documents found                     Patient given educational materials -see patient instructions. Discussed use, benefit, and side effects of prescribed medications. All patient questions answered. Pt voiced understanding. Reviewed health maintenance. Instructed to continue currentmedications, diet and exercise. Patient agreed with treatment plan. Follow up as directed.    MEDICATIONS:  Orders Placed This Encounter   Medications    dicyclomine (BENTYL) 10 MG capsule     Sig: Take 1 capsule by mouth 4 times daily (before meals and nightly)     Dispense:  120 capsule     Refill:  0         ORDERS:  Orders Placed This Encounter   Procedures    XR HIP 2-3 VW W PELVIS

## 2023-10-10 NOTE — TELEPHONE ENCOUNTER
----- Message from [facility] sent at 10/10/2023 10:49 AM CDT -----  Subject: Message to Provider    QUESTIONS  Information for Provider? Frederick a pharmacist with [facility] called in to let   the patient's provider know that the patient has diabetes and is not   taking a statin. Frederick stated that patient's between the ages of 37-78   should take a statin to decrease cardiovascular risk. If there is any   questions give Frederick a call at [phone].   ---------------------------------------------------------------------------  --------------  [address]  [phone]; OK to leave message on voicemail  ---------------------------------------------------------------------------  --------------  SCRIPT ANSWERS  Relationship to Patient? Covered Entity  Covered Entity Type? Health Insurance? Representative Name?  Wes Pharmacist

## 2023-10-12 ENCOUNTER — TELEPHONE (OUTPATIENT)
Dept: PRIMARY CARE CLINIC | Age: 68
End: 2023-10-12

## 2023-10-12 RX ORDER — BENZONATATE 200 MG/1
200 CAPSULE ORAL 3 TIMES DAILY PRN
Qty: 30 CAPSULE | Refills: 0 | Status: SHIPPED | OUTPATIENT
Start: 2023-10-12 | End: 2023-10-19

## 2023-10-12 RX ORDER — DEXTROMETHORPHAN HYDROBROMIDE AND PROMETHAZINE HYDROCHLORIDE 15; 6.25 MG/5ML; MG/5ML
SYRUP ORAL
Qty: 120 ML | Refills: 0 | Status: SHIPPED | OUTPATIENT
Start: 2023-10-12

## 2023-10-12 NOTE — TELEPHONE ENCOUNTER
Pt calls and complains of a cough with no other symptoms. She requests we send something to the pharmacy to control the cough. She has trie several OTC meds and it has not helped.

## 2023-10-12 NOTE — TELEPHONE ENCOUNTER
I am so glad the pharmacist noticed that she is not on a statin but he may also notice I written her for 1 in the past and she had side effects and she refuses to take it. Her cholesterol is really not that bad based on her being diabetic but it is the patient's choice whether she takes it or not.   She knows the risk and benefits

## 2023-10-13 ASSESSMENT — ENCOUNTER SYMPTOMS
EYES NEGATIVE: 1
DIARRHEA: 1
RESPIRATORY NEGATIVE: 1

## 2023-10-19 ENCOUNTER — OFFICE VISIT (OUTPATIENT)
Dept: GASTROENTEROLOGY | Age: 68
End: 2023-10-19
Payer: MEDICARE

## 2023-10-19 VITALS
HEIGHT: 65 IN | WEIGHT: 257 LBS | OXYGEN SATURATION: 96 % | DIASTOLIC BLOOD PRESSURE: 80 MMHG | SYSTOLIC BLOOD PRESSURE: 125 MMHG | BODY MASS INDEX: 42.82 KG/M2 | HEART RATE: 69 BPM

## 2023-10-19 DIAGNOSIS — K52.9 CHRONIC DIARRHEA: Primary | ICD-10-CM

## 2023-10-19 DIAGNOSIS — K90.89 BILE SALT-INDUCED DIARRHEA: ICD-10-CM

## 2023-10-19 PROCEDURE — 3079F DIAST BP 80-89 MM HG: CPT | Performed by: NURSE PRACTITIONER

## 2023-10-19 PROCEDURE — 1123F ACP DISCUSS/DSCN MKR DOCD: CPT | Performed by: NURSE PRACTITIONER

## 2023-10-19 PROCEDURE — 3074F SYST BP LT 130 MM HG: CPT | Performed by: NURSE PRACTITIONER

## 2023-10-19 PROCEDURE — 99214 OFFICE O/P EST MOD 30 MIN: CPT | Performed by: NURSE PRACTITIONER

## 2023-10-19 RX ORDER — MONTELUKAST SODIUM 4 MG/1
1 TABLET, CHEWABLE ORAL 2 TIMES DAILY
Qty: 60 TABLET | Refills: 3 | Status: SHIPPED | OUTPATIENT
Start: 2023-10-19

## 2023-10-19 ASSESSMENT — ENCOUNTER SYMPTOMS
ABDOMINAL PAIN: 1
SHORTNESS OF BREATH: 0
COUGH: 0
NAUSEA: 0
VOMITING: 0
RECTAL PAIN: 0
BLOOD IN STOOL: 0
ANAL BLEEDING: 0
CHOKING: 0
CONSTIPATION: 0
DIARRHEA: 1
ABDOMINAL DISTENTION: 0
TROUBLE SWALLOWING: 0

## 2023-10-19 NOTE — PROGRESS NOTES
Subjective:     Patient ID: Graceann Gottron is a 74 y.o. female  PCP: MIKEY Rajput - CNP  Referring Provider: MIKEY Rajput *    HPI  Patient presents to the office today with the following complaints: Diarrhea    Pt seen today for c/o diarrhea. This is chronic, worsening. Worse after eating. She c/o watery stools 5-6 times a day. This is with fecal urgency and incontinence. PCP prescribed Bentyl, this made her nauseated. She will have times of abdominal cramping with this. Symptoms are daily. She has tried and failed Imodium. This is affecting her life. She is afraid to go anywhere. Last EGD 11/2019 - gastritis, duodenitis  Last Colonoscopy 11/2019 - BCM  Denies any family hx colon cancer or colon polyps    Assessment:     1. Chronic diarrhea    2. Bile salt-induced diarrhea            Plan:   - Trial of Colestid 1 gm BID  - Ok for Dicyclomine 10 mg QID prn   - Follow up in 6 weeks to check symptoms   - Call with any questions or concerns       Orders  No orders of the defined types were placed in this encounter.     Medications  Orders Placed This Encounter   Medications    colestipol (COLESTID) 1 g tablet     Sig: Take 1 tablet by mouth 2 times daily     Dispense:  60 tablet     Refill:  3         Patient History:     Past Medical History:   Diagnosis Date    Depression 2022    Diabetes mellitus ([address])     History of blood transfusion     1976 post childbirth    Hypertension     Thyroid disease        Past Surgical History:   Procedure Laterality Date    CARPAL TUNNEL RELEASE Left 3/25/2022    LEFT CARPAL TUNNEL RELEASE performed by Gennaro Burch MD [address] 11/21/2019    Dr Severiano Kindler    HYSTERECTOMY (CERVIX STATUS UNKNOWN)      TOTAL KNEE ARTHROPLASTY Left 8/30/2016    KNEE TOTAL ARTHROPLASTY performed by Braulio Saab MD at [address] [facility] Right 8/11/2020    RIGHT TOTAL KNEE REPLACEMENT performed

## 2023-11-06 RX ORDER — DICYCLOMINE HYDROCHLORIDE 10 MG/1
CAPSULE ORAL
Qty: 120 CAPSULE | Refills: 3 | Status: SHIPPED | OUTPATIENT
Start: 2023-11-06

## 2023-11-22 ENCOUNTER — TELEPHONE (OUTPATIENT)
Dept: PAIN MANAGEMENT | Age: 68
End: 2023-11-22

## 2023-11-22 NOTE — TELEPHONE ENCOUNTER
Patient called for a refill on her norco 5mg.  Per her chart she is a no meds til seen due to no showing her appointment on 10/19/23.  She was sent a letter explaining this in the mail at that time.  I attempted to contact the patient.  There was no answer and no voicemail.

## 2023-12-04 ENCOUNTER — OFFICE VISIT (OUTPATIENT)
Dept: GASTROENTEROLOGY | Age: 68
End: 2023-12-04
Payer: MEDICARE

## 2023-12-04 VITALS
BODY MASS INDEX: 42.32 KG/M2 | WEIGHT: 254 LBS | HEIGHT: 65 IN | SYSTOLIC BLOOD PRESSURE: 125 MMHG | OXYGEN SATURATION: 94 % | DIASTOLIC BLOOD PRESSURE: 70 MMHG | HEART RATE: 68 BPM

## 2023-12-04 DIAGNOSIS — K52.9 CHRONIC DIARRHEA: Primary | ICD-10-CM

## 2023-12-04 PROCEDURE — 3074F SYST BP LT 130 MM HG: CPT | Performed by: NURSE PRACTITIONER

## 2023-12-04 PROCEDURE — 99213 OFFICE O/P EST LOW 20 MIN: CPT | Performed by: NURSE PRACTITIONER

## 2023-12-04 PROCEDURE — 1123F ACP DISCUSS/DSCN MKR DOCD: CPT | Performed by: NURSE PRACTITIONER

## 2023-12-04 PROCEDURE — 3078F DIAST BP <80 MM HG: CPT | Performed by: NURSE PRACTITIONER

## 2023-12-04 RX ORDER — MONTELUKAST SODIUM 4 MG/1
1 TABLET, CHEWABLE ORAL 2 TIMES DAILY
Qty: 180 TABLET | Refills: 3 | Status: SHIPPED | OUTPATIENT
Start: 2023-12-04

## 2023-12-04 ASSESSMENT — ENCOUNTER SYMPTOMS
NAUSEA: 0
ABDOMINAL DISTENTION: 0
ABDOMINAL PAIN: 0
DIARRHEA: 1
TROUBLE SWALLOWING: 0
CONSTIPATION: 0
BLOOD IN STOOL: 0
COUGH: 0
CHOKING: 0
SHORTNESS OF BREATH: 0
VOMITING: 0
ANAL BLEEDING: 0
RECTAL PAIN: 0

## 2023-12-04 NOTE — PROGRESS NOTES
Subjective:     Patient ID: Dionne Fiugeredo is a 79 y.o. female  PCP: MIKEY Huang - CNP  Referring Provider: No ref. provider found    HPI  Patient presents to the office today with the following complaints: Follow-up      Pt seen today for follow up. Pt had c/o chronic diarrhea at last OV. Trial of Colestid BID was recommended at last OV. Today, pt states this is working well for her. BM once a day, formed. She denies any further issues or concerns today. Last EGD 11/2019 - gastritis, duodenitis  Last Colonoscopy 11/2019 - BCM  Denies any family hx colon cancer or colon polyps    Assessment:     1. Chronic diarrhea            Plan:   - Continue Colestid 1 gm BID  - Follow up yearly or sooner if needed  - Call with any questions or concerns       Orders  No orders of the defined types were placed in this encounter.     Medications  Orders Placed This Encounter   Medications    colestipol (COLESTID) 1 g tablet     Sig: Take 1 tablet by mouth 2 times daily     Dispense:  180 tablet     Refill:  3         Patient History:     Past Medical History:   Diagnosis Date    Depression 2022    Diabetes mellitus ([address])     History of blood transfusion     1976 post childbirth    Hypertension     Thyroid disease        Past Surgical History:   Procedure Laterality Date    CARPAL TUNNEL RELEASE Left 3/25/2022    LEFT CARPAL TUNNEL RELEASE performed by Tami Nicole MD at [address] [facility] 11/21/2019    Dr William Buckley    HYSTERECTOMY (CERVIX STATUS UNKNOWN)      TOTAL KNEE ARTHROPLASTY Left 8/30/2016    KNEE TOTAL ARTHROPLASTY performed by Dhaval Bailey MD at [address] [facility] Right 8/11/2020    RIGHT TOTAL KNEE REPLACEMENT performed by Dhaval Bailey MD at [address] [facility] ENDOSCOPY N/A 11/21/2019    Dr Blaine Rosas-Gastritis, duodenitis       Family History   Problem Relation Age of Onset    Cancer Mother         uterine

## 2023-12-11 RX ORDER — AMLODIPINE BESYLATE 5 MG/1
TABLET ORAL
Qty: 90 TABLET | Refills: 0 | Status: SHIPPED | OUTPATIENT
Start: 2023-12-11

## 2024-01-03 RX ORDER — PIOGLITAZONEHYDROCHLORIDE 45 MG/1
TABLET ORAL
Qty: 90 TABLET | Refills: 0 | Status: SHIPPED | OUTPATIENT
Start: 2024-01-03

## 2024-01-16 ENCOUNTER — OFFICE VISIT (OUTPATIENT)
Dept: PRIMARY CARE CLINIC | Age: 69
End: 2024-01-16
Payer: MEDICARE

## 2024-01-16 VITALS
OXYGEN SATURATION: 97 % | RESPIRATION RATE: 16 BRPM | SYSTOLIC BLOOD PRESSURE: 130 MMHG | HEART RATE: 64 BPM | TEMPERATURE: 98.3 F | WEIGHT: 249.6 LBS | BODY MASS INDEX: 41.59 KG/M2 | DIASTOLIC BLOOD PRESSURE: 80 MMHG | HEIGHT: 65 IN

## 2024-01-16 DIAGNOSIS — I10 ESSENTIAL HYPERTENSION: ICD-10-CM

## 2024-01-16 DIAGNOSIS — F33.41 RECURRENT MAJOR DEPRESSIVE DISORDER, IN PARTIAL REMISSION (HCC): ICD-10-CM

## 2024-01-16 DIAGNOSIS — E11.9 TYPE 2 DIABETES MELLITUS WITHOUT COMPLICATION, WITHOUT LONG-TERM CURRENT USE OF INSULIN (HCC): ICD-10-CM

## 2024-01-16 DIAGNOSIS — Z00.00 INITIAL MEDICARE ANNUAL WELLNESS VISIT: Primary | ICD-10-CM

## 2024-01-16 DIAGNOSIS — M54.50 CHRONIC BILATERAL LOW BACK PAIN WITHOUT SCIATICA: ICD-10-CM

## 2024-01-16 DIAGNOSIS — G89.29 CHRONIC BILATERAL LOW BACK PAIN WITHOUT SCIATICA: ICD-10-CM

## 2024-01-16 DIAGNOSIS — Z29.11 NEED FOR RSV IMMUNIZATION: ICD-10-CM

## 2024-01-16 DIAGNOSIS — M53.3 SI (SACROILIAC) JOINT DYSFUNCTION: ICD-10-CM

## 2024-01-16 DIAGNOSIS — E03.9 ACQUIRED HYPOTHYROIDISM: ICD-10-CM

## 2024-01-16 DIAGNOSIS — Z13.220 ENCOUNTER FOR SCREENING FOR LIPID DISORDER: ICD-10-CM

## 2024-01-16 LAB
ALBUMIN SERPL-MCNC: 4.3 G/DL (ref 3.5–5.2)
ALP SERPL-CCNC: 104 U/L (ref 35–104)
ALT SERPL-CCNC: 8 U/L (ref 5–33)
ANION GAP SERPL CALCULATED.3IONS-SCNC: 8 MMOL/L (ref 7–19)
AST SERPL-CCNC: 16 U/L (ref 5–32)
BILIRUB SERPL-MCNC: 0.4 MG/DL (ref 0.2–1.2)
BUN SERPL-MCNC: 22 MG/DL (ref 8–23)
CALCIUM SERPL-MCNC: 9.8 MG/DL (ref 8.8–10.2)
CHLORIDE SERPL-SCNC: 101 MMOL/L (ref 98–111)
CHOLEST SERPL-MCNC: 157 MG/DL (ref 160–199)
CO2 SERPL-SCNC: 31 MMOL/L (ref 22–29)
CREAT SERPL-MCNC: 0.6 MG/DL (ref 0.5–0.9)
CREAT UR-MCNC: 117.5 MG/DL (ref 28–217)
GLUCOSE SERPL-MCNC: 102 MG/DL (ref 74–109)
HBA1C MFR BLD: 5.4 % (ref 4–6)
HDLC SERPL-MCNC: 56 MG/DL (ref 65–121)
LDLC SERPL CALC-MCNC: 85 MG/DL
MICROALBUMIN UR-MCNC: <1.2 MG/DL (ref 0–19)
MICROALBUMIN/CREAT UR-RTO: NORMAL MG/G
POTASSIUM SERPL-SCNC: 4.4 MMOL/L (ref 3.5–5)
PROT SERPL-MCNC: 7.7 G/DL (ref 6.6–8.7)
SODIUM SERPL-SCNC: 140 MMOL/L (ref 136–145)
T4 FREE SERPL-MCNC: 1.44 NG/DL (ref 0.93–1.7)
TRIGL SERPL-MCNC: 81 MG/DL (ref 0–149)
TSH SERPL DL<=0.005 MIU/L-ACNC: 2.01 UIU/ML (ref 0.27–4.2)

## 2024-01-16 PROCEDURE — 1123F ACP DISCUSS/DSCN MKR DOCD: CPT | Performed by: NURSE PRACTITIONER

## 2024-01-16 PROCEDURE — 3075F SYST BP GE 130 - 139MM HG: CPT | Performed by: NURSE PRACTITIONER

## 2024-01-16 PROCEDURE — G0438 PPPS, INITIAL VISIT: HCPCS | Performed by: NURSE PRACTITIONER

## 2024-01-16 PROCEDURE — 3079F DIAST BP 80-89 MM HG: CPT | Performed by: NURSE PRACTITIONER

## 2024-01-16 RX ORDER — SERTRALINE HYDROCHLORIDE 100 MG/1
TABLET, FILM COATED ORAL
Qty: 90 TABLET | Refills: 3 | Status: SHIPPED | OUTPATIENT
Start: 2024-01-16

## 2024-01-16 RX ORDER — LEVOTHYROXINE SODIUM 0.15 MG/1
150 TABLET ORAL DAILY
Qty: 90 TABLET | Refills: 2 | Status: SHIPPED | OUTPATIENT
Start: 2024-01-16

## 2024-01-16 RX ORDER — AMLODIPINE BESYLATE 5 MG/1
5 TABLET ORAL DAILY
Qty: 90 TABLET | Refills: 0 | Status: SHIPPED | OUTPATIENT
Start: 2024-01-16

## 2024-01-16 RX ORDER — DULAGLUTIDE 1.5 MG/.5ML
INJECTION, SOLUTION SUBCUTANEOUS
Qty: 12 ADJUSTABLE DOSE PRE-FILLED PEN SYRINGE | Refills: 3 | Status: SHIPPED | OUTPATIENT
Start: 2024-01-16

## 2024-01-16 RX ORDER — PIOGLITAZONEHYDROCHLORIDE 45 MG/1
45 TABLET ORAL DAILY
Qty: 90 TABLET | Refills: 1 | Status: SHIPPED | OUTPATIENT
Start: 2024-01-16

## 2024-01-16 RX ORDER — CELECOXIB 200 MG/1
CAPSULE ORAL
Qty: 180 CAPSULE | Refills: 3 | Status: SHIPPED | OUTPATIENT
Start: 2024-01-16

## 2024-01-16 RX ORDER — DOXYCYCLINE HYCLATE 100 MG
100 TABLET ORAL 2 TIMES DAILY
Qty: 20 TABLET | Refills: 0 | Status: SHIPPED | OUTPATIENT
Start: 2024-01-16 | End: 2024-01-26

## 2024-01-16 ASSESSMENT — PATIENT HEALTH QUESTIONNAIRE - PHQ9
5. POOR APPETITE OR OVEREATING: 0
7. TROUBLE CONCENTRATING ON THINGS, SUCH AS READING THE NEWSPAPER OR WATCHING TELEVISION: 0
9. THOUGHTS THAT YOU WOULD BE BETTER OFF DEAD, OR OF HURTING YOURSELF: 0
3. TROUBLE FALLING OR STAYING ASLEEP: 0
6. FEELING BAD ABOUT YOURSELF - OR THAT YOU ARE A FAILURE OR HAVE LET YOURSELF OR YOUR FAMILY DOWN: 0
2. FEELING DOWN, DEPRESSED OR HOPELESS: 0
SUM OF ALL RESPONSES TO PHQ QUESTIONS 1-9: 1
8. MOVING OR SPEAKING SO SLOWLY THAT OTHER PEOPLE COULD HAVE NOTICED. OR THE OPPOSITE, BEING SO FIGETY OR RESTLESS THAT YOU HAVE BEEN MOVING AROUND A LOT MORE THAN USUAL: 0
10. IF YOU CHECKED OFF ANY PROBLEMS, HOW DIFFICULT HAVE THESE PROBLEMS MADE IT FOR YOU TO DO YOUR WORK, TAKE CARE OF THINGS AT HOME, OR GET ALONG WITH OTHER PEOPLE: 0
4. FEELING TIRED OR HAVING LITTLE ENERGY: 0
1. LITTLE INTEREST OR PLEASURE IN DOING THINGS: 1
SUM OF ALL RESPONSES TO PHQ9 QUESTIONS 1 & 2: 1
SUM OF ALL RESPONSES TO PHQ QUESTIONS 1-9: 1

## 2024-01-16 NOTE — PROGRESS NOTES
Medicare Annual Wellness Visit    Domitila Corrales is here for Medicare AWV (Pt is fasting, doing well other than URI with cough for about 2 weeks she knew she had an appt so she didn't come in sooner, OTC meds have helped some)    Assessment & Plan   Initial Medicare annual wellness visit  Type 2 diabetes mellitus without complication, without long-term current use of insulin (HCC)  -     Microalbumin / Creatinine Urine Ratio  -     Comprehensive Metabolic Panel  -     Hemoglobin A1C  SI (sacroiliac) joint dysfunction  Recurrent major depressive disorder, in partial remission (HCC)  Essential hypertension  Chronic bilateral low back pain without sciatica  Acquired hypothyroidism  -     TSH  -     T4, Free  Encounter for screening for lipid disorder  -     Lipid Panel  Need for RSV immunization  -     respiratory syncytial vaccine, adjuvanted (AREXVY) 120 MCG/0.5ML injection; Inject 0.5 mLs into the muscle once for 1 dose, Disp-0.5 mL, R-0She is going to wait till feb to get thisNormal  Continue to monitor your blood sugar fasting should be under 120 and at 2 hours after any meal under 170  Keep your appointment with pain management about your back pain  You may get your RSV vaccine at the pharmacy anytime you are ready to especially since you are around small children  You may start the antibiotic for your current upper respiratory symptoms and you can use Mucinex or Robitussin for the congestion.  Flonase for nasal congestion  Recommendations for Preventive Services Due: see orders and patient instructions/AVS.  Recommended screening schedule for the next 5-10 years is provided to the patient in written form: see Patient Instructions/AVS.     No follow-ups on file.     Subjective   The following acute and/or chronic problems were also addressed today:  She has stopped.  She is still on her Trulicity.,  Metformin and Actos.  She has some chronic back problems that flareup on occasion.  She is still taking her

## 2024-01-16 NOTE — PATIENT INSTRUCTIONS
Preventive Care list are included within your After Visit Summary for your review.    Other Preventive Recommendations:    A preventive eye exam performed by an eye specialist is recommended every 1-2 years to screen for glaucoma; cataracts, macular degeneration, and other eye disorders.  A preventive dental visit is recommended every 6 months.  Try to get at least 150 minutes of exercise per week or 10,000 steps per day on a pedometer .  Order or download the FREE \"Exercise & Physical Activity: Your Everyday Guide\" from [employer]. Call 1-110.482.5042 or search [employer] online.  You need 4090-8110 mg of calcium and 8050-3398 IU of vitamin D per day. It is possible to meet your calcium requirement with diet alone, but a vitamin D supplement is usually necessary to meet this goal.  When exposed to the sun, use a sunscreen that protects against both UVA and UVB radiation with an SPF of 30 or greater. Reapply every 2 to 3 hours or after sweating, drying off with a towel, or swimming.  Always wear a seat belt when traveling in a car. Always wear a helmet when riding a bicycle or motorcycle.

## 2024-01-25 ENCOUNTER — HOSPITAL ENCOUNTER (OUTPATIENT)
Dept: PAIN MANAGEMENT | Age: 69
Discharge: HOME | End: 2024-01-25
Payer: MEDICARE

## 2024-01-25 VITALS
DIASTOLIC BLOOD PRESSURE: 69 MMHG | RESPIRATION RATE: 18 BRPM | TEMPERATURE: 96.5 F | SYSTOLIC BLOOD PRESSURE: 155 MMHG | HEIGHT: 64 IN | HEART RATE: 60 BPM | WEIGHT: 254 LBS | OXYGEN SATURATION: 94 % | BODY MASS INDEX: 43.36 KG/M2

## 2024-01-25 DIAGNOSIS — M54.50 CHRONIC BILATERAL LOW BACK PAIN WITHOUT SCIATICA: ICD-10-CM

## 2024-01-25 DIAGNOSIS — G89.29 CHRONIC NECK AND BACK PAIN: ICD-10-CM

## 2024-01-25 DIAGNOSIS — M54.2 CHRONIC NECK AND BACK PAIN: ICD-10-CM

## 2024-01-25 DIAGNOSIS — M54.9 CHRONIC NECK AND BACK PAIN: ICD-10-CM

## 2024-01-25 DIAGNOSIS — M47.817 LUMBOSACRAL SPONDYLOSIS WITHOUT MYELOPATHY: Primary | ICD-10-CM

## 2024-01-25 DIAGNOSIS — G89.29 CHRONIC BILATERAL LOW BACK PAIN WITHOUT SCIATICA: ICD-10-CM

## 2024-01-25 PROCEDURE — 99214 OFFICE O/P EST MOD 30 MIN: CPT

## 2024-01-25 RX ORDER — HYDROCODONE BITARTRATE AND ACETAMINOPHEN 5; 325 MG/1; MG/1
1 TABLET ORAL EVERY 8 HOURS PRN
Qty: 75 TABLET | Refills: 0 | Status: SHIPPED | OUTPATIENT
Start: 2024-01-25 | End: 2024-02-24

## 2024-01-25 ASSESSMENT — PAIN DESCRIPTION - ORIENTATION
ORIENTATION: LOWER
ORIENTATION_2: LOWER

## 2024-01-25 ASSESSMENT — ENCOUNTER SYMPTOMS
BOWEL INCONTINENCE: 1
CONSTIPATION: 0
BACK PAIN: 1

## 2024-01-25 ASSESSMENT — PAIN DESCRIPTION - INTENSITY: RATING_2: 8

## 2024-01-25 ASSESSMENT — PAIN DESCRIPTION - LOCATION
LOCATION: BACK
LOCATION_2: NECK

## 2024-01-25 ASSESSMENT — PAIN SCALES - GENERAL: PAINLEVEL_OUTOF10: 9

## 2024-01-25 ASSESSMENT — PAIN DESCRIPTION - PAIN TYPE: TYPE: CHRONIC PAIN

## 2024-01-25 NOTE — PROGRESS NOTES
[facility] Physical & Pain Medicine    Office Visit    Patient Name: Domitila Corrales    MR #: [id number]    Account #:[id number]    : 1955    Age: 68 y.o.    Sex: female    Date: 2023    PCP: Gabbie Shah APRN - CNP     Chief Complaint:   Chief Complaint   Patient presents with    Back Pain     9/10    Neck Pain     8/10       History of Present Illness:   The patient is a 68 y.o. female who presents for procedure follow up.     Patient had LESI of L4/L5 on 2023. Patient had at least 80-85% relief of pain from procedure(s) for at least 60 days. yes  Patient rates his/her pain a 8 today.   After injection, patient was able to increase activity and had less pain from aggravating factors such as standing and walking;  transportation, shopping, preparing meals, laundry, housework, and walking  Patient was able to reduce pain medication yes  Patient was able to continue HEP - home exercise program which consists of exercises at least 3 times a week or as tolerated in addition patient is encouraged to stretch twice daily (upon awaking & prior to bed) yes  Does patient receive any other injections on the same day as Epidural or Facet injection(s)? no  Patient received enough pain relief from injections that the patient would like to repeat the injection(s). yes     Patient had bilateral cervical trigger point injections on  2023. Patient had at least 80-85% relief of pain from procedure(s) for at least 8 weeks. After injection, patient was able to increase activity. Patient had less pain from aggravating factors. Patient was able to decrease pain medication usage. Patient received enough pain relief from injections that the patient would like to repeat the injection(s).     Back Pain  This is a chronic problem. The current episode started more than 1 year ago. The problem occurs constantly. The problem has been waxing and waning since onset. The pain is present in the lumbar

## 2024-01-25 NOTE — TELEPHONE ENCOUNTER
1. Lumbosacral spondylosis without myelopathy  - HYDROcodone-acetaminophen (NORCO) 5-325 MG per tablet; Take 1 tablet by mouth every 8 hours as needed for Pain for up to 30 days. Intended supply: 30 days Max Daily Amount: 3 tablets  Dispense: 75 tablet; Refill: 0    2. Chronic neck and back pain  - HYDROcodone-acetaminophen (NORCO) 5-325 MG per tablet; Take 1 tablet by mouth every 8 hours as needed for Pain for up to 30 days. Intended supply: 30 days Max Daily Amount: 3 tablets  Dispense: 75 tablet; Refill: 0    3. Chronic bilateral low back pain without sciatica  - HYDROcodone-acetaminophen (NORCO) 5-325 MG per tablet; Take 1 tablet by mouth every 8 hours as needed for Pain for up to 30 days. Intended supply: 30 days Max Daily Amount: 3 tablets  Dispense: 75 tablet; Refill: 0      Requested Prescriptions     Signed Prescriptions Disp Refills    HYDROcodone-acetaminophen (NORCO) 5-325 MG per tablet 75 tablet 0     Sig: Take 1 tablet by mouth every 8 hours as needed for Pain for up to 30 days. Intended supply: 30 days Max Daily Amount: 3 tablets     Authorizing Provider: KIERA MARTIN       Continue medication with refill sent at appointment yes; refill sent to medical director at appointment no, see refill encounter dated 1/25/2024    Electronically signed by MIKEY Lima CNP on 1/25/2024 at 10:38 AM

## 2024-01-25 NOTE — PROGRESS NOTES
Clinic Documentation      Education Provided:  [x] Review of Jono  [] Agreement Review  [x] PEG Score Calculated [] PHQ Score Calculated [] ORT Score Calculated    [] Compliance Issues Discussed [] Cognitive Behavior Needs [x] Exercise [] Review of Test [] Financial Issues  [x] Tobacco/Alcohol Use Reviewed [x] Teaching [] New Patient [] Picture Obtained    Physician Plan:  [] Outgoing Referral  [] Pharmacy Consult  [x] Test Ordered [x] Prescription Ordered/Changed   [] Obtained Test Results / Consult Notes        Complete if patient is withholding blood thinner for procedure     Blood Thinner Patient is currently taking:      [] Plavix (Hold for 7 days)  [] Aspirin (Hold for 5 days)     [] Pletal (Hold for 2 days)  [] Pradaxa (Hold for 3 days)    [] Effient (Hold for 7 days)  [] Xarelto (Hold for 2 days)    [] Eliquis (Hold for 2 days)  [] Brilinta (Hold for 7 days)    [] Coumadin (Hold for 5 days) - (INR needs to be drawn the day prior to procedure- INR < 2.0)    [] Aggrenox (Hold for 7 days)        [] Patient will stop medication on their own.    [] Blood Thinner Form Faxed for approval to hold.   Provider form faxed to:     Assessment Completed by:  Electronically signed by Rosa Lepe RN on 1/25/2024 at 10:46 AM

## 2024-02-08 ENCOUNTER — HOSPITAL ENCOUNTER (OUTPATIENT)
Dept: PAIN MANAGEMENT | Age: 69
Discharge: HOME | End: 2024-02-08
Payer: MEDICARE

## 2024-02-08 VITALS
SYSTOLIC BLOOD PRESSURE: 167 MMHG | DIASTOLIC BLOOD PRESSURE: 72 MMHG | TEMPERATURE: 96.6 F | HEART RATE: 64 BPM | OXYGEN SATURATION: 97 % | RESPIRATION RATE: 18 BRPM

## 2024-02-08 DIAGNOSIS — M62.838 CERVICAL PARASPINAL MUSCLE SPASM: Primary | ICD-10-CM

## 2024-02-08 PROCEDURE — 20553 NJX 1/MLT TRIGGER POINTS 3/>: CPT

## 2024-02-08 PROCEDURE — 6360000002 HC RX W HCPCS

## 2024-02-08 PROCEDURE — 2500000003 HC RX 250 WO HCPCS

## 2024-02-08 RX ORDER — BUPIVACAINE HYDROCHLORIDE 5 MG/ML
10 INJECTION, SOLUTION EPIDURAL; INTRACAUDAL ONCE
Status: DISCONTINUED | OUTPATIENT
Start: 2024-02-08 | End: 2024-02-10 | Stop reason: HOSPADM

## 2024-02-08 RX ORDER — LIDOCAINE HYDROCHLORIDE 10 MG/ML
10 INJECTION, SOLUTION EPIDURAL; INFILTRATION; INTRACAUDAL; PERINEURAL ONCE
Status: DISCONTINUED | OUTPATIENT
Start: 2024-02-08 | End: 2024-02-10 | Stop reason: HOSPADM

## 2024-02-08 NOTE — PROGRESS NOTES
Procedure:  Level of Consciousness: [x]Alert [x]Oriented []Disoriented []Lethargic  Anxiety Level: [x]Calm []Anxious []Depressed []Other  Skin: [x]Warm [x]Dry []Cool []Moist []Intact []Other  Cardiovascular: [x]Palpitations: [x]Never []Occasionally []Frequently  Chest Pain: [x]No []Yes  Respiratory:  [x]Unlabored []Labored []Cough ([] Productive []Unproductive)  HCG Required: [x]No []Yes   Results: []Negative []Positive  Knowledge Level:        [x]Patient/Other verbalized understanding of pre-procedure instructions.        [x]Assessment of post-op care needs (transportation, responsible caregiver)        [x]Able to discuss health care problems and how to deal with it.  Factors that Affect Teaching:        Language Barrier: [x]No []Yes - why:        Hearing Loss:        [x]No []Yes            Corrective Device:  []Yes [x]No        Vision Loss:           [x]No []Yes            Corrective Device:  []Yes [x]No        Memory Loss:       [x]No []Yes            []Short Term []Long Term  Motivational Level:  [x]Asks Questions                  []Extremely Anxious       [x]Seems Interested               []Seems Uninterested                  []Denies need for Education  Risk for Injury:  [x]Patient oriented to person, place and time  []History of frequent falls/loss of balance  Nutritional:  []Change in appetite   []Weight Gain   []Weight Loss  Functional:  []Requires assistance with ADL's

## 2024-02-08 NOTE — DISCHARGE INSTRUCTIONS
[facility] Physical And Pain Medicine  Post Procedure Discharge Instructions        YOU HAVE HAD THE FOLLOWING PROCEDURE:                                  [] Occipital Nerve Blocks  [] CTS wrist injection(s)  [] Knee Injection(s)         [] Shoulder Injection(s)   [] Elbow Injection(s)     [] Botox Injection  [x] Cervical Trigger Point Injections    [] Thoracic Trigger Point Injections    [] Lumbar Trigger Point Injections  [] Piriformis Trigger Point Injections  [] SI Joint Injection(s)     [] Trochanteric Bursa Injection(s)       [] Ankle Injection(s)   [] Plantar Fasciitis   []  ______________  Injection(s) [] Botox []  Migraines [] Spasticity    YOU HAVE RECEIVED THE FOLLOWING MEDICATIONS IN YOUR INJECTION(s)  [x] Lidocaine [x] Bupivacaine   [] DepoMedrol (steroid) [] Decadron (steroid)  []  Kenalog (steroid)   [] Toradol  [] Supartz [] Zilretta    [] Botox        PATIENT INFORMATION:   You may experience the following symptoms after your procedure. These symptoms are normal and should not cause concern:    You may have an increase in your pain. This may last 24 - 48 hours after your procedure.  You may have no change in the pain that you had prior to your injection(s).  You may have weakness or numbness in your affected extremity. If this occurs, this may last until numbing the medication wears off.     REPORT THE FOLLOWING SYMPTOMS TO YOUR DOCTOR:  Redness, swelling or drainage at the injection site(s)  Unusual pain that interferes with your normal activities of daily living.    OTHER INSTRUCTIONS:    [x] I will apply ice to the injection site(s) for at least 24 hours after the procedure. I will rotate the ice on for 20 minutes and off for 20 minutes for at least 24 hours.    [x] I will not apply heat for at least 48 hours and I will not take a hot bath or shower for at least 24 hours.     [x] I understand that if Lidocaine or Bupivacaine was used in my injection(s) that the injection site(s)

## 2024-02-09 NOTE — PROCEDURES
[facility] Physical & Pain Medicine    Patient Name: Domitila Corrales    : 1955                    Age: 68 y.o.    Sex: female    Date: 2024    Pre-op Diagnosis: Myofascial Pain/ Muscle Spasms/ Cervicalgia    Post-op Diagnosis: Myofascial Pain/ Muscle Spasms/ Cervicalgia    Procedure: Cervical Trigger Point Injections    Performing Procedure: MATT Sainz - BC, VA-BC    Patient Vitals for the past 24 hrs:   BP Temp Temp src Pulse Resp SpO2   24 0912 (!) 167/72 (!) 96.6 °F (35.9 °C) Temporal 64 18 97 %       Description of Procedure:    After a brief physical assessment and failure to improve with conservative measures the patient presented for Cervical Trigger Point Injections The indications, limitations and possible complications were discussed with the patient and the patient elected to proceed with the procedure.    After voluntary, informed and signed consent obtained the patient was placed in a seated position.     Appropriate time out was obtained per policy.     The area of maximal tenderness was palpated over the   bilaterally Cervical muscles - Splenius  Trapezius  Rhomboid The skin overlying these areas was marked with a skin marker. The skin overlying the proposed injection sites were then sprayed with Gebauer's Solution while protecting patient eyes. The areas were prepped using aseptic technique with CHG prep. Each trigger point of the Cervical muscles - Splenius  Trapezius  Rhomboid was injected with approximately 2 ml of a solution of 5 ml of 1% Lidocaine Plain and 5 ml of 0.5% Marcaine Plain    Discharge:  The patient tolerated the procedure well. There were no complications during the procedure and the patient was discharged home with discharge instructions.    The patient has been instructed to contact the office should there be any complications or questions to arise between today and their next appointment.    Plan:    Will return to the office

## 2024-02-11 PROBLEM — M62.838 CERVICAL PARASPINAL MUSCLE SPASM: Status: ACTIVE | Noted: 2024-02-11

## 2024-02-13 ENCOUNTER — HOSPITAL ENCOUNTER (OUTPATIENT)
Dept: PAIN MANAGEMENT | Age: 69
Discharge: HOME | End: 2024-02-13
Payer: MEDICARE

## 2024-02-13 VITALS
HEART RATE: 64 BPM | OXYGEN SATURATION: 98 % | RESPIRATION RATE: 18 BRPM | TEMPERATURE: 96 F | SYSTOLIC BLOOD PRESSURE: 149 MMHG | DIASTOLIC BLOOD PRESSURE: 62 MMHG

## 2024-02-13 VITALS — SYSTOLIC BLOOD PRESSURE: 126 MMHG | RESPIRATION RATE: 18 BRPM | DIASTOLIC BLOOD PRESSURE: 43 MMHG | HEART RATE: 64 BPM

## 2024-02-13 DIAGNOSIS — R52 PAIN MANAGEMENT: ICD-10-CM

## 2024-02-13 PROCEDURE — A4216 STERILE WATER/SALINE, 10 ML: HCPCS

## 2024-02-13 PROCEDURE — 6360000002 HC RX W HCPCS

## 2024-02-13 PROCEDURE — 2580000003 HC RX 258

## 2024-02-13 PROCEDURE — 62323 NJX INTERLAMINAR LMBR/SAC: CPT

## 2024-02-13 PROCEDURE — 2500000003 HC RX 250 WO HCPCS

## 2024-02-13 RX ORDER — SODIUM CHLORIDE 9 MG/ML
5 INJECTION INTRAVENOUS ONCE
Status: DISCONTINUED | OUTPATIENT
Start: 2024-02-13 | End: 2024-02-15 | Stop reason: HOSPADM

## 2024-02-13 RX ORDER — LIDOCAINE HYDROCHLORIDE 10 MG/ML
5 INJECTION, SOLUTION EPIDURAL; INFILTRATION; INTRACAUDAL; PERINEURAL ONCE
Status: DISCONTINUED | OUTPATIENT
Start: 2024-02-13 | End: 2024-02-15 | Stop reason: HOSPADM

## 2024-02-13 RX ORDER — METHYLPREDNISOLONE ACETATE 80 MG/ML
80 INJECTION, SUSPENSION INTRA-ARTICULAR; INTRALESIONAL; INTRAMUSCULAR; SOFT TISSUE ONCE
Status: DISCONTINUED | OUTPATIENT
Start: 2024-02-13 | End: 2024-02-15 | Stop reason: HOSPADM

## 2024-02-13 NOTE — INTERVAL H&P NOTE
Update History & Physical    The patient's History and Physical  was reviewed with the patient and I examined the patient. There was no change. The surgical site was confirmed by the patient and me.     Plan: The risks, benefits, expected outcome, and alternative to the recommended procedure have been discussed with the patient. Patient understands and wants to proceed with the procedure.     Electronically signed by Farhan Rios MD on 2/13/2024 at 1:37 PM

## 2024-02-13 NOTE — PROGRESS NOTES
Procedure:  Level of Consciousness: [x]Alert [x]Oriented []Disoriented []Lethargic  Anxiety Level: [x]Calm []Anxious []Depressed []Other  Skin: []Warm [x]Dry []Cool []Moist []Intact []Other  Cardiovascular: [x]Palpitations: [x]Never []Occasionally []Frequently  Chest Pain: [x]No []Yes  Respiratory:  [x]Unlabored []Labored []Cough ([] Productive []Unproductive)  HCG Required: [x]No []Yes   Results: []Negative []Positive  Knowledge Level:        [x]Patient/Other verbalized understanding of pre-procedure instructions.        [x]Assessment of post-op care needs (transportation, responsible caregiver)        [x]Able to discuss health care problems and how to deal with it.  Factors that Affect Teaching:        Language Barrier: [x]No []Yes - why:        Hearing Loss:        [x]No []Yes            Corrective Device:  []Yes []No        Vision Loss:           [x]No []Yes            Corrective Device:  []Yes []No        Memory Loss:       [x]No []Yes            []Short Term []Long Term  Motivational Level:  [x]Asks Questions                  []Extremely Anxious       [x]Seems Interested               []Seems Uninterested                  []Denies need for Education  Risk for Injury:  [x]Patient oriented to person, place and time  []History of frequent falls/loss of balance  Nutritional:  []Change in appetite   []Weight Gain   []Weight Loss  Functional:  []Requires assistance with ADL's

## 2024-02-19 ENCOUNTER — TELEPHONE (OUTPATIENT)
Dept: PAIN MANAGEMENT | Age: 69
End: 2024-02-19

## 2024-02-23 PROBLEM — F11.90 CHRONIC, CONTINUOUS USE OF OPIOIDS: Status: ACTIVE | Noted: 2024-02-23

## 2024-02-23 ASSESSMENT — ENCOUNTER SYMPTOMS
BACK PAIN: 1
BOWEL INCONTINENCE: 1

## 2024-03-20 DIAGNOSIS — G89.29 CHRONIC NECK AND BACK PAIN: ICD-10-CM

## 2024-03-20 DIAGNOSIS — M54.9 CHRONIC NECK AND BACK PAIN: ICD-10-CM

## 2024-03-20 DIAGNOSIS — M47.817 LUMBOSACRAL SPONDYLOSIS WITHOUT MYELOPATHY: ICD-10-CM

## 2024-03-20 DIAGNOSIS — G89.29 CHRONIC BILATERAL LOW BACK PAIN WITHOUT SCIATICA: ICD-10-CM

## 2024-03-20 DIAGNOSIS — M54.50 CHRONIC BILATERAL LOW BACK PAIN WITHOUT SCIATICA: ICD-10-CM

## 2024-03-20 DIAGNOSIS — M54.2 CHRONIC NECK AND BACK PAIN: ICD-10-CM

## 2024-03-21 RX ORDER — HYDROCODONE BITARTRATE AND ACETAMINOPHEN 5; 325 MG/1; MG/1
1 TABLET ORAL EVERY 8 HOURS PRN
Qty: 75 TABLET | Refills: 0 | Status: SHIPPED | OUTPATIENT
Start: 2024-03-21 | End: 2024-04-20

## 2024-03-25 RX ORDER — BUSPIRONE HYDROCHLORIDE 10 MG/1
TABLET ORAL
Qty: 90 TABLET | Refills: 0 | Status: SHIPPED | OUTPATIENT
Start: 2024-03-25

## 2024-04-22 RX ORDER — BUSPIRONE HYDROCHLORIDE 10 MG/1
TABLET ORAL
Qty: 90 TABLET | Refills: 0 | Status: SHIPPED | OUTPATIENT
Start: 2024-04-22

## 2024-05-28 RX ORDER — BUSPIRONE HYDROCHLORIDE 10 MG/1
TABLET ORAL
Qty: 90 TABLET | Refills: 0 | Status: SHIPPED | OUTPATIENT
Start: 2024-05-28

## 2024-05-28 NOTE — PROGRESS NOTES
Procedure:  Level of Consciousness: [x]Alert [x]Oriented []Disoriented []Lethargic  Anxiety Level: [x]Calm []Anxious []Depressed []Other  Skin: []Warm [x]Dry []Cool []Moist []Intact []Other  Cardiovascular: [x]Palpitations: [x]Never []Occasionally []Frequently  Chest Pain: []No []Yes  Respiratory:  [x]Unlabored []Labored []Cough ([] Productive []Unproductive)  HCG Required: [x]No []Yes   Results: []Negative []Positive  Knowledge Level:        [x]Patient/Other verbalized understanding of pre-procedure instructions. [x]Assessment of post-op care needs (transportation, responsible caregiver)        [x]Able to discuss health care problems and how to deal with it.   Factors that Affect Teaching:        Language Barrier: [x]No []Yes - why:        Hearing Loss:        [x]No []Yes            Corrective Device:  []Yes []No        Vision Loss:           [x]No []Yes            Corrective Device:  []Yes []No        Memory Loss:       [x]No []Yes            []Short Term []Long Term  Motivational Level:  [x]Asks Questions                  []Extremely Anxious       [x]Seems Interested               []Seems Uninterested                  [x]Denies need for Education  Risk for Injury:  [x]Patient oriented to person, place and time  []History of frequent falls/loss of balance  Nutritional:  []Change in appetite   []Weight Gain   []Weight Loss  Functional:  []Requires assistance with ADL's
Normal gait / station

## 2024-05-29 ENCOUNTER — HOSPITAL ENCOUNTER (OUTPATIENT)
Dept: PAIN MANAGEMENT | Age: 69
Discharge: HOME | End: 2024-05-29
Payer: MEDICARE

## 2024-05-29 VITALS
SYSTOLIC BLOOD PRESSURE: 158 MMHG | WEIGHT: 271 LBS | OXYGEN SATURATION: 97 % | RESPIRATION RATE: 18 BRPM | TEMPERATURE: 96.5 F | DIASTOLIC BLOOD PRESSURE: 73 MMHG | BODY MASS INDEX: 46.52 KG/M2

## 2024-05-29 DIAGNOSIS — M62.838 CERVICAL PARASPINAL MUSCLE SPASM: ICD-10-CM

## 2024-05-29 DIAGNOSIS — G89.29 CHRONIC BILATERAL LOW BACK PAIN WITHOUT SCIATICA: Primary | ICD-10-CM

## 2024-05-29 DIAGNOSIS — M47.812 FACET ARTHRITIS OF CERVICAL REGION: ICD-10-CM

## 2024-05-29 DIAGNOSIS — F11.90 CHRONIC, CONTINUOUS USE OF OPIOIDS: ICD-10-CM

## 2024-05-29 DIAGNOSIS — M47.816 LUMBAR FACET ARTHROPATHY: ICD-10-CM

## 2024-05-29 DIAGNOSIS — M54.2 MUSCLE PAIN, CERVICAL: ICD-10-CM

## 2024-05-29 DIAGNOSIS — M54.50 CHRONIC BILATERAL LOW BACK PAIN WITHOUT SCIATICA: Primary | ICD-10-CM

## 2024-05-29 DIAGNOSIS — M54.16 LUMBAR RADICULOPATHY: ICD-10-CM

## 2024-05-29 PROCEDURE — 99213 OFFICE O/P EST LOW 20 MIN: CPT

## 2024-05-29 PROCEDURE — 99213 OFFICE O/P EST LOW 20 MIN: CPT | Performed by: NURSE PRACTITIONER

## 2024-05-29 RX ORDER — HYDROCODONE BITARTRATE AND ACETAMINOPHEN 5; 325 MG/1; MG/1
1 TABLET ORAL EVERY 8 HOURS PRN
Qty: 75 TABLET | Refills: 0 | Status: SHIPPED | OUTPATIENT
Start: 2024-05-29 | End: 2024-06-28

## 2024-05-29 ASSESSMENT — ENCOUNTER SYMPTOMS
VISUAL CHANGE: 0
ABDOMINAL PAIN: 0
BACK PAIN: 1
CHEST TIGHTNESS: 0
SINUS PAIN: 0
EYE PAIN: 0
SHORTNESS OF BREATH: 0
COLOR CHANGE: 0
APNEA: 0

## 2024-05-29 ASSESSMENT — PAIN DESCRIPTION - LOCATION: LOCATION: BACK;KNEE;NECK

## 2024-05-29 ASSESSMENT — PAIN DESCRIPTION - DESCRIPTORS: DESCRIPTORS: ACHING;SHARP;STABBING

## 2024-05-29 ASSESSMENT — PAIN DESCRIPTION - ORIENTATION: ORIENTATION: RIGHT;LEFT

## 2024-05-29 NOTE — PROGRESS NOTES
Clinic Documentation      Education Provided:  [x] Review of Jono  [] Agreement Review  [x] PEG Score Calculated [] PHQ Score Calculated [] ORT Score Calculated    [] Compliance Issues Discussed [] Cognitive Behavior Needs [x] Exercise [] Review of Test [] Financial Issues  [x] Tobacco/Alcohol Use Reviewed [x] Teaching [] New Patient [] Picture Obtained    Physician Plan:  [] Outgoing Referral  [] Pharmacy Consult  [] Test Ordered [x] Prescription Ordered/Changed   [] Obtained Test Results / Consult Notes        Complete if patient is withholding blood thinner for procedure     Blood Thinner Patient is currently taking:      [] Plavix (Hold for 7 days)  [] Aspirin (Hold for 5 days)     [] Pletal (Hold for 2 days)  [] Pradaxa (Hold for 3 days)    [] Effient (Hold for 7 days)  [] Xarelto (Hold for 2 days)    [] Eliquis (Hold for 2 days)  [] Brilinta (Hold for 7 days)    [] Coumadin (Hold for 5 days) - (INR needs to be drawn the day prior to procedure- INR < 2.0)    [] Aggrenox (Hold for 7 days)        [] Patient will stop medication on their own.    [] Blood Thinner Form Faxed for approval to hold.   Provider form faxed to:     Assessment Completed by:  Electronically signed by Rebecca Zapata RN on 5/29/2024 at 8:35 AM

## 2024-05-29 NOTE — H&P
BY MOUTH TWICE DAILY WITH MEALS FOR DIABETES 180 tablet 3    pioglitazone (ACTOS) 45 MG tablet Take 1 tablet by mouth daily 90 tablet 1    sertraline (ZOLOFT) 100 MG tablet TAKE 1 TABLET BY MOUTH ONCE DAILY FOR DEPRESSION 90 tablet 3    levothyroxine (SYNTHROID) 150 MCG tablet Take 1 tablet by mouth daily 90 tablet 2    colestipol (COLESTID) 1 g tablet Take 1 tablet by mouth 2 times daily 180 tablet 3    Blood Glucose Monitoring Suppl (FREESTYLE LITE) w/Device KIT USE AS DIRECTED      Blood Glucose Monitoring Suppl (FREESTYLE LITE) ROBEL 1 Device by Does not apply route daily 1 each 0    Lancets MISC 1 each by Does not apply route 2 times daily 100 each 5    blood glucose monitor strips Test 3 times a day & as needed for symptoms of irregular blood glucose. Freestyle lite (Patient taking differently: 1 strip by Other route 3 times daily as needed Test 3 times a day & as needed for symptoms of irregular blood glucose. Freestyle lite) 100 strip 3     No current facility-administered medications for this encounter.       Allergies  Pcn [penicillins]    Current Medications  Current Outpatient Medications   Medication Sig Dispense Refill    HYDROcodone-acetaminophen (NORCO) 5-325 MG per tablet Take 1 tablet by mouth every 8 hours as needed for Pain for up to 30 days. Intended supply: 30 days. Take lowest dose possible to manage pain Max Daily Amount: 3 tablets 75 tablet 0    busPIRone (BUSPAR) 10 MG tablet TAKE 1 TABLET BY MOUTH THREE TIMES DAILY 90 tablet 0    amLODIPine (NORVASC) 5 MG tablet Take 1 tablet by mouth daily 90 tablet 0    celecoxib (CELEBREX) 200 MG capsule TAKE 1 CAPSULE BY MOUTH TWICE DAILY REPLACE  DICLOFENAC 180 capsule 3    dulaglutide (TRULICITY) 1.5 MG/0.5ML SC injection INJECT 1.5 MG SUB-Q ONCE A WEEK 12 Adjustable Dose Pre-filled Pen Syringe 3    metFORMIN (GLUCOPHAGE) 1000 MG tablet TAKE 1 TABLET BY MOUTH TWICE DAILY WITH MEALS FOR DIABETES 180 tablet 3    pioglitazone (ACTOS) 45 MG tablet Take 1

## 2024-06-03 ENCOUNTER — OFFICE VISIT (OUTPATIENT)
Dept: PRIMARY CARE CLINIC | Age: 69
End: 2024-06-03
Payer: MEDICARE

## 2024-06-03 VITALS
BODY MASS INDEX: 45.65 KG/M2 | WEIGHT: 274 LBS | TEMPERATURE: 96.9 F | SYSTOLIC BLOOD PRESSURE: 130 MMHG | RESPIRATION RATE: 18 BRPM | HEART RATE: 61 BPM | DIASTOLIC BLOOD PRESSURE: 82 MMHG | OXYGEN SATURATION: 99 % | HEIGHT: 65 IN

## 2024-06-03 DIAGNOSIS — R05.1 ACUTE COUGH: ICD-10-CM

## 2024-06-03 DIAGNOSIS — J06.9 VIRAL URI: Primary | ICD-10-CM

## 2024-06-03 PROCEDURE — G8417 CALC BMI ABV UP PARAM F/U: HCPCS | Performed by: FAMILY MEDICINE

## 2024-06-03 PROCEDURE — 1036F TOBACCO NON-USER: CPT | Performed by: FAMILY MEDICINE

## 2024-06-03 PROCEDURE — G8427 DOCREV CUR MEDS BY ELIG CLIN: HCPCS | Performed by: FAMILY MEDICINE

## 2024-06-03 PROCEDURE — 1123F ACP DISCUSS/DSCN MKR DOCD: CPT | Performed by: FAMILY MEDICINE

## 2024-06-03 PROCEDURE — 3075F SYST BP GE 130 - 139MM HG: CPT | Performed by: FAMILY MEDICINE

## 2024-06-03 PROCEDURE — 1090F PRES/ABSN URINE INCON ASSESS: CPT | Performed by: FAMILY MEDICINE

## 2024-06-03 PROCEDURE — 99213 OFFICE O/P EST LOW 20 MIN: CPT | Performed by: FAMILY MEDICINE

## 2024-06-03 PROCEDURE — 3017F COLORECTAL CA SCREEN DOC REV: CPT | Performed by: FAMILY MEDICINE

## 2024-06-03 PROCEDURE — 3079F DIAST BP 80-89 MM HG: CPT | Performed by: FAMILY MEDICINE

## 2024-06-03 PROCEDURE — G8399 PT W/DXA RESULTS DOCUMENT: HCPCS | Performed by: FAMILY MEDICINE

## 2024-06-03 RX ORDER — FEXOFENADINE HCL 180 MG/1
180 TABLET ORAL DAILY
Qty: 30 TABLET | Refills: 0 | Status: SHIPPED | OUTPATIENT
Start: 2024-06-03 | End: 2024-07-03

## 2024-06-03 RX ORDER — BENZONATATE 200 MG/1
200 CAPSULE ORAL 3 TIMES DAILY PRN
Qty: 30 CAPSULE | Refills: 0 | Status: SHIPPED | OUTPATIENT
Start: 2024-06-03 | End: 2024-06-10

## 2024-06-03 ASSESSMENT — ENCOUNTER SYMPTOMS
WHEEZING: 0
NAUSEA: 1
SORE THROAT: 1
CHEST TIGHTNESS: 0
BLOOD IN STOOL: 0
ABDOMINAL PAIN: 0
COUGH: 1
VOMITING: 0
SHORTNESS OF BREATH: 1

## 2024-06-03 NOTE — PROGRESS NOTES
language toprinted texts.  The electronic translation of spoken language may be erroneous, or at times, nonsensical words or phrases may be inadvertently transcribed.  Although I have reviewed the note for such errors, some may stillexist.      An electronic signature was used to authenticate this note.    --Orestes Salcido MD

## 2024-06-07 RX ORDER — AMLODIPINE BESYLATE 5 MG/1
5 TABLET ORAL DAILY
Qty: 90 TABLET | Refills: 0 | Status: SHIPPED | OUTPATIENT
Start: 2024-06-07

## 2024-06-20 ENCOUNTER — HOSPITAL ENCOUNTER (OUTPATIENT)
Dept: PAIN MANAGEMENT | Age: 69
Discharge: HOME | End: 2024-06-20
Payer: MEDICARE

## 2024-06-20 VITALS
TEMPERATURE: 96.3 F | SYSTOLIC BLOOD PRESSURE: 151 MMHG | OXYGEN SATURATION: 97 % | DIASTOLIC BLOOD PRESSURE: 70 MMHG | RESPIRATION RATE: 18 BRPM | HEART RATE: 64 BPM

## 2024-06-20 DIAGNOSIS — M62.838 CERVICAL PARASPINAL MUSCLE SPASM: Primary | ICD-10-CM

## 2024-06-20 PROCEDURE — 20553 NJX 1/MLT TRIGGER POINTS 3/>: CPT

## 2024-06-20 PROCEDURE — 2500000003 HC RX 250 WO HCPCS

## 2024-06-20 PROCEDURE — 6360000002 HC RX W HCPCS

## 2024-06-20 RX ORDER — BUPIVACAINE HYDROCHLORIDE 5 MG/ML
10 INJECTION, SOLUTION EPIDURAL; INTRACAUDAL ONCE
Status: DISCONTINUED | OUTPATIENT
Start: 2024-06-20 | End: 2024-06-22 | Stop reason: HOSPADM

## 2024-06-20 RX ORDER — LIDOCAINE HYDROCHLORIDE 10 MG/ML
10 INJECTION, SOLUTION EPIDURAL; INFILTRATION; INTRACAUDAL; PERINEURAL ONCE
Status: DISCONTINUED | OUTPATIENT
Start: 2024-06-20 | End: 2024-06-22 | Stop reason: HOSPADM

## 2024-06-20 NOTE — DISCHARGE INSTRUCTIONS
will be numb for a few hours after the procedure    [] I understand if a steroid was used it will take effect in 3 - 7 days. I understand that as the numbing medication wears off, the pain may return until the steroids start working.    [x] I understand that today I will rest for the next 24 hours and drink plenty of water.    [] For Botox for Migraines please do not wear anything constricting around the forehead for 7-10 days post injection. Examples headband, hats, or bandana    [] For Botox for Spasticity start therapy for the affected limb in two weeks.    [] Additional instructions: ______________________________________________ ___________________________________________________________________    Sedation:  Was oral sedation given?    [] Yes  [x] No    I understand that if I took an oral nerve calming medication I will not drive for  [] 24 hours after taking the medication.    [x] I have received a copy of my discharge instructions and understand the above instructions to the best of my knowledge    Patient Discharged:  [x] Home  [] Hospital  [] Other  ____________________________________________    Via:  [x] Ambulatory  [] Wheelchair   [] Stretcher [] EMS       Accompanied By:   [] Significant other  [] Family Member  [] Friend   [] Hospital Staff  []  Ambulance Staff  [] Other_______________________________________________    Plan:  [x] Will return to the office in   [] 1 month   [] 3 months for:  [] Procedure Follow-up  [] Office Visit   [] Planned Procedure        [x] Printed AVS and given to patient.  [] Patient has mychart and does not wish for AVS to be printed.      If you have questions, problems or concerns you may call [phone] and follow the prompts

## 2024-06-20 NOTE — PROCEDURES
[facility]    Patient Name: Domitila Corrales    : 1955    Age: 68 y.o.    Sex: female    Date: 2024    Pre-op Diagnosis: Myofascial Pain/ Muscle Spasms/ Cervicalgia    Post-op Diagnosis: Myofascial Pain/ Muscle Spasms/ Cervicalgia    Procedure: Cervical Trigger Point Injections    Performing Procedure: Reina JENSEN    Patient Vitals for the past 24 hrs:   BP Temp Temp src Pulse Resp SpO2   24 1119 (!) 151/70 (!) 96.3 °F (35.7 °C) Temporal 64 18 97 %       Description of Procedure:    After a brief physical assessment and failure to improve with conservative measures the patient presented for Cervical Trigger Point Injections The indications, limitations and possible complications were discussed with the patient and the patient elected to proceed with the procedure.    After voluntary, informed and signed consent obtained the patient was placed in a seated position.     Appropriate time out was obtained per policy.     The area of maximal tenderness was palpated over the   bilaterally Cervical muscles - Splenius  Trapezius  Rhomboid The skin overlying these areas was marked with a skin marker. The skin overlying the proposed injection sites were then sprayed with Gebauer's Solution while protecting patient eyes. The areas were prepped using aseptic technique with CHG prep. Each trigger point of the Cervical muscles - Splenius  Trapezius  Rhomboid was injected with approximately 2 ml of a solution of 5 ml of 1% Lidocaine Plain and 5 ml of 0.5% Marcaine Plain    Discharge:  The patient tolerated the procedure well. There were no complications during the procedure and the patient was discharged home with discharge instructions.    The patient has been instructed to contact the office should there be any complications or questions to arise between today and their next appointment.    Plan:    Will return to the office as previously scheduled    Reina MATTHEWS

## 2024-06-28 DIAGNOSIS — J06.9 VIRAL URI: ICD-10-CM

## 2024-06-28 RX ORDER — FEXOFENADINE HCL 180 MG/1
180 TABLET ORAL DAILY
Qty: 30 TABLET | Refills: 0 | Status: SHIPPED | OUTPATIENT
Start: 2024-06-28

## 2024-07-10 DIAGNOSIS — G89.29 CHRONIC BILATERAL LOW BACK PAIN WITHOUT SCIATICA: ICD-10-CM

## 2024-07-10 DIAGNOSIS — M54.50 CHRONIC BILATERAL LOW BACK PAIN WITHOUT SCIATICA: ICD-10-CM

## 2024-07-10 RX ORDER — HYDROCODONE BITARTRATE AND ACETAMINOPHEN 5; 325 MG/1; MG/1
1 TABLET ORAL EVERY 8 HOURS PRN
Qty: 75 TABLET | Refills: 0 | Status: SHIPPED | OUTPATIENT
Start: 2024-07-10 | End: 2024-08-09

## 2024-07-16 ENCOUNTER — HOSPITAL ENCOUNTER (OUTPATIENT)
Dept: PAIN MANAGEMENT | Age: 69
Discharge: HOME | End: 2024-07-16
Payer: MEDICARE

## 2024-07-16 ENCOUNTER — OFFICE VISIT (OUTPATIENT)
Dept: PRIMARY CARE CLINIC | Age: 69
End: 2024-07-16
Payer: MEDICARE

## 2024-07-16 VITALS
DIASTOLIC BLOOD PRESSURE: 80 MMHG | HEART RATE: 64 BPM | HEIGHT: 65 IN | RESPIRATION RATE: 16 BRPM | TEMPERATURE: 96.6 F | SYSTOLIC BLOOD PRESSURE: 130 MMHG | WEIGHT: 266 LBS | BODY MASS INDEX: 44.32 KG/M2 | OXYGEN SATURATION: 97 %

## 2024-07-16 VITALS
SYSTOLIC BLOOD PRESSURE: 165 MMHG | OXYGEN SATURATION: 94 % | HEART RATE: 67 BPM | RESPIRATION RATE: 18 BRPM | TEMPERATURE: 96.6 F | DIASTOLIC BLOOD PRESSURE: 73 MMHG

## 2024-07-16 DIAGNOSIS — R52 PAIN MANAGEMENT: ICD-10-CM

## 2024-07-16 DIAGNOSIS — E11.42 TYPE 2 DIABETES MELLITUS WITH DIABETIC POLYNEUROPATHY, WITHOUT LONG-TERM CURRENT USE OF INSULIN (HCC): ICD-10-CM

## 2024-07-16 DIAGNOSIS — E03.9 ACQUIRED HYPOTHYROIDISM: ICD-10-CM

## 2024-07-16 LAB
ALBUMIN SERPL-MCNC: 4.2 G/DL (ref 3.5–5.2)
ALP SERPL-CCNC: 109 U/L (ref 35–104)
ALT SERPL-CCNC: 11 U/L (ref 5–33)
ANION GAP SERPL CALCULATED.3IONS-SCNC: 9 MMOL/L (ref 7–19)
AST SERPL-CCNC: 17 U/L (ref 5–32)
BILIRUB SERPL-MCNC: 0.3 MG/DL (ref 0.2–1.2)
BUN SERPL-MCNC: 20 MG/DL (ref 8–23)
CALCIUM SERPL-MCNC: 9.4 MG/DL (ref 8.8–10.2)
CHLORIDE SERPL-SCNC: 100 MMOL/L (ref 98–111)
CO2 SERPL-SCNC: 30 MMOL/L (ref 22–29)
CREAT SERPL-MCNC: 1 MG/DL (ref 0.5–0.9)
GLUCOSE SERPL-MCNC: 157 MG/DL (ref 74–109)
HBA1C MFR BLD: 5.4 % (ref 4–6)
POTASSIUM SERPL-SCNC: 4.5 MMOL/L (ref 3.5–5)
PROT SERPL-MCNC: 7.5 G/DL (ref 6.6–8.7)
SODIUM SERPL-SCNC: 139 MMOL/L (ref 136–145)

## 2024-07-16 PROCEDURE — 62323 NJX INTERLAMINAR LMBR/SAC: CPT

## 2024-07-16 PROCEDURE — 3044F HG A1C LEVEL LT 7.0%: CPT | Performed by: NURSE PRACTITIONER

## 2024-07-16 PROCEDURE — 3017F COLORECTAL CA SCREEN DOC REV: CPT | Performed by: NURSE PRACTITIONER

## 2024-07-16 PROCEDURE — 2022F DILAT RTA XM EVC RTNOPTHY: CPT | Performed by: NURSE PRACTITIONER

## 2024-07-16 PROCEDURE — 3075F SYST BP GE 130 - 139MM HG: CPT | Performed by: NURSE PRACTITIONER

## 2024-07-16 PROCEDURE — 1090F PRES/ABSN URINE INCON ASSESS: CPT | Performed by: NURSE PRACTITIONER

## 2024-07-16 PROCEDURE — 1123F ACP DISCUSS/DSCN MKR DOCD: CPT | Performed by: NURSE PRACTITIONER

## 2024-07-16 PROCEDURE — G8399 PT W/DXA RESULTS DOCUMENT: HCPCS | Performed by: NURSE PRACTITIONER

## 2024-07-16 PROCEDURE — 6360000002 HC RX W HCPCS

## 2024-07-16 PROCEDURE — 1036F TOBACCO NON-USER: CPT | Performed by: NURSE PRACTITIONER

## 2024-07-16 PROCEDURE — 99213 OFFICE O/P EST LOW 20 MIN: CPT | Performed by: NURSE PRACTITIONER

## 2024-07-16 PROCEDURE — 2580000003 HC RX 258

## 2024-07-16 PROCEDURE — G8427 DOCREV CUR MEDS BY ELIG CLIN: HCPCS | Performed by: NURSE PRACTITIONER

## 2024-07-16 PROCEDURE — 2500000003 HC RX 250 WO HCPCS

## 2024-07-16 PROCEDURE — G8417 CALC BMI ABV UP PARAM F/U: HCPCS | Performed by: NURSE PRACTITIONER

## 2024-07-16 PROCEDURE — 3079F DIAST BP 80-89 MM HG: CPT | Performed by: NURSE PRACTITIONER

## 2024-07-16 RX ORDER — METHYLPREDNISOLONE ACETATE 80 MG/ML
80 INJECTION, SUSPENSION INTRA-ARTICULAR; INTRALESIONAL; INTRAMUSCULAR; SOFT TISSUE ONCE
Status: DISCONTINUED | OUTPATIENT
Start: 2024-07-16 | End: 2024-07-18 | Stop reason: HOSPADM

## 2024-07-16 RX ORDER — SODIUM CHLORIDE 9 MG/ML
5 INJECTION, SOLUTION INTRAMUSCULAR; INTRAVENOUS; SUBCUTANEOUS ONCE
Status: DISCONTINUED | OUTPATIENT
Start: 2024-07-16 | End: 2024-07-18 | Stop reason: HOSPADM

## 2024-07-16 RX ORDER — LIDOCAINE HYDROCHLORIDE 10 MG/ML
5 INJECTION, SOLUTION EPIDURAL; INFILTRATION; INTRACAUDAL; PERINEURAL ONCE
Status: DISCONTINUED | OUTPATIENT
Start: 2024-07-16 | End: 2024-07-18 | Stop reason: HOSPADM

## 2024-07-16 ASSESSMENT — ENCOUNTER SYMPTOMS
RESPIRATORY NEGATIVE: 1
EYES NEGATIVE: 1
GASTROINTESTINAL NEGATIVE: 1

## 2024-07-16 ASSESSMENT — PAIN - FUNCTIONAL ASSESSMENT: PAIN_FUNCTIONAL_ASSESSMENT: 0-10

## 2024-07-16 NOTE — PROGRESS NOTES
Procedure:  Level of Consciousness: [x]Alert [x]Oriented []Disoriented []Lethargic  Anxiety Level: [x]Calm []Anxious []Depressed []Other  Skin: []Warm [x]Dry []Cool []Moist []Intact []Other  Cardiovascular: [x]Palpitations: []Never []Occasionally []Frequently  Chest Pain: [x]No []Yes  Respiratory:  [x]Unlabored []Labored []Cough ([] Productive []Unproductive)  HCG Required: [x]No []Yes   Results: []Negative []Positive  Knowledge Level:        [x]Patient/Other verbalized understanding of pre-procedure instructions.        [x]Assessment of post-op care needs (transportation, responsible caregiver)        [x]Able to discuss health care problems and how to deal with it.  Factors that Affect Teaching:        Language Barrier: [x]No []Yes - why:        Hearing Loss:        [x]No []Yes            Corrective Device:  []Yes [x]No        Vision Loss:           [x]No []Yes            Corrective Device:  []Yes [x]No        Memory Loss:       [x]No []Yes            []Short Term []Long Term  Motivational Level:  [x]Asks Questions                  []Extremely Anxious       [x]Seems Interested               []Seems Uninterested                  []Denies need for Education  Risk for Injury:  [x]Patient oriented to person, place and time  []History of frequent falls/loss of balance  Nutritional:  []Change in appetite   []Weight Gain   []Weight Loss  Functional:  []Requires assistance with ADL's

## 2024-07-16 NOTE — INTERVAL H&P NOTE
Update History & Physical    The patient's History and Physical  was reviewed with the patient and I examined the patient. There was no change. The surgical site was confirmed by the patient and me.     Plan: The risks, benefits, expected outcome, and alternative to the recommended procedure have been discussed with the patient. Patient understands and wants to proceed with the procedure.     Electronically signed by Farhan Rios MD on 7/16/2024 at 10:52 AM

## 2024-07-16 NOTE — PROGRESS NOTES
[facility]  [address] [place] [address]  Dept: [phone]  Dept Fax: [phone]  Loc: [phone]    Domitila Corrales is a 68 y.o. female who presents today for her medical conditions/complaints as noted below.  Domitila Corrales is c/o of 6 Month Follow-Up (Patient presents today for 6 month follow up. /)        HPI:     HPI   Chief Complaint   Patient presents with    6 Month Follow-Up     Patient presents today for 6 month follow up.      Blood sugar   Lab Results   Component Value Date/Time    GLUCOSE 102 01/16/2024 11:24 AM    GLUCOSE 97 10/10/2023 10:59 AM    GLUCOSE 107 01/12/2023 11:33 AM    LABA1C 5.4 01/16/2024 11:24 AM    LABA1C 5.7 10/10/2023 10:59 AM    LABA1C 5.5 01/12/2023 11:31 AM     Past Medical History:   Diagnosis Date    Depression 2022    Diabetes mellitus (HCC)     History of blood transfusion     1976 post childbirth    Hypertension     Thyroid disease       Past Surgical History:   Procedure Laterality Date    CARPAL TUNNEL RELEASE Left 3/25/2022    LEFT CARPAL TUNNEL RELEASE performed by Brooks Bell MD at [facility] OR    CHOLECYSTECTOMY      COLONOSCOPY N/A 11/21/2019    Dr KATELYN [facility]as-[facility]    HYSTERECTOMY (CERVIX STATUS UNKNOWN)      TOTAL KNEE ARTHROPLASTY Left 8/30/2016    KNEE TOTAL ARTHROPLASTY performed by Roberth Busby MD at [facility] OR    TOTAL KNEE ARTHROPLASTY Right 8/11/2020    RIGHT TOTAL KNEE REPLACEMENT performed by Roberth Busby MD at [facility] OR    UPPER GASTROINTESTINAL ENDOSCOPY N/A 11/21/2019    Dr KATELYN Rosas-Gastritis, duodenitis           7/16/2024     3:42 PM 7/16/2024    10:58 AM 7/16/2024    10:20 AM 6/20/2024    11:19 AM 6/3/2024     9:34 AM 5/29/2024     8:15 AM   Vitals   SYSTOLIC 130 165 170 151 130 158   DIASTOLIC 80 73 84 70 82 73   Pulse 64 67 59 64 61    Temp 96.6 °F (35.9 °C)  96.6 °F (35.9 °C) 96.3 °F (35.7 °C) 96.9 °F (36.1 °C) 96.5 °F (35.8 °C)   Respirations 16 18 18 18 18 18   SpO2 97 % 94

## 2024-07-24 ENCOUNTER — TELEPHONE (OUTPATIENT)
Dept: PAIN MANAGEMENT | Age: 69
End: 2024-07-24

## 2024-08-04 DIAGNOSIS — J06.9 VIRAL URI: ICD-10-CM

## 2024-08-05 RX ORDER — FEXOFENADINE HCL 180 MG/1
180 TABLET ORAL DAILY
Qty: 30 TABLET | Refills: 0 | Status: SHIPPED | OUTPATIENT
Start: 2024-08-05

## 2024-09-03 RX ORDER — AMLODIPINE BESYLATE 5 MG/1
5 TABLET ORAL DAILY
Qty: 90 TABLET | Refills: 0 | Status: SHIPPED | OUTPATIENT
Start: 2024-09-03

## 2024-09-05 DIAGNOSIS — M54.50 CHRONIC BILATERAL LOW BACK PAIN WITHOUT SCIATICA: ICD-10-CM

## 2024-09-05 DIAGNOSIS — G89.29 CHRONIC BILATERAL LOW BACK PAIN WITHOUT SCIATICA: ICD-10-CM

## 2024-09-06 RX ORDER — HYDROCODONE BITARTRATE AND ACETAMINOPHEN 5; 325 MG/1; MG/1
1 TABLET ORAL EVERY 8 HOURS PRN
Qty: 75 TABLET | Refills: 0 | Status: SHIPPED | OUTPATIENT
Start: 2024-09-06 | End: 2024-10-06

## 2024-09-17 ENCOUNTER — HOSPITAL ENCOUNTER (OUTPATIENT)
Dept: PAIN MANAGEMENT | Age: 69
Discharge: HOME | End: 2024-09-17
Payer: MEDICARE

## 2024-09-17 VITALS
BODY MASS INDEX: 45.48 KG/M2 | RESPIRATION RATE: 16 BRPM | OXYGEN SATURATION: 94 % | WEIGHT: 273 LBS | HEIGHT: 65 IN | HEART RATE: 57 BPM | TEMPERATURE: 96.9 F | SYSTOLIC BLOOD PRESSURE: 162 MMHG | DIASTOLIC BLOOD PRESSURE: 78 MMHG

## 2024-09-17 DIAGNOSIS — G89.29 CHRONIC BILATERAL LOW BACK PAIN WITHOUT SCIATICA: ICD-10-CM

## 2024-09-17 DIAGNOSIS — M54.16 LUMBAR RADICULOPATHY: ICD-10-CM

## 2024-09-17 DIAGNOSIS — M54.9 CHRONIC NECK AND BACK PAIN: Primary | ICD-10-CM

## 2024-09-17 DIAGNOSIS — M54.2 MUSCLE PAIN, CERVICAL: ICD-10-CM

## 2024-09-17 DIAGNOSIS — M54.50 CHRONIC BILATERAL LOW BACK PAIN WITHOUT SCIATICA: ICD-10-CM

## 2024-09-17 DIAGNOSIS — Z02.89 PAIN MEDICATION AGREEMENT: ICD-10-CM

## 2024-09-17 DIAGNOSIS — M54.2 CHRONIC NECK AND BACK PAIN: Primary | ICD-10-CM

## 2024-09-17 DIAGNOSIS — G89.29 CHRONIC NECK AND BACK PAIN: Primary | ICD-10-CM

## 2024-09-17 PROCEDURE — 99214 OFFICE O/P EST MOD 30 MIN: CPT

## 2024-09-17 PROCEDURE — 99215 OFFICE O/P EST HI 40 MIN: CPT

## 2024-09-17 RX ORDER — HYDROCODONE BITARTRATE AND ACETAMINOPHEN 5; 325 MG/1; MG/1
1 TABLET ORAL EVERY 8 HOURS PRN
Qty: 75 TABLET | Refills: 0 | Status: SHIPPED | OUTPATIENT
Start: 2024-10-06 | End: 2024-11-05

## 2024-09-17 ASSESSMENT — ENCOUNTER SYMPTOMS
APNEA: 0
BACK PAIN: 1
COLOR CHANGE: 0
SHORTNESS OF BREATH: 0
VISUAL CHANGE: 0
ABDOMINAL PAIN: 0
SINUS PAIN: 0
CHEST TIGHTNESS: 0
EYE PAIN: 0

## 2024-09-17 ASSESSMENT — PAIN DESCRIPTION - INTENSITY: RATING_2: 8

## 2024-09-17 ASSESSMENT — PAIN DESCRIPTION - ORIENTATION
ORIENTATION_2: LOWER
ORIENTATION: LOWER

## 2024-09-17 ASSESSMENT — PAIN SCALES - GENERAL: PAINLEVEL_OUTOF10: 9

## 2024-09-17 ASSESSMENT — PAIN DESCRIPTION - LOCATION
LOCATION: BACK
LOCATION_2: NECK

## 2024-09-17 ASSESSMENT — PAIN DESCRIPTION - PAIN TYPE: TYPE: CHRONIC PAIN

## 2024-10-03 ENCOUNTER — HOSPITAL ENCOUNTER (OUTPATIENT)
Dept: PAIN MANAGEMENT | Age: 69
Discharge: HOME | End: 2024-10-03
Payer: MEDICARE

## 2024-10-03 VITALS
HEART RATE: 62 BPM | SYSTOLIC BLOOD PRESSURE: 135 MMHG | RESPIRATION RATE: 16 BRPM | DIASTOLIC BLOOD PRESSURE: 66 MMHG | OXYGEN SATURATION: 100 % | TEMPERATURE: 96.2 F

## 2024-10-03 DIAGNOSIS — M62.838 MUSCLE SPASMS OF NECK: Primary | ICD-10-CM

## 2024-10-03 PROCEDURE — 20553 NJX 1/MLT TRIGGER POINTS 3/>: CPT

## 2024-10-03 PROCEDURE — 2500000003 HC RX 250 WO HCPCS

## 2024-10-03 PROCEDURE — 6360000002 HC RX W HCPCS

## 2024-10-03 NOTE — DISCHARGE INSTRUCTIONS
[facility]  Post Procedure Discharge Instructions        YOU HAVE HAD THE FOLLOWING PROCEDURE:                                  [] Occipital Nerve Blocks  [] CTS wrist injection(s)  [] Knee Injection(s)         [] Shoulder Injection(s)   [] Elbow Injection(s)     [] Botox Injection  [x] Cervical Trigger Point Injections    [] Thoracic Trigger Point Injections    [] Lumbar Trigger Point Injections  [] Piriformis Trigger Point Injections  [] SI Joint Injection(s)     [] Trochanteric Bursa Injection(s)       [] Ankle Injection(s)   [] Plantar Fasciitis   []  ______________  Injection(s) [] Botox []  Migraines [] Spasticity    YOU HAVE RECEIVED THE FOLLOWING MEDICATIONS IN YOUR INJECTION(s)  [x] Lidocaine [x] Bupivacaine   [] DepoMedrol (steroid) [] Decadron (steroid)  []  Kenalog (steroid)   [] Toradol  [] Supartz [] Zilretta    [] Botox        PATIENT INFORMATION:   You may experience the following symptoms after your procedure. These symptoms are normal and should not cause concern:    You may have an increase in your pain. This may last 24 - 48 hours after your procedure.  You may have no change in the pain that you had prior to your injection(s).  You may have weakness or numbness in your affected extremity. If this occurs, this may last until numbing the medication wears off.     REPORT THE FOLLOWING SYMPTOMS TO YOUR DOCTOR:  Redness, swelling or drainage at the injection site(s)  Unusual pain that interferes with your normal activities of daily living.    OTHER INSTRUCTIONS:    [x] I will apply ice to the injection site(s) for at least 24 hours after the procedure. I will rotate the ice on for 20 minutes and off for 20 minutes for at least 24 hours.    [x] I will not apply heat for at least 48 hours and I will not take a hot bath or shower for at least 24 hours.     [x] I understand that if Lidocaine or Bupivacaine was used in my injection(s) that the injection site(s)

## 2024-10-03 NOTE — DISCHARGE INSTR - COC
Continuity of Care Form    Patient Name: Domitila Corrales   :  1955  MRN:  [id number]    Admit date:  10/3/2024  Discharge date:  ***    Code Status Order: Prior   Advance Directives:   Advance Care Flowsheet Documentation             Admitting Physician:  No admitting provider for patient encounter.  PCP: Gabbie Shah APRN - CNP    Discharging Nurse: ***  Discharging Hospital Unit/Room#: No information available for this encounter.  Discharging Unit Phone Number: ***    Emergency Contact:   Extended Emergency Contact Information  Primary Emergency Contact: Tam Corrales   [facility]  Home Phone: [phone]  Relation: Child   needed? No  Secondary Emergency Contact: Rubina Corrales   [facility]  Home Phone: [phone]  Relation: Daughter-in-Law   needed? No    Past Surgical History:  Past Surgical History:   Procedure Laterality Date    CARPAL TUNNEL RELEASE Left 3/25/2022    LEFT CARPAL TUNNEL RELEASE performed by Brooks Bell MD at [facility] OR    CHOLECYSTECTOMY      COLONOSCOPY N/A 2019    Dr KATELYN [facility]as-[facility]    HYSTERECTOMY (CERVIX STATUS UNKNOWN)      TOTAL KNEE ARTHROPLASTY Left 2016    KNEE TOTAL ARTHROPLASTY performed by Roberth Busby MD at [facility] OR    TOTAL KNEE ARTHROPLASTY Right 2020    RIGHT TOTAL KNEE REPLACEMENT performed by Roberth Busby MD at [facility] OR    UPPER GASTROINTESTINAL ENDOSCOPY N/A 2019    Dr KATELYN Rosas-Gastritis, duodenitis       Immunization History:   Immunization History   Administered Date(s) Administered    Pneumococcal, PCV20, PREVNAR 20, (age 6w+), IM, 0.5mL 2023       Active Problems:  Patient Active Problem List   Diagnosis Code    Osteoarthrosis, localized, primary, knee M17.10    Type 2 diabetes mellitus with diabetic polyneuropathy, without long-term current use of insulin (HCC) E11.42    Acquired hypothyroidism E03.9    Primary osteoarthritis of right knee M17.11    Iron

## 2024-10-03 NOTE — PROCEDURES
PROCEDURE NOTE  Date: 10/3/2024   Name: Domitila Corrales  YOB: 1955    Procedures  [facility]    Patient Name: Domitila Corrales    : 1955    Age: 68 y.o.    Sex: female    Date: October 3, 2024    Pre-op Diagnosis: Myofascial Pain/ Muscle Spasms/ Cervicalgia    Post-op Diagnosis: Myofascial Pain/ Muscle Spasms/ Cervicalgia    Procedure: Cervical Trigger Point Injections    Performing Procedure: Reina JENSEN    Patient Vitals for the past 24 hrs:   BP Temp Temp src Pulse Resp SpO2   10/03/24 1028 135/66 (!) 96.2 °F (35.7 °C) Temporal 62 16 100 %       Description of Procedure:    After a brief physical assessment and failure to improve with conservative measures the patient presented for Cervical Trigger Point Injections The indications, limitations and possible complications were discussed with the patient and the patient elected to proceed with the procedure.    After voluntary, informed and signed consent obtained the patient was placed in a seated position.     Appropriate time out was obtained per policy.     The area of maximal tenderness was palpated over the   bilaterally Cervical muscles - Splenius  Trapezius  Rhomboid The skin overlying these areas was marked with a skin marker. The skin overlying the proposed injection sites were then sprayed with Gebauer's Solution while protecting patient eyes. The areas were prepped using aseptic technique with CHG prep. Each trigger point of the Cervical muscles - Splenius  Trapezius  Rhomboid was injected with approximately 2 ml of a solution of 5 ml of 1% Lidocaine Plain and 5 ml of 0.5% Marcaine Plain    Discharge:  The patient tolerated the procedure well. There were no complications during the procedure and the patient was discharged home with discharge instructions.    The patient has been instructed to contact the office should there be any complications or questions to arise between today and

## 2024-10-04 RX ORDER — PIOGLITAZONEHYDROCHLORIDE 45 MG/1
45 TABLET ORAL DAILY
Qty: 90 TABLET | Refills: 0 | Status: SHIPPED | OUTPATIENT
Start: 2024-10-04

## 2024-10-17 ENCOUNTER — TELEPHONE (OUTPATIENT)
Dept: PRIMARY CARE CLINIC | Age: 69
End: 2024-10-17

## 2024-10-29 ENCOUNTER — HOSPITAL ENCOUNTER (OUTPATIENT)
Dept: PAIN MANAGEMENT | Age: 69
Discharge: HOME | End: 2024-10-29
Payer: MEDICARE

## 2024-10-29 VITALS
RESPIRATION RATE: 18 BRPM | DIASTOLIC BLOOD PRESSURE: 83 MMHG | OXYGEN SATURATION: 98 % | HEART RATE: 52 BPM | TEMPERATURE: 96.1 F | SYSTOLIC BLOOD PRESSURE: 156 MMHG

## 2024-10-29 DIAGNOSIS — R52 PAIN MANAGEMENT: ICD-10-CM

## 2024-10-29 PROCEDURE — 2500000003 HC RX 250 WO HCPCS

## 2024-10-29 PROCEDURE — 6360000002 HC RX W HCPCS

## 2024-10-29 PROCEDURE — 62323 NJX INTERLAMINAR LMBR/SAC: CPT

## 2024-10-29 PROCEDURE — 2580000003 HC RX 258

## 2024-10-29 RX ORDER — TRIAMCINOLONE ACETONIDE 40 MG/ML
80 INJECTION, SUSPENSION INTRA-ARTICULAR; INTRAMUSCULAR ONCE
Status: DISCONTINUED | OUTPATIENT
Start: 2024-10-29 | End: 2024-10-31 | Stop reason: HOSPADM

## 2024-10-29 RX ORDER — SODIUM CHLORIDE 9 MG/ML
5 INJECTION, SOLUTION INTRAMUSCULAR; INTRAVENOUS; SUBCUTANEOUS ONCE
Status: DISCONTINUED | OUTPATIENT
Start: 2024-10-29 | End: 2024-10-31 | Stop reason: HOSPADM

## 2024-10-29 RX ORDER — LIDOCAINE HYDROCHLORIDE 10 MG/ML
5 INJECTION, SOLUTION EPIDURAL; INFILTRATION; INTRACAUDAL; PERINEURAL ONCE
Status: DISCONTINUED | OUTPATIENT
Start: 2024-10-29 | End: 2024-10-31 | Stop reason: HOSPADM

## 2024-10-29 ASSESSMENT — PAIN - FUNCTIONAL ASSESSMENT: PAIN_FUNCTIONAL_ASSESSMENT: 0-10

## 2024-10-29 NOTE — INTERVAL H&P NOTE
Update History & Physical    The patient's History and Physical  was reviewed with the patient and I examined the patient. There was no change. The surgical site was confirmed by the patient and me.     Plan: The risks, benefits, expected outcome, and alternative to the recommended procedure have been discussed with the patient. Patient understands and wants to proceed with the procedure.     Electronically signed by Farhan Rios MD on 10/29/2024 at 9:58 AM

## 2024-11-19 DIAGNOSIS — E11.42 TYPE 2 DIABETES MELLITUS WITH DIABETIC POLYNEUROPATHY, WITHOUT LONG-TERM CURRENT USE OF INSULIN (HCC): ICD-10-CM

## 2024-11-19 RX ORDER — SEMAGLUTIDE 0.68 MG/ML
INJECTION, SOLUTION SUBCUTANEOUS
Qty: 3 ML | Refills: 3 | Status: SHIPPED | OUTPATIENT
Start: 2024-11-19

## 2024-12-04 RX ORDER — AMLODIPINE BESYLATE 5 MG/1
5 TABLET ORAL DAILY
Qty: 90 TABLET | Refills: 0 | Status: SHIPPED | OUTPATIENT
Start: 2024-12-04

## 2024-12-09 DIAGNOSIS — G89.29 CHRONIC BILATERAL LOW BACK PAIN WITHOUT SCIATICA: ICD-10-CM

## 2024-12-09 DIAGNOSIS — M54.50 CHRONIC BILATERAL LOW BACK PAIN WITHOUT SCIATICA: ICD-10-CM

## 2024-12-09 RX ORDER — MONTELUKAST SODIUM 4 MG/1
1 TABLET, CHEWABLE ORAL 2 TIMES DAILY
Qty: 60 TABLET | Refills: 0 | Status: SHIPPED | OUTPATIENT
Start: 2024-12-09 | End: 2024-12-12 | Stop reason: ALTCHOICE

## 2024-12-09 NOTE — TELEPHONE ENCOUNTER
12- Called the patient -The pharmacy has requested a refill on your current medication Colestipol Colestid.  If regular medications are being prescribed our Providers prefers that patients have a yearly follow up apt.     Your last apt was on 12- ileana JENSEN      Apt scheduled for 12-     Routed to LAURENCE JENSEN

## 2024-12-09 NOTE — TELEPHONE ENCOUNTER
Last seen in office visit: 9/17/24  Next scheduled office visit: 1/28/25 with Reina  Last UDS results: compliant  Any discrepancies on Jono (such as refills from another provider): none

## 2024-12-10 RX ORDER — HYDROCODONE BITARTRATE AND ACETAMINOPHEN 5; 325 MG/1; MG/1
1 TABLET ORAL EVERY 8 HOURS PRN
Qty: 75 TABLET | Refills: 0 | Status: SHIPPED | OUTPATIENT
Start: 2024-12-10 | End: 2025-01-09

## 2024-12-12 ENCOUNTER — TELEPHONE (OUTPATIENT)
Dept: GASTROENTEROLOGY | Age: 69
End: 2024-12-12

## 2024-12-12 ENCOUNTER — OFFICE VISIT (OUTPATIENT)
Dept: GASTROENTEROLOGY | Age: 69
End: 2024-12-12
Payer: MEDICARE

## 2024-12-12 VITALS
WEIGHT: 271 LBS | OXYGEN SATURATION: 98 % | DIASTOLIC BLOOD PRESSURE: 80 MMHG | HEIGHT: 65 IN | HEART RATE: 80 BPM | BODY MASS INDEX: 45.15 KG/M2 | SYSTOLIC BLOOD PRESSURE: 140 MMHG

## 2024-12-12 DIAGNOSIS — K52.9 CHRONIC DIARRHEA: Primary | ICD-10-CM

## 2024-12-12 DIAGNOSIS — K90.89 BILE SALT-INDUCED DIARRHEA: ICD-10-CM

## 2024-12-12 PROCEDURE — 3017F COLORECTAL CA SCREEN DOC REV: CPT | Performed by: NURSE PRACTITIONER

## 2024-12-12 PROCEDURE — G8417 CALC BMI ABV UP PARAM F/U: HCPCS | Performed by: NURSE PRACTITIONER

## 2024-12-12 PROCEDURE — 1090F PRES/ABSN URINE INCON ASSESS: CPT | Performed by: NURSE PRACTITIONER

## 2024-12-12 PROCEDURE — G8399 PT W/DXA RESULTS DOCUMENT: HCPCS | Performed by: NURSE PRACTITIONER

## 2024-12-12 PROCEDURE — 1159F MED LIST DOCD IN RCRD: CPT | Performed by: NURSE PRACTITIONER

## 2024-12-12 PROCEDURE — 1123F ACP DISCUSS/DSCN MKR DOCD: CPT | Performed by: NURSE PRACTITIONER

## 2024-12-12 PROCEDURE — 3079F DIAST BP 80-89 MM HG: CPT | Performed by: NURSE PRACTITIONER

## 2024-12-12 PROCEDURE — 1036F TOBACCO NON-USER: CPT | Performed by: NURSE PRACTITIONER

## 2024-12-12 PROCEDURE — G8484 FLU IMMUNIZE NO ADMIN: HCPCS | Performed by: NURSE PRACTITIONER

## 2024-12-12 PROCEDURE — 99214 OFFICE O/P EST MOD 30 MIN: CPT | Performed by: NURSE PRACTITIONER

## 2024-12-12 PROCEDURE — G8427 DOCREV CUR MEDS BY ELIG CLIN: HCPCS | Performed by: NURSE PRACTITIONER

## 2024-12-12 PROCEDURE — 3077F SYST BP >= 140 MM HG: CPT | Performed by: NURSE PRACTITIONER

## 2024-12-12 RX ORDER — COLESEVELAM 180 1/1
1250 TABLET ORAL 2 TIMES DAILY WITH MEALS
Qty: 120 TABLET | Refills: 3 | Status: SHIPPED | OUTPATIENT
Start: 2024-12-12

## 2024-12-12 ASSESSMENT — ENCOUNTER SYMPTOMS
CHOKING: 0
COUGH: 0
RECTAL PAIN: 0
ANAL BLEEDING: 0
BLOOD IN STOOL: 0
ABDOMINAL PAIN: 1
NAUSEA: 0
VOMITING: 0
ABDOMINAL DISTENTION: 0
SHORTNESS OF BREATH: 0
TROUBLE SWALLOWING: 0
CONSTIPATION: 0
DIARRHEA: 1

## 2024-12-12 NOTE — PROGRESS NOTES
chest pain.   Gastrointestinal:  Positive for abdominal pain and diarrhea. Negative for abdominal distention, anal bleeding, blood in stool, constipation, nausea, rectal pain and vomiting.   Allergic/Immunologic: Negative for food allergies.   All other systems reviewed and are negative.        Objective:     BP (!) 140/80 (Site: Left Upper Arm)   Pulse 80   Ht 1.651 m (5' 5\")   Wt 122.9 kg (271 lb)   SpO2 98%   BMI 45.10 kg/m²     Physical Exam  Constitutional:       Appearance: Normal appearance.   HENT:      Head: Normocephalic.      Right Ear: External ear normal.      Left Ear: External ear normal.      Nose: Nose normal.   Eyes:      General:         Right eye: No discharge.         Left eye: No discharge.      Extraocular Movements: Extraocular movements intact.      Conjunctiva/sclera: Conjunctivae normal.   Cardiovascular:      Rate and Rhythm: Normal rate.   Pulmonary:      Effort: Pulmonary effort is normal. No respiratory distress.   Abdominal:      General: There is no distension.   Musculoskeletal:         General: Normal range of motion.      Cervical back: Normal range of motion.   Skin:     General: Skin is warm and dry.   Neurological:      General: No focal deficit present.      Mental Status: She is alert and oriented to person, place, and time.   Psychiatric:         Mood and Affect: Mood normal.         Behavior: Behavior normal.

## 2024-12-12 NOTE — TELEPHONE ENCOUNTER
12- Received notified that [employer] *Honey) needed a PA as that it was not covered .       PA completed and sent to [facility]     Received Denial:         Routed to  LAURENCE JENSEN   Do you want and appeal or where do you recommend

## 2025-01-02 RX ORDER — LEVOTHYROXINE SODIUM 150 UG/1
150 TABLET ORAL DAILY
Qty: 90 TABLET | Refills: 0 | Status: SHIPPED | OUTPATIENT
Start: 2025-01-02

## 2025-01-02 RX ORDER — PIOGLITAZONE 45 MG/1
45 TABLET ORAL DAILY
Qty: 90 TABLET | Refills: 0 | Status: SHIPPED | OUTPATIENT
Start: 2025-01-02

## 2025-01-13 RX ORDER — BUSPIRONE HYDROCHLORIDE 10 MG/1
TABLET ORAL
Qty: 90 TABLET | Refills: 3 | Status: SHIPPED | OUTPATIENT
Start: 2025-01-13

## 2025-01-15 ENCOUNTER — APPOINTMENT (OUTPATIENT)
Dept: CT IMAGING | Facility: HOSPITAL | Age: 70
End: 2025-01-15
Payer: MEDICARE

## 2025-01-15 ENCOUNTER — APPOINTMENT (OUTPATIENT)
Dept: MRI IMAGING | Facility: HOSPITAL | Age: 70
End: 2025-01-15
Payer: MEDICARE

## 2025-01-15 ENCOUNTER — HOSPITAL ENCOUNTER (OUTPATIENT)
Facility: HOSPITAL | Age: 70
Setting detail: OBSERVATION
LOS: 1 days | Discharge: HOME | End: 2025-01-17
Attending: EMERGENCY MEDICINE | Admitting: INTERNAL MEDICINE
Payer: MEDICARE

## 2025-01-15 ENCOUNTER — APPOINTMENT (OUTPATIENT)
Dept: GENERAL RADIOLOGY | Facility: HOSPITAL | Age: 70
End: 2025-01-15
Payer: MEDICARE

## 2025-01-15 ENCOUNTER — APPOINTMENT (OUTPATIENT)
Dept: NEUROLOGY | Facility: HOSPITAL | Age: 70
End: 2025-01-15
Payer: MEDICARE

## 2025-01-15 DIAGNOSIS — E04.1 THYROID NODULE: ICD-10-CM

## 2025-01-15 DIAGNOSIS — R29.898 LEFT ARM WEAKNESS: ICD-10-CM

## 2025-01-15 DIAGNOSIS — Z74.09 IMPAIRED FUNCTIONAL MOBILITY AND ACTIVITY TOLERANCE: ICD-10-CM

## 2025-01-15 DIAGNOSIS — G45.9 TIA (TRANSIENT ISCHEMIC ATTACK): ICD-10-CM

## 2025-01-15 DIAGNOSIS — R41.89 IMPAIRED COGNITION: ICD-10-CM

## 2025-01-15 DIAGNOSIS — R41.0 DISORIENTATION: Primary | ICD-10-CM

## 2025-01-15 PROBLEM — E66.01 CLASS 3 SEVERE OBESITY WITH BODY MASS INDEX (BMI) OF 45.0 TO 49.9 IN ADULT: Status: ACTIVE | Noted: 2025-01-15

## 2025-01-15 PROBLEM — E78.5 HYPERLIPIDEMIA: Status: ACTIVE | Noted: 2025-01-15

## 2025-01-15 PROBLEM — E66.813 CLASS 3 SEVERE OBESITY WITH BODY MASS INDEX (BMI) OF 45.0 TO 49.9 IN ADULT: Status: ACTIVE | Noted: 2025-01-15

## 2025-01-15 LAB
ABO GROUP BLD: NORMAL
ALBUMIN SERPL-MCNC: 3.6 G/DL (ref 3.5–5.2)
ALBUMIN/GLOB SERPL: 1.3 G/DL
ALP SERPL-CCNC: 73 U/L (ref 39–117)
ALT SERPL W P-5'-P-CCNC: 9 U/L (ref 1–33)
ANION GAP SERPL CALCULATED.3IONS-SCNC: 11 MMOL/L (ref 5–15)
APTT PPP: 26 SECONDS (ref 24.5–36)
AST SERPL-CCNC: 16 U/L (ref 1–32)
BASOPHILS # BLD AUTO: 0.03 10*3/MM3 (ref 0–0.2)
BASOPHILS NFR BLD AUTO: 0.4 % (ref 0–1.5)
BILIRUB SERPL-MCNC: 0.3 MG/DL (ref 0–1.2)
BLD GP AB SCN SERPL QL: NEGATIVE
BUN SERPL-MCNC: 15 MG/DL (ref 8–23)
BUN/CREAT SERPL: 20.8 (ref 7–25)
CALCIUM SPEC-SCNC: 8.8 MG/DL (ref 8.6–10.5)
CHLORIDE SERPL-SCNC: 98 MMOL/L (ref 98–107)
CK SERPL-CCNC: 63 U/L (ref 20–180)
CO2 SERPL-SCNC: 27 MMOL/L (ref 22–29)
CREAT SERPL-MCNC: 0.72 MG/DL (ref 0.57–1)
D-LACTATE SERPL-SCNC: 1 MMOL/L (ref 0.5–2)
DEPRECATED RDW RBC AUTO: 47.2 FL (ref 37–54)
EGFRCR SERPLBLD CKD-EPI 2021: 91.8 ML/MIN/1.73
EOSINOPHIL # BLD AUTO: 0.29 10*3/MM3 (ref 0–0.4)
EOSINOPHIL NFR BLD AUTO: 4.2 % (ref 0.3–6.2)
ERYTHROCYTE [DISTWIDTH] IN BLOOD BY AUTOMATED COUNT: 14.3 % (ref 12.3–15.4)
GEN 5 1HR TROPONIN T REFLEX: 19 NG/L
GLOBULIN UR ELPH-MCNC: 2.8 GM/DL
GLUCOSE BLDC GLUCOMTR-MCNC: 104 MG/DL (ref 70–130)
GLUCOSE BLDC GLUCOMTR-MCNC: 124 MG/DL (ref 70–130)
GLUCOSE BLDC GLUCOMTR-MCNC: 152 MG/DL (ref 70–130)
GLUCOSE SERPL-MCNC: 157 MG/DL (ref 65–99)
HCT VFR BLD AUTO: 41.2 % (ref 34–46.6)
HGB BLD-MCNC: 12.8 G/DL (ref 12–15.9)
HOLD SPECIMEN: NORMAL
HOLD SPECIMEN: NORMAL
IMM GRANULOCYTES # BLD AUTO: 0.02 10*3/MM3 (ref 0–0.05)
IMM GRANULOCYTES NFR BLD AUTO: 0.3 % (ref 0–0.5)
INR PPP: 0.99 (ref 0.91–1.09)
LYMPHOCYTES # BLD AUTO: 1.4 10*3/MM3 (ref 0.7–3.1)
LYMPHOCYTES NFR BLD AUTO: 20.3 % (ref 19.6–45.3)
MCH RBC QN AUTO: 27.9 PG (ref 26.6–33)
MCHC RBC AUTO-ENTMCNC: 31.1 G/DL (ref 31.5–35.7)
MCV RBC AUTO: 90 FL (ref 79–97)
MONOCYTES # BLD AUTO: 0.48 10*3/MM3 (ref 0.1–0.9)
MONOCYTES NFR BLD AUTO: 7 % (ref 5–12)
NEUTROPHILS NFR BLD AUTO: 4.67 10*3/MM3 (ref 1.7–7)
NEUTROPHILS NFR BLD AUTO: 67.8 % (ref 42.7–76)
NRBC BLD AUTO-RTO: 0 /100 WBC (ref 0–0.2)
PLATELET # BLD AUTO: 234 10*3/MM3 (ref 140–450)
PMV BLD AUTO: 9.5 FL (ref 6–12)
POTASSIUM SERPL-SCNC: 4.1 MMOL/L (ref 3.5–5.2)
PROT SERPL-MCNC: 6.4 G/DL (ref 6–8.5)
PROTHROMBIN TIME: 13.6 SECONDS (ref 11.8–14.8)
RBC # BLD AUTO: 4.58 10*6/MM3 (ref 3.77–5.28)
RH BLD: NEGATIVE
SODIUM SERPL-SCNC: 136 MMOL/L (ref 136–145)
T&S EXPIRATION DATE: NORMAL
TROPONIN T % DELTA: 12
TROPONIN T NUMERIC DELTA: 2 NG/L
TROPONIN T SERPL HS-MCNC: 17 NG/L
TSH SERPL DL<=0.05 MIU/L-ACNC: 3.91 UIU/ML (ref 0.27–4.2)
WBC NRBC COR # BLD AUTO: 6.89 10*3/MM3 (ref 3.4–10.8)
WHOLE BLOOD HOLD COAG: NORMAL
WHOLE BLOOD HOLD SPECIMEN: NORMAL

## 2025-01-15 PROCEDURE — 95816 EEG AWAKE AND DROWSY: CPT | Performed by: PSYCHIATRY & NEUROLOGY

## 2025-01-15 PROCEDURE — 84443 ASSAY THYROID STIM HORMONE: CPT | Performed by: NURSE PRACTITIONER

## 2025-01-15 PROCEDURE — 85025 COMPLETE CBC W/AUTO DIFF WBC: CPT | Performed by: EMERGENCY MEDICINE

## 2025-01-15 PROCEDURE — 96375 TX/PRO/DX INJ NEW DRUG ADDON: CPT

## 2025-01-15 PROCEDURE — 86850 RBC ANTIBODY SCREEN: CPT | Performed by: EMERGENCY MEDICINE

## 2025-01-15 PROCEDURE — 82550 ASSAY OF CK (CPK): CPT | Performed by: EMERGENCY MEDICINE

## 2025-01-15 PROCEDURE — 85730 THROMBOPLASTIN TIME PARTIAL: CPT | Performed by: EMERGENCY MEDICINE

## 2025-01-15 PROCEDURE — 93010 ELECTROCARDIOGRAM REPORT: CPT | Performed by: STUDENT IN AN ORGANIZED HEALTH CARE EDUCATION/TRAINING PROGRAM

## 2025-01-15 PROCEDURE — G0378 HOSPITAL OBSERVATION PER HR: HCPCS

## 2025-01-15 PROCEDURE — 25510000001 IOPAMIDOL PER 1 ML: Performed by: EMERGENCY MEDICINE

## 2025-01-15 PROCEDURE — 93005 ELECTROCARDIOGRAM TRACING: CPT | Performed by: EMERGENCY MEDICINE

## 2025-01-15 PROCEDURE — 83605 ASSAY OF LACTIC ACID: CPT | Performed by: EMERGENCY MEDICINE

## 2025-01-15 PROCEDURE — 82948 REAGENT STRIP/BLOOD GLUCOSE: CPT

## 2025-01-15 PROCEDURE — 86901 BLOOD TYPING SEROLOGIC RH(D): CPT | Performed by: EMERGENCY MEDICINE

## 2025-01-15 PROCEDURE — 99214 OFFICE O/P EST MOD 30 MIN: CPT | Performed by: PSYCHIATRY & NEUROLOGY

## 2025-01-15 PROCEDURE — 25510000001 GADOPICLENOL 0.5 MMOL/ML SOLUTION: Performed by: FAMILY MEDICINE

## 2025-01-15 PROCEDURE — 95816 EEG AWAKE AND DROWSY: CPT

## 2025-01-15 PROCEDURE — 36415 COLL VENOUS BLD VENIPUNCTURE: CPT

## 2025-01-15 PROCEDURE — 84484 ASSAY OF TROPONIN QUANT: CPT | Performed by: EMERGENCY MEDICINE

## 2025-01-15 PROCEDURE — 96374 THER/PROPH/DIAG INJ IV PUSH: CPT

## 2025-01-15 PROCEDURE — 70498 CT ANGIOGRAPHY NECK: CPT

## 2025-01-15 PROCEDURE — 70496 CT ANGIOGRAPHY HEAD: CPT

## 2025-01-15 PROCEDURE — 71045 X-RAY EXAM CHEST 1 VIEW: CPT

## 2025-01-15 PROCEDURE — 70450 CT HEAD/BRAIN W/O DYE: CPT

## 2025-01-15 PROCEDURE — 25010000002 ONDANSETRON PER 1 MG: Performed by: EMERGENCY MEDICINE

## 2025-01-15 PROCEDURE — 85610 PROTHROMBIN TIME: CPT | Performed by: EMERGENCY MEDICINE

## 2025-01-15 PROCEDURE — 25010000002 MORPHINE PER 10 MG: Performed by: EMERGENCY MEDICINE

## 2025-01-15 PROCEDURE — A9579 GAD-BASE MR CONTRAST NOS,1ML: HCPCS | Performed by: FAMILY MEDICINE

## 2025-01-15 PROCEDURE — 70553 MRI BRAIN STEM W/O & W/DYE: CPT

## 2025-01-15 PROCEDURE — 80053 COMPREHEN METABOLIC PANEL: CPT | Performed by: EMERGENCY MEDICINE

## 2025-01-15 PROCEDURE — 99285 EMERGENCY DEPT VISIT HI MDM: CPT

## 2025-01-15 PROCEDURE — 86900 BLOOD TYPING SEROLOGIC ABO: CPT | Performed by: EMERGENCY MEDICINE

## 2025-01-15 RX ORDER — IBUPROFEN 600 MG/1
1 TABLET ORAL
Status: DISCONTINUED | OUTPATIENT
Start: 2025-01-15 | End: 2025-01-17 | Stop reason: HOSPADM

## 2025-01-15 RX ORDER — LEVOTHYROXINE SODIUM 137 UG/1
137 TABLET ORAL
Status: DISCONTINUED | OUTPATIENT
Start: 2025-01-16 | End: 2025-01-17 | Stop reason: HOSPADM

## 2025-01-15 RX ORDER — SODIUM CHLORIDE 0.9 % (FLUSH) 0.9 %
10 SYRINGE (ML) INJECTION AS NEEDED
Status: DISCONTINUED | OUTPATIENT
Start: 2025-01-15 | End: 2025-01-15 | Stop reason: SDUPTHER

## 2025-01-15 RX ORDER — POLYETHYLENE GLYCOL 3350 17 G/17G
17 POWDER, FOR SOLUTION ORAL DAILY PRN
Status: DISCONTINUED | OUTPATIENT
Start: 2025-01-15 | End: 2025-01-17 | Stop reason: HOSPADM

## 2025-01-15 RX ORDER — ACETAMINOPHEN 325 MG/1
650 TABLET ORAL EVERY 4 HOURS PRN
Status: DISCONTINUED | OUTPATIENT
Start: 2025-01-15 | End: 2025-01-17 | Stop reason: HOSPADM

## 2025-01-15 RX ORDER — NICOTINE POLACRILEX 4 MG
15 LOZENGE BUCCAL
Status: DISCONTINUED | OUTPATIENT
Start: 2025-01-15 | End: 2025-01-17 | Stop reason: HOSPADM

## 2025-01-15 RX ORDER — ACETAMINOPHEN 650 MG/1
650 SUPPOSITORY RECTAL EVERY 4 HOURS PRN
Status: DISCONTINUED | OUTPATIENT
Start: 2025-01-15 | End: 2025-01-17 | Stop reason: HOSPADM

## 2025-01-15 RX ORDER — INSULIN LISPRO 100 [IU]/ML
2-7 INJECTION, SOLUTION INTRAVENOUS; SUBCUTANEOUS
Status: DISCONTINUED | OUTPATIENT
Start: 2025-01-15 | End: 2025-01-17 | Stop reason: HOSPADM

## 2025-01-15 RX ORDER — DEXTROSE MONOHYDRATE 25 G/50ML
25 INJECTION, SOLUTION INTRAVENOUS
Status: DISCONTINUED | OUTPATIENT
Start: 2025-01-15 | End: 2025-01-17 | Stop reason: HOSPADM

## 2025-01-15 RX ORDER — HYDROCODONE BITARTRATE AND ACETAMINOPHEN 5; 325 MG/1; MG/1
1 TABLET ORAL EVERY 8 HOURS PRN
Status: DISCONTINUED | OUTPATIENT
Start: 2025-01-15 | End: 2025-01-17 | Stop reason: HOSPADM

## 2025-01-15 RX ORDER — SODIUM CHLORIDE 0.9 % (FLUSH) 0.9 %
10 SYRINGE (ML) INJECTION AS NEEDED
Status: DISCONTINUED | OUTPATIENT
Start: 2025-01-15 | End: 2025-01-17 | Stop reason: HOSPADM

## 2025-01-15 RX ORDER — AMLODIPINE BESYLATE 5 MG/1
5 TABLET ORAL DAILY
COMMUNITY

## 2025-01-15 RX ORDER — ONDANSETRON 2 MG/ML
4 INJECTION INTRAMUSCULAR; INTRAVENOUS ONCE
Status: COMPLETED | OUTPATIENT
Start: 2025-01-15 | End: 2025-01-15

## 2025-01-15 RX ORDER — PIOGLITAZONE 45 MG/1
45 TABLET ORAL DAILY
COMMUNITY

## 2025-01-15 RX ORDER — ATORVASTATIN CALCIUM 40 MG/1
80 TABLET, FILM COATED ORAL NIGHTLY
Status: DISCONTINUED | OUTPATIENT
Start: 2025-01-15 | End: 2025-01-17 | Stop reason: HOSPADM

## 2025-01-15 RX ORDER — BUSPIRONE HYDROCHLORIDE 10 MG/1
10 TABLET ORAL 3 TIMES DAILY
COMMUNITY

## 2025-01-15 RX ORDER — ACETAMINOPHEN 160 MG/5ML
650 SOLUTION ORAL EVERY 4 HOURS PRN
Status: DISCONTINUED | OUTPATIENT
Start: 2025-01-15 | End: 2025-01-17 | Stop reason: HOSPADM

## 2025-01-15 RX ORDER — HYDROCODONE BITARTRATE AND ACETAMINOPHEN 5; 325 MG/1; MG/1
1 TABLET ORAL EVERY 8 HOURS PRN
COMMUNITY

## 2025-01-15 RX ORDER — COLESEVELAM 180 1/1
1250 TABLET ORAL 2 TIMES DAILY WITH MEALS
COMMUNITY

## 2025-01-15 RX ORDER — IOPAMIDOL 755 MG/ML
125 INJECTION, SOLUTION INTRAVASCULAR
Status: COMPLETED | OUTPATIENT
Start: 2025-01-15 | End: 2025-01-15

## 2025-01-15 RX ORDER — BISACODYL 10 MG
10 SUPPOSITORY, RECTAL RECTAL DAILY PRN
Status: DISCONTINUED | OUTPATIENT
Start: 2025-01-15 | End: 2025-01-17 | Stop reason: HOSPADM

## 2025-01-15 RX ORDER — SODIUM CHLORIDE 0.9 % (FLUSH) 0.9 %
10 SYRINGE (ML) INJECTION EVERY 12 HOURS SCHEDULED
Status: DISCONTINUED | OUTPATIENT
Start: 2025-01-15 | End: 2025-01-15 | Stop reason: SDUPTHER

## 2025-01-15 RX ORDER — ONDANSETRON 2 MG/ML
4 INJECTION INTRAMUSCULAR; INTRAVENOUS EVERY 6 HOURS PRN
Status: DISCONTINUED | OUTPATIENT
Start: 2025-01-15 | End: 2025-01-17 | Stop reason: HOSPADM

## 2025-01-15 RX ORDER — CELECOXIB 200 MG/1
200 CAPSULE ORAL 2 TIMES DAILY
COMMUNITY

## 2025-01-15 RX ORDER — ONDANSETRON 4 MG/1
4 TABLET, ORALLY DISINTEGRATING ORAL EVERY 6 HOURS PRN
Status: DISCONTINUED | OUTPATIENT
Start: 2025-01-15 | End: 2025-01-17 | Stop reason: HOSPADM

## 2025-01-15 RX ORDER — SERTRALINE HYDROCHLORIDE 100 MG/1
100 TABLET, FILM COATED ORAL DAILY
Status: DISCONTINUED | OUTPATIENT
Start: 2025-01-15 | End: 2025-01-17 | Stop reason: HOSPADM

## 2025-01-15 RX ORDER — ASPIRIN 300 MG/1
300 SUPPOSITORY RECTAL DAILY
Status: DISCONTINUED | OUTPATIENT
Start: 2025-01-16 | End: 2025-01-17 | Stop reason: HOSPADM

## 2025-01-15 RX ORDER — LEVOTHYROXINE SODIUM 150 UG/1
150 TABLET ORAL
COMMUNITY

## 2025-01-15 RX ORDER — AMOXICILLIN 250 MG
2 CAPSULE ORAL 2 TIMES DAILY PRN
Status: DISCONTINUED | OUTPATIENT
Start: 2025-01-15 | End: 2025-01-17 | Stop reason: HOSPADM

## 2025-01-15 RX ORDER — SODIUM CHLORIDE 9 MG/ML
40 INJECTION, SOLUTION INTRAVENOUS AS NEEDED
Status: DISCONTINUED | OUTPATIENT
Start: 2025-01-15 | End: 2025-01-17 | Stop reason: HOSPADM

## 2025-01-15 RX ORDER — AMLODIPINE BESYLATE 5 MG/1
5 TABLET ORAL DAILY
Status: DISCONTINUED | OUTPATIENT
Start: 2025-01-16 | End: 2025-01-17 | Stop reason: HOSPADM

## 2025-01-15 RX ORDER — HYDROCODONE BITARTRATE AND ACETAMINOPHEN 5; 325 MG/1; MG/1
1 TABLET ORAL ONCE AS NEEDED
Status: COMPLETED | OUTPATIENT
Start: 2025-01-15 | End: 2025-01-15

## 2025-01-15 RX ORDER — BUSPIRONE HYDROCHLORIDE 5 MG/1
5 TABLET ORAL EVERY 8 HOURS SCHEDULED
Status: DISCONTINUED | OUTPATIENT
Start: 2025-01-15 | End: 2025-01-17 | Stop reason: HOSPADM

## 2025-01-15 RX ORDER — ASPIRIN 81 MG/1
81 TABLET, CHEWABLE ORAL DAILY
Status: DISCONTINUED | OUTPATIENT
Start: 2025-01-16 | End: 2025-01-17 | Stop reason: HOSPADM

## 2025-01-15 RX ORDER — SODIUM CHLORIDE 9 MG/ML
40 INJECTION, SOLUTION INTRAVENOUS AS NEEDED
Status: DISCONTINUED | OUTPATIENT
Start: 2025-01-15 | End: 2025-01-15 | Stop reason: SDUPTHER

## 2025-01-15 RX ORDER — SODIUM CHLORIDE 0.9 % (FLUSH) 0.9 %
10 SYRINGE (ML) INJECTION EVERY 12 HOURS SCHEDULED
Status: DISCONTINUED | OUTPATIENT
Start: 2025-01-15 | End: 2025-01-17 | Stop reason: HOSPADM

## 2025-01-15 RX ORDER — HYDROCODONE BITARTRATE AND ACETAMINOPHEN 5; 325 MG/1; MG/1
1 TABLET ORAL EVERY 8 HOURS PRN
Status: DISCONTINUED | OUTPATIENT
Start: 2025-01-15 | End: 2025-01-15 | Stop reason: SDUPTHER

## 2025-01-15 RX ORDER — ACETAMINOPHEN 325 MG/1
650 TABLET ORAL EVERY 6 HOURS PRN
Status: DISCONTINUED | OUTPATIENT
Start: 2025-01-15 | End: 2025-01-15 | Stop reason: SDUPTHER

## 2025-01-15 RX ORDER — BISACODYL 5 MG/1
5 TABLET, DELAYED RELEASE ORAL DAILY PRN
Status: DISCONTINUED | OUTPATIENT
Start: 2025-01-15 | End: 2025-01-17 | Stop reason: HOSPADM

## 2025-01-15 RX ORDER — SEMAGLUTIDE 2.68 MG/ML
2 INJECTION, SOLUTION SUBCUTANEOUS WEEKLY
COMMUNITY

## 2025-01-15 RX ADMIN — ONDANSETRON 4 MG: 2 INJECTION INTRAMUSCULAR; INTRAVENOUS at 11:12

## 2025-01-15 RX ADMIN — HYDROCODONE BITARTRATE AND ACETAMINOPHEN 1 TABLET: 5; 325 TABLET ORAL at 21:07

## 2025-01-15 RX ADMIN — ATORVASTATIN CALCIUM 80 MG: 40 TABLET, FILM COATED ORAL at 21:07

## 2025-01-15 RX ADMIN — HYDROCODONE BITARTRATE AND ACETAMINOPHEN 1 TABLET: 5; 325 TABLET ORAL at 17:10

## 2025-01-15 RX ADMIN — IOPAMIDOL 125 ML: 755 INJECTION, SOLUTION INTRAVENOUS at 10:53

## 2025-01-15 RX ADMIN — ACETAMINOPHEN 650 MG: 325 TABLET ORAL at 11:30

## 2025-01-15 RX ADMIN — SERTRALINE HYDROCHLORIDE 100 MG: 100 TABLET ORAL at 17:10

## 2025-01-15 RX ADMIN — GADOPICLENOL 10 ML: 485.1 INJECTION INTRAVENOUS at 16:25

## 2025-01-15 RX ADMIN — BUSPIRONE HYDROCHLORIDE 5 MG: 5 TABLET ORAL at 21:07

## 2025-01-15 RX ADMIN — MORPHINE SULFATE 4 MG: 4 INJECTION, SOLUTION INTRAMUSCULAR; INTRAVENOUS at 12:37

## 2025-01-15 RX ADMIN — Medication 10 ML: at 21:09

## 2025-01-15 NOTE — H&P
"    [facility] Medicine Services  HISTORY AND PHYSICAL    Date of Admission: 1/15/2025  Primary Care Physician: Natividad Vargas APRN    Subjective   Primary Historian: patient's son analia    Chief Complaint: facial droop, dysarthria, and left arm weakness     History of Present Illness  Ms. Jay is a 69-year-old female that presented to [facility] with strokelike symptoms.  She has a past medical history significant for hypertension, type 2 diabetes, hyperlipidemia, hypothyroidism, morbid obesity.  She lives with her granddaughter and son-in-law.  She is normally very active and independent and will watch great grand child.  Last night, patient went to bed around 9 or 9:30 PM.  When she came out of the bathroom this morning, she seemed disoriented and asked her son-in-law where her bathroom was.  Family took her to her bedroom.  When they would try to get her up off the bed she \"almost passed out\" and her eyes were closed.  She complained of headache and had associated nausea with vomiting.  Family noticed that her left arm was clenched when they were trying to put her pants on.  They called EMS.  When EMS arrived, concern for facial droop, dysarthria and left arm weakness.  She has also complained of chest discomfort described as \"throbbing\", rated 9/10.  Nontender to palpation.  No radiation.  No previous history of stroke, TIAs, coronary artery disease, seizure.  No antiplatelet use at home. In the last week she has had a tooth that has caused her pain and swelling to right side of face.  She is not up-to-date on dental exams.  No drainage.  Family had leftover amoxicillin and believes that she has had 3-4 days with improvement.  No fever or chills.  In the ED, CT head showed no acute findings.  CT angiography showed no evidence of large vessel occlusions, no evidence of significant stenosis.  High-sensitivity troponin 17, 19.  EKG without acute changes.  She " will be admitted to the hospitalist service for further evaluation and management.    Review of Systems   Otherwise complete ROS reviewed and negative except as mentioned in the HPI.    Past Medical History:   Past Medical History:   Diagnosis Date    Cervical spondylosis with radiculopathy     Cervicalgia     Diabetes mellitus, type 2     Hypertension     Obesity due to excess calories     Prescription refill     Radiculopathy, cervical      Past Surgical History:  Past Surgical History:   Procedure Laterality Date    CHOLECYSTECTOMY      HYSTERECTOMY      REPLACEMENT TOTAL KNEE Left 08/2016     Social History:  reports that she has never smoked. She has never used smokeless tobacco. She reports that she does not drink alcohol and does not use drugs.    Family History: family history includes Arthritis in her paternal grandfather; Cancer in her mother; Hypertension in her father; Stroke in her father.       Allergies:  Allergies   Allergen Reactions    Penicillins        Medications:  Prior to Admission medications    Medication Sig Start Date End Date Taking? Authorizing Provider   albuterol (PROVENTIL HFA;VENTOLIN HFA) 108 (90 Base) MCG/ACT inhaler Inhale 2 puffs Every 4 (Four) Hours As Needed for Wheezing. 11/20/17   Loulou Hernandez APRN   azithromycin (ZITHROMAX) 250 MG tablet Take 2 tablets the first day, then 1 tablet daily for 4 days. 8/16/19   Lily Smith APRN   busPIRone (BUSPAR) 10 MG tablet Take 1 tablet by mouth 2 (Two) Times a Day. Patient needs an appt for further refills 7/5/18   Jacquie Medley APRN   cholecalciferol (VITAMIN D3) 1000 UNITS tablet Take 1,000 Units by mouth Daily.    Provider, MD Dane   diclofenac (VOLTAREN) 50 MG EC tablet Take 1 tablet by mouth 2 (Two) Times a Day. 2/23/18   Kyle Cole DO   doxycycline (MONODOX) 100 MG capsule Take 1 capsule by mouth 2 (Two) Times a Day. 11/20/17   Loulou Hernandez APRN   glimepiride (AMARYL) 2 MG tablet Take 1  "tablet by mouth Every Morning Before Breakfast. MUST MAKE AN APPT FOR LAB WORK AND OFFICE VISIT. 8/21/17   Kyle Cole DO   guaiFENesin (MUCINEX) 600 MG 12 hr tablet Take 1 tablet by mouth 2 (Two) Times a Day. 11/20/17   Loulou Hernandez APRN   levothyroxine (SYNTHROID, LEVOTHROID) 137 MCG tablet Take 1 tablet by mouth Daily. 6/23/17   Kyle Cole DO   lisinopril (PRINIVIL,ZESTRIL) 20 MG tablet Take 1 tablet by mouth Daily. 10/30/17   Kyle Cole DO   metFORMIN (GLUCOPHAGE) 1000 MG tablet Take 1 tablet by mouth 2 (Two) Times a Day With Meals. 8/10/17   Kyle Cole DO   MethylPREDNISolone (MEDROL, ISIDORO,) 4 MG tablet Take as directed on package instructions. 11/20/17   Loulou Hernandez APRN   promethazine-dextromethorphan (PROMETHAZINE-DM) 6.25-15 MG/5ML syrup Take 5 mL by mouth 4 (Four) Times a Day As Needed for Cough. 11/20/17   Loulou Hernandez APRN   sertraline (ZOLOFT) 100 MG tablet Take 1 tablet by mouth Daily. 10/30/17   Kyle Cole DO   simvastatin (ZOCOR) 10 MG tablet Take 1 tablet by mouth Every Evening. Patient will need an appt for further refills 7/5/18   Jacquie Medley APRN     I have utilized all available immediate resources to obtain, update, or review the patient's current medications (including all prescriptions, over-the-counter products, herbals, cannabis/cannabidiol products, and vitamin/mineral/dietary (nutritional) supplements).    Objective     Vital Signs: /91 (BP Location: Right arm, Patient Position: Lying)   Pulse 61   Temp 98 °F (36.7 °C) (Oral)   Resp 20   Ht 165.1 cm (65\")   Wt 123 kg (270 lb 12.8 oz)   SpO2 95%   BMI 45.06 kg/m²   Physical Exam  Vitals reviewed.   Constitutional:       General: She is not in acute distress.     Appearance: She is morbidly obese. She is not toxic-appearing.   HENT:      Head: Normocephalic and atraumatic.      Mouth/Throat:      Mouth: Mucous membranes are moist.      Pharynx: " Oropharynx is clear.   Eyes:      Extraocular Movements: Extraocular movements intact.      Conjunctiva/sclera: Conjunctivae normal.      Pupils: Pupils are equal, round, and reactive to light.   Cardiovascular:      Rate and Rhythm: Normal rate and regular rhythm.      Pulses: Normal pulses.   Pulmonary:      Effort: Pulmonary effort is normal. No respiratory distress.      Breath sounds: Normal breath sounds.   Abdominal:      General: Bowel sounds are normal. There is no distension.      Palpations: Abdomen is soft.      Tenderness: There is no abdominal tenderness.   Musculoskeletal:         General: No swelling or tenderness. Normal range of motion.      Cervical back: Normal range of motion and neck supple. No muscular tenderness.   Skin:     General: Skin is warm and dry.      Findings: No erythema or rash.   Neurological:      General: No focal deficit present.      Mental Status: She is alert and oriented to person, place, and time.      Cranial Nerves: No cranial nerve deficit.      Motor: No weakness.   Psychiatric:         Mood and Affect: Mood normal.         Behavior: Behavior normal.        Results Reviewed:  Lab Results (last 24 hours)       Procedure Component Value Units Date/Time    High Sensitivity Troponin T 1Hr [[id number]]  (Abnormal) Collected: 01/15/25 1237    Specimen: Blood from Arm, Left Updated: 01/15/25 1309     HS Troponin T 19 ng/L      Troponin T Numeric Delta 2 ng/L      Troponin T % Delta 12    Narrative:      High Sensitive Troponin T Reference Range:  <14.0 ng/L- Negative Female for AMI  <22.0 ng/L- Negative Male for AMI  >=14 - Abnormal Female indicating possible myocardial injury.  >=22 - Abnormal Male indicating possible myocardial injury.   Clinicians would have to utilize clinical acumen, EKG, Troponin, and serial changes to determine if it is an Acute Myocardial Infarction or myocardial injury due to an underlying chronic condition.         High Sensitivity Troponin T  [[id number]]  (Abnormal) Collected: 01/15/25 1056    Specimen: Blood Updated: 01/15/25 1231     HS Troponin T 17 ng/L     Narrative:      High Sensitive Troponin T Reference Range:  <14.0 ng/L- Negative Female for AMI  <22.0 ng/L- Negative Male for AMI  >=14 - Abnormal Female indicating possible myocardial injury.  >=22 - Abnormal Male indicating possible myocardial injury.   Clinicians would have to utilize clinical acumen, EKG, Troponin, and serial changes to determine if it is an Acute Myocardial Infarction or myocardial injury due to an underlying chronic condition.         Lactic Acid, Plasma [[id number]]  (Normal) Collected: 01/15/25 1117    Specimen: Blood Updated: 01/15/25 1207     Lactate 1.0 mmol/L     Comprehensive Metabolic Panel [[id number]]  (Abnormal) Collected: 01/15/25 1056    Specimen: Blood Updated: 01/15/25 1124     Glucose 157 mg/dL      BUN 15 mg/dL      Creatinine 0.72 mg/dL      Sodium 136 mmol/L      Potassium 4.1 mmol/L      Chloride 98 mmol/L      CO2 27.0 mmol/L      Calcium 8.8 mg/dL      Total Protein 6.4 g/dL      Albumin 3.6 g/dL      ALT (SGPT) 9 U/L      AST (SGOT) 16 U/L      Alkaline Phosphatase 73 U/L      Total Bilirubin 0.3 mg/dL      Globulin 2.8 gm/dL      A/G Ratio 1.3 g/dL      BUN/Creatinine Ratio 20.8     Anion Gap 11.0 mmol/L      eGFR 91.8 mL/min/1.73     Narrative:      GFR Categories in Chronic Kidney Disease (CKD)      GFR Category          GFR (mL/min/1.73)    Interpretation  G1                     90 or greater         Normal or high (1)  G2                      60-89                Mild decrease (1)  G3a                   45-59                Mild to moderate decrease  G3b                   30-44                Moderate to severe decrease  G4                    15-29                Severe decrease  G5                    14 or less           Kidney failure          (1)In the absence of evidence of kidney disease, neither GFR category G1 or G2 fulfill the criteria for  CKD.    eGFR calculation 2021 CKD-EPI creatinine equation, which does not include race as a factor    CK [[id number]]  (Normal) Collected: 01/15/25 1056    Specimen: Blood Updated: 01/15/25 1124     Creatine Kinase 63 U/L     Protime-INR [[id number]]  (Normal) Collected: 01/15/25 1056    Specimen: Blood Updated: 01/15/25 1115     Protime 13.6 Seconds      INR 0.99    aPTT [[id number]]  (Normal) Collected: 01/15/25 1056    Specimen: Blood Updated: 01/15/25 1115     PTT 26.0 seconds     [place] Draw [[id number]] Collected: 01/15/25 1056    Specimen: Blood Updated: 01/15/25 1115    Narrative:      The following orders were created for panel order [place] Draw.  Procedure                               Abnormality         Status                     ---------                               -----------         ------                     Green Top (Gel)[[id number]]                                  Final result               Lavender Top[[id number]]                                     Final result               Red Top[[id number]]                                          Final result               Light Blue Top[[id number]]                                   Final result                 Please view results for these tests on the individual orders.    Green Top (Gel) [[id number]] Collected: 01/15/25 1056    Specimen: Blood Updated: 01/15/25 1115     Extra Tube Hold for add-ons.     Comment: Auto resulted.       Lavender Top [[id number]] Collected: 01/15/25 1056    Specimen: Blood Updated: 01/15/25 1115     Extra Tube hold for add-on     Comment: Auto resulted       Red Top [[id number]] Collected: 01/15/25 1056    Specimen: Blood Updated: 01/15/25 1115     Extra Tube Hold for add-ons.     Comment: Auto resulted.       Light Blue Top [[id number]] Collected: 01/15/25 1056    Specimen: Blood Updated: 01/15/25 1115     Extra Tube Hold for add-ons.     Comment: Auto resulted       CBC & Differential [[id number]]  (Abnormal) Collected: 01/15/25  1056    Specimen: Blood Updated: 01/15/25 1106    Narrative:      The following orders were created for panel order CBC & Differential.  Procedure                               Abnormality         Status                     ---------                               -----------         ------                     CBC Auto Differential[[id number]]        Abnormal            Final result                 Please view results for these tests on the individual orders.    CBC Auto Differential [[id number]]  (Abnormal) Collected: 01/15/25 1056    Specimen: Blood Updated: 01/15/25 1106     WBC 6.89 10*3/mm3      RBC 4.58 10*6/mm3      Hemoglobin 12.8 g/dL      Hematocrit 41.2 %      MCV 90.0 fL      MCH 27.9 pg      MCHC 31.1 g/dL      RDW 14.3 %      RDW-SD 47.2 fl      MPV 9.5 fL      Platelets 234 10*3/mm3      Neutrophil % 67.8 %      Lymphocyte % 20.3 %      Monocyte % 7.0 %      Eosinophil % 4.2 %      Basophil % 0.4 %      Immature Grans % 0.3 %      Neutrophils, Absolute 4.67 10*3/mm3      Lymphocytes, Absolute 1.40 10*3/mm3      Monocytes, Absolute 0.48 10*3/mm3      Eosinophils, Absolute 0.29 10*3/mm3      Basophils, Absolute 0.03 10*3/mm3      Immature Grans, Absolute 0.02 10*3/mm3      nRBC 0.0 /100 WBC     POC Glucose Once [[id number]]  (Abnormal) Collected: 01/15/25 1043    Specimen: Blood Updated: 01/15/25 1054     Glucose 152 mg/dL      Comment: : [id number] Kinsey EmilyMeter ID: [id number]             Imaging Results (Last 24 Hours)       Procedure Component Value Units Date/Time    CT Angiogram Neck [[id number]] Collected: 01/15/25 1110     Updated: 01/15/25 1126    Narrative:      EXAMINATION: CT ANGIOGRAM NECK-      1/15/2025 9:42 AM     HISTORY: Stroke, follow up     In order to have a CT radiation dose as low as reasonably achievable  Automated Exposure Control was utilized for adjustment of the mA and/or  KV according to patient size.     Total DLP = 410.8 mGy.cm     The CT angiography of the neck is  performed after intravenous contrast  enhancement.     The images were acquired in the axial plane and subsequent 2D  reconstruction in coronal and sagittal planes and 3D maximum intensity  projection image reconstruction.     There is no previous similar study for comparison. The correlation is  made with CT scan of the head obtained earlier today.     Limited visualized thoracic aorta and aortic arch show moderate  atheromatous changes. No aneurysmal dilatation or dissection.     Normal origin course and caliber of the brachiocephalic, left common  carotid and left subclavian arteries.     The brachiocephalic artery divides into normal-appearing subclavian and  right common carotid arteries.     Both common carotid arteries have a normal course and caliber. No areas  of focal stenosis or aneurysmal dilatation.     There is a mixed, noncalcified and calcified atheromatous plaque in the  distal left common carotid artery extending into the carotid bulb. No  significant stenosis of the distal common and proximal internal carotid  artery.     The right common carotid artery divides into normal internal and  external carotid arteries.     Both internal carotid arteries there is subsequent normal course and  caliber. No areas of focal stenosis or aneurysmal dilatation. Normal  size internal carotid arteries are seen at the skull base and the  carotid canal and and in the cavernous sinus bilaterally.     Normal appearing vertebral arteries arise from the subclavian arteries  bilaterally. There is subsequent normal course and caliber throughout  the neck. Normal sized vertebral arteries enter the foramen magnum and  subsequently join to make a normal basilar artery.     The intracranial internal carotid and vertebral arteries are not  evaluated in this study.     Limited visualized soft tissues of the neck are unremarkable. No mass or  lymphadenopathy.     There is a heterogeneous low-density nodule in the right lobe of  the  thyroid gland which measures approximately 2 cm x 1.5 cm.     Limited visualized lung apices are unremarkable.     There is severe chronic degenerative changes of the cervical spine with  multilevel degenerative disc disease, prominent osteophytes and facet  arthropathy.       Impression:      1. A mixed plaque in the distal left common carotid and proximal  internal carotid artery without a significant/flow-limiting stenosis.  The remaining CT angiography of the neck is normal.  2. Right thyroid nodule may be further evaluated with sonography.  3. Severe cervical spondylosis.  4. The NASCET criteria was utilized for estimation of carotid arterial  stenosis.                             This report was signed and finalized on 1/15/2025 11:23 AM by Dr. Parvez Hanks MD.       XR Chest 1 View [[id number]] Collected: 01/15/25 1111     Updated: 01/15/25 1116    Narrative:      EXAMINATION: XR CHEST 1 VW- 1/15/2025 11:11 AM     HISTORY: Acute Stroke Protocol (onset < 12 hrs).     COMPARISON: Chest x-rays of 8/16/2019.     REPORT:  The lungs are hypoaerated, no focal infiltrate or pulmonary  consolidation is identified. No pneumothorax or effusion is identified.  Heart size is upper limits of normal. The osseous structures show no  acute findings.       Impression:      Shallow inspiration, no acute cardiopulmonary abnormality.        This report was signed and finalized on 1/15/2025 11:12 AM by Dr. Young Donald MD.       CT Angiogram Head w AI Analysis of LVO [[id number]] Collected: 01/15/25 1108     Updated: 01/15/25 1114    Narrative:      CT ANGIOGRAM HEAD W AI ANALYSIS OF LVO- 1/15/2025 9:42 AM     HISTORY: Stroke, follow up      COMPARISON: Nonenhanced CT head 1/15/2025     TOTAL DOSE LENGTH PRODUCT: 410.8 mGy.cm. Automated exposure control was  also utilized to decrease patient radiation dose.     TECHNIQUE: Axial images of the brain are performed following IV  contrast. 2D, 3D, MIPS reconstructed images  are reviewed. AI analysis of  LVO was utilized.        FINDINGS: The distal bilateral internal carotid and vertebral arteries  are patent. Patent basilar artery. The bilateral anterior, middle, and  posterior cerebral arteries are patent. No large vessel occlusion. No  aneurysm or dissection.       Impression:      No large vessel occlusion. No aneurysm or dissection.     This report was signed and finalized on 1/15/2025 11:11 AM by Dr. Dorita Knight MD.       CT Head Without Contrast Stroke Protocol [[id number]] Collected: 01/15/25 1054     Updated: 01/15/25 1059    Narrative:      EXAM: CT HEAD WO CONTRAST STROKE PROTOCOL-      DATE: 1/15/2025 9:41 AM     HISTORY: Neuro deficit, acute, stroke suspected       COMPARISON: No existing relevant imaging studies available.     DOSE LENGTH PRODUCT: 751.46 mGy.cm  Automated exposure control was also  utilized to decrease patient radiation dose.     TECHNIQUE: Unenhanced CT images obtained from vertex to skull base with  multiplanar reformats.     FINDINGS:  No acute intracranial hemorrhage, midline shift, or mass effect.      Ventricles, sulci, basilar cisterns are normal in size and configuration  for the patient's age. Gray-white matter differentiation maintained.     Globes, retrobulbar soft tissues, paranasal sinuses, mastoid air cells  and external auditory canals are within normal limits. Calvarium appears  intact. Subcutaneous tissues within normal limits.          Impression:      1. No acute intracranial finding.         This report was signed and finalized on 1/15/2025 10:56 AM by Dr Cydney Quiros MD.             I have personally reviewed and interpreted the radiology studies and ECG obtained at time of admission.     Assessment / Plan   Assessment:   Active Hospital Problems    Diagnosis     **TIA (transient ischemic attack)     Hyperlipidemia     Class 3 severe obesity with body mass index (BMI) of 45.0 to 49.9 in adult     Essential hypertension      Type 2 diabetes mellitus with diabetic polyneuropathy, without long-term current use of insulin      Treatment Plan  The patient will be admitted to Dr. Zavala's service at [facility].     Facial droop, dysarthria, and left arm weakness.  CT head showed no acute findings.  CT angiography showed no evidence of large vessel occlusions, no evidence of significant stenosis.  Neurology, Dr. Iqbal consulted.  MRI brain with and without contrast and EEG.  Transthoracic echocardiogram.  Initiate aspirin 81 mg and Lipitor 80 mg.  Check lipid panel.  Monitor on telemetry.    Check A1c. Initiate Accu-Cheks with insulin sliding    Consult PT/OT/SLP.    Review and resume home medications as appropriate.    SCDs for DVT prophylaxis.    Workup ongoing.    Medical Decision Making  Number and Complexity of problems: 3 acute problems  Differential Diagnosis: As above    Conditions and Status        Condition is unchanged.     [facility] Data  External documents reviewed: Prior epic records  Cardiac tracing (EKG, telemetry) interpretation: Reviewed  Radiology interpretation: Interpreted by radiology  Labs reviewed: As above  Any tests that were considered but not ordered: None considered at present     Decision rules/scores evaluated (example [id number], [place], etc): None considered at present     Discussed with: Patient and Dr. Zavala     Care Planning  Shared decision making: Patient is agreeable to ongoing workup and treatment  Code status and discussions: No CPR with limited support to include no intubation    Disposition  Social Determinants of Health that impact treatment or disposition: none  Estimated length of stay is 1-2 days.     I confirmed that the patient's advanced care plan is present, code status is documented, and a surrogate decision maker is listed in the patient's medical record.     The patient's surrogate decision maker is her son James Jay.     The patient was seen and examined by me on 1/5/2025  at 13:30.    Electronically signed by TAMARA Rosales, 01/15/25, 14:11 CST.

## 2025-01-15 NOTE — CONSULTS
Neurology Consult Note    Consult Date: 1/15/2025  Referring MD: No ref. provider found  Reason for Consult: CODE STROKE    Patient: Kenyatta Jay (69 y.o. female)  MRN: [id number]  : 1955    History of Present Illness:   Kenyatta Jay is a 69 y.o. female with a known history of obesity, diabetes mellitus, and hypertension.  Her last seen normal was last night when she went to bed around 9 or 9:30 PM.  Her son is in room and assist in the history.  Some family lives with her.  She went to bed last night.  This morning she came out of the bathroom and asked her son-in-law where her bedroom was.  She seems somewhat disoriented.  They took her to her bedroom.  Every time that they would try to get her up off of the bed she would almost pass out and her eyes were closed.  They attempted to put some pants on her and noted that her left arm was clenched with a claw formation.  They called EMS.  When EMS arrived they believe they saw a facial droop, dysarthria, and had left arm weakness with it falling off of her chest.  The family reports no previous history of seizures.  They do tell me that she has had a tooth that has been hurting and they have started on amoxicillin.  She does not have a prescription for this and has not been checked out in regards to the possible abscess.  The amoxicillin was her son's.  She has no previous history of coronary artery disease and no previous history of strokes or TIAs.    She complains of a severe headache which is rated as a 9 out of 10 in nature.  She tells me she is normally not have headaches.  The headache has occurred for several days in a row.  She was having nausea and vomiting on the way in.  She had an emesis bag that was full when she arrived at our facility.      Medical History:   Past Medical/Surgical Hx:  Past Medical History:   Diagnosis Date    Cervical spondylosis with radiculopathy     Cervicalgia     Diabetes mellitus, type 2     Hypertension      Obesity due to excess calories     Prescription refill     Radiculopathy, cervical      Past Surgical History:   Procedure Laterality Date    CHOLECYSTECTOMY      HYSTERECTOMY      REPLACEMENT TOTAL KNEE Left 08/2016       Medications On Admission:  (Not in a hospital admission)      Current Medications:    Current Facility-Administered Medications:     ondansetron (ZOFRAN) injection 4 mg, 4 mg, Intravenous, Once, Cydney Galvan MD    sodium chloride 0.9 % flush 10 mL, 10 mL, Intravenous, PRN, Cydney Galvan MD    Current Outpatient Medications:     albuterol (PROVENTIL HFA;VENTOLIN HFA) 108 (90 Base) MCG/ACT inhaler, Inhale 2 puffs Every 4 (Four) Hours As Needed for Wheezing., Disp: 1 inhaler, Rfl: 0    azithromycin (ZITHROMAX) 250 MG tablet, Take 2 tablets the first day, then 1 tablet daily for 4 days., Disp: 6 tablet, Rfl: 0    busPIRone (BUSPAR) 10 MG tablet, Take 1 tablet by mouth 2 (Two) Times a Day. Patient needs an appt for further refills, Disp: 60 tablet, Rfl: 0    cholecalciferol (VITAMIN D3) 1000 UNITS tablet, Take 1,000 Units by mouth Daily., Disp: , Rfl:     diclofenac (VOLTAREN) 50 MG EC tablet, Take 1 tablet by mouth 2 (Two) Times a Day., Disp: 60 tablet, Rfl: 3    doxycycline (MONODOX) 100 MG capsule, Take 1 capsule by mouth 2 (Two) Times a Day., Disp: 20 capsule, Rfl: 0    glimepiride (AMARYL) 2 MG tablet, Take 1 tablet by mouth Every Morning Before Breakfast. MUST MAKE AN APPT FOR LAB WORK AND OFFICE VISIT., Disp: 30 tablet, Rfl: 5    guaiFENesin (MUCINEX) 600 MG 12 hr tablet, Take 1 tablet by mouth 2 (Two) Times a Day., Disp: 20 tablet, Rfl: 0    levothyroxine (SYNTHROID, LEVOTHROID) 137 MCG tablet, Take 1 tablet by mouth Daily., Disp: 30 tablet, Rfl: 11    lisinopril (PRINIVIL,ZESTRIL) 20 MG tablet, Take 1 tablet by mouth Daily., Disp: 30 tablet, Rfl: 5    metFORMIN (GLUCOPHAGE) 1000 MG tablet, Take 1 tablet by mouth 2 (Two) Times a Day With Meals., Disp: 60 tablet, Rfl: 5     "MethylPREDNISolone (MEDROL, ISIDORO,) 4 MG tablet, Take as directed on package instructions., Disp: 21 tablet, Rfl: 0    promethazine-dextromethorphan (PROMETHAZINE-DM) 6.25-15 MG/5ML syrup, Take 5 mL by mouth 4 (Four) Times a Day As Needed for Cough., Disp: 118 mL, Rfl: 0    sertraline (ZOLOFT) 100 MG tablet, Take 1 tablet by mouth Daily., Disp: 30 tablet, Rfl: 5    simvastatin (ZOCOR) 10 MG tablet, Take 1 tablet by mouth Every Evening. Patient will need an appt for further refills, Disp: 30 tablet, Rfl: 0     Allergies:  Allergies   Allergen Reactions    Penicillins        Social Hx:  Social History     Socioeconomic History    Marital status:    Tobacco Use    Smoking status: Never    Smokeless tobacco: Never   Substance and Sexual Activity    Alcohol use: No    Drug use: No    Sexual activity: Yes     Partners: Male     Birth control/protection: None       Family Hx:  Family History   Problem Relation Age of Onset    Cancer Mother     Hypertension Father     Stroke Father     Arthritis Paternal Grandfather      Physical Examination:   Vital Signs:  Vitals:    01/15/25 1101   BP: 151/77   BP Location: Right arm   Patient Position: Lying   Pulse: 67   Resp: 20   Temp: 98 °F (36.7 °C)   TempSrc: Oral   SpO2: 96%   Weight: 123 kg (270 lb 12.8 oz)   Height: 165.1 cm (65\")         General Exam: Obese  Head:  Normocephalic, atraumatic  HEENT:  Neck supple  Fundoscopic Exam:  No signs of disc edema  CVS:  Regular rate and rhythm.  No murmurs  Carotid Examination:  No bruits  Lungs:  Clear to auscultation  Abdomen:  Nontender, Nondistended  Extremities:  No signs of peripheral edema  Skin:  No rashes    Neurologic Exam:    Mental Status:    -The patient is awake and alert.  She is somewhat slow to process.  She follows commands well.  No speech deficits    CN II:  Visual fields full.  Pupils equally reactive to light  CN III, IV, VI:  Extraocular Muscles full with no signs of nystagmus  CN V:  Facial sensory is " symmetric with no asymmetries.  CN VII:  Facial motor symmetric  CN VIII:  Gross hearing intact bilaterally  CN IX:  Palate elevates symmetrically  CN X:  Palate elevates symmetrically  CN XI:  Shoulder shrug symmetric  CN XII:  Tongue is midline on protrusion    Motor: (strength out of 5:  1= minimal movement, 2 = movement in plane of gravity, 3 = movement against gravity, 4 = movement against some resistance, 5 = full strength)    -Right Upper Ext: Proximal: 5 Distal: 5  -Left Upper Ext: Proximal: 5 Distal: 5    -Right Lower Ext: Proximal: 5 Distal: 5  -Left Lower Ext: Proximal: 5 Distal: 5    DTR:  -Somewhat hyporeflexic likely secondary to body habitus    Sensory:  -Intact to light touch, pinprick, temperature, pain, and proprioception    Coordination:  -Finger to nose intact  -Heel to shin intact  -No ataxia      Recent Diagnostics:   Laboratory Results:   - Reviewed in EMR  Lab Results   Component Value Date    GLUCOSE 157 (H) 07/16/2024    CALCIUM 9.4 07/16/2024     07/16/2024    K 4.5 07/16/2024    CO2 30 (H) 07/16/2024     07/16/2024    BUN 20 07/16/2024    CREATININE 1.0 (H) 07/16/2024    EGFRIFAFRI >59 07/12/2022    EGFRIFNONA >60 07/12/2022    BCR 28.9 (H) 08/16/2019    ANIONGAP 9 07/16/2024     Lab Results   Component Value Date    WBC 5.3 10/10/2023    HGB 12.7 10/10/2023    HCT 41.0 10/10/2023    MCV 91.5 10/10/2023     10/10/2023     Lab Results   Component Value Date    INR 0.97 07/28/2020     Lab Results   Component Value Date    TRIG 81 01/16/2024    HDL 56 (L) 01/16/2024    LDL 85 01/16/2024     Lab Results   Component Value Date    HGBA1C 5.4 07/16/2024       Imaging Results:  Imaging Results (Last 24 Hours)       Procedure Component Value Units Date/Time    XR Chest 1 View [[id number]] Resulted: 01/15/25 1100     Updated: 01/15/25 1100    CT Head Without Contrast Stroke Protocol [[id number]] Collected: 01/15/25 1054     Updated: 01/15/25 1059    Narrative:      EXAM: CT HEAD WO  CONTRAST STROKE PROTOCOL-      DATE: 1/15/2025 9:41 AM     HISTORY: Neuro deficit, acute, stroke suspected       COMPARISON: No existing relevant imaging studies available.     DOSE LENGTH PRODUCT: 751.46 mGy.cm  Automated exposure control was also  utilized to decrease patient radiation dose.     TECHNIQUE: Unenhanced CT images obtained from vertex to skull base with  multiplanar reformats.     FINDINGS:  No acute intracranial hemorrhage, midline shift, or mass effect.      Ventricles, sulci, basilar cisterns are normal in size and configuration  for the patient's age. Gray-white matter differentiation maintained.     Globes, retrobulbar soft tissues, paranasal sinuses, mastoid air cells  and external auditory canals are within normal limits. Calvarium appears  intact. Subcutaneous tissues within normal limits.          Impression:      1. No acute intracranial finding.         This report was signed and finalized on 1/15/2025 10:56 AM by Dr Cydney Quiros MD.       CT Angiogram Head w AI Analysis of LVO [[id number]] Resulted: 01/15/25 1043     Updated: 01/15/25 1054    CT Angiogram Neck [[id number]] Resulted: 01/15/25 1043     Updated: 01/15/25 1054             Other labs:  CBC and CMP unremarkable thus far  Other RAD:  Uninfused head CT was unremarkable.  CT angiography reviewed by me showing no evidence of large vessel occlusions.  No evidence of significant stenosis either.      Assessment & Plan:   Patient presents as a code stroke with multiple stroke risk factors including obesity, hypertension, and hyperlipidemia.  Upon review of the history it sounds almost as if her main presenting symptoms were increased tone in the left upper extremity combined with confusion and the weakness was subsequent to this.  This may be consistent with a seizure and a Lewis's paralysis.  She does seem slow mentally currently which also may represent a postictal state.  She is not a candidate for lytics as she presents outside of  the 4.5-hour window.  She does not have a large vessel occlusion.  I think it is reasonable to admit her under stroke protocols but I do believe that she needs an MRI as well that will be with and without contrast along with an EEG.    Impression:  Acute neurologic spell consisting of confusion with left upper extremity tone and subsequent weakness  History of diabetes mellitus  History of hypertension  Obesity      Plan:   Admit to inpatient service  MRI of the brain with and without contrast given the fact that we are considering a seizure  EEG  Aspirin 81 mg  Lipitor 80 mg  Physical, occupational, and speech therapy consultations  Bedside swallow  Tylenol as needed for headache if passes swallow screen  Hemoglobin A1c goal less than 7 and LDL goal less than 70.    Hayes qIbal MD  01/15/25  11:04 CST    Medical Decision Making    Number/Complexity of Problems  Moderate  1 undiagnosed new problem with uncertain prognosis -   1 acute illness with systemic symptoms -   High  1 acute or chronic illness that poses a threat to life/body function -   High     MDM Data  Moderate - 1/3 categories  Extensive - 2/3 categories    Category 1: 3 of the following  Review of external notes  Review of results  Ordering of each unique test  Independent historian  Category 2:  Independent interpretation of test (ex: imaging)  Category 3:  Discussion of management with another provider    Extensive     Treatment Plan  Moderate - Prescription Drug management  High  Drug therapy requiring intensive monitoring for toxicity  Decision regarding hospitalization or escalation of care  De-escalate care/DNR decisions  Recommend admission.

## 2025-01-15 NOTE — ED NOTES
Pt presents to ER by EMS for CC stroke s/s. EMS reports LKW was 2130 last night. EMS reports left side weakness and left facial droop. Pt was found in bathroom, shuffled gait which is not normal per family to EMS. Pt is altered, but alert and oriented when asked questions. Pt reports headache for 2 days. No facial droop noted at this time by this RN and pt has generalized weakness but equal strength.

## 2025-01-15 NOTE — ED PROVIDER NOTES
Subjective   History of Present Illness  Patient is a 69-year-old female with a history of diabetes and hypertension who presents to the ER with strokelike symptoms.  Family told EMS the patient went to bed around 9:30 PM last evening and was normal.  Patient woke up this morning around 9:30 AM and was altered.  She was noted to have weakness to her left arm and leg as well as a left-sided facial droop.  Patient also had slurred speech.  Patient also complains of a headache and nausea and vomiting.  She does not take blood thinners.  Patient denies any fever, chest pain, shortness of air, abdominal pain, diarrhea, urinary changes, numbness.    When son arrived, son states the patient woke up confused.  Patient was having difficulty putting on her pants and was noted to have a clinched and curled left upper extremity.  Family states the patient has also complained of a toothache and has been taking amoxicillin that she had at home.      Review of Systems   Eyes: Negative.    Respiratory: Negative.     Cardiovascular: Negative.    Gastrointestinal:  Positive for nausea and vomiting.   Endocrine: Negative.    Genitourinary: Negative.    Musculoskeletal: Negative.    Skin: Negative.    Allergic/Immunologic: Negative.    Neurological:  Positive for weakness and headaches.   Hematological: Negative.    Psychiatric/Behavioral:  Positive for confusion.    All other systems reviewed and are negative.      Past Medical History:   Diagnosis Date    Cervical spondylosis with radiculopathy     Cervicalgia     Diabetes mellitus, type 2     Hypertension     Obesity due to excess calories     Prescription refill     Radiculopathy, cervical        Allergies   Allergen Reactions    Penicillins        Past Surgical History:   Procedure Laterality Date    CHOLECYSTECTOMY      HYSTERECTOMY      REPLACEMENT TOTAL KNEE Left 08/2016       Family History   Problem Relation Age of Onset    Cancer Mother     Hypertension Father     Stroke  Father     Arthritis Paternal Grandfather        Social History     Socioeconomic History    Marital status:    Tobacco Use    Smoking status: Never    Smokeless tobacco: Never   Substance and Sexual Activity    Alcohol use: No    Drug use: No    Sexual activity: Yes     Partners: Male     Birth control/protection: None           Objective   Physical Exam  Vitals and nursing note reviewed.   Constitutional:       Appearance: She is well-developed.   HENT:      Head: Normocephalic and atraumatic.   Eyes:      Conjunctiva/sclera: Conjunctivae normal.      Pupils: Pupils are equal, round, and reactive to light.   Cardiovascular:      Rate and Rhythm: Normal rate and regular rhythm.      Heart sounds: Normal heart sounds.   Pulmonary:      Effort: Pulmonary effort is normal.      Breath sounds: Normal breath sounds.   Abdominal:      Palpations: Abdomen is soft.      Tenderness: There is no abdominal tenderness.   Musculoskeletal:         General: No deformity. Normal range of motion.      Cervical back: Normal range of motion.   Skin:     General: Skin is warm.   Neurological:      Mental Status: She is alert and oriented to person, place, and time.      Sensory: Sensation is intact.      Comments: Mild left-sided facial droop, minimal decreased strength to the left upper extremity with mild arm drift   Psychiatric:         Behavior: Behavior normal.         Procedures           ED Course      EKG: Normal sinus rhythm with a rate of 62, no acute ischemia or infarction                                       Lab Results (last 24 hours)       Procedure Component Value Units Date/Time    POC Glucose Once [[id number]]  (Abnormal) Collected: 01/15/25 1043    Specimen: Blood Updated: 01/15/25 1054     Glucose 152 mg/dL      Comment: : [id number] [facility]: [id number]       CBC & Differential [[id number]]  (Abnormal) Collected: 01/15/25 1056    Specimen: Blood Updated: 01/15/25 1106    Narrative:      The  following orders were created for panel order CBC & Differential.  Procedure                               Abnormality         Status                     ---------                               -----------         ------                     CBC Auto Differential[[id number]]        Abnormal            Final result                 Please view results for these tests on the individual orders.    Comprehensive Metabolic Panel [[id number]]  (Abnormal) Collected: 01/15/25 1056    Specimen: Blood Updated: 01/15/25 1124     Glucose 157 mg/dL      BUN 15 mg/dL      Creatinine 0.72 mg/dL      Sodium 136 mmol/L      Potassium 4.1 mmol/L      Chloride 98 mmol/L      CO2 27.0 mmol/L      Calcium 8.8 mg/dL      Total Protein 6.4 g/dL      Albumin 3.6 g/dL      ALT (SGPT) 9 U/L      AST (SGOT) 16 U/L      Alkaline Phosphatase 73 U/L      Total Bilirubin 0.3 mg/dL      Globulin 2.8 gm/dL      A/G Ratio 1.3 g/dL      BUN/Creatinine Ratio 20.8     Anion Gap 11.0 mmol/L      eGFR 91.8 mL/min/1.73     Narrative:      GFR Categories in Chronic Kidney Disease (CKD)      GFR Category          GFR (mL/min/1.73)    Interpretation  G1                     90 or greater         Normal or high (1)  G2                      60-89                Mild decrease (1)  G3a                   45-59                Mild to moderate decrease  G3b                   30-44                Moderate to severe decrease  G4                    15-29                Severe decrease  G5                    14 or less           Kidney failure          (1)In the absence of evidence of kidney disease, neither GFR category G1 or G2 fulfill the criteria for CKD.    eGFR calculation 2021 CKD-EPI creatinine equation, which does not include race as a factor    Protime-INR [[id number]]  (Normal) Collected: 01/15/25 1056    Specimen: Blood Updated: 01/15/25 1115     Protime 13.6 Seconds      INR 0.99    aPTT [[id number]]  (Normal) Collected: 01/15/25 1056    Specimen: Blood Updated:  01/15/25 1115     PTT 26.0 seconds     CBC Auto Differential [[id number]]  (Abnormal) Collected: 01/15/25 1056    Specimen: Blood Updated: 01/15/25 1106     WBC 6.89 10*3/mm3      RBC 4.58 10*6/mm3      Hemoglobin 12.8 g/dL      Hematocrit 41.2 %      MCV 90.0 fL      MCH 27.9 pg      MCHC 31.1 g/dL      RDW 14.3 %      RDW-SD 47.2 fl      MPV 9.5 fL      Platelets 234 10*3/mm3      Neutrophil % 67.8 %      Lymphocyte % 20.3 %      Monocyte % 7.0 %      Eosinophil % 4.2 %      Basophil % 0.4 %      Immature Grans % 0.3 %      Neutrophils, Absolute 4.67 10*3/mm3      Lymphocytes, Absolute 1.40 10*3/mm3      Monocytes, Absolute 0.48 10*3/mm3      Eosinophils, Absolute 0.29 10*3/mm3      Basophils, Absolute 0.03 10*3/mm3      Immature Grans, Absolute 0.02 10*3/mm3      nRBC 0.0 /100 WBC     CK [[id number]]  (Normal) Collected: 01/15/25 1056    Specimen: Blood Updated: 01/15/25 1124     Creatine Kinase 63 U/L     High Sensitivity Troponin T [[id number]] Collected: 01/15/25 1056    Specimen: Blood Updated: 01/15/25 1217    Lactic Acid, Plasma [[id number]]  (Normal) Collected: 01/15/25 1117    Specimen: Blood Updated: 01/15/25 1207     Lactate 1.0 mmol/L            XR Chest 1 View   Final Result   Shallow inspiration, no acute cardiopulmonary abnormality.           This report was signed and finalized on 1/15/2025 11:12 AM by Dr. Young Donald MD.          CT Angiogram Head w AI Analysis of LVO   Final Result   No large vessel occlusion. No aneurysm or dissection.       This report was signed and finalized on 1/15/2025 11:11 AM by Dr. Dorita Knight MD.          CT Angiogram Neck   Final Result   1. A mixed plaque in the distal left common carotid and proximal   internal carotid artery without a significant/flow-limiting stenosis.   The remaining CT angiography of the neck is normal.   2. Right thyroid nodule may be further evaluated with sonography.   3. Severe cervical spondylosis.   4. The NASCET criteria was  utilized for estimation of carotid arterial   stenosis.                                       This report was signed and finalized on 1/15/2025 11:23 AM by Dr. Parvez Hanks MD.          CT Head Without Contrast Stroke Protocol   Final Result   1. No acute intracranial finding.            This report was signed and finalized on 1/15/2025 10:56 AM by Dr Cydney Quiros MD.                       Medical Decision Making  Patient is a 69-year-old female with a history of diabetes and hypertension who presents to the ER with strokelike symptoms.  Family told EMS the patient went to bed around 9:30 PM last evening and was normal.  Patient woke up this morning around 9:30 AM and was altered.  She was noted to have weakness to her left arm and leg as well as a left-sided facial droop.  Patient also had slurred speech.  Patient also complains of a headache and nausea and vomiting.  She does not take blood thinners.  Patient denies any fever, chest pain, shortness of air, abdominal pain, diarrhea, urinary changes, numbness.    When son arrived, son states the patient woke up confused.  Patient was having difficulty putting on her pants and was noted to have a clinched and curled left upper extremity.  Family states the patient has also complained of a toothache and has been taking amoxicillin that she had at home.    Differential diagnosis: TIA, CVA, seizure, electrolyte abnormality, viral syndrome    A code stroke was called upon patient arrival.  Patient was not a TNK candidate due to onset of symptoms being greater than 12 hours.  Labs showed mild hyperglycemia but were otherwise negative.  Chest x-ray was unremarkable.  CTA of the head showed no large vessel occlusion.  CT scan of the head showed no acute findings.  CTA of the neck showed a mixed plaque in the distal left common carotid and proximal internal carotid artery without a flow-limiting stenosis.  Patient also had a right thyroid nodule.  She will need  an outpatient ultrasound for further workup.  Dr. Iqbal with neurology has seen the patient.  He recommended admission to the hospitalist service for further workup and treatment.  I then discussed the case with Makenna Nicolas with the hospitalist service and patient was admitted to Dr. Zavala's team for further treatment.  When I went to inform the patient that she will be admitted, patient then began complaining of some midsternal chest pain that just began.  Troponins were added.  Results still pending at admission.  The hospitalist service will follow results.    Interval: baseline  1a. Level of Consciousness: 0-->Alert, keenly responsive  1b. LOC Questions: 0-->Answers both questions correctly  1c. LOC Commands: 0-->Performs both tasks correctly  2. Best Gaze: 0-->Normal  3. Visual: 0-->No visual loss  4. Facial Palsy: 0-->Normal symmetrical movements  5a. Motor Arm, Left: 0-->No drift, limb holds 90 (or 45) degrees for full 10 secs  5b. Motor Arm, Right: 0-->No drift, limb holds 90 (or 45) degrees for full 10 secs  6a. Motor Leg, Left: 0-->No drift, leg holds 30 degree position for full 5 secs  6b. Motor Leg, Right: 0-->No drift, leg holds 30 degree position for full 5 secs   7. Limb Ataxia: 0-->Absent  8. Sensory: 0-->Normal, no sensory loss  9. Best Language: 0-->No aphasia, normal  10. Dysarthria: 0-->Normal  11. Extinction and Inattention (formerly Neglect): 0-->No abnormality    Total (NIH Stroke Scale): 0       Problems Addressed:  Disorientation: complicated acute illness or injury  Left arm weakness: complicated acute illness or injury  Thyroid nodule: complicated acute illness or injury  TIA (transient ischemic attack): complicated acute illness or injury    Amount and/or Complexity of Data Reviewed  Labs: ordered. Decision-making details documented in ED Course.  Radiology: ordered. Decision-making details documented in ED Course.  ECG/medicine tests: ordered. Decision-making details documented in  ED Course.  Discussion of management or test interpretation with external provider(s): Makenna Nicolas with the hospitalist service    Risk  Prescription drug management.  Decision regarding hospitalization.        Final diagnoses:   Disorientation   Left arm weakness   Thyroid nodule   TIA (transient ischemic attack)       ED Disposition  ED Disposition       ED Disposition   Decision to Admit    Condition   --    Comment   Level of Care: Telemetry [5]   Diagnosis: TIA (transient ischemic attack) [191557]   Admitting Physician: EDIE RIDDLE [[id number]]   Attending Physician: EDIE RIDDLE [[id number]]   Certification: I Certify That Inpatient Hospital Services Are Medically Necessary For Greater Than 2 Midnights                 No follow-up provider specified.       Medication List      No changes were made to your prescriptions during this visit.            Cydney Galvan MD  01/15/25 5545

## 2025-01-15 NOTE — PLAN OF CARE
Goal Outcome Evaluation:Patient is alert and oriented x 4, INGRAM, up to BR x 1-2 to void. Incontinent episode of BM upon arrival. On RA,VSS. C/O pain, has history of chronic pain. Family is at bedside.

## 2025-01-16 ENCOUNTER — APPOINTMENT (OUTPATIENT)
Dept: CARDIOLOGY | Facility: HOSPITAL | Age: 70
End: 2025-01-16
Payer: MEDICARE

## 2025-01-16 ENCOUNTER — APPOINTMENT (OUTPATIENT)
Dept: CT IMAGING | Facility: HOSPITAL | Age: 70
End: 2025-01-16
Payer: MEDICARE

## 2025-01-16 ENCOUNTER — APPOINTMENT (OUTPATIENT)
Dept: ULTRASOUND IMAGING | Facility: HOSPITAL | Age: 70
End: 2025-01-16
Payer: MEDICARE

## 2025-01-16 PROBLEM — E04.1 RIGHT THYROID NODULE: Status: ACTIVE | Noted: 2025-01-16

## 2025-01-16 PROBLEM — M62.89 MUSCLE TONE INCREASED: Status: ACTIVE | Noted: 2025-01-16

## 2025-01-16 LAB
AMPHET+METHAMPHET UR QL: NEGATIVE
AMPHETAMINES UR QL: NEGATIVE
ANION GAP SERPL CALCULATED.3IONS-SCNC: 8 MMOL/L (ref 5–15)
AV MEAN PRESS GRAD SYS DOP V1V2: 5 MMHG
AV VMAX SYS DOP: 159 CM/SEC
BACTERIA UR QL AUTO: ABNORMAL /HPF
BARBITURATES UR QL SCN: NEGATIVE
BENZODIAZ UR QL SCN: NEGATIVE
BH CV ECHO MEAS - AO MAX PG: 10.1 MMHG
BH CV ECHO MEAS - AO V2 VTI: 31.6 CM
BH CV ECHO MEAS - AVA(I,D): 2.6 CM2
BH CV ECHO MEAS - EDV(CUBED): 175.6 ML
BH CV ECHO MEAS - EDV(MOD-SP2): 65.9 ML
BH CV ECHO MEAS - EDV(MOD-SP4): 87.2 ML
BH CV ECHO MEAS - EF(MOD-SP2): 51.6 %
BH CV ECHO MEAS - EF(MOD-SP4): 64.9 %
BH CV ECHO MEAS - ESV(CUBED): 50.7 ML
BH CV ECHO MEAS - ESV(MOD-SP2): 31.9 ML
BH CV ECHO MEAS - ESV(MOD-SP4): 30.6 ML
BH CV ECHO MEAS - FS: 33.9 %
BH CV ECHO MEAS - IVS/LVPW: 0.82 CM
BH CV ECHO MEAS - IVSD: 0.77 CM
BH CV ECHO MEAS - LA DIMENSION: 4.2 CM
BH CV ECHO MEAS - LAT PEAK E' VEL: 11.4 CM/SEC
BH CV ECHO MEAS - LV DIASTOLIC VOL/BSA (35-75): 38.8 CM2
BH CV ECHO MEAS - LV MASS(C)D: 178.4 GRAMS
BH CV ECHO MEAS - LV MAX PG: 6.2 MMHG
BH CV ECHO MEAS - LV MEAN PG: 3 MMHG
BH CV ECHO MEAS - LV SYSTOLIC VOL/BSA (12-30): 13.6 CM2
BH CV ECHO MEAS - LV V1 MAX: 124 CM/SEC
BH CV ECHO MEAS - LV V1 VTI: 29.4 CM
BH CV ECHO MEAS - LVIDD: 5.6 CM
BH CV ECHO MEAS - LVIDS: 3.7 CM
BH CV ECHO MEAS - LVOT AREA: 2.8 CM2
BH CV ECHO MEAS - LVOT DIAM: 1.9 CM
BH CV ECHO MEAS - LVPWD: 0.93 CM
BH CV ECHO MEAS - MED PEAK E' VEL: 8.4 CM/SEC
BH CV ECHO MEAS - MV A MAX VEL: 92.2 CM/SEC
BH CV ECHO MEAS - MV DEC SLOPE: 323 CM/SEC2
BH CV ECHO MEAS - MV E MAX VEL: 106 CM/SEC
BH CV ECHO MEAS - MV E/A: 1.15
BH CV ECHO MEAS - MV P1/2T: 70.9 MSEC
BH CV ECHO MEAS - MVA(P1/2T): 3.1 CM2
BH CV ECHO MEAS - PA V2 MAX: 129 CM/SEC
BH CV ECHO MEAS - RAP SYSTOLE: 3 MMHG
BH CV ECHO MEAS - RV MAX PG: 4.5 MMHG
BH CV ECHO MEAS - RV V1 MAX: 106 CM/SEC
BH CV ECHO MEAS - RVDD: 2.9 CM
BH CV ECHO MEAS - RVSP: 16.1 MMHG
BH CV ECHO MEAS - SV(LVOT): 83.4 ML
BH CV ECHO MEAS - SV(MOD-SP2): 34 ML
BH CV ECHO MEAS - SV(MOD-SP4): 56.6 ML
BH CV ECHO MEAS - SVI(LVOT): 37.1 ML/M2
BH CV ECHO MEAS - SVI(MOD-SP2): 15.1 ML/M2
BH CV ECHO MEAS - SVI(MOD-SP4): 25.2 ML/M2
BH CV ECHO MEAS - TAPSE (>1.6): 2.22 CM
BH CV ECHO MEAS - TR MAX PG: 13.1 MMHG
BH CV ECHO MEAS - TR MAX VEL: 181 CM/SEC
BH CV ECHO MEASUREMENTS AVERAGE E/E' RATIO: 10.71
BH CV ECHO SHUNT ASSESSMENT PERFORMED (HIDDEN SCRIPTING): 1
BH CV XLRA - RV BASE: 2.8 CM
BILIRUB UR QL STRIP: ABNORMAL
BUN SERPL-MCNC: 17 MG/DL (ref 8–23)
BUN/CREAT SERPL: 25.8 (ref 7–25)
BUPRENORPHINE SERPL-MCNC: NEGATIVE NG/ML
CALCIUM SPEC-SCNC: 9.4 MG/DL (ref 8.6–10.5)
CANNABINOIDS SERPL QL: NEGATIVE
CHLORIDE SERPL-SCNC: 97 MMOL/L (ref 98–107)
CHOLEST SERPL-MCNC: 130 MG/DL (ref 0–200)
CLARITY UR: ABNORMAL
CO2 SERPL-SCNC: 29 MMOL/L (ref 22–29)
COCAINE UR QL: NEGATIVE
COLOR UR: ABNORMAL
CREAT SERPL-MCNC: 0.66 MG/DL (ref 0.57–1)
EGFRCR SERPLBLD CKD-EPI 2021: 96.3 ML/MIN/1.73
FENTANYL UR-MCNC: NEGATIVE NG/ML
GLUCOSE BLDC GLUCOMTR-MCNC: 125 MG/DL (ref 70–130)
GLUCOSE BLDC GLUCOMTR-MCNC: 137 MG/DL (ref 70–130)
GLUCOSE BLDC GLUCOMTR-MCNC: 163 MG/DL (ref 70–130)
GLUCOSE BLDC GLUCOMTR-MCNC: 165 MG/DL (ref 70–130)
GLUCOSE SERPL-MCNC: 136 MG/DL (ref 65–99)
GLUCOSE UR STRIP-MCNC: ABNORMAL MG/DL
HBA1C MFR BLD: 5.9 % (ref 4.8–5.6)
HDLC SERPL-MCNC: 45 MG/DL (ref 40–60)
HGB UR QL STRIP.AUTO: NEGATIVE
HYALINE CASTS UR QL AUTO: ABNORMAL /LPF
KETONES UR QL STRIP: ABNORMAL
LDLC SERPL CALC-MCNC: 73 MG/DL (ref 0–100)
LDLC/HDLC SERPL: 1.64 {RATIO}
LEFT ATRIUM VOLUME INDEX: 31.6 ML/M2
LEFT ATRIUM VOLUME: 71.2 ML
LEUKOCYTE ESTERASE UR QL STRIP.AUTO: NEGATIVE
LV EF BIPLANE MOD: 57.7 %
METHADONE UR QL SCN: NEGATIVE
NITRITE UR QL STRIP: NEGATIVE
OPIATES UR QL: POSITIVE
OXYCODONE UR QL SCN: NEGATIVE
PCP UR QL SCN: NEGATIVE
PH UR STRIP.AUTO: <=5 [PH] (ref 5–8)
POTASSIUM SERPL-SCNC: 3.9 MMOL/L (ref 3.5–5.2)
PROT UR QL STRIP: ABNORMAL
QT INTERVAL: 404 MS
QTC INTERVAL: 448 MS
RBC # UR STRIP: ABNORMAL /HPF
REF LAB TEST METHOD: ABNORMAL
SODIUM SERPL-SCNC: 134 MMOL/L (ref 136–145)
SP GR UR STRIP: >1.03 (ref 1–1.03)
SQUAMOUS #/AREA URNS HPF: ABNORMAL /HPF
TRICYCLICS UR QL SCN: NEGATIVE
TRIGL SERPL-MCNC: 57 MG/DL (ref 0–150)
TROPONIN T SERPL HS-MCNC: 17 NG/L
UROBILINOGEN UR QL STRIP: ABNORMAL
VLDLC SERPL-MCNC: 12 MG/DL (ref 5–40)
WBC # UR STRIP: ABNORMAL /HPF

## 2025-01-16 PROCEDURE — 93306 TTE W/DOPPLER COMPLETE: CPT

## 2025-01-16 PROCEDURE — G0378 HOSPITAL OBSERVATION PER HR: HCPCS

## 2025-01-16 PROCEDURE — 92523 SPEECH SOUND LANG COMPREHEN: CPT | Performed by: SPEECH-LANGUAGE PATHOLOGIST

## 2025-01-16 PROCEDURE — 83036 HEMOGLOBIN GLYCOSYLATED A1C: CPT | Performed by: NURSE PRACTITIONER

## 2025-01-16 PROCEDURE — 80048 BASIC METABOLIC PNL TOTAL CA: CPT | Performed by: FAMILY MEDICINE

## 2025-01-16 PROCEDURE — 84484 ASSAY OF TROPONIN QUANT: CPT | Performed by: FAMILY MEDICINE

## 2025-01-16 PROCEDURE — 82948 REAGENT STRIP/BLOOD GLUCOSE: CPT

## 2025-01-16 PROCEDURE — 80061 LIPID PANEL: CPT | Performed by: NURSE PRACTITIONER

## 2025-01-16 PROCEDURE — 96376 TX/PRO/DX INJ SAME DRUG ADON: CPT

## 2025-01-16 PROCEDURE — 93005 ELECTROCARDIOGRAM TRACING: CPT | Performed by: FAMILY MEDICINE

## 2025-01-16 PROCEDURE — 97162 PT EVAL MOD COMPLEX 30 MIN: CPT

## 2025-01-16 PROCEDURE — 74176 CT ABD & PELVIS W/O CONTRAST: CPT

## 2025-01-16 PROCEDURE — 93306 TTE W/DOPPLER COMPLETE: CPT | Performed by: EMERGENCY MEDICINE

## 2025-01-16 PROCEDURE — 94762 N-INVAS EAR/PLS OXIMTRY CONT: CPT

## 2025-01-16 PROCEDURE — 25010000002 METOCLOPRAMIDE PER 10 MG: Performed by: FAMILY MEDICINE

## 2025-01-16 PROCEDURE — 25510000001 PERFLUTREN 6.52 MG/ML SUSPENSION: Performed by: EMERGENCY MEDICINE

## 2025-01-16 PROCEDURE — 25010000002 DOBUTAMINE PER 250 MG: Performed by: EMERGENCY MEDICINE

## 2025-01-16 PROCEDURE — 76536 US EXAM OF HEAD AND NECK: CPT

## 2025-01-16 PROCEDURE — 99214 OFFICE O/P EST MOD 30 MIN: CPT | Performed by: PSYCHIATRY & NEUROLOGY

## 2025-01-16 PROCEDURE — 36415 COLL VENOUS BLD VENIPUNCTURE: CPT | Performed by: NURSE PRACTITIONER

## 2025-01-16 PROCEDURE — 97166 OT EVAL MOD COMPLEX 45 MIN: CPT

## 2025-01-16 PROCEDURE — 25010000002 PROMETHAZINE PER 50 MG: Performed by: FAMILY MEDICINE

## 2025-01-16 PROCEDURE — 93010 ELECTROCARDIOGRAM REPORT: CPT | Performed by: INTERNAL MEDICINE

## 2025-01-16 PROCEDURE — 63710000001 INSULIN LISPRO (HUMAN) PER 5 UNITS: Performed by: NURSE PRACTITIONER

## 2025-01-16 PROCEDURE — 80307 DRUG TEST PRSMV CHEM ANLYZR: CPT | Performed by: CLINICAL NURSE SPECIALIST

## 2025-01-16 PROCEDURE — 81001 URINALYSIS AUTO W/SCOPE: CPT | Performed by: CLINICAL NURSE SPECIALIST

## 2025-01-16 PROCEDURE — 25010000002 ONDANSETRON PER 1 MG: Performed by: NURSE PRACTITIONER

## 2025-01-16 PROCEDURE — 96375 TX/PRO/DX INJ NEW DRUG ADDON: CPT

## 2025-01-16 PROCEDURE — 25510000001 PERFLUTREN 6.52 MG/ML SUSPENSION: Performed by: FAMILY MEDICINE

## 2025-01-16 RX ORDER — PROMETHAZINE HYDROCHLORIDE 12.5 MG/1
12.5 SUPPOSITORY RECTAL EVERY 8 HOURS PRN
Status: DISCONTINUED | OUTPATIENT
Start: 2025-01-16 | End: 2025-01-17 | Stop reason: HOSPADM

## 2025-01-16 RX ORDER — DOBUTAMINE HYDROCHLORIDE 100 MG/100ML
10-50 INJECTION INTRAVENOUS CONTINUOUS
Status: DISCONTINUED | OUTPATIENT
Start: 2025-01-16 | End: 2025-01-16

## 2025-01-16 RX ORDER — ATROPINE SULFATE 1 MG/ML
2 INJECTION, SOLUTION INTRAMUSCULAR; INTRAVENOUS; SUBCUTANEOUS ONCE
Status: DISCONTINUED | OUTPATIENT
Start: 2025-01-16 | End: 2025-01-16

## 2025-01-16 RX ORDER — METOPROLOL TARTRATE 1 MG/ML
5 INJECTION, SOLUTION INTRAVENOUS ONCE
Status: DISCONTINUED | OUTPATIENT
Start: 2025-01-16 | End: 2025-01-16

## 2025-01-16 RX ORDER — PANTOPRAZOLE SODIUM 40 MG/10ML
40 INJECTION, POWDER, LYOPHILIZED, FOR SOLUTION INTRAVENOUS
Status: DISCONTINUED | OUTPATIENT
Start: 2025-01-16 | End: 2025-01-17 | Stop reason: HOSPADM

## 2025-01-16 RX ORDER — ALPRAZOLAM 0.25 MG/1
0.25 TABLET ORAL 2 TIMES DAILY PRN
Status: DISCONTINUED | OUTPATIENT
Start: 2025-01-16 | End: 2025-01-17 | Stop reason: HOSPADM

## 2025-01-16 RX ORDER — METOCLOPRAMIDE HYDROCHLORIDE 5 MG/ML
10 INJECTION INTRAMUSCULAR; INTRAVENOUS EVERY 6 HOURS
Status: DISCONTINUED | OUTPATIENT
Start: 2025-01-16 | End: 2025-01-16

## 2025-01-16 RX ORDER — HYDRALAZINE HYDROCHLORIDE 20 MG/ML
5 INJECTION INTRAMUSCULAR; INTRAVENOUS EVERY 4 HOURS PRN
Status: DISCONTINUED | OUTPATIENT
Start: 2025-01-16 | End: 2025-01-17 | Stop reason: HOSPADM

## 2025-01-16 RX ORDER — METOCLOPRAMIDE HYDROCHLORIDE 5 MG/ML
10 INJECTION INTRAMUSCULAR; INTRAVENOUS EVERY 6 HOURS PRN
Status: DISCONTINUED | OUTPATIENT
Start: 2025-01-16 | End: 2025-01-17 | Stop reason: HOSPADM

## 2025-01-16 RX ORDER — NITROGLYCERIN 0.4 MG/1
0.4 TABLET SUBLINGUAL
Status: DISCONTINUED | OUTPATIENT
Start: 2025-01-16 | End: 2025-01-17 | Stop reason: HOSPADM

## 2025-01-16 RX ADMIN — Medication 10 ML: at 21:00

## 2025-01-16 RX ADMIN — PROMETHAZINE HYDROCHLORIDE 12.5 MG: 25 INJECTION INTRAMUSCULAR; INTRAVENOUS at 17:29

## 2025-01-16 RX ADMIN — PERFLUTREN 13.04 MG: 6.52 INJECTION, SUSPENSION INTRAVENOUS at 08:37

## 2025-01-16 RX ADMIN — ATORVASTATIN CALCIUM 80 MG: 40 TABLET, FILM COATED ORAL at 21:00

## 2025-01-16 RX ADMIN — Medication 10 ML: at 09:05

## 2025-01-16 RX ADMIN — METOCLOPRAMIDE 10 MG: 5 INJECTION, SOLUTION INTRAMUSCULAR; INTRAVENOUS at 14:40

## 2025-01-16 RX ADMIN — INSULIN LISPRO 2 UNITS: 100 INJECTION, SOLUTION INTRAVENOUS; SUBCUTANEOUS at 21:00

## 2025-01-16 RX ADMIN — AMLODIPINE BESYLATE 5 MG: 5 TABLET ORAL at 09:04

## 2025-01-16 RX ADMIN — PIOGLITAZONE HYDROCHLORIDE 45 MG: 15 TABLET ORAL at 09:04

## 2025-01-16 RX ADMIN — BUSPIRONE HYDROCHLORIDE 5 MG: 5 TABLET ORAL at 06:06

## 2025-01-16 RX ADMIN — ONDANSETRON 4 MG: 2 INJECTION INTRAMUSCULAR; INTRAVENOUS at 12:54

## 2025-01-16 RX ADMIN — BUSPIRONE HYDROCHLORIDE 5 MG: 5 TABLET ORAL at 20:59

## 2025-01-16 RX ADMIN — NITROGLYCERIN 0.4 MG: 0.4 TABLET SUBLINGUAL at 11:07

## 2025-01-16 RX ADMIN — SERTRALINE HYDROCHLORIDE 100 MG: 100 TABLET ORAL at 09:04

## 2025-01-16 RX ADMIN — ONDANSETRON 4 MG: 2 INJECTION INTRAMUSCULAR; INTRAVENOUS at 22:32

## 2025-01-16 RX ADMIN — PANTOPRAZOLE SODIUM 40 MG: 40 INJECTION, POWDER, FOR SOLUTION INTRAVENOUS at 14:41

## 2025-01-16 RX ADMIN — DOBUTAMINE HYDROCHLORIDE 10 MCG/KG/MIN: 100 INJECTION INTRAVENOUS at 13:52

## 2025-01-16 RX ADMIN — LEVOTHYROXINE SODIUM 137 MCG: 0.14 TABLET ORAL at 06:06

## 2025-01-16 RX ADMIN — PERFLUTREN 8.48 MG: 6.52 INJECTION, SUSPENSION INTRAVENOUS at 13:52

## 2025-01-16 RX ADMIN — HYDROCODONE BITARTRATE AND ACETAMINOPHEN 1 TABLET: 5; 325 TABLET ORAL at 06:11

## 2025-01-16 RX ADMIN — ASPIRIN 81 MG: 81 TABLET, CHEWABLE ORAL at 09:04

## 2025-01-16 RX ADMIN — NITROGLYCERIN 0.4 MG: 0.4 TABLET SUBLINGUAL at 09:57

## 2025-01-16 NOTE — PLAN OF CARE
Goal Outcome Evaluation:  Plan of Care Reviewed With: patient, child        Progress: no change  Outcome Evaluation: PT eval completed.  Pt pleasant and agreeable to therapy.  Pt currently w/ controlled BP, underwent EKG just prior to this visit w/ nsg reporting chest pain work up normal so far.  Pt w/ c/os pain at back, neck and chest.  Performs bed mobility w/ SBA.  Pt w/ noted mild flattening of creases at R side of face.  Moves all 4 extremities w/out difficulty, however reports discomfort at B shlds w/ flexion.  B LEs grossly 4+/5 throughout.  Intact FTN, finger opposition, heel to shin and JOHN of LEs.  Tfers w/ SBA to CGA.  Initiated gait w/ HHA X 1 tolerating ~ 10 ft before reporting worsening light headedness and increase chest pain.  Pt returned to room to bed w/ min to CGA.  Unsteady w/ wide JUAN R, increase lateral wt shifting, decrease step length, gait speed and HS.  HR running 60s to 70s upon return to bed.  Notified RN of increase c/os.  Pt will benefit from continued PT services to improve activity tolerance, safety awareness, to improve overall strength, balance, and I w/ functional mobility reducing fall risk.  Recommend use of rwx at this time w/ out of bed activity.  Discharge pending progress and needs.  Will follow for progress and needs.    Anticipated Discharge Disposition (PT): home with assist

## 2025-01-16 NOTE — CASE MANAGEMENT/SOCIAL WORK
Discharge Planning Assessment  [facility]     Patient Name: Kenyatta Jay  MRN: [id number]  Today's Date: 1/16/2025    Admit Date: 1/15/2025        Discharge Needs Assessment       Row Name 01/16/25 1020       Living Environment    People in Home child(sheryl), adult;grandchild(shreyl)    Name(s) of People in Home James Jay (Son)  [phone] ([place])    Current Living Arrangements home    Potentially Unsafe Housing Conditions none    In the past 12 months has the electric, gas, oil, or water company threatened to shut off services in your home? No    Primary Care Provided by self    Provides Primary Care For no one    Family Caregiver if Needed child(sheryl), adult    Family Caregiver Names James Jay (Son)  [phone] ([place])    Quality of Family Relationships helpful;involved;supportive    Able to Return to Prior Arrangements yes       Resource/Environmental Concerns    Resource/Environmental Concerns none    Transportation Concerns none       Transportation Needs    In the past 12 months, has lack of transportation kept you from medical appointments or from getting medications? no    In the past 12 months, has lack of transportation kept you from meetings, work, or from getting things needed for daily living? No       Food Insecurity    Within the past 12 months, you worried that your food would run out before you got the money to buy more. Never true    Within the past 12 months, the food you bought just didn't last and you didn't have money to get more. Never true       Transition Planning    Patient/Family Anticipates Transition to home with family    Transportation Anticipated family or friend will provide       Discharge Needs Assessment    Equipment Currently Used at Home none    Concerns to be Addressed discharge planning    Do you want help finding or keeping work or a job? I do not need or want help    Do you want help with school or training? For example, starting or completing job training or  getting a high school diploma, GED or equivalent No    Equipment Needed After Discharge none    Discharge Coordination/Progress Patient lives at home with son and family. No DME used. Has an established PCP.  No identified immediate needs. CM/SS will follow and assist with any discharge planning needs that may arise.                   Discharge Plan    No documentation.                 Continued Care and Services - Admitted Since 1/15/2025    No active coordination exists for this encounter.       Expected Discharge Date and Time       Expected Discharge Date Expected Discharge Time    Jan 17, 2025            Demographic Summary    No documentation.                  Functional Status    No documentation.                  Psychosocial    No documentation.                  Abuse/Neglect    No documentation.                  Legal    No documentation.                  Substance Abuse    No documentation.                  Patient Forms    No documentation.                     Nasra Judd RN

## 2025-01-16 NOTE — PLAN OF CARE
Goal Outcome Evaluation:  Plan of Care Reviewed With: (P) patient, caregiver        Progress: (P) no change            SLP Diagnosis: (P) mild (cognitive impairment) (01/16/25 1038)  SLP Diagnosis Comments: (P) If cognitive deficits persist, out pateint therapy would be beneficial. (01/16/25 1038)                Speech-Language/Cognitive-Linguistic Evaluation  Subjective: The patient was seen on this date for a Speech-Language Evaluation and Cognitive-Linguistic Evaluation.  Patient was alert and cooperative.    The patient's history is significant for concern for Transient Ishemic Attack, complaint of chest pain, alteration in cognition of unknown etiology. Patient came to the hospital with complaints of left sided facial drooping, arm weakness, and dysarthria. Facial drooping has resolved, patient passed RN dysphagia screening. Communication evaluation per stroke protocol.   Objective: The patient was assessed utilizing informal assessment for speech, language, and cognition. Findings were as follows:  Assessment: The patient demonstrated functional speech-language skills and impaired cognition. Deficits were noted in cognition. Patient had difficulty with complex command following and complex questions. Deficits noted with memory and high level cognitive tasks. It is difficult to discern if related to poor effort vs true difficulty.    Recommendations:   Patient reports her goal is to discharge home with family. The family reports cognition is improving and do not wish to pursue intervention acutely.   Other Recommended Evaluations:     Speech Therapy is not recommended at this time. If cognitive deficits persist, outpatient standardized cognitive assessment may be beneficial.     Tati Florez, Speech Therapy Student    Shira Chowdhury, MS CCC-SLP 1/16/2025 14:09 CST

## 2025-01-16 NOTE — THERAPY EVALUATION
Patient Name: Kenyatta Jay  : 1955    MRN: [id number]                              Today's Date: 2025       Admit Date: 1/15/2025    Visit Dx:     ICD-10-CM ICD-9-CM   1. Disorientation  R41.0 780.99   2. Left arm weakness  R29.898 729.89   3. Thyroid nodule  E04.1 241.0   4. TIA (transient ischemic attack)  G45.9 435.9   5. Impaired cognition  R41.89 294.9   6. Impaired functional mobility and activity tolerance [Z74.09]  Z74.09 V49.89     Patient Active Problem List   Diagnosis    Essential hypertension    Type 2 diabetes mellitus with diabetic polyneuropathy, without long-term current use of insulin    Morbid obesity due to excess calories    Recurrent major depressive disorder, in partial remission    Osteoporosis    Acquired hypothyroidism    Hyperlipidemia    Class 3 severe obesity with body mass index (BMI) of 45.0 to 49.9 in adult    Muscle tone increased left arm    Right thyroid nodule     Past Medical History:   Diagnosis Date    Cervical spondylosis with radiculopathy     Cervicalgia     Diabetes mellitus, type 2     Hypertension     Obesity due to excess calories     Prescription refill     Radiculopathy, cervical      Past Surgical History:   Procedure Laterality Date    CHOLECYSTECTOMY      HYSTERECTOMY      REPLACEMENT TOTAL KNEE Left 2016      General Information       Row Name 25 1031          Physical Therapy Time and Intention    Document Type evaluation;other (see comments)  see MAR  -MAXWELL     Mode of Treatment physical therapy  -JE       Row Name 25 1031          General Information    Patient Profile Reviewed yes  -JE     Prior Level of Function independent:;all household mobility;community mobility;ADL's;bathing;dressing;driving;shopping  reports she does cruise furniture some  -JE     Existing Precautions/Restrictions fall  -JE     Barriers to Rehab medically complex  -JE       Row Name 25 1031          Living Environment    People in Home  grandchild(sheryl)  -     Name(s) of People in Home lives w/ granddaughter, her spouse and 2 small children  -       Row Name 01/16/25 1031          Home Main Entrance    Number of Stairs, Main Entrance none;other (see comments)  ramp  -       Row Name 01/16/25 1031          Stairs Within Home, Primary    Number of Stairs, Within Home, Primary none  -       Row Name 01/16/25 1031          Cognition    Orientation Status (Cognition) oriented x 4  -       Row Name 01/16/25 1031          Safety Issues/Impairments Affecting Functional Mobility    Safety Issues Affecting Function (Mobility) friction/shear risk;safety precaution awareness  -     Impairments Affecting Function (Mobility) balance;endurance/activity tolerance;pain  -               User Key  (r) = Recorded By, (t) = Taken By, (c) = Cosigned By      Initials Name Provider Type    Clair Velazco, PT Physical Therapist                   Mobility       Row Name 01/16/25 1031          Bed Mobility    Bed Mobility scooting/bridging;sit-supine;supine-sit  -     Scooting/Bridging [place] (Bed Mobility) standby assist;verbal cues  -     Supine-Sit [place] (Bed Mobility) standby assist;verbal cues  -MAXWELL     Sit-Supine [place] (Bed Mobility) standby assist;verbal cues  -     Assistive Device (Bed Mobility) bed rails;head of bed elevated  -       Row Name 01/16/25 1031          Sit-Stand Transfer    Sit-Stand [place] (Transfers) standby assist;contact guard;verbal cues  -       Row Name 01/16/25 1031          Gait/Stairs (Locomotion)    [place] Level (Gait) contact guard;minimum assist (75% patient effort);verbal cues  -     Assistive Device (Gait) other (see comments)  HHA X 1  -JE     Distance in Feet (Gait) 20  -JE     Deviations/Abnormal Patterns (Gait) gait speed decreased;stride length decreased;base of support, wide  -     Bilateral Gait Deviations heel strike decreased  forward head, rounded shlds  -      Comment, (Gait/Stairs) increase lateral wt shifting; c/os worsening light headedness w/ out of bed activity; reports continued chest pain that worsened w/ activity as well; Nsg notified - HR 68 at end of visit; had just had EKG prior to visit  -               User Key  (r) = Recorded By, (t) = Taken By, (c) = Cosigned By      Initials Name Provider Type    Clair Velazco, PT Physical Therapist                   Obj/Interventions       Row Name 01/16/25 1031          Range of Motion Comprehensive    Comment, General Range of Motion AROM all 4 extremities WFLs  -[facility] Name 01/16/25 1031          Strength Comprehensive (MMT)    Comment, General Manual Muscle Testing (MMT) Assessment defer to OT for formal UE strength assessment, however pt moves all 4 extremities against gravity w/out difficulty; B LEs grossly 4+/5 throughout  -[facility] Name 01/16/25 1031          Motor Skills    Motor Skills other (see comments)   intact finger opposition, FTN, heel to shin and JOHN of LEs; noted increase tremor B UEs w/ L worse than R; also of note, mild facial flattening on the R  -JE       Row Name 01/16/25 1031          Balance    Balance Assessment sitting static balance;sitting dynamic balance;standing static balance;standing dynamic balance  -     Static Sitting Balance standby assist  -     Dynamic Sitting Balance standby assist  [facility]     Position, Sitting Balance supported;unsupported;sitting edge of bed  -     Static Standing Balance contact guard  -     Dynamic Standing Balance minimal assist;contact guard  -     Position/Device Used, Standing Balance other (see comments);supported  HHA  -JE       Row Name 01/16/25 1031          Sensory Assessment (Somatosensory)    Sensory Assessment (Somatosensory) sensation intact;other (see comments)  denies N/T  -               User Key  (r) = Recorded By, (t) = Taken By, (c) = Cosigned By      Initials Name Provider Type    Clair Velazco, PT Physical  Therapist                   Goals/Plan       Row Name 01/16/25 1031          Bed Mobility Goal 1 (PT)    Activity/Assistive Device (Bed Mobility Goal 1, PT) bed mobility activities, all  -JE     [place] Level/Cues Needed (Bed Mobility Goal 1, PT) independent  -JE     Time Frame (Bed Mobility Goal 1, PT) long term goal (LTG);10 days  -JE     Progress/Outcomes (Bed Mobility Goal 1, PT) goal ongoing  -       Row Name 01/16/25 1031          Transfer Goal 1 (PT)    Activity/Assistive Device (Transfer Goal 1, PT) sit-to-stand/stand-to-sit;bed-to-chair/chair-to-bed;other (see comments)  bed to/from chair w/ rwx  -JE     [place] Level/Cues Needed (Transfer Goal 1, PT) standby assist  -JE     Time Frame (Transfer Goal 1, PT) long term goal (LTG);10 days  -JE     Progress/Outcome (Transfer Goal 1, PT) goal ongoing  -       Row Name 01/16/25 1031          Gait Training Goal 1 (PT)    Activity/Assistive Device (Gait Training Goal 1, PT) gait (walking locomotion);assistive device use;decrease fall risk;diminish gait deviation;improve balance and speed;increase endurance/gait distance;increase energy conservation;walker, rolling  -JE     [place] Level (Gait Training Goal 1, PT) standby assist  -JE     Distance (Gait Training Goal 1, PT)  ft  -JE     Time Frame (Gait Training Goal 1, PT) long term goal (LTG)  -JE     Progress/Outcome (Gait Training Goal 1, PT) goal ongoing  -       Row Name 01/16/25 1031          Therapy Assessment/Plan (PT)    Planned Therapy Interventions (PT) balance training;bed mobility training;gait training;home exercise program;patient/family education;postural re-education;strengthening;transfer training;other (see comments)  safety/falls prevention  -JE               User Key  (r) = Recorded By, (t) = Taken By, (c) = Cosigned By      Initials Name Provider Type    Clair Velazco, PT Physical Therapist                   Clinical Impression       Row Name 01/16/25 1031           Pain    Pretreatment Pain Rating 9/10  -     Posttreatment Pain Rating 9/10  -     Pain Location back;chest;head  -     Pain Management Interventions exercise or physical activity utilized  -     Response to Pain Interventions activity participation with increased pain  -       Row Name 01/16/25 1031          Plan of Care Review    Plan of Care Reviewed With patient;child  -     Progress no change  -     Outcome Evaluation PT eval completed.  Pt pleasant and agreeable to therapy.  Pt currently w/ controlled BP, underwent EKG just prior to this visit w/ nsg reporting chest pain work up normal so far.  Pt w/ c/os pain at back, neck and chest.  Performs bed mobility w/ SBA.  Pt w/ noted mild flattening of creases at R side of face.  Moves all 4 extremities w/out difficulty, however reports discomfort at B shlds w/ flexion.  B LEs grossly 4+/5 throughout.  Intact FTN, finger opposition, heel to shin and JOHN of LEs.  Tfers w/ SBA to CGA.  Initiated gait w/ HHA X 1 tolerating ~ 10 ft before reporting worsening light headedness and increase chest pain.  Pt returned to room to bed w/ min to CGA.  Unsteady w/ wide JUAN R, increase lateral wt shifting, decrease step length, gait speed and HS.  HR running 60s to 70s upon return to bed.  Notified RN of increase c/os.  Pt will benefit from continued PT services to improve activity tolerance, safety awareness, to improve overall strength, balance, and I w/ functional mobility reducing fall risk.  Recommend use of rwx at this time w/ out of bed activity.  Discharge pending progress and needs.  Will follow for progress and needs.  -       Row Name 01/16/25 1031          Therapy Assessment/Plan (PT)    Patient/Family Therapy Goals Statement (PT) improve tolerance to activity, return to prior level of function  -     Rehab Potential (PT) good  -     Criteria for Skilled Interventions Met (PT) yes;meets criteria;skilled treatment is necessary  -     Therapy  Frequency (PT) 2 times/day  -JE     Predicted Duration of Therapy Intervention (PT) until discharge or goals achieved  -JE       Row Name 01/16/25 1031          Vital Signs    Posttreatment Heart Rate (beats/min) 68  -JE     Pre SpO2 (%) 97  -JE     O2 Delivery Pre Treatment room air  -JE     O2 Delivery Intra Treatment room air  -JE     Post SpO2 (%) 94  -JE     O2 Delivery Post Treatment nasal cannula  -JE     Pre Patient Position Supine  -JE     Intra Patient Position Standing  -JE     Post Patient Position Supine  -JE       Row Name 01/16/25 1031          Positioning and Restraints    Pre-Treatment Position in bed  -JE     Post Treatment Position bed  -JE     In Bed notified nsg;fowlers;call light within reach;encouraged to call for assist;exit alarm on;with family/caregiver;side rails up x2  -JE               User Key  (r) = Recorded By, (t) = Taken By, (c) = Cosigned By      Initials Name Provider Type    Clair Velazco, PT Physical Therapist                   Outcome Measures       Row Name 01/16/25 1031 01/16/25 0857       How much help from another person do you currently need...    Turning from your back to your side while in flat bed without using bedrails? 4  -JE 4  -SS    Moving from lying on back to sitting on the side of a flat bed without bedrails? 4  -JE 4  -SS    Moving to and from a bed to a chair (including a wheelchair)? 3  -JE 4  -SS    Standing up from a chair using your arms (e.g., wheelchair, bedside chair)? 3  -JE 3  -SS    Climbing 3-5 steps with a railing? 3  -JE 3  -SS    To walk in hospital room? 3  -JE 3  -SS    AM-PAC 6 Clicks Score (PT) 20  -JE 21  -SS    Highest Level of Mobility Goal 6 --> Walk 10 steps or more  - 6 --> Walk 10 steps or more  -SS      Row Name 01/16/25 1031 01/16/25 1021       Functional Assessment    Outcome Measure Options AM-PAC 6 Clicks Basic Mobility (PT)  -JE AM-PAC 6 Clicks Daily Activity (OT)  -CS              User Key  (r) = Recorded By, (t) = Taken  By, (c) = Cosigned By      Initials Name Provider Type    CS Yasmine Sales S, OTR/L, CNT Occupational Therapist    Clair Velazco, PT Physical Therapist    Christiane Perez, RN Registered Nurse                                 Physical Therapy Education       Title: PT OT SLP Therapies (In Progress)       Topic: Physical Therapy (In Progress)       Point: Mobility training (Done)       Learning Progress Summary            Patient Acceptance, E,TB,D, VU,NR by MAXWELL at 1/16/2025 1151    Comment: Education re: purpose of PT/importance of activity, safety/falls prevention, controlled transitions w/ tfers                      Point: Home exercise program (Not Started)       Learner Progress:  Not documented in this visit.              Point: Precautions (Done)       Learning Progress Summary            Patient Acceptance, E,TB,D, VU,NR by MAXWELL at 1/16/2025 1151    Comment: Education re: purpose of PT/importance of activity, safety/falls prevention, controlled transitions w/ tfers                                      User Key       Initials Effective Dates Name Provider Type Discipline     08/02/18 -  Clair Brothers, PT Physical Therapist PT                  PT Recommendation and Plan  Planned Therapy Interventions (PT): balance training, bed mobility training, gait training, home exercise program, patient/family education, postural re-education, strengthening, transfer training, other (see comments) (safety/falls prevention)  Progress: no change  Outcome Evaluation: PT eval completed.  Pt pleasant and agreeable to therapy.  Pt currently w/ controlled BP, underwent EKG just prior to this visit w/ nsg reporting chest pain work up normal so far.  Pt w/ c/os pain at back, neck and chest.  Performs bed mobility w/ SBA.  Pt w/ noted mild flattening of creases at R side of face.  Moves all 4 extremities w/out difficulty, however reports discomfort at B shlds w/ flexion.  B LEs grossly 4+/5 throughout.  Intact FTN, finger  opposition, heel to shin and JOHN of LEs.  Tfers w/ SBA to CGA.  Initiated gait w/ HHA X 1 tolerating ~ 10 ft before reporting worsening light headedness and increase chest pain.  Pt returned to room to bed w/ min to CGA.  Unsteady w/ wide JUAN R, increase lateral wt shifting, decrease step length, gait speed and HS.  HR running 60s to 70s upon return to bed.  Notified RN of increase c/os.  Pt will benefit from continued PT services to improve activity tolerance, safety awareness, to improve overall strength, balance, and I w/ functional mobility reducing fall risk.  Recommend use of rwx at this time w/ out of bed activity.  Discharge pending progress and needs.  Will follow for progress and needs.     Time Calculation:         PT Charges       Row Name 01/16/25 1611             Time Calculation    Start Time 1031  -      Stop Time 1110  -      Time Calculation (min) 39 min  -      PT Received On 01/16/25  -      PT Goal Re-Cert Due Date 01/26/25  -                User Key  (r) = Recorded By, (t) = Taken By, (c) = Cosigned By      Initials Name Provider Type    Clair Velazco, PT Physical Therapist                  Therapy Charges for Today       Code Description Service Date Service Provider Modifiers Qty    [id number] HC PT EVAL MOD COMPLEXITY 3 1/16/2025 Clair Brothers, PT GP 1            PT G-Codes  Outcome Measure Options: AM-PAC 6 Clicks Basic Mobility (PT)  AM-PAC 6 Clicks Score (PT): 20  AM-PAC 6 Clicks Score (OT): 21  PT Discharge Summary  Anticipated Discharge Disposition (PT): home with assist    Clair Brothers, PT  1/16/2025

## 2025-01-16 NOTE — PROGRESS NOTES
[facility] Medicine Services  INPATIENT PROGRESS NOTE    Patient Name: Kenyatta Jay  Date of Admission: 1/15/2025  Today's Date: 01/16/25  Length of Stay: 1  Primary Care Physician: Natividad Vargas APRN    Subjective   Chief Complaint: chest discomfort  HPI   Patient seen examined at bedside this morning.  Her son was at bedside.  Her main complaint today is chest discomfort, worse with deep breath and tenderness to palpation.  EKG without acute changes.  Troponin with flat trend, 17, 19, 17.  She complains of chronic neck and back pain.  She did not have breakfast and has now been made n.p.o. for possible stress test.    Review of Systems   All pertinent negatives and positives are as above. All other systems have been reviewed and are negative unless otherwise stated.     Objective    Temp:  [97.9 °F (36.6 °C)-98.3 °F (36.8 °C)] 98.2 °F (36.8 °C)  Heart Rate:  [64-75] 73  Resp:  [18-20] 18  BP: (119-188)/(52-72) 188/72  Physical Exam  Vitals reviewed.   Constitutional:       General: She is not in acute distress.     Appearance: She is morbidly obese. She is not toxic-appearing.      Interventions: Nasal cannula in place.   HENT:      Head: Normocephalic and atraumatic.      Mouth/Throat:      Mouth: Mucous membranes are moist.      Pharynx: Oropharynx is clear.   Eyes:      Extraocular Movements: Extraocular movements intact.      Conjunctiva/sclera: Conjunctivae normal.      Pupils: Pupils are equal, round, and reactive to light.   Cardiovascular:      Rate and Rhythm: Normal rate and regular rhythm.      Pulses: Normal pulses.   Pulmonary:      Effort: Pulmonary effort is normal. No respiratory distress.      Breath sounds: Normal breath sounds.   Abdominal:      General: Bowel sounds are normal. There is no distension.      Palpations: Abdomen is soft.      Tenderness: There is no abdominal tenderness.   Musculoskeletal:         General: No swelling or tenderness.  "Normal range of motion.      Cervical back: Normal range of motion and neck supple. No muscular tenderness.   Skin:     General: Skin is warm and dry.      Findings: No erythema or rash.   Neurological:      General: No focal deficit present.      Mental Status: She is alert and oriented to person, place, and time.      Cranial Nerves: No cranial nerve deficit.      Motor: No weakness.   Psychiatric:         Mood and Affect: Mood normal.         Behavior: Behavior normal.       Results Review:  I have reviewed the labs, radiology results, and diagnostic studies.    Laboratory Data:   Results from last 7 days   Lab Units 01/15/25  1056   WBC 10*3/mm3 6.89   HEMOGLOBIN g/dL 12.8   HEMATOCRIT % 41.2   PLATELETS 10*3/mm3 234        Results from last 7 days   Lab Units 01/16/25  0848 01/15/25  1056   SODIUM mmol/L 134* 136   POTASSIUM mmol/L 3.9 4.1   CHLORIDE mmol/L 97* 98   CO2 mmol/L 29.0 27.0   BUN mg/dL 17 15   CREATININE mg/dL 0.66 0.72   CALCIUM mg/dL 9.4 8.8   BILIRUBIN mg/dL  --  0.3   ALK PHOS U/L  --  73   ALT (SGPT) U/L  --  9   AST (SGOT) U/L  --  16   GLUCOSE mg/dL 136* 157*       Culture Data:   No results found for: \"ACANTHNAEG\", \"AFBCX\", \"BPERTUSSISCX\", \"BLOODCX\"  No results found for: \"BCIDPCR\", \"CXREFLEX\", \"CSFCX\", \"CULTURETIS\"  No results found for: \"CULTURES\", \"HSVCX\", \"URCX\"  No results found for: \"EYECULTURE\", \"GCCX\", \"HSVCULTURE\", \"LABHSV\"  No results found for: \"LEGIONELLA\", \"MRSACX\", \"MUMPSCX\", \"MYCOPLASCX\"  No results found for: \"NOCARDIACX\", \"STOOLCX\"  No results found for: \"THROATCX\", \"UNSTIMCULT\", \"URINECX\", \"CULTURE\", \"VZVCULTUR\"  No results found for: \"VIRALCULTU\", \"WOUNDCX\"      Radiology Data:   Imaging Results (Last 24 Hours)       Procedure Component Value Units Date/Time    US Thyroid [[id number]] Collected: 01/16/25 1411     Updated: 01/16/25 1416    Narrative:      EXAM: US THYROID- - 1/16/2025 12:01 PM     HISTORY: thyroid nodule; R41.0-Disorientation, unspecified;  R29.898-Other " symptoms and signs involving the musculoskeletal system;  E04.1-Nontoxic single thyroid nodule; G45.9-Transient cerebral ischemic  attack, unspecified; R41.89-Other symptoms and signs involving cognitive  functions and awareness       COMPARISON: No existing relevant imaging studies available     TECHNIQUE: Real-time ultrasound performed with representative images and  report stored to PACS per institutional protocol.     FINDINGS:  RIGHT lobe. Measures 5.8 x 2.0 x 1.9 cm.  ISTHMUS. Measures 0.6 cm.  LEFT lobe. Measures 3.5 x 1.8 x 1.1 cm.        NODULE 1 -  Located -RIGHT  Size -2.5 x 1.5 cm  Density -solid or almost completely solid (2 points)  Echogenicity -hyperechoic or isoechoic (1 point)  Shape - wider than tall (0 points)  Margins - smooth (0 points)  Calcifications - none or large comet tail artifacts (0 points)       ACR TI-RADS 2017 risk category: TR3 (3 points)  FNA if greater than 2.5 cm.  Follow up if greater than 1.5 cm.        NODULE 2 -  Located -LEFT  Size -0.7 x 0.4 cm  Density -solid or almost completely solid (2 points)  Echogenicity -hypoechoic (2 points)  Shape - wider than tall (0 points)  Margins - smooth (0 points)  Calcifications - none or large comet tail artifacts (0 points)       ACR TI-RADS 2017 risk category: TR4 (4-6 points)  FNA if greater than 1.5 cm.   Follow up if greater than 1 cm.          Impression:      1. Dominant RIGHT thyroid nodule meeting criteria for ultrasound-guided  fine-needle aspiration if not previously performed and patient can  tolerate.              This report was signed and finalized on 1/16/2025 2:13 PM by Dr Cydney Quiros MD.       MRI Brain With & Without Contrast [[id number]] Collected: 01/15/25 1655     Updated: 01/15/25 1702    Narrative:      EXAMINATION: MRI BRAIN W WO CONTRAST- 1/15/2025 4:55 PM     HISTORY: seizure vs. stroke; R41.0-Disorientation, unspecified;  R29.898-Other symptoms and signs involving the musculoskeletal  system;  E04.1-Nontoxic single thyroid nodule; G45.9-Transient cerebral ischemic  attack, unspecified.     REPORT: Multiplanar multisequence MR imaging of the brain was performed  with and without intravenous contrast.     COMPARISON: Noncontrast CT of the head and CT angio of the head and  neck: 1525. Noncontrast CT of the head 8/16/2019.     No diffusion restriction is identified to indicate acute ischemia. There  is no intracranial mass or mass effect. The ventricles and basal  cisterns are normal. Gray-white differentiation appears normal. The  pituitary gland, optic chiasm and midline structures appear within  normal limits. No abnormal FLAIR signal is identified. T2-weighted  images demonstrates normal-appearing major intracranial vascular flow  voids. Susceptibility weighted images show no evidence of intracranial  hemorrhage. Thin section images through the hippocampal regions  demonstrate symmetry of the hippocampi with no obvious abnormality. No  abnormal contrast enhancement is identified. The extracranial structures  appear within normal limits.       Impression:      1. No acute intracranial abnormality, no evidence of acute ischemia,  hemorrhage or mass effect. No abnormal contrast enhancement.  3. Thin section images through the temporal lobes demonstrate  normal-appearing symmetric hippocampi.     This report was signed and finalized on 1/15/2025 4:59 PM by Dr. Young Donald MD.               I have reviewed the patient's current medications.     Assessment/Plan   Assessment  Active Hospital Problems    Diagnosis     **Muscle tone increased left arm     Right thyroid nodule     Hyperlipidemia     Class 3 severe obesity with body mass index (BMI) of 45.0 to 49.9 in adult     Essential hypertension     Type 2 diabetes mellitus with diabetic polyneuropathy, without long-term current use of insulin      Ms. Jay is a 69-year-old female that presented to [facility] with strokelike  "symptoms.  She has a past medical history significant for hypertension, type 2 diabetes, hyperlipidemia, hypothyroidism, morbid obesity.  She lives with her granddaughter and son-in-law.  She is normally very active and independent and will watch great grand child.  Last night, patient went to bed around 9 or 9:30 PM.  When she came out of the bathroom this morning, she seemed disoriented and asked her son-in-law where her bathroom was.  Family took her to her bedroom.  When they would try to get her up off the bed she \"almost passed out\" and her eyes were closed.  She complained of headache and had associated nausea with vomiting.  Family noticed that her left arm was clenched when they were trying to put her pants on.  They called EMS.  When EMS arrived, concern for facial droop, dysarthria and left arm weakness.  She has also complained of chest discomfort described as \"throbbing\", rated 9/10.  Nontender to palpation.  No radiation.  No previous history of stroke, TIAs, coronary artery disease, seizure.  No antiplatelet use at home. In the last week she has had a tooth that has caused her pain and swelling to right side of face.  She is not up-to-date on dental exams.  No drainage.  Family had leftover amoxicillin and believes that she has had 3-4 days with improvement.  No fever or chills.  In the ED, CT head showed no acute findings.  CT angiography showed no evidence of large vessel occlusions, no evidence of significant stenosis.  High-sensitivity troponin 17, 19.  EKG without acute changes.  She was admitted to the hospitalist service for further evaluation and management.     Treatment Plan  Facial droop, dysarthria, and left arm weakness.  CT head showed no acute findings.  CT angiography showed no evidence of large vessel occlusions, no evidence of significant stenosis.  Neurology, Dr. Iqbal consulted.  MRI brain showed no acute findings.  EEG unremarkable.  Likely not TIA per neurology.    Patient " complains of chest pain, worse with deep breath and tenderness to palpation.  EKG without acute changes.  Troponin with flat trend.  She has not had anything to eat today. Normal sinus rhythm on telemetry.  Obtain stress echocardiogram.    Right thyroid nodule -thyroid ultrasound.      She has had nausea with vomiting this morning.  No abdominal pain.  Antiemetics.  PPI.  Obtain CT.    Blood pressure elevated this morning.  Resume amlodipine.  Hydralazine as needed.  Continue to monitor blood pressure trend.     Type 2 diabetes with A1c 5.9.  At goal less than 7.  Continue Accu-Cheks with sliding scale insulin.     Consult PT/OT/SLP.     Review and resume home medications as appropriate.     SCDs for DVT prophylaxis.     Workup ongoing.     Medical Decision Making  Number and Complexity of problems: 3 acute problems  Differential Diagnosis: As above     Conditions and Status        Condition is unchanged.     [facility] Data  External documents reviewed: Prior [facility] records  Cardiac tracing (EKG, telemetry) interpretation: Reviewed  Radiology interpretation: Interpreted by radiology  Labs reviewed: As above  Any tests that were considered but not ordered: None considered at present     Decision rules/scores evaluated (example [id number], Wells, etc): None considered at present     Discussed with: Patient and Dr. Zavala     Care Planning  Shared decision making: Patient is agreeable to ongoing workup and treatment  Code status and discussions: No CPR with limited support to include no intubation    Disposition  Social Determinants of Health that impact treatment or disposition: none  I expect the patient to be discharged to home in 1-2 days.     Electronically signed by TAMARA Rosales, 01/16/25, 14:42 CST.

## 2025-01-16 NOTE — PROGRESS NOTES
Neurology Progress Note      Chief Complaint:  spell  Length of Stay:  1   Subjective     Subjective:    Improved today from a neurologic standpoint but is complaining today of chest pain and back pain.  No seizure activity.    Medications:  Current Facility-Administered Medications   Medication Dose Route Frequency Provider Last Rate Last Admin    acetaminophen (TYLENOL) tablet 650 mg  650 mg Oral Q4H PRN Motley, Destiny, APRN        Or    acetaminophen (TYLENOL) 160 MG/5ML oral solution 650 mg  650 mg Oral Q4H PRN Motley, Destiny, APRN        Or    acetaminophen (TYLENOL) suppository 650 mg  650 mg Rectal Q4H PRN Motley, Destiny, APRN        amLODIPine (NORVASC) tablet 5 mg  5 mg Oral Daily Parker Zavala MD   5 mg at 01/16/25 0904    aspirin chewable tablet 81 mg  81 mg Oral Daily MotleyDestiny, APRN   81 mg at 01/16/25 0904    Or    aspirin suppository 300 mg  300 mg Rectal Daily Motley, Destiny, APRN        atorvastatin (LIPITOR) tablet 80 mg  80 mg Oral Nightly MotleyDestiny, APRN   80 mg at 01/15/25 2107    sennosides-docusate (PERICOLACE) 8.6-50 MG per tablet 2 tablet  2 tablet Oral BID PRN MotleyDestiny, APRN        And    polyethylene glycol (MIRALAX) packet 17 g  17 g Oral Daily PRN Motley, Destiny, APRN        And    bisacodyl (DULCOLAX) EC tablet 5 mg  5 mg Oral Daily PRN Motley, Destiny, APRN        And    bisacodyl (DULCOLAX) suppository 10 mg  10 mg Rectal Daily PRN Motley, Destiny, APRN        busPIRone (BUSPAR) tablet 5 mg  5 mg Oral Q8H Parker Zavala MD   5 mg at 01/16/25 0606    dextrose (D50W) (25 g/50 mL) IV injection 25 g  25 g Intravenous Q15 Min PRN MotleyDestiny APRN        dextrose (GLUTOSE) oral gel 15 g  15 g Oral Q15 Min PRN Motley, Destiny, APRN        glucagon (GLUCAGEN) injection 1 mg  1 mg Intramuscular Q15 Min PRN Motley, Destiny, APRN        HYDROcodone-acetaminophen (NORCO) 5-325 MG per tablet 1 tablet  1 tablet Oral Q8H PRN Parker Zavala MD   1 tablet at 01/16/25 0611    Insulin Lispro (humaLOG) injection  2-7 Units  2-7 Units Subcutaneous 4x Daily AC & at Bedtime Destiny Motley APRN        levothyroxine (SYNTHROID, LEVOTHROID) tablet 137 mcg  137 mcg Oral Q AM Destiny Motley APRN   137 mcg at 01/16/25 0606    nitroglycerin (NITROSTAT) SL tablet 0.4 mg  0.4 mg Sublingual Q5 Min PRN Parker Zavala MD   0.4 mg at 01/16/25 0957    ondansetron ODT (ZOFRAN-ODT) disintegrating tablet 4 mg  4 mg Oral Q6H PRN Destiny Motley APRN        Or    ondansetron (ZOFRAN) injection 4 mg  4 mg Intravenous Q6H PRN Destiny Motley APRN        pioglitazone (ACTOS) tablet 45 mg  45 mg Oral Daily Parker Zavala MD   45 mg at 01/16/25 0904    sertraline (ZOLOFT) tablet 100 mg  100 mg Oral Daily Parker Zavala MD   100 mg at 01/16/25 0904    sodium chloride 0.9 % flush 10 mL  10 mL Intravenous PRN Cydney Galvan MD        sodium chloride 0.9 % flush 10 mL  10 mL Intravenous Q12H Destiny Motley APRN   10 mL at 01/16/25 0905    sodium chloride 0.9 % infusion 40 mL  40 mL Intravenous PRN Destiny Motley APRN                 Objective      Vital Signs  Temp:  [97.9 °F (36.6 °C)-98.3 °F (36.8 °C)] 98.2 °F (36.8 °C)  Heart Rate:  [61-75] 72  Resp:  [18-20] 18  BP: (106-183)/(52-91) 127/69    Physical Exam:    HEENT:  Neck is supple  CVS:  RRR  Lungs:  CTA - B/L  Abd:  NT/ND  Ext:  No edema  Skin:  No rashes    Pertinent Neuro Exam:  Awake alert oriented x 3  Cranial nerves II through XII intact  Moving all extremities equally  Reflexes somewhat hypolikely secondary to body habitus  Sensory exam reveals no asymmetries  Coordination gait examination show no signs of obvious ataxia      Last nurse assessment:  Interval:  (handoff with NATHANAEL Grande)  1a. Level of Consciousness: 0-->Alert, keenly responsive  1b. LOC Questions: 0-->Answers both questions correctly  1c. LOC Commands: 0-->Performs both tasks correctly  2. Best Gaze: 0-->Normal  3. Visual: 0-->No visual loss  4. Facial Palsy: 0-->Normal symmetrical movements  5a. Motor Arm, Left: 0-->No drift,  "limb holds 90 (or 45) degrees for full 10 secs  5b. Motor Arm, Right: 0-->No drift, limb holds 90 (or 45) degrees for full 10 secs  6a. Motor Leg, Left: 0-->No drift, leg holds 30 degree position for full 5 secs  6b. Motor Leg, Right: 0-->No drift, leg holds 30 degree position for full 5 secs  7. Limb Ataxia: 0-->Absent  8. Sensory: 0-->Normal, no sensory loss  9. Best Language: 0-->No aphasia, normal  10. Dysarthria: 0-->Normal  11. Extinction and Inattention (formerly Neglect): 0-->No abnormality    Total (NIH Stroke Scale): 0       Results Review:      Labs:  Hemoglobin A1c 5.9%  LDL 73  Urinalysis pending  Basic metabolic panel shows no significant abnormalities  Urine drug screen pending  High-sensitivity Trope is mildly elevated at 14  CK level normal at 63    Imaging:  EEG performed 1/15 showing movement shiver artifact but no signs of epileptiform activities  MRI of the brain with and without contrast reviewed by me on 1/15 showing no acute findings.  CT angiography of the head and neck shows no signs of significant vessel stenosis.    Assessment/Plan     Hospital Problem List      TIA (transient ischemic attack)    Essential hypertension    Type 2 diabetes mellitus with diabetic polyneuropathy, without long-term current use of insulin    Hyperlipidemia    Class 3 severe obesity with body mass index (BMI) of 45.0 to 49.9 in adult    Impression:  Atypical spell of altered consciousness of unknown etiology.  No signs of ischemic disease.  Doubt TIA.  Could be transient global amnesia.  The fact that her left arm had some increased tone at some point makes epileptic etiology is a possibility but the EEG shows no signs of this.  At this point moving forward I would like to label this currently as a \"spell.\"  If she has no further spells then it will not matter what this 1 was.  If she begins to have more than we will look for commonalities to see if this fits better with a diagnoses.  Obesity with BMI of " 45  Chest pain and back pain be worked up by primary team  Possible thyroid imaging abnormalities to be worked up by primary team    Plan:  No further neurologic workup indicated at this time.  No requirement for changes in home medications from a neurologic standpoint.  Follow-up with neurology as needed      Medical Decision Making    Number/Complexity of Problems  Moderate  1 undiagnosed new problem with uncertain prognosis -   1 acute illness with systemic symptoms -   High  1 acute or chronic illness that poses a threat to life/body function -   Moderate    MDM Data  Moderate - 1/3 categories  Extensive - 2/3 categories    Category 1: 3 of the following  Review of external notes  Review of results  Ordering of each unique test  Independent historian  Category 2:  Independent interpretation of test (ex: imaging)  Category 3:  Discussion of management with another provider    Moderate     Treatment Plan  Moderate - Prescription Drug management  High  Drug therapy requiring intensive monitoring for toxicity  Decision regarding hospitalization or escalation of care  De-escalate care/DNR decisions         Hayes Iqbal MD  01/16/25  10:40 CST

## 2025-01-16 NOTE — NURSING NOTE
Patient arrived to stress lab with vomiting.  DSE started, patient requested to stop due to headache and vomiting. Approximately 1/4 of emesis bag filled x 4 with liquid bile colored emesis.  Patient's primary nurse notified of above and unable to complete stress.  Patient back upstairs with transporters x 2 via stretcher.

## 2025-01-16 NOTE — PLAN OF CARE
Goal Outcome Evaluation:  Plan of Care Reviewed With: patient        Progress: no change  Outcome Evaluation: OT evaluation completed. Pt is A&Ox4. She complains of chest pain, headache, back pain, and BUE shoulder arthritic pain.  Per RN her work up for chest pain has been normal so far. Pt able to complete bed mobility with SBA.  She completed LB dressing sitting EOB with mod I due to increased time.  Pt demo no focal strength, coordination or sensation deficits in her UEs, however she does demo a mild intention tremor of BUEs during activity.  Pt completed functional transfers with SBA and functional mobility with CGA/min A.  Pt walked just outside of her room door in the hallway and back to bedside. She required hand held assist and her distance was limited due to light headedness.  Pt demo decreased balance, generalized weakness, decreased endurance, and pain that limits her independence in functional mobility and ADLs.  OT will cont to follow to maximize her I and safety with ADLs and functional mobility.    Anticipated Discharge Disposition (OT): home with assist

## 2025-01-16 NOTE — PLAN OF CARE
Goal Outcome Evaluation:           Progress: declining   Pt A/Ox4. VSS on 2L NC. IV to LAC, IID. C/o headache, n/v, back pain, chest pain, and abd pain. PRN meds igven. Tropoining labs ran, EKG ran, US thyroid, CT of abd, Echo. Echo stress test unable to complete due to n/v. Accucheck, Tele: NSR. NPO. NIH-0. Family at BS. MD aware. Upx1 to BR with walker. Incontinent to bowel at times. Safety maintained. Call light in reach.

## 2025-01-16 NOTE — THERAPY EVALUATION
Acute Care - Speech Language Pathology Initial Evaluation  [facility]     Patient Name: Kenyatta Jay  : 1955  MRN: [id number]  Today's Date: 2025               Admit Date: 1/15/2025      Speech-Language/Cognitive-Linguistic Evaluation  Subjective: The patient was seen on this date for a Speech-Language Evaluation and Cognitive-Linguistic Evaluation.  Patient was alert and cooperative.    The patient's history is significant for concern for Transient Ishemic Attack, complaint of chest pain, alteration in cognition of unknown etiology. Patient came to the hospital with complaints of left sided facial drooping, arm weakness, and dysarthria. Facial drooping has resolved, patient passed RN dysphagia screening. Communication evaluation per stroke protocol.   Objective: The patient was assessed utilizing informal assessment for speech, language, and cognition. Findings were as follows:  Assessment: The patient demonstrated functional speech-language skills and impaired cognition. Deficits were noted in cognition. Patient had difficulty with complex command following and complex questions. Deficits noted with memory and high level cognitive tasks. It is difficult to discern if related to poor effort vs true difficulty.    Recommendations:   Patient reports her goal is to discharge home with family. The family reports cognition is improving and do not wish to pursue intervention acutely.   Other Recommended Evaluations:     Speech Therapy is not recommended at this time. If cognitive deficits persist, outpatient standardized cognitive assessment may be beneficial.     Tati Florez, Speech Therapy Student  Shira Chowdhury, MS CCC-SLP 2025 14:09 CST    Visit Dx:  Mild Cognitive Impairment.    ICD-10-CM ICD-9-CM   1. Disorientation  R41.0 780.99   2. Left arm weakness  R29.898 729.89   3. Thyroid nodule  E04.1 241.0   4. TIA (transient ischemic attack)  G45.9 435.9   5. Impaired cognition  R41.89  294.9     Patient Active Problem List   Diagnosis    Essential hypertension    Type 2 diabetes mellitus with diabetic polyneuropathy, without long-term current use of insulin    Morbid obesity due to excess calories    Recurrent major depressive disorder, in partial remission    Osteoporosis    Acquired hypothyroidism    Hyperlipidemia    Class 3 severe obesity with body mass index (BMI) of 45.0 to 49.9 in adult    Muscle tone increased left arm    Right thyroid nodule     Past Medical History:   Diagnosis Date    Cervical spondylosis with radiculopathy     Cervicalgia     Diabetes mellitus, type 2     Hypertension     Obesity due to excess calories     Prescription refill     Radiculopathy, cervical      Past Surgical History:   Procedure Laterality Date    CHOLECYSTECTOMY      HYSTERECTOMY      REPLACEMENT TOTAL KNEE Left 08/2016       SLP Recommendation and Plan  SLP Diagnosis: mild, cognitive-linguistic disorder (01/16/25 1038)  SLP Diagnosis Comments: If cognitive deficits persist, out pateint standardized assessment may be warranted and beneficial. (01/16/25 1038)  Reason for Discharge: patient/family request discontinuation of services (01/16/25 1358)        SLC Criteria for Skilled Therapy Interventions Met: declined skilled intervention at this time (01/16/25 1038)  Anticipated Discharge Disposition (SLP): home with OP services (01/16/25 1358)           Predicted Duration Therapy Intervention (Days): until discharge (01/16/25 1038)                                    SLP EVALUATION (Last 72 Hours)       SLP SLC Evaluation       Row Name 01/16/25 1038 01/16/25 0810                Communication Assessment/Intervention    Document Type evaluation  -MG (r) CB (t) MG (c) evaluation  -MG       Subjective Information complains of;pain  -MG (r) CB (t) MG (c) --       Patient Observations alert;agree to therapy;poorly cooperative  -MG (r) CB (t) MG (c) --       Patient/Family/Caregiver Comments/Observations Son  -MG  "(r) CB (t) MG (c) --       Session Not Performed -- patient unavailable for evaluation  -MG       Patient Effort adequate  -MG (r) CB (t) MG (c) --       Comment Son commented, \"If she doesn't want to do something she doesn't do it.\" Referring to her efforts.  -MG (r) CB (t) MG (c) ELVIRA for testing  -MG       Symptoms Noted During/After Treatment fatigue  -MG (r) CB (t) MG (c) --          General Information    Patient Profile Reviewed yes  -MG --       Pertinent History Of Current Problem Concern for Transient Ishemic Attack, complaint of chest pain, alteration in cognition of unknown etiology.  -MG --       Precautions/Limitations, Vision WFL  -MG (r) CB (t) MG (c) --       Precautions/Limitations, Hearing WFL;for purposes of eval  When asked if she was hard of hearing she said no. She asked SLP to repeat questions. SLP spoke louldy while giving instructions.  -MG (r) CB (t) MG (c) --       Patient Level of Education Watches her grandbabies  -MG (r) CB (t) MG (c) --       Prior Level of Function-Communication WFL  -MG (r) CB (t) MG (c) --       Plans/Goals Discussed with patient;patient and family  -MG --       Barriers to Rehab none identified  -MG --       Patient's Goals for Discharge return to home  -MG (r) CB (t) MG (c) --       Family Goals for Discharge patient able to return to previous activities/roles  Her daughter and grandchilidren live with her.  -MG (r) CB (t) MG (c) --          Pain    Pretreatment Pain Rating --  She said she did not feel any better than when she was admitted.  -MG (r) CB (t) MG (c) --       Pain Location other (see comments)  She did not specify where, she said she felt bad all over.  -MG (r) CB (t) MG (c) --       Pain Side/Orientation generalized  -MG (r) CB (t) MG (c) --          Comprehension Assessment/Intervention    Comprehension Assessment/Intervention Auditory Comprehension  -MG --          Auditory Comprehension Assessment/Intervention    Auditory Comprehension " (Communication) mild impairment  -MG --       Answers Questions (Communication) simple;WFL;mulit-unit;mild impairment  -MG --       Able to Follow Commands (Communication) 1-step;WFL;multi-step;mild impairment  -MG --       Narrative Discourse mild impairment  -MG --       Auditory Comprehension Communication, Comment likely due to cognition rather than language  -MG --          Expression Assessment/Intervention    Expression Assessment/Intervention verbal expression  -MG --          Verbal Expression Assessment/Intervention    Verbal Expression WFL  -MG --          Oral Motor Structure and Function    Oral Motor Structure and Function WFL  -MG (r) CB (t) MG (c) --          Motor Speech Assessment/Intervention    Motor Speech Function WFL  -MG (r) CB (t) MG (c) --       Initiation of Phonation (Communication) WNL;WFL  -MG (r) CB (t) MG (c) --       Automatic Speech (Communication) counting 1-20;days of week;months of year;WFL;response to greeting  -MG (r) CB (t) MG (c) --       Conversational Speech (Communication) WFL  -MG (r) CB (t) MG (c) --       Speech intelligibility 100%  -MG (r) CB (t) MG (c) --          Cognitive Assessment Intervention- SLP    Cognitive Function (Cognition) mild impairment  Pateint was confused due to response of questions asked.  -MG (r) CB (t) MG (c) --       Orientation Status (Cognition) WFL  -MG (r) CB (t) MG (c) --       Memory (Cognitive) simple  When asked to recall numbers she demonstrated difficulty recalling them in addition to poor effort.  -MG (r) CB (t) MG (c) --       Attention (Cognitive) WFL  -MG (r) CB (t) MG (c) --       Thought Organization (Cognitive) WFL;concrete convergent;concrete divergent  -MG (r) CB (t) MG (c) --       Reasoning (Cognitive) analogies;mild impairment;complex  In addition to poor effort that was put forth.  -MG (r) CB (t) MG (c) --       Problem Solving (Cognitive) WFL;simple  -MG (r) CB (t) MG (c) --       Functional Math (Cognitive) simple;WFL   -MG (r) CB (t) MG (c) --       Executive Function (Cognition) mild impairment  -MG (r) CB (t) MG (c) --       Pragmatics (Communication) WFL  -MG (r) CB (t) MG (c) --          SLP Evaluation Clinical Impressions    SLP Diagnosis mild;cognitive-linguistic disorder  -MG --       SLP Diagnosis Comments If cognitive deficits persist, out pateint standardized assessment may be warranted and beneficial.  -MG --       SLC Criteria for Skilled Therapy Interventions Met declined skilled intervention at this time  -MG --       Functional Impact needs occasional supervision;difficulty completing home management task  -MG --          Recommendations    Therapy Frequency (SLP SLC) evaluation only  -MG (r) CB (t) MG (c) --       Predicted Duration Therapy Intervention (Days) until discharge  -MG --       Anticipated Discharge Disposition (SLP) home with OP services  -MG --       Patient/Family Concerns, Anticipated Discharge Disposition (SLP) They desire for the patient to get better so they can go home to be with family and spend time with grandchildren.  -MG (r) CB (t) MG (c) --                 User Key  (r) = Recorded By, (t) = Taken By, (c) = Cosigned By      Initials Name Effective Dates    MG Shira Chowdhury, MS CCC-SLP 07/11/23 -     Tati Pitts, Speech Therapy Student 12/17/24 -                        EDUCATION  The patient has been educated in the following areas:     Cognitive Impairment.                        Time Calculation:      Time Calculation- SLP       Row Name 01/16/25 1125 01/16/25 1123          Time Calculation- SLP    SLP Start Time --  -MG 1005  -MG (r) CB (t) MG (c)     SLP Stop Time --  -MG 1123  -MG (r) CB (t) MG (c)     SLP Time Calculation (min) --  -MG 78 min  -MG (r) CB (t)     Total Timed Code Minutes- SLP -- --  -MG     SLP Received On --  -MG 01/16/25  -MG        Untimed Charges    SLP Eval/Re-eval  -- ST Eval Speech and Production w/ Language - 08314  -MG     09034-PR Eval Speech and  Production w/ Language Minutes -- 78  -MG        Total Minutes    Untimed Charges Total Minutes -- 78  -MG      Total Minutes -- 78  -MG               User Key  (r) = Recorded By, (t) = Taken By, (c) = Cosigned By      Initials Name Provider Type    Shira Pineda, MS CCC-SLP Speech and Language Pathologist    Tati Pitts, Speech Therapy Student SLP Student                    Therapy Charges for Today       Code Description Service Date Service Provider Modifiers Qty    [id number]  ST EVAL SPEECH AND PROD W LANG  5 1/16/2025 Shira Chowdhury, MS CCC-SLP GN 1                       Shira Chowdhury MS CCC-SLP  1/16/2025

## 2025-01-16 NOTE — PLAN OF CARE
Goal Outcome Evaluation:  Plan of Care Reviewed With: patient        Progress: no change  Outcome Evaluation: A&Ox4. O2 2L/NC applied this shift while asleep d/t O2 sats of 85% on RA. Medicated for c/o back pain, headache with relief noted. NIH 1. No new neuro changes. Up x1 assist. Voiding without difficulty. Call light within reach.

## 2025-01-17 VITALS
BODY MASS INDEX: 45.18 KG/M2 | TEMPERATURE: 98.5 F | WEIGHT: 271.17 LBS | SYSTOLIC BLOOD PRESSURE: 143 MMHG | OXYGEN SATURATION: 97 % | HEART RATE: 98 BPM | DIASTOLIC BLOOD PRESSURE: 97 MMHG | HEIGHT: 65 IN | RESPIRATION RATE: 18 BRPM

## 2025-01-17 PROBLEM — R41.89 SPELL OF ALTERED COGNITION: Status: ACTIVE | Noted: 2025-01-16

## 2025-01-17 LAB
ALBUMIN SERPL-MCNC: 3.9 G/DL (ref 3.5–5.2)
ALBUMIN/GLOB SERPL: 1.2 G/DL
ALP SERPL-CCNC: 79 U/L (ref 39–117)
ALT SERPL W P-5'-P-CCNC: 12 U/L (ref 1–33)
ANION GAP SERPL CALCULATED.3IONS-SCNC: 11 MMOL/L (ref 5–15)
AST SERPL-CCNC: 18 U/L (ref 1–32)
BASOPHILS # BLD AUTO: 0.02 10*3/MM3 (ref 0–0.2)
BASOPHILS NFR BLD AUTO: 0.2 % (ref 0–1.5)
BILIRUB SERPL-MCNC: 0.3 MG/DL (ref 0–1.2)
BUN SERPL-MCNC: 15 MG/DL (ref 8–23)
BUN/CREAT SERPL: 27.8 (ref 7–25)
CALCIUM SPEC-SCNC: 9.4 MG/DL (ref 8.6–10.5)
CHLORIDE SERPL-SCNC: 99 MMOL/L (ref 98–107)
CO2 SERPL-SCNC: 27 MMOL/L (ref 22–29)
CREAT SERPL-MCNC: 0.54 MG/DL (ref 0.57–1)
DEPRECATED RDW RBC AUTO: 46.7 FL (ref 37–54)
EGFRCR SERPLBLD CKD-EPI 2021: 101.1 ML/MIN/1.73
EOSINOPHIL # BLD AUTO: 0 10*3/MM3 (ref 0–0.4)
EOSINOPHIL NFR BLD AUTO: 0 % (ref 0.3–6.2)
ERYTHROCYTE [DISTWIDTH] IN BLOOD BY AUTOMATED COUNT: 14.1 % (ref 12.3–15.4)
GLOBULIN UR ELPH-MCNC: 3.3 GM/DL
GLUCOSE BLDC GLUCOMTR-MCNC: 126 MG/DL (ref 70–130)
GLUCOSE BLDC GLUCOMTR-MCNC: 126 MG/DL (ref 70–130)
GLUCOSE BLDC GLUCOMTR-MCNC: 132 MG/DL (ref 70–130)
GLUCOSE SERPL-MCNC: 125 MG/DL (ref 65–99)
HCT VFR BLD AUTO: 41.8 % (ref 34–46.6)
HGB BLD-MCNC: 12.9 G/DL (ref 12–15.9)
IMM GRANULOCYTES # BLD AUTO: 0.08 10*3/MM3 (ref 0–0.05)
IMM GRANULOCYTES NFR BLD AUTO: 1 % (ref 0–0.5)
LYMPHOCYTES # BLD AUTO: 0.92 10*3/MM3 (ref 0.7–3.1)
LYMPHOCYTES NFR BLD AUTO: 11.1 % (ref 19.6–45.3)
MCH RBC QN AUTO: 27.8 PG (ref 26.6–33)
MCHC RBC AUTO-ENTMCNC: 30.9 G/DL (ref 31.5–35.7)
MCV RBC AUTO: 90.1 FL (ref 79–97)
MONOCYTES # BLD AUTO: 0.53 10*3/MM3 (ref 0.1–0.9)
MONOCYTES NFR BLD AUTO: 6.4 % (ref 5–12)
NEUTROPHILS NFR BLD AUTO: 6.72 10*3/MM3 (ref 1.7–7)
NEUTROPHILS NFR BLD AUTO: 81.3 % (ref 42.7–76)
NRBC BLD AUTO-RTO: 0 /100 WBC (ref 0–0.2)
PLATELET # BLD AUTO: 261 10*3/MM3 (ref 140–450)
PMV BLD AUTO: 9.7 FL (ref 6–12)
POTASSIUM SERPL-SCNC: 3.8 MMOL/L (ref 3.5–5.2)
PROT SERPL-MCNC: 7.2 G/DL (ref 6–8.5)
QT INTERVAL: 424 MS
QTC INTERVAL: 430 MS
RBC # BLD AUTO: 4.64 10*6/MM3 (ref 3.77–5.28)
SODIUM SERPL-SCNC: 137 MMOL/L (ref 136–145)
WBC NRBC COR # BLD AUTO: 8.27 10*3/MM3 (ref 3.4–10.8)

## 2025-01-17 PROCEDURE — G0378 HOSPITAL OBSERVATION PER HR: HCPCS

## 2025-01-17 PROCEDURE — 85025 COMPLETE CBC W/AUTO DIFF WBC: CPT | Performed by: NURSE PRACTITIONER

## 2025-01-17 PROCEDURE — 80053 COMPREHEN METABOLIC PANEL: CPT | Performed by: NURSE PRACTITIONER

## 2025-01-17 PROCEDURE — 82948 REAGENT STRIP/BLOOD GLUCOSE: CPT

## 2025-01-17 PROCEDURE — 97116 GAIT TRAINING THERAPY: CPT

## 2025-01-17 PROCEDURE — 97110 THERAPEUTIC EXERCISES: CPT

## 2025-01-17 PROCEDURE — 97535 SELF CARE MNGMENT TRAINING: CPT

## 2025-01-17 PROCEDURE — 96376 TX/PRO/DX INJ SAME DRUG ADON: CPT

## 2025-01-17 RX ORDER — PANTOPRAZOLE SODIUM 40 MG/1
40 TABLET, DELAYED RELEASE ORAL DAILY
Qty: 30 TABLET | Refills: 0 | Status: SHIPPED | OUTPATIENT
Start: 2025-01-17 | End: 2025-02-16

## 2025-01-17 RX ADMIN — BUSPIRONE HYDROCHLORIDE 5 MG: 5 TABLET ORAL at 05:08

## 2025-01-17 RX ADMIN — ASPIRIN 81 MG: 81 TABLET, CHEWABLE ORAL at 08:26

## 2025-01-17 RX ADMIN — AMLODIPINE BESYLATE 5 MG: 5 TABLET ORAL at 08:26

## 2025-01-17 RX ADMIN — ACETAMINOPHEN 650 MG: 325 TABLET ORAL at 11:46

## 2025-01-17 RX ADMIN — BUSPIRONE HYDROCHLORIDE 5 MG: 5 TABLET ORAL at 13:35

## 2025-01-17 RX ADMIN — SERTRALINE HYDROCHLORIDE 100 MG: 100 TABLET ORAL at 08:26

## 2025-01-17 RX ADMIN — PANTOPRAZOLE SODIUM 40 MG: 40 INJECTION, POWDER, FOR SOLUTION INTRAVENOUS at 05:08

## 2025-01-17 RX ADMIN — Medication 10 ML: at 08:26

## 2025-01-17 NOTE — PLAN OF CARE
Goal Outcome Evaluation:  Plan of Care Reviewed With: patient, family        Progress: no change  A&Ox4. NIH 0. Lethargic, sleepy, cold, nausea, vomiting, little diarrhea overnight. This morning feeling much better. Up x1 bsc. BG monitoring. HR up to 169 while sleeping this morning with return to 84.

## 2025-01-17 NOTE — DISCHARGE SUMMARY
[facility] Medicine Services  DISCHARGE SUMMARY       Date of Admission: 1/15/2025  Date of Discharge:  1/17/2025  Primary Care Physician: Natividad Vargas APRN    Presenting Problem/History of Present Illness:  Stroke like symptoms    Final Discharge Diagnoses:  Active Hospital Problems    Diagnosis     **Spell of altered cognition     Right thyroid nodule     Hyperlipidemia     Class 3 severe obesity with body mass index (BMI) of 45.0 to 49.9 in adult     Essential hypertension     Type 2 diabetes mellitus with diabetic polyneuropathy, without long-term current use of insulin        Consults:   Dr. Iqbal, neurology    Procedures Performed: none    Pertinent Test Results:   Results for orders placed during the hospital encounter of 01/15/25    Adult Transthoracic Echo Complete W/ Cont if Necessary Per Protocol (With Agitated Saline)    Interpretation Summary    Left ventricular systolic function is normal. Left ventricular ejection fraction appears to be 56 - 60%    The left ventricular cavity is moderately dilated.    Left ventricular diastolic function was normal    Normal right ventricular cavity size and systolic function noted.    Saline test results are negative for right to left atrial level shunt.    Mild aortic valve regurgitation is present.      Imaging Results (All)       Procedure Component Value Units Date/Time    CT Abdomen Pelvis Without Contrast [[id number]] Collected: 01/16/25 1735     Updated: 01/16/25 1744    Narrative:      CT ABDOMEN PELVIS WO CONTRAST- 1/16/2025 4:15 PM     HISTORY: vomiting; R41.0-Disorientation, unspecified; R29.898-Other  symptoms and signs involving the musculoskeletal system; E04.1-Nontoxic  single thyroid nodule; G45.9-Transient cerebral ischemic attack,  unspecified; R41.89-Other symptoms and signs involving cognitive  functions and awareness; Z74.09-Other reduced mobility      COMPARISON: None      DOSE LENGTH PRODUCT:  1075.58 mGy.cm. Automated exposure control was also  utilized to decrease patient radiation dose.     TECHNIQUE: Noncontrast images of the abdomen and pelvis obtained without  oral contrast. Multiplanar reformatted images were provided for review.     FINDINGS:     LOWER CHEST: Lung bases are clear. Coronary atheromatous calcification.     LIVER: Liver appears grossly normal without contrast.     BILIARY SYSTEM: The gallbladder has been removed. There is no evidence  of biliary ductal dilation.     PANCREAS: Pancreas appears grossly normal without contrast.     SPLEEN: Unremarkable.     ADRENALS: Unremarkable.     KIDNEYS: Bilateral kidneys are unremarkable. The ureters are  decompressed.     RETROPERITONEUM: No mass, lymphadenopathy or hemorrhage.     GI TRACT: Stomach is nondistended. Small bowel loops are nondilated.  Colon is near completely decompressed.     OTHER: No mesenteric adenopathy or mass. No intraperitoneal free fluid  or free air. No acute bony abnormality. L4-L5 level degenerative  anterolisthesis. Facet arthropathy.     PELVIS: No mass lesion, fluid collection or significant lymphadenopathy  is seen in the pelvis. Iodinated contrast material in the urinary  bladder. Urinary bladder is nondistended.       Impression:         1.  No acute and/or inflammatory process in the abdomen or pelvis. Bowel  is nondistended. Small and large bowel loops are actually quite  decompressed.     This report was signed and finalized on 1/16/2025 5:41 PM by Dr Rosendo Bright.       US Thyroid [[id number]] Collected: 01/16/25 1411     Updated: 01/16/25 1416    Narrative:      EXAM: US THYROID- - 1/16/2025 12:01 PM     HISTORY: thyroid nodule; R41.0-Disorientation, unspecified;  R29.898-Other symptoms and signs involving the musculoskeletal system;  E04.1-Nontoxic single thyroid nodule; G45.9-Transient cerebral ischemic  attack, unspecified; R41.89-Other symptoms and signs involving cognitive  functions and awareness        COMPARISON: No existing relevant imaging studies available     TECHNIQUE: Real-time ultrasound performed with representative images and  report stored to PACS per institutional protocol.     FINDINGS:  RIGHT lobe. Measures 5.8 x 2.0 x 1.9 cm.  ISTHMUS. Measures 0.6 cm.  LEFT lobe. Measures 3.5 x 1.8 x 1.1 cm.        NODULE 1 -  Located -RIGHT  Size -2.5 x 1.5 cm  Density -solid or almost completely solid (2 points)  Echogenicity -hyperechoic or isoechoic (1 point)  Shape - wider than tall (0 points)  Margins - smooth (0 points)  Calcifications - none or large comet tail artifacts (0 points)       ACR TI-RADS 2017 risk category: TR3 (3 points)  FNA if greater than 2.5 cm.  Follow up if greater than 1.5 cm.        NODULE 2 -  Located -LEFT  Size -0.7 x 0.4 cm  Density -solid or almost completely solid (2 points)  Echogenicity -hypoechoic (2 points)  Shape - wider than tall (0 points)  Margins - smooth (0 points)  Calcifications - none or large comet tail artifacts (0 points)       ACR TI-RADS 2017 risk category: TR4 (4-6 points)  FNA if greater than 1.5 cm.   Follow up if greater than 1 cm.          Impression:      1. Dominant RIGHT thyroid nodule meeting criteria for ultrasound-guided  fine-needle aspiration if not previously performed and patient can  tolerate.              This report was signed and finalized on 1/16/2025 2:13 PM by Dr Cydney Quiros MD.       MRI Brain With & Without Contrast [[id number]] Collected: 01/15/25 1655     Updated: 01/15/25 1702    Narrative:      EXAMINATION: MRI BRAIN W WO CONTRAST- 1/15/2025 4:55 PM     HISTORY: seizure vs. stroke; R41.0-Disorientation, unspecified;  R29.898-Other symptoms and signs involving the musculoskeletal system;  E04.1-Nontoxic single thyroid nodule; G45.9-Transient cerebral ischemic  attack, unspecified.     REPORT: Multiplanar multisequence MR imaging of the brain was performed  with and without intravenous contrast.     COMPARISON: Noncontrast CT of  the head and CT angio of the head and  neck: 1525. Noncontrast CT of the head 8/16/2019.     No diffusion restriction is identified to indicate acute ischemia. There  is no intracranial mass or mass effect. The ventricles and basal  cisterns are normal. Gray-white differentiation appears normal. The  pituitary gland, optic chiasm and midline structures appear within  normal limits. No abnormal FLAIR signal is identified. T2-weighted  images demonstrates normal-appearing major intracranial vascular flow  voids. Susceptibility weighted images show no evidence of intracranial  hemorrhage. Thin section images through the hippocampal regions  demonstrate symmetry of the hippocampi with no obvious abnormality. No  abnormal contrast enhancement is identified. The extracranial structures  appear within normal limits.       Impression:      1. No acute intracranial abnormality, no evidence of acute ischemia,  hemorrhage or mass effect. No abnormal contrast enhancement.  3. Thin section images through the temporal lobes demonstrate  normal-appearing symmetric hippocampi.     This report was signed and finalized on 1/15/2025 4:59 PM by Dr. Young Donald MD.       CT Angiogram Neck [[id number]] Collected: 01/15/25 1110     Updated: 01/15/25 1126    Narrative:      EXAMINATION: CT ANGIOGRAM NECK-      1/15/2025 9:42 AM     HISTORY: Stroke, follow up     In order to have a CT radiation dose as low as reasonably achievable  Automated Exposure Control was utilized for adjustment of the mA and/or  KV according to patient size.     Total DLP = 410.8 mGy.cm     The CT angiography of the neck is performed after intravenous contrast  enhancement.     The images were acquired in the axial plane and subsequent 2D  reconstruction in coronal and sagittal planes and 3D maximum intensity  projection image reconstruction.     There is no previous similar study for comparison. The correlation is  made with CT scan of the head obtained earlier  today.     Limited visualized thoracic aorta and aortic arch show moderate  atheromatous changes. No aneurysmal dilatation or dissection.     Normal origin course and caliber of the brachiocephalic, left common  carotid and left subclavian arteries.     The brachiocephalic artery divides into normal-appearing subclavian and  right common carotid arteries.     Both common carotid arteries have a normal course and caliber. No areas  of focal stenosis or aneurysmal dilatation.     There is a mixed, noncalcified and calcified atheromatous plaque in the  distal left common carotid artery extending into the carotid bulb. No  significant stenosis of the distal common and proximal internal carotid  artery.     The right common carotid artery divides into normal internal and  external carotid arteries.     Both internal carotid arteries there is subsequent normal course and  caliber. No areas of focal stenosis or aneurysmal dilatation. Normal  size internal carotid arteries are seen at the skull base and the  carotid canal and and in the cavernous sinus bilaterally.     Normal appearing vertebral arteries arise from the subclavian arteries  bilaterally. There is subsequent normal course and caliber throughout  the neck. Normal sized vertebral arteries enter the foramen magnum and  subsequently join to make a normal basilar artery.     The intracranial internal carotid and vertebral arteries are not  evaluated in this study.     Limited visualized soft tissues of the neck are unremarkable. No mass or  lymphadenopathy.     There is a heterogeneous low-density nodule in the right lobe of the  thyroid gland which measures approximately 2 cm x 1.5 cm.     Limited visualized lung apices are unremarkable.     There is severe chronic degenerative changes of the cervical spine with  multilevel degenerative disc disease, prominent osteophytes and facet  arthropathy.       Impression:      1. A mixed plaque in the distal left common  carotid and proximal  internal carotid artery without a significant/flow-limiting stenosis.  The remaining CT angiography of the neck is normal.  2. Right thyroid nodule may be further evaluated with sonography.  3. Severe cervical spondylosis.  4. The NASCET criteria was utilized for estimation of carotid arterial  stenosis.                             This report was signed and finalized on 1/15/2025 11:23 AM by Dr. Parvez Hanks MD.       XR Chest 1 View [[id number]] Collected: 01/15/25 1111     Updated: 01/15/25 1116    Narrative:      EXAMINATION: XR CHEST 1 VW- 1/15/2025 11:11 AM     HISTORY: Acute Stroke Protocol (onset < 12 hrs).     COMPARISON: Chest x-rays of 8/16/2019.     REPORT:  The lungs are hypoaerated, no focal infiltrate or pulmonary  consolidation is identified. No pneumothorax or effusion is identified.  Heart size is upper limits of normal. The osseous structures show no  acute findings.       Impression:      Shallow inspiration, no acute cardiopulmonary abnormality.        This report was signed and finalized on 1/15/2025 11:12 AM by Dr. Young Donald MD.       CT Angiogram Head w AI Analysis of LVO [[id number]] Collected: 01/15/25 1108     Updated: 01/15/25 1114    Narrative:      CT ANGIOGRAM HEAD W AI ANALYSIS OF LVO- 1/15/2025 9:42 AM     HISTORY: Stroke, follow up      COMPARISON: Nonenhanced CT head 1/15/2025     TOTAL DOSE LENGTH PRODUCT: 410.8 mGy.cm. Automated exposure control was  also utilized to decrease patient radiation dose.     TECHNIQUE: Axial images of the brain are performed following IV  contrast. 2D, 3D, MIPS reconstructed images are reviewed. AI analysis of  LVO was utilized.        FINDINGS: The distal bilateral internal carotid and vertebral arteries  are patent. Patent basilar artery. The bilateral anterior, middle, and  posterior cerebral arteries are patent. No large vessel occlusion. No  aneurysm or dissection.       Impression:      No large vessel occlusion.  No aneurysm or dissection.     This report was signed and finalized on 1/15/2025 11:11 AM by Dr. Dorita Knight MD.       CT Head Without Contrast Stroke Protocol [[id number]] Collected: 01/15/25 1054     Updated: 01/15/25 1059    Narrative:      EXAM: CT HEAD WO CONTRAST STROKE PROTOCOL-      DATE: 1/15/2025 9:41 AM     HISTORY: Neuro deficit, acute, stroke suspected       COMPARISON: No existing relevant imaging studies available.     DOSE LENGTH PRODUCT: 751.46 mGy.cm  Automated exposure control was also  utilized to decrease patient radiation dose.     TECHNIQUE: Unenhanced CT images obtained from vertex to skull base with  multiplanar reformats.     FINDINGS:  No acute intracranial hemorrhage, midline shift, or mass effect.      Ventricles, sulci, basilar cisterns are normal in size and configuration  for the patient's age. Gray-white matter differentiation maintained.     Globes, retrobulbar soft tissues, paranasal sinuses, mastoid air cells  and external auditory canals are within normal limits. Calvarium appears  intact. Subcutaneous tissues within normal limits.          Impression:      1. No acute intracranial finding.         This report was signed and finalized on 1/15/2025 10:56 AM by Dr Cydney Quiros MD.             LAB RESULTS:      Lab 01/17/25  0215 01/15/25  1117 01/15/25  1056   WBC 8.27  --  6.89   HEMOGLOBIN 12.9  --  12.8   HEMATOCRIT 41.8  --  41.2   PLATELETS 261  --  234   NEUTROS ABS 6.72  --  4.67   IMMATURE GRANS (ABS) 0.08*  --  0.02   LYMPHS ABS 0.92  --  1.40   MONOS ABS 0.53  --  0.48   EOS ABS 0.00  --  0.29   MCV 90.1  --  90.0   LACTATE  --  1.0  --    PROTIME  --   --  13.6   APTT  --   --  26.0         Lab 01/17/25  0215 01/16/25  0848 01/16/25  0328 01/15/25  1237 01/15/25  1056   SODIUM 137 134*  --   --  136   POTASSIUM 3.8 3.9  --   --  4.1   CHLORIDE 99 97*  --   --  98   CO2 27.0 29.0  --   --  27.0   ANION GAP 11.0 8.0  --   --  11.0   BUN 15 17  --   --  15    CREATININE 0.54* 0.66  --   --  0.72   EGFR 101.1 96.3  --   --  91.8   GLUCOSE 125* 136*  --   --  157*   CALCIUM 9.4 9.4  --   --  8.8   HEMOGLOBIN A1C  --   --  5.90*  --   --    TSH  --   --   --  3.910  --          Lab 01/17/25  0215 01/15/25  1056   TOTAL PROTEIN 7.2 6.4   ALBUMIN 3.9 3.6   GLOBULIN 3.3 2.8   ALT (SGPT) 12 9   AST (SGOT) 18 16   BILIRUBIN 0.3 0.3   ALK PHOS 79 73         Lab 01/16/25  0848 01/15/25  1237 01/15/25  1056   HSTROP T 17* 19* 17*   PROTIME  --   --  13.6   INR  --   --  0.99         Lab 01/16/25  0328   CHOLESTEROL 130   LDL CHOL 73   HDL CHOL 45   TRIGLYCERIDES 57         Lab 01/15/25  1056   ABO TYPING O   RH TYPING Negative   ANTIBODY SCREEN Negative             Microbiology Results (last 10 days)       ** No results found for the last 240 hours. **            Hospital Course:   Patient is a 69-year-old female with a past medical history of arthritis and chronic back pain on Celebrex, also with a history of hypertension, diabetes, radiculopathy.  Patient presented to the hospital on 1/15 with reported like symptoms to include left arm stiffness.  She was seen in the ER by neurology for possible code stroke.  Imaging including Ct head/ CTA head/neck which were negative.  She was admitted to the hospital for further workup to include MRI and echo.  Subsequently MRI also showed no evidence of stroke.  2D echo showed normal EF without PFO.  No significant wall motion issues.  Patient's neuro symptoms resolved during hospital stay she had no further issues.  She was seen by neurology who classify this as unlikely TIA and likely just a spell.  EEG was also normal here without any obvious signs of acute discharge or seizure.  Patient was cleared for discharge from neurology standpoint.  Subsequently other issue during hospital stay was that she was complaining of some chest fullness and pain mission arrival.  First troponin was 17, up to 19, and then back down to 17.  EKG was normal  sinus rhythm without any acute ST-T changes.  Again her 2D echo had preserved EF without any wall motion issues.  Her lipid panel is normal.  She has no cardiac history prior.  Her chest pain has been variable.  Initially was described as left sided rib cage and patient reported worsening with deep inspiration and palpation.  Overall again this would not be consistent with ACS given flat troponin and symptoms with fairly normal EKG and echo.  Regardless patient was kept to do a stress test on 1/16 however when she got the dye for the stress test she became violently ill and started vomiting.  Tress test could not be completed.  Today she is complaining of some epigastric burning which began after her vomiting episodes.  Suspect this is likely some irritation/gastritis given her vomiting episode yesterday.  Overall it is not debilitating and she feels generally better.  She also takes again Celebrex for chronic pain which can be very hard in the stomach.  She is not on a PPI.  We discussed where to go from here.  Patient does not want to take another stress test.  She feels well and wants to go home.  Given overall evaluation and clinical course feel this would be okay at this time.  I am going to write a daily Protonix for her to take for the next month to see if it helps with some of her epigastric discomfort.  PUD definitely with differential.  Additionally I discussed with both her and family members multiple symptoms and signs of potential ischemic heart disease and to seek immediate medical evaluation if any return or worsening of symptoms.  Follow-up with PCP for additional workup as needed.  Of note patient was also found to have a thyroid nodule incidentally.  Her TSH is normal.  She can be followed up as an outpatient with fine-needle aspiration through PCP at later date if she has not had one done prior.      Physical Exam on Discharge:  /97 (BP Location: Right arm, Patient Position: Sitting)    "Pulse 98   Temp 98.5 °F (36.9 °C) (Oral)   Resp 18   Ht 165.1 cm (65\")   Wt 123 kg (271 lb 2.7 oz)   SpO2 97%   BMI 45.12 kg/m²   Physical Exam  GEN: Awake, alert, interactive, in NAD  HEENT: PERRLA, EOMI, Anicteric, Trachea midline  Lungs: CTAB, no wheezing/rales/rhonchi  Heart: RRR, +S1/s2, no rub  ABD: soft, nt/nd, +BS, no guarding/rebound  Extremities: atraumatic, no cyanosis, no edema  Skin: no rashes or lesions  Neuro: AAOx3, no focal deficits  Psych: normal mood & affect      Condition on Discharge: stable/improved    Discharge Disposition:  Home or Self Care    Discharge Medications:     Discharge Medications        New Medications        Instructions Start Date   pantoprazole 40 MG EC tablet  Commonly known as: Protonix   40 mg, Oral, Daily             Continue These Medications        Instructions Start Date   amLODIPine 5 MG tablet  Commonly known as: NORVASC   5 mg, Daily      busPIRone 10 MG tablet  Commonly known as: BUSPAR   10 mg, Oral, 3 Times Daily      celecoxib 200 MG capsule  Commonly known as: CeleBREX   200 mg, 2 Times Daily      colesevelam 625 MG tablet  Commonly known as: WELCHOL   1,250 mg, 2 Times Daily With Meals      HYDROcodone-acetaminophen 5-325 MG per tablet  Commonly known as: NORCO   1 tablet, Oral, Every 8 Hours PRN      levothyroxine 150 MCG tablet  Commonly known as: SYNTHROID, LEVOTHROID   150 mcg, Every Early Morning      metFORMIN 1000 MG tablet  Commonly known as: GLUCOPHAGE   1,000 mg, Oral, 2 Times Daily With Meals      Ozempic (2 MG/DOSE) 8 MG/3ML solution pen-injector  Generic drug: Semaglutide (2 MG/DOSE)   2 mg, Weekly      pioglitazone 45 MG tablet  Commonly known as: ACTOS   45 mg, Daily      sertraline 100 MG tablet  Commonly known as: ZOLOFT   100 mg, Oral, Daily               Discharge Diet:   Dietary Orders (From admission, onward)       Start     Ordered    01/17/25 0839  Diet: Diabetic; Consistent Carbohydrate; Fluid Consistency: Thin (IDDSI 0)  Diet " Effective Now        References:    Diet Order Definitions   Question Answer Comment   Diets: Diabetic    Diabetic Diet: Consistent Carbohydrate    Fluid Consistency: Thin (IDDSI 0)        01/17/25 0838                      Activity at Discharge:   As prior    Follow-up Appointments:   No future appointments.    Test Results Pending at Discharge: none    Electronically signed by Oren Barlow DO, 01/17/25, 16:05 CST.    Time: 32 minutes.

## 2025-01-17 NOTE — THERAPY TREATMENT NOTE
Patient Name: Kenyatta Jay  : 1955    MRN: [id number]                              Today's Date: 2025       Admit Date: 1/15/2025    Visit Dx:     ICD-10-CM ICD-9-CM   1. Disorientation  R41.0 780.99   2. Left arm weakness  R29.898 729.89   3. Thyroid nodule  E04.1 241.0   4. TIA (transient ischemic attack)  G45.9 435.9   5. Impaired cognition  R41.89 294.9   6. Impaired functional mobility and activity tolerance [Z74.09]  Z74.09 V49.89     Patient Active Problem List   Diagnosis    Essential hypertension    Type 2 diabetes mellitus with diabetic polyneuropathy, without long-term current use of insulin    Morbid obesity due to excess calories    Recurrent major depressive disorder, in partial remission    Osteoporosis    Acquired hypothyroidism    Hyperlipidemia    Class 3 severe obesity with body mass index (BMI) of 45.0 to 49.9 in adult    Muscle tone increased left arm    Right thyroid nodule     Past Medical History:   Diagnosis Date    Cervical spondylosis with radiculopathy     Cervicalgia     Diabetes mellitus, type 2     Hypertension     Obesity due to excess calories     Prescription refill     Radiculopathy, cervical      Past Surgical History:   Procedure Laterality Date    CHOLECYSTECTOMY      HYSTERECTOMY      REPLACEMENT TOTAL KNEE Left 2016      General Information       Row Name 25 1003          OT Time and Intention    Subjective Information complains of;pain  -LS     Document Type therapy note (daily note)  -LS     Mode of Treatment occupational therapy  -LS       Row Name 25 1003          General Information    Existing Precautions/Restrictions fall  -LS       Row Name 25 1003          Cognition    Orientation Status (Cognition) oriented x 4  -LS       Row Name 25 1003          Safety Issues/Impairments Affecting Functional Mobility    Impairments Affecting Function (Mobility) balance;endurance/activity tolerance;pain;strength  -LS               User  Key  (r) = Recorded By, (t) = Taken By, (c) = Cosigned By      Initials Name Provider Type    Meena Gallardo OTR/L Occupational Therapist                     Mobility/ADL's       Row Name 01/17/25 1003          Bed Mobility    Supine-Sit [place] (Bed Mobility) supervision  -     Sit-Supine [place] (Bed Mobility) supervision  -     Assistive Device (Bed Mobility) bed rails;head of bed elevated  -       Row Name 01/17/25 1003          Transfers    Transfers sit-stand transfer;stand-sit transfer  -       Row Name 01/17/25 1003          Sit-Stand Transfer    Sit-Stand [place] (Transfers) contact guard  -       Row Name 01/17/25 1003          Stand-Sit Transfer    Stand-Sit [place] (Transfers) contact guard  -       Row Name 01/17/25 1003          Functional Mobility    Functional Mobility- Ind. Level contact guard assist  -     Functional Mobility- Device walker, front-wheeled  -       Row Name 01/17/25 1003          Activities of Daily Living    BADL Assessment/Intervention upper body dressing;lower body dressing;bathing;grooming  -       Row Name 01/17/25 1003          Lower Body Dressing Assessment/Training    [place] Level (Lower Body Dressing) don;socks;standby assist  -LS     Position (Lower Body Dressing) edge of bed sitting  -       Row Name 01/17/25 1003          Upper Body Dressing Assessment/Training    [place] Level (Upper Body Dressing) upper body dressing skills;standby assist  -LS     Position (Upper Body Dressing) unsupported standing  -[facility] Name 01/17/25 1003          Bathing Assessment/Intervention    [place] Level (Bathing) bathing skills;lower body;contact guard assist;upper body;standby assist  -LS     Position (Bathing) unsupported sitting;supported standing  -       Row Name 01/17/25 1003          Grooming Assessment/Training    [place] Level (Grooming) grooming skills;oral care regimen;contact guard assist  -LS     Position  (Grooming) sink side;supported standing  -               User Key  (r) = Recorded By, (t) = Taken By, (c) = Cosigned By      Initials Name Provider Type    Meena Gallardo, OTR/L Occupational Therapist                   Obj/Interventions    No documentation.                  Goals/Plan    No documentation.                  Clinical Impression       Row Name 01/17/25 1003          Pain Assessment    Pretreatment Pain Rating 7/10  -LS     Posttreatment Pain Rating 6/10  -LS     Pain Location chest  -LS     Pain Management Interventions exercise or physical activity utilized  -LS     Response to Pain Interventions activity participation with decreased pain  -LS       Row Name 01/17/25 1003          Plan of Care Review    Plan of Care Reviewed With patient;son  -LS     Progress improving  -LS     Outcome Evaluation OT tx completed. Pt in fowlers upon therapist arrival; A&Ox4: c/o 7/10 chest pain; Pt's son also present. Pt performed supine<>sit utilizing bedrail with HOB elevated with SBA. Pt performed all sit<>stand transfers to/from bed and shower chair utilizing rwx and/or grab bar with CGA and verbal cues for safety awareness and positioning of AD. Pt ambulated from bed<>BR utilizing rwx with CGA and verbal cues for positioning of AD. Pt performed UB bathing while seated with SBA and LB bathing while sitting/standing as appropriate with CGA. Pt performed UB dressing in standing with CGA/SBA. Pt donned B socks while seated with SBA. Pt performed oral hygiene while standing at sink with CGA. Pt tolerated session well. Cont OT POC. Recommend home with assist and HH at discharge.  -       Row Name 01/17/25 1003          Therapy Plan Review/Discharge Plan (OT)    Anticipated Discharge Disposition (OT) home with assist;home with home health  -       Row Name 01/17/25 1003          Positioning and Restraints    Pre-Treatment Position in bed  -LS     Post Treatment Position bed  -LS     In Bed fowlers;side rails up  x2;call light within reach;encouraged to call for assist;exit alarm on;with family/caregiver  -               User Key  (r) = Recorded By, (t) = Taken By, (c) = Cosigned By      Initials Name Provider Type    Meena Gallardo, OTR/L Occupational Therapist                   Outcome Measures       Row Name 01/17/25 1003          How much help from another is currently needed...    Putting on and taking off regular lower body clothing? 3  -LS     Bathing (including washing, rinsing, and drying) 3  -LS     Toileting (which includes using toilet bed pan or urinal) 3  -LS     Putting on and taking off regular upper body clothing 4  -LS     Taking care of personal grooming (such as brushing teeth) 4  -LS     Eating meals 4  -LS     AM-PAC 6 Clicks Score (OT) 21  -       Row Name 01/17/25 0854 01/17/25 0835       How much help from another person do you currently need...    Turning from your back to your side while in flat bed without using bedrails? 4  -AH 4  -SS    Moving from lying on back to sitting on the side of a flat bed without bedrails? 4  -AH 4  -SS    Moving to and from a bed to a chair (including a wheelchair)? 3  -AH 3  -SS    Standing up from a chair using your arms (e.g., wheelchair, bedside chair)? 3  -AH 3  -SS    Climbing 3-5 steps with a railing? 3  -AH 3  -SS    To walk in hospital room? 3  -AH 3  -SS    AM-PAC 6 Clicks Score (PT) 20  - 20  -SS    Highest Level of Mobility Goal 6 --> Walk 10 steps or more  - 6 --> Walk 10 steps or more  -      Row Name 01/17/25 1003 01/17/25 0854       Functional Assessment    Outcome Measure Options AM-PAC 6 Clicks Daily Activity (OT)  -LS AM-PAC 6 Clicks Basic Mobility (PT)  -              User Key  (r) = Recorded By, (t) = Taken By, (c) = Cosigned By      Initials Name Provider Type    Kanchan Alarcon PTA Physical Therapist Assistant    Meena Gallardo, OTR/L Occupational Therapist    SS Christiane Bose, RN Registered Nurse                     Occupational Therapy Education       Title: PT OT SLP Therapies (In Progress)       Topic: Occupational Therapy (Done)       Point: ADL training (Done)       Description:   Instruct learner(s) on proper safety adaptation and remediation techniques during self care or transfers.   Instruct in proper use of assistive devices.                  Learning Progress Summary            Patient Acceptance, E, VU by  at 1/17/2025 1059    Acceptance, E, VU by CS at 1/16/2025 1114                      Point: Home exercise program (Done)       Description:   Instruct learner(s) on appropriate technique for monitoring, assisting and/or progressing therapeutic exercises/activities.                  Learning Progress Summary            Patient Acceptance, E, VU by CS at 1/16/2025 1114                      Point: Precautions (Done)       Description:   Instruct learner(s) on prescribed precautions during self-care and functional transfers.                  Learning Progress Summary            Patient Acceptance, E, VU by  at 1/17/2025 1059    Acceptance, E, VU by CS at 1/16/2025 1114                      Point: Body mechanics (Done)       Description:   Instruct learner(s) on proper positioning and spine alignment during self-care, functional mobility activities and/or exercises.                  Learning Progress Summary            Patient Acceptance, E, VU by  at 1/17/2025 1059    Acceptance, E, VU by  at 1/16/2025 1114                                      User Key       Initials Effective Dates Name Provider Type Discipline     02/03/23 -  Yasmine Sales S, OTR/L, CNT Occupational Therapist OT     06/20/22 -  Meena Hawkins OTR/L Occupational Therapist OT                  OT Recommendation and Plan     Plan of Care Review  Plan of Care Reviewed With: patient, son  Progress: improving  Outcome Evaluation: OT tx completed. Pt in fowlers upon therapist arrival; A&Ox4: c/o 7/10 chest pain; Pt's son also present. Pt  performed supine<>sit utilizing bedrail with HOB elevated with SBA. Pt performed all sit<>stand transfers to/from bed and shower chair utilizing rwx and/or grab bar with CGA and verbal cues for safety awareness and positioning of AD. Pt ambulated from bed<>BR utilizing rwx with CGA and verbal cues for positioning of AD. Pt performed UB bathing while seated with SBA and LB bathing while sitting/standing as appropriate with CGA. Pt performed UB dressing in standing with CGA/SBA. Pt donned B socks while seated with SBA. Pt performed oral hygiene while standing at sink with CGA. Pt tolerated session well. Cont OT POC. Recommend home with assist and HH at discharge.     Time Calculation:         Time Calculation- OT       Row Name 01/17/25 1003 01/17/25 0855          Time Calculation- OT    OT Start Time 1003  -LS --     OT Stop Time 1056  -LS --     OT Time Calculation (min) 53 min  -LS --     Total Timed Code Minutes- OT 53 minute(s)  -LS --     OT Received On 01/17/25  -LS --        Timed Charges    82824 - Gait Training Minutes  -- 13  -AH        Total Minutes    Timed Charges Total Minutes -- 13  -AH      Total Minutes -- 13  -AH               User Key  (r) = Recorded By, (t) = Taken By, (c) = Cosigned By      Initials Name Provider Type     Kanchan Conti, PTA Physical Therapist Assistant    LS Meena Hawkins OTR/L Occupational Therapist                  Therapy Charges for Today       Code Description Service Date Service Provider Modifiers Qty    08582494414 HC OT SELF CARE/MGMT/TRAIN EA 15 MIN 1/17/2025 Meena Hawkins OTR/L GO 4                 TASIA Izaguirre/BENJAMÍN  1/17/2025

## 2025-01-17 NOTE — PLAN OF CARE
Goal Outcome Evaluation:  Plan of Care Reviewed With: patient, son        Progress: improving  Outcome Evaluation: pt trans to EOB sba, BLE AROM x 10 reps, sit-stand cga-sba, pt amb 50 feet cga rwx, trans back to bed sba

## 2025-01-17 NOTE — CASE MANAGEMENT/SOCIAL WORK
Continued Stay Note  Psychiatric     Patient Name: Kenyatta Jay  MRN: [id number]  Today's Date: 1/17/2025    Admit Date: 1/15/2025        Discharge Plan       Row Name 01/17/25 1057       Plan    Plan Comments Therapy has evaluated and recommends home with family. Pt was not using any DME or home services prior to admit. No dc needs identified.    Final Discharge Disposition Code 01 - home or self-care                   Discharge Codes    No documentation.                 Expected Discharge Date and Time       Expected Discharge Date Expected Discharge Time    Jan 17, 2025               MANISH Chester

## 2025-01-17 NOTE — THERAPY TREATMENT NOTE
Acute Care - Physical Therapy Treatment Note  [facility]     Patient Name: Kenyatta Jay  : 1955  MRN: [id number]  Today's Date: 2025      Visit Dx:     ICD-10-CM ICD-9-CM   1. Disorientation  R41.0 780.99   2. Left arm weakness  R29.898 729.89   3. Thyroid nodule  E04.1 241.0   4. TIA (transient ischemic attack)  G45.9 435.9   5. Impaired cognition  R41.89 294.9   6. Impaired functional mobility and activity tolerance [Z74.09]  Z74.09 V49.89     Patient Active Problem List   Diagnosis    Essential hypertension    Type 2 diabetes mellitus with diabetic polyneuropathy, without long-term current use of insulin    Morbid obesity due to excess calories    Recurrent major depressive disorder, in partial remission    Osteoporosis    Acquired hypothyroidism    Hyperlipidemia    Class 3 severe obesity with body mass index (BMI) of 45.0 to 49.9 in adult    Muscle tone increased left arm    Right thyroid nodule     Past Medical History:   Diagnosis Date    Cervical spondylosis with radiculopathy     Cervicalgia     Diabetes mellitus, type 2     Hypertension     Obesity due to excess calories     Prescription refill     Radiculopathy, cervical      Past Surgical History:   Procedure Laterality Date    CHOLECYSTECTOMY      HYSTERECTOMY      REPLACEMENT TOTAL KNEE Left 2016     PT Assessment (Last 12 Hours)       PT Evaluation and Treatment       Row Name 25 0832          Physical Therapy Time and Intention    Subjective Information complains of;weakness  -     Document Type therapy note (daily note)  -     Mode of Treatment physical therapy  -       Row Name 25 0832          General Information    Patient/Family/Caregiver Comments/Observations son  -     Existing Precautions/Restrictions fall  -       Row Name 25 0832          Pain    Pretreatment Pain Rating 0/10 - no pain  -     Posttreatment Pain Rating 0/10 - no pain  -       Row Name 25 0832          Bed Mobility     Supine-Sit [place] (Bed Mobility) supervision  -     Sit-Supine [place] (Bed Mobility) supervision  -       Row Name 01/17/25 0832          Sit-Stand Transfer    Sit-Stand [place] (Transfers) contact guard;standby assist  -       Row Name 01/17/25 0832          Stand-Sit Transfer    Stand-Sit [place] (Transfers) contact guard;standby assist  -       Row Name 01/17/25 0832          Gait/Stairs (Locomotion)    [place] Level (Gait) contact guard;verbal cues  -     Assistive Device (Gait) walker, front-wheeled  -     Distance in Feet (Gait) 50  -       Row Name 01/17/25 0832          Motor Skills    Comments, Therapeutic Exercise BLE AROM x 10 reps  -     Additional Documentation Comments, Therapeutic Exercise (Row)  -       Row Name 01/17/25 0832          Plan of Care Review    Plan of Care Reviewed With patient;son  -     Progress improving  -     Outcome Evaluation pt trans to EOB sba, BLE AROM x 10 reps, sit-stand cga-sba, pt amb 50 feet cga rwx, trans back to bed sba  -       Row Name 01/17/25 0832          Positioning and Restraints    Pre-Treatment Position in bed  -     Post Treatment Position bed  -     In Bed fowlers;call light within reach;encouraged to call for assist;exit alarm on;with family/caregiver  -               User Key  (r) = Recorded By, (t) = Taken By, (c) = Cosigned By      Initials Name Provider Type     Kanchan Conti, PTA Physical Therapist Assistant                    Physical Therapy Education       Title: PT OT SLP Therapies (In Progress)       Topic: Physical Therapy (In Progress)       Point: Mobility training (Done)       Learning Progress Summary            Patient Acceptance, E,TB,D, VU,NR by MAXWELL at 1/16/2025 1151    Comment: Education re: purpose of PT/importance of activity, safety/falls prevention, controlled transitions w/ tfers                      Point: Home exercise program (Not Started)       Learner Progress:  Not  documented in this visit.              Point: Precautions (Done)       Learning Progress Summary            Patient Acceptance, E,TB,D, VU,NR by  at 1/16/2025 1151    Comment: Education re: purpose of PT/importance of activity, safety/falls prevention, controlled transitions w/ tfers                                      User Key       Initials Effective Dates Name Provider Type Discipline     08/02/18 -  Clair Brothers, PT Physical Therapist PT                  PT Recommendation and Plan     Plan of Care Reviewed With: patient, son  Progress: improving  Outcome Evaluation: pt trans to EOB sba, BLE AROM x 10 reps, sit-stand cga-sba, pt amb 50 feet cga rwx, trans back to bed sba   Outcome Measures       Row Name 01/17/25 0854             How much help from another person do you currently need...    Turning from your back to your side while in flat bed without using bedrails? 4  -AH      Moving from lying on back to sitting on the side of a flat bed without bedrails? 4  -AH      Moving to and from a bed to a chair (including a wheelchair)? 3  -AH      Standing up from a chair using your arms (e.g., wheelchair, bedside chair)? 3  -AH      Climbing 3-5 steps with a railing? 3  -AH      To walk in hospital room? 3  -AH      AM-PAC 6 Clicks Score (PT) 20  -         Functional Assessment    Outcome Measure Options AM-PAC 6 Clicks Basic Mobility (PT)  -                User Key  (r) = Recorded By, (t) = Taken By, (c) = Cosigned By      Initials Name Provider Type     Kanchan Conti, PTA Physical Therapist Assistant                     Time Calculation:    PT Charges       Row Name 01/17/25 0855             Time Calculation    Start Time 0832  -      Stop Time 0855  -      Time Calculation (min) 23 min  -      PT Received On 01/17/25  -         Time Calculation- PT    Total Timed Code Minutes- PT 23 minute(s)  -         Timed Charges    64004 - PT Therapeutic Exercise Minutes 10  -      86447 - Gait  Training Minutes  13  -AH         Total Minutes    Timed Charges Total Minutes 23  -AH       Total Minutes 23  -AH                User Key  (r) = Recorded By, (t) = Taken By, (c) = Cosigned By      Initials Name Provider Type     Kanchan Conti PTA Physical Therapist Assistant                  Therapy Charges for Today       Code Description Service Date Service Provider Modifiers Qty    93604622563 HC PT THER PROC EA 15 MIN 1/17/2025 Kanchan Conti PTA GP 1    09407154612 HC GAIT TRAINING EA 15 MIN 1/17/2025 Kanchan Conti PTA GP 1            PT G-Codes  Outcome Measure Options: AM-PAC 6 Clicks Basic Mobility (PT)  AM-PAC 6 Clicks Score (PT): 20  AM-PAC 6 Clicks Score (OT): 21    Kanchan Conti PTA  1/17/2025

## 2025-01-17 NOTE — PLAN OF CARE
Goal Outcome Evaluation:           Pt A/Ox4. VSS on RA. IV removed. D/c education completed. Family transported home. Safety Maintained.

## 2025-01-18 NOTE — THERAPY DISCHARGE NOTE
Acute Care - Occupational Therapy Discharge Summary  [facility]     Patient Name: Kenyatta Jay  : 1955  MRN: [id number]    Today's Date: 2025                 Admit Date: 1/15/2025        OT Recommendation and Plan    Visit Dx:    ICD-10-CM ICD-9-CM   1. Disorientation  R41.0 780.99   2. Left arm weakness  R29.898 729.89   3. Thyroid nodule  E04.1 241.0   4. TIA (transient ischemic attack)  G45.9 435.9   5. Impaired cognition  R41.89 294.9   6. Impaired functional mobility and activity tolerance [Z74.09]  Z74.09 V49.89                OT Rehab Goals       Row Name 25 1500             Transfer Goal 1 (OT)    Activity/Assistive Device (Transfer Goal 1, OT) toilet;bed-to-chair/chair-to-bed  -TS      [place] Level/Cues Needed (Transfer Goal 1, OT) independent  -TS      Time Frame (Transfer Goal 1, OT) long term goal (LTG)  -TS      Progress/Outcome (Transfer Goal 1, OT) goal not met  -TS         Bathing Goal 1 (OT)    Activity/Device (Bathing Goal 1, OT) bathing skills, all  -TS      [place] Level/Cues Needed (Bathing Goal 1, OT) independent  -TS      Time Frame (Bathing Goal 1, OT) long term goal (LTG)  -TS      Strategies/Barriers (Bathing Goal 1, OT) no vcs required for energy conservation  -TS      Progress/Outcomes (Bathing Goal 1, OT) goal not met  -TS         Toileting Goal 1 (OT)    Activity/Device (Toileting Goal 1, OT) toileting skills, all  -TS      [place] Level/Cues Needed (Toileting Goal 1, OT) independent  -TS      Time Frame (Toileting Goal 1, OT) long term goal (LTG)  -TS      Progress/Outcome (Toileting Goal 1, OT) goal not met  -TS                User Key  (r) = Recorded By, (t) = Taken By, (c) = Cosigned By      Initials Name Provider Type Discipline    TS Onelia Cook COTA Occupational Therapist Assistant OT                     Outcome Measures       Row Name 25 0854             How much help from another person do you currently need...    Turning  from your back to your side while in flat bed without using bedrails? 4  -AH      Moving from lying on back to sitting on the side of a flat bed without bedrails? 4  -AH      Moving to and from a bed to a chair (including a wheelchair)? 3  -AH      Standing up from a chair using your arms (e.g., wheelchair, bedside chair)? 3  -AH      Climbing 3-5 steps with a railing? 3  -AH      To walk in hospital room? 3  -AH      AM-PAC 6 Clicks Score (PT) 20  -         Functional Assessment    Outcome Measure Options AM-PAC 6 Clicks Basic Mobility (PT)  -                User Key  (r) = Recorded By, (t) = Taken By, (c) = Cosigned By      Initials Name Provider Type     Kanchan Conti, PTA Physical Therapist Assistant                            OT Discharge Summary  Anticipated Discharge Disposition (OT): home with assist  Reason for Discharge: Discharge from facility  Outcomes Achieved: Refer to plan of care for updates on goals achieved  Discharge Destination: Home with assist      ALBERT Sage  1/18/2025

## 2025-01-19 NOTE — THERAPY DISCHARGE NOTE
Acute Care - Physical Therapy Discharge Summary  [facility]       Patient Name: Kenyatta Jay  : 1955  MRN: [id number]    Today's Date: 2025                 Admit Date: 1/15/2025      PT Recommendation and Plan    Visit Dx:    ICD-10-CM ICD-9-CM   1. Disorientation  R41.0 780.99   2. Left arm weakness  R29.898 729.89   3. Thyroid nodule  E04.1 241.0   4. TIA (transient ischemic attack)  G45.9 435.9   5. Impaired cognition  R41.89 294.9   6. Impaired functional mobility and activity tolerance [Z74.09]  Z74.09 V49.89        Outcome Measures       Row Name 25 0854             How much help from another person do you currently need...    Turning from your back to your side while in flat bed without using bedrails? 4  -AH      Moving from lying on back to sitting on the side of a flat bed without bedrails? 4  -AH      Moving to and from a bed to a chair (including a wheelchair)? 3  -AH      Standing up from a chair using your arms (e.g., wheelchair, bedside chair)? 3  -AH      Climbing 3-5 steps with a railing? 3  -AH      To walk in hospital room? 3  -AH      AM-PAC 6 Clicks Score (PT) 20  -         Functional Assessment    Outcome Measure Options AM-PAC 6 Clicks Basic Mobility (PT)  -                User Key  (r) = Recorded By, (t) = Taken By, (c) = Cosigned By      Initials Name Provider Type     Kanchan Conti, PTA Physical Therapist Assistant                         PT Rehab Goals       Row Name 25 1455             Bed Mobility Goal 1 (PT)    Activity/Assistive Device (Bed Mobility Goal 1, PT) bed mobility activities, all  -      [place] Level/Cues Needed (Bed Mobility Goal 1, PT) independent  -      Time Frame (Bed Mobility Goal 1, PT) long term goal (LTG);10 days  -      Progress/Outcomes (Bed Mobility Goal 1, PT) goal not met  -         Transfer Goal 1 (PT)    Activity/Assistive Device (Transfer Goal 1, PT) sit-to-stand/stand-to-sit;bed-to-chair/chair-to-bed;other  (see comments)  bed to/from chair w/ rwx  -      [place] Level/Cues Needed (Transfer Goal 1, PT) standby assist  -AH      Time Frame (Transfer Goal 1, PT) long term goal (LTG);10 days  -      Progress/Outcome (Transfer Goal 1, PT) goal not met  -AH         Gait Training Goal 1 (PT)    Activity/Assistive Device (Gait Training Goal 1, PT) gait (walking locomotion);assistive device use;decrease fall risk;diminish gait deviation;improve balance and speed;increase endurance/gait distance;increase energy conservation;walker, rolling  -      [place] Level (Gait Training Goal 1, PT) standby assist  -      Distance (Gait Training Goal 1, PT)  ft  -      Time Frame (Gait Training Goal 1, PT) long term goal (LTG)  -      Progress/Outcome (Gait Training Goal 1, PT) goal not met  -                User Key  (r) = Recorded By, (t) = Taken By, (c) = Cosigned By      Initials Name Provider Type Discipline    Kanchan Alarcon PTA Physical Therapist Assistant PT                        PT Discharge Summary  Reason for Discharge: Discharge from facility  Outcomes Achieved: Refer to plan of care for updates on goals achieved  Discharge Destination: Home      Kanchan Conti PTA   1/19/2025

## 2025-01-23 DIAGNOSIS — G89.29 CHRONIC BILATERAL LOW BACK PAIN WITHOUT SCIATICA: ICD-10-CM

## 2025-01-23 DIAGNOSIS — M54.50 CHRONIC BILATERAL LOW BACK PAIN WITHOUT SCIATICA: ICD-10-CM

## 2025-01-23 LAB
QT INTERVAL: 404 MS
QTC INTERVAL: 448 MS

## 2025-01-23 NOTE — TELEPHONE ENCOUNTER
Last seen in office visit: 09/17/24  Next scheduled office visit: 01/28/25  Last UDS results: 01/25/24  Any discrepancies on Jono (such as refills from another provider): no

## 2025-01-27 RX ORDER — HYDROCODONE BITARTRATE AND ACETAMINOPHEN 5; 325 MG/1; MG/1
1 TABLET ORAL EVERY 8 HOURS PRN
Qty: 75 TABLET | Refills: 0 | Status: SHIPPED | OUTPATIENT
Start: 2025-01-27 | End: 2025-01-28 | Stop reason: ALTCHOICE

## 2025-01-28 ENCOUNTER — HOSPITAL ENCOUNTER (OUTPATIENT)
Dept: PAIN MANAGEMENT | Age: 70
Discharge: HOME | End: 2025-01-28
Payer: MEDICARE

## 2025-01-28 VITALS
SYSTOLIC BLOOD PRESSURE: 135 MMHG | HEIGHT: 65 IN | TEMPERATURE: 97.1 F | WEIGHT: 265 LBS | BODY MASS INDEX: 44.15 KG/M2 | DIASTOLIC BLOOD PRESSURE: 72 MMHG | OXYGEN SATURATION: 95 % | RESPIRATION RATE: 16 BRPM | HEART RATE: 64 BPM

## 2025-01-28 DIAGNOSIS — M50.30 BULGING OF CERVICAL INTERVERTEBRAL DISC WITHOUT MYELOPATHY: ICD-10-CM

## 2025-01-28 DIAGNOSIS — M79.18 MYOFASCIAL MUSCLE PAIN: ICD-10-CM

## 2025-01-28 DIAGNOSIS — G89.29 CHRONIC NECK AND BACK PAIN: Primary | ICD-10-CM

## 2025-01-28 DIAGNOSIS — M54.50 CHRONIC BILATERAL LOW BACK PAIN WITHOUT SCIATICA: ICD-10-CM

## 2025-01-28 DIAGNOSIS — M47.816 LUMBAR FACET ARTHROPATHY: ICD-10-CM

## 2025-01-28 DIAGNOSIS — F11.90 CHRONIC, CONTINUOUS USE OF OPIOIDS: ICD-10-CM

## 2025-01-28 DIAGNOSIS — M54.2 CHRONIC NECK AND BACK PAIN: Primary | ICD-10-CM

## 2025-01-28 DIAGNOSIS — M54.9 CHRONIC NECK AND BACK PAIN: Primary | ICD-10-CM

## 2025-01-28 DIAGNOSIS — M47.817 LUMBOSACRAL SPONDYLOSIS WITHOUT MYELOPATHY: ICD-10-CM

## 2025-01-28 DIAGNOSIS — M47.812 FACET ARTHRITIS OF CERVICAL REGION: ICD-10-CM

## 2025-01-28 DIAGNOSIS — G89.29 CHRONIC BILATERAL LOW BACK PAIN WITHOUT SCIATICA: ICD-10-CM

## 2025-01-28 DIAGNOSIS — M48.061 NEURAL FORAMINAL STENOSIS OF LUMBAR SPINE: ICD-10-CM

## 2025-01-28 PROCEDURE — 99214 OFFICE O/P EST MOD 30 MIN: CPT

## 2025-01-28 RX ORDER — PANTOPRAZOLE SODIUM 40 MG/1
40 TABLET, DELAYED RELEASE ORAL DAILY
COMMUNITY
Start: 2025-01-17 | End: 2025-02-17

## 2025-01-28 RX ORDER — HYDROCODONE BITARTRATE AND ACETAMINOPHEN 7.5; 325 MG/1; MG/1
1 TABLET ORAL EVERY 8 HOURS PRN
Qty: 90 TABLET | Refills: 0 | Status: SHIPPED | OUTPATIENT
Start: 2025-01-28 | End: 2025-02-27

## 2025-01-28 ASSESSMENT — PAIN DESCRIPTION - LOCATION
LOCATION: BACK
LOCATION_2: NECK

## 2025-01-28 ASSESSMENT — PAIN DESCRIPTION - INTENSITY: RATING_2: 8

## 2025-01-28 ASSESSMENT — PAIN SCALES - GENERAL: PAINLEVEL_OUTOF10: 8

## 2025-01-28 ASSESSMENT — PAIN DESCRIPTION - PAIN TYPE: TYPE: CHRONIC PAIN

## 2025-01-28 ASSESSMENT — PAIN DESCRIPTION - ORIENTATION
ORIENTATION_2: LOWER
ORIENTATION: LOWER

## 2025-01-28 NOTE — TELEPHONE ENCOUNTER
1. Chronic bilateral low back pain without sciatica      Requested Prescriptions     Pending Prescriptions Disp Refills    HYDROcodone-acetaminophen (NORCO) 7.5-325 MG per tablet 90 tablet 0     Sig: Take 1 tablet by mouth every 8 hours as needed for Pain for up to 30 days. Dosage increased- Discontinue Norco 5mg Max Daily Amount: 3 tablets       Continue medication with refill sent at appointment yes; refill sent to medical director at appointment no, see refill encounter dated 1/28/2025    Electronically signed by MIKEY Elizondo CNP on 1/28/2025 at 11:23 AM

## 2025-01-28 NOTE — PROGRESS NOTES
Clinic Documentation      Education Provided:  [x] Review of Jono  [] Agreement Review  [x] PEG Score Calculated [] PHQ Score Calculated [] ORT Score Calculated    [] Compliance Issues Discussed [] Cognitive Behavior Needs [x] Exercise [] Review of Test [] Financial Issues  [x] Tobacco/Alcohol Use Reviewed [x] Teaching [] New Patient [] Picture Obtained    Physician Plan:  [x] Outgoing Referral  [] Pharmacy Consult  [x] Test Ordered [x] Prescription Ordered/Changed   [] Obtained Test Results / Consult Notes        Complete if patient is withholding blood thinner for procedure     Blood Thinner Patient is currently taking:      [] Plavix (Hold for 7 days)  [] Aspirin (Hold for 5 days)     [] Pletal (Hold for 2 days)  [] Pradaxa (Hold for 3 days)    [] Effient (Hold for 7 days)  [] Xarelto (Hold for 2 days)    [] Eliquis (Hold for 2 days)  [] Brilinta (Hold for 7 days)    [] Coumadin (Hold for 5 days) - (INR needs to be drawn the day prior to procedure- INR < 2.0)    [] Aggrenox (Hold for 7 days)        [] Patient will stop medication on their own.    [] Blood Thinner Form Faxed for approval to hold.   Provider form faxed to:     Assessment Completed by:  Electronically signed by Rosa Lepe RN on 1/28/2025 at 11:26 AM  
tablet 90 tablet 0       Sig: Take 1 tablet by mouth every 8 hours as needed for Pain for up to 30 days. Dosage increased- Discontinue Norco 5mg Max Daily Amount: 3 tablets     3. Neural foraminal stenosis of lumbar spine  - [facility]    4. Lumbar facet arthropathy  - XR LUMBAR SPINE STANDARD EXTENDED VW (MIN 6 VWS); Future  - [facility]    5. Lumbosacral spondylosis without myelopathy  - XR LUMBAR SPINE STANDARD EXTENDED VW (MIN 6 VWS); Future  - [facility]    6. Bulging of cervical intervertebral disc without myelopathy  - [facility]    7. Facet arthritis of cervical region  - [facility]    8. Myofascial muscle pain  - [facility]    - Our mission at the [facility] is to improve the lives of our patients by improving function and relieving pain. We have a multidisciplinary outlook with a focus on non-opioid medications, interventions, physical therapy and improved mindset. We strive to improve function and relieve pain with a focus on health and individualizing care.     [x] Follow up    [] 4 weeks   [] 6 weeks   [] 8 weeks   [] 10 weeks   [x] 3 months  [] Post procedure to evaluate effectiveness of treatment  [] To evaluate medications changes made at office visit.   [x] To review diagnostics ordered at last visit.   [x] To evaluate response to therapy    [x] For management of controlled substance  [x] Random UDS as indicated by ORT score or if indicated by abberent behaviors     Discussion: Discussed exam findings, reviewed imaging  and gave education regarding pathophysiology, exercise, and treatment options  and reviewed plan of care with patient. Strongly reinforced that exercise is exteremly important part of pain management. Exercises should be completed upon awaking and before bed. Patient agreed with POC and questions were asked and

## 2025-02-03 RX ORDER — CELECOXIB 200 MG/1
CAPSULE ORAL
Qty: 60 CAPSULE | Refills: 0 | Status: SHIPPED | OUTPATIENT
Start: 2025-02-03 | End: 2025-02-04 | Stop reason: SDUPTHER

## 2025-02-04 ENCOUNTER — TRANSCRIBE ORDERS (OUTPATIENT)
Dept: ADMINISTRATIVE | Facility: HOSPITAL | Age: 70
End: 2025-02-04
Payer: MEDICARE

## 2025-02-04 ENCOUNTER — OFFICE VISIT (OUTPATIENT)
Dept: PRIMARY CARE CLINIC | Age: 70
End: 2025-02-04
Payer: MEDICARE

## 2025-02-04 VITALS
OXYGEN SATURATION: 98 % | BODY MASS INDEX: 44.32 KG/M2 | SYSTOLIC BLOOD PRESSURE: 130 MMHG | WEIGHT: 266 LBS | HEIGHT: 65 IN | RESPIRATION RATE: 18 BRPM | DIASTOLIC BLOOD PRESSURE: 78 MMHG | HEART RATE: 60 BPM | TEMPERATURE: 97 F

## 2025-02-04 DIAGNOSIS — E04.1 RIGHT THYROID NODULE: Primary | ICD-10-CM

## 2025-02-04 DIAGNOSIS — R13.10 DYSPHAGIA, UNSPECIFIED TYPE: ICD-10-CM

## 2025-02-04 DIAGNOSIS — Z12.31 ENCOUNTER FOR SCREENING MAMMOGRAM FOR MALIGNANT NEOPLASM OF BREAST: ICD-10-CM

## 2025-02-04 DIAGNOSIS — Z00.00 MEDICARE ANNUAL WELLNESS VISIT, SUBSEQUENT: Primary | ICD-10-CM

## 2025-02-04 DIAGNOSIS — G45.9 TIA (TRANSIENT ISCHEMIC ATTACK): ICD-10-CM

## 2025-02-04 DIAGNOSIS — E04.1 THYROID NODULE: ICD-10-CM

## 2025-02-04 DIAGNOSIS — E11.42 TYPE 2 DIABETES MELLITUS WITH DIABETIC POLYNEUROPATHY, WITHOUT LONG-TERM CURRENT USE OF INSULIN (HCC): ICD-10-CM

## 2025-02-04 PROCEDURE — 3078F DIAST BP <80 MM HG: CPT | Performed by: NURSE PRACTITIONER

## 2025-02-04 PROCEDURE — 1159F MED LIST DOCD IN RCRD: CPT | Performed by: NURSE PRACTITIONER

## 2025-02-04 PROCEDURE — 3017F COLORECTAL CA SCREEN DOC REV: CPT | Performed by: NURSE PRACTITIONER

## 2025-02-04 PROCEDURE — 3075F SYST BP GE 130 - 139MM HG: CPT | Performed by: NURSE PRACTITIONER

## 2025-02-04 PROCEDURE — 1123F ACP DISCUSS/DSCN MKR DOCD: CPT | Performed by: NURSE PRACTITIONER

## 2025-02-04 PROCEDURE — 3046F HEMOGLOBIN A1C LEVEL >9.0%: CPT | Performed by: NURSE PRACTITIONER

## 2025-02-04 PROCEDURE — 1160F RVW MEDS BY RX/DR IN RCRD: CPT | Performed by: NURSE PRACTITIONER

## 2025-02-04 PROCEDURE — G0439 PPPS, SUBSEQ VISIT: HCPCS | Performed by: NURSE PRACTITIONER

## 2025-02-04 RX ORDER — ASPIRIN 81 MG/1
81 TABLET ORAL DAILY
Qty: 90 TABLET | Refills: 3 | Status: SHIPPED | OUTPATIENT
Start: 2025-02-04

## 2025-02-04 RX ORDER — CELECOXIB 200 MG/1
CAPSULE ORAL
Qty: 180 CAPSULE | Refills: 3 | Status: SHIPPED | OUTPATIENT
Start: 2025-02-04

## 2025-02-04 RX ORDER — AMLODIPINE BESYLATE 5 MG/1
5 TABLET ORAL DAILY
Qty: 90 TABLET | Refills: 3 | Status: SHIPPED | OUTPATIENT
Start: 2025-02-04

## 2025-02-04 RX ORDER — LEVOTHYROXINE SODIUM 150 UG/1
150 TABLET ORAL DAILY
Qty: 90 TABLET | Refills: 3 | Status: SHIPPED | OUTPATIENT
Start: 2025-02-04

## 2025-02-04 RX ORDER — SERTRALINE HYDROCHLORIDE 100 MG/1
TABLET, FILM COATED ORAL
Qty: 90 TABLET | Refills: 3 | Status: SHIPPED | OUTPATIENT
Start: 2025-02-04

## 2025-02-04 RX ORDER — PIOGLITAZONE 45 MG/1
45 TABLET ORAL DAILY
Qty: 90 TABLET | Refills: 3 | Status: SHIPPED | OUTPATIENT
Start: 2025-02-04

## 2025-02-04 RX ORDER — PANTOPRAZOLE SODIUM 40 MG/1
40 TABLET, DELAYED RELEASE ORAL DAILY
Qty: 90 TABLET | Refills: 3 | Status: SHIPPED | OUTPATIENT
Start: 2025-02-04 | End: 2026-01-30

## 2025-02-04 RX ORDER — SEMAGLUTIDE 0.68 MG/ML
INJECTION, SOLUTION SUBCUTANEOUS
Qty: 3 ML | Refills: 3 | Status: SHIPPED | OUTPATIENT
Start: 2025-02-04

## 2025-02-04 RX ORDER — BUSPIRONE HYDROCHLORIDE 10 MG/1
TABLET ORAL
Qty: 90 TABLET | Refills: 3 | Status: SHIPPED | OUTPATIENT
Start: 2025-02-04

## 2025-02-04 SDOH — ECONOMIC STABILITY: FOOD INSECURITY: WITHIN THE PAST 12 MONTHS, YOU WORRIED THAT YOUR FOOD WOULD RUN OUT BEFORE YOU GOT MONEY TO BUY MORE.: NEVER TRUE

## 2025-02-04 SDOH — ECONOMIC STABILITY: FOOD INSECURITY: WITHIN THE PAST 12 MONTHS, THE FOOD YOU BOUGHT JUST DIDN'T LAST AND YOU DIDN'T HAVE MONEY TO GET MORE.: NEVER TRUE

## 2025-02-04 ASSESSMENT — LIFESTYLE VARIABLES
HOW OFTEN DO YOU HAVE A DRINK CONTAINING ALCOHOL: NEVER
HOW MANY STANDARD DRINKS CONTAINING ALCOHOL DO YOU HAVE ON A TYPICAL DAY: PATIENT DOES NOT DRINK

## 2025-02-04 ASSESSMENT — PATIENT HEALTH QUESTIONNAIRE - PHQ9
SUM OF ALL RESPONSES TO PHQ QUESTIONS 1-9: 0
2. FEELING DOWN, DEPRESSED OR HOPELESS: NOT AT ALL
5. POOR APPETITE OR OVEREATING: NOT AT ALL
7. TROUBLE CONCENTRATING ON THINGS, SUCH AS READING THE NEWSPAPER OR WATCHING TELEVISION: NOT AT ALL
10. IF YOU CHECKED OFF ANY PROBLEMS, HOW DIFFICULT HAVE THESE PROBLEMS MADE IT FOR YOU TO DO YOUR WORK, TAKE CARE OF THINGS AT HOME, OR GET ALONG WITH OTHER PEOPLE: NOT DIFFICULT AT ALL
1. LITTLE INTEREST OR PLEASURE IN DOING THINGS: NOT AT ALL
SUM OF ALL RESPONSES TO PHQ9 QUESTIONS 1 & 2: 0
9. THOUGHTS THAT YOU WOULD BE BETTER OFF DEAD, OR OF HURTING YOURSELF: NOT AT ALL
8. MOVING OR SPEAKING SO SLOWLY THAT OTHER PEOPLE COULD HAVE NOTICED. OR THE OPPOSITE, BEING SO FIGETY OR RESTLESS THAT YOU HAVE BEEN MOVING AROUND A LOT MORE THAN USUAL: NOT AT ALL
6. FEELING BAD ABOUT YOURSELF - OR THAT YOU ARE A FAILURE OR HAVE LET YOURSELF OR YOUR FAMILY DOWN: NOT AT ALL
4. FEELING TIRED OR HAVING LITTLE ENERGY: NOT AT ALL
SUM OF ALL RESPONSES TO PHQ QUESTIONS 1-9: 0
3. TROUBLE FALLING OR STAYING ASLEEP: NOT AT ALL

## 2025-02-04 NOTE — PATIENT INSTRUCTIONS
(Maybe you're afraid of having pain, losing your independence, or being kept alive by machines.)  Where would you prefer to die? (Your home? A hospital? A nursing home?)  Do you want to donate your organs when you die?  Do you want certain Latter day practices performed before you die?  When should you call for help?  Be sure to contact your doctor if you have any questions.  Where can you learn more?  Go to https://[email or web address].net/patientEd and enter R264 to learn more about \"Advance Directives: Care Instructions.\"  Current as of: November 16, 2023  Content Version: 14.3  © 2024 [employer].   Care instructions adapted under license by [employer]. If you have questions about a medical condition or this instruction, always ask your healthcare professional. [employer], disclaims any warranty or liability for your use of this information.         A Healthy Heart: Care Instructions  Overview     Coronary artery disease, also called heart disease, occurs when a substance called plaque builds up in the vessels that supply oxygen-rich blood to your heart muscle. This can narrow the blood vessels and reduce blood flow. A heart attack happens when blood flow is completely blocked. A high-fat diet, smoking, and other factors increase the risk of heart disease.  Your doctor has found that you have a chance of having heart disease. A heart-healthy lifestyle can help keep your heart healthy and prevent heart disease. This lifestyle includes eating healthy, being active, staying at a weight that's healthy for you, and not smoking or using tobacco. It also includes taking medicines as directed, managing other health conditions, and trying to get a healthy amount of sleep.  Follow-up care is a key part of your treatment and safety. Be sure to make and go to all appointments, and call your doctor if you are having problems. It's also a good idea to know your test results and keep a list of the medicines

## 2025-02-04 NOTE — PROGRESS NOTES
time, well developed and well- nourished, in no acute distress  Skin: warm and dry, no rash or erythema  Head: normocephalic and atraumatic  Eyes: pupils equal, round, and reactive to light, extraocular eye movements intact, conjunctivae normal  ENT: tympanic membrane, external ear and ear canal normal bilaterally, nose without deformity, nasal mucosa and turbinates normal without polyps  Neck: supple and non-tender without mass, , no cervical lymphadenopathy, some thyromegaly  Pulmonary/Chest: clear to auscultation bilaterally- no wheezes, rales or rhonchi, normal air movement, no respiratory distress  Cardiovascular: normal rate, regular rhythm, normal S1 and S2, no murmurs, rubs, clicks, or gallops, distal pulses intact, no carotid bruits  Abdomen: soft, non-tender, non-distended, normal bowel sounds, no masses or organomegaly  Extremities: no cyanosis, clubbing or edema  Musculoskeletal: Low back pain with decreased range of motion.  Neurologic: reflexes normal and symmetric, no cranial nerve deficit, gait, coordination and speech normal  and pedal pushes were equal.  No pronator drift.  Facial expressions were equal and symmetric.           Allergies   Allergen Reactions    Pcn [Penicillins] Rash     childhood     Prior to Visit Medications    Medication Sig Taking? Authorizing Provider   amLODIPine (NORVASC) 5 MG tablet Take 1 tablet by mouth daily Yes Gabbie Shah APRN - CNP   busPIRone (BUSPAR) 10 MG tablet TAKE 1 TABLET BY MOUTH THREE TIMES DAILY Yes Gabbie Shah APRN - CNP   celecoxib (CELEBREX) 200 MG capsule TAKE 1 CAPSULE BY MOUTH TWICE DAILY REPLACES  DICLOFENAC Yes Gabbie Shah APRN - CNP   levothyroxine (SYNTHROID) 150 MCG tablet Take 1 tablet by mouth daily Yes Gabbie Shah APRN - CNP   metFORMIN (GLUCOPHAGE) 1000 MG tablet TAKE 1 TABLET BY MOUTH TWICE DAILY WITH MEALS FOR DIABETES Yes Gabbie Shah APRN - CNP   pantoprazole (PROTONIX) 40 MG tablet Take 1 tablet by mouth

## 2025-02-07 DIAGNOSIS — Z12.31 ENCOUNTER FOR SCREENING MAMMOGRAM FOR MALIGNANT NEOPLASM OF BREAST: ICD-10-CM

## 2025-02-10 ENCOUNTER — HOSPITAL ENCOUNTER (OUTPATIENT)
Dept: GENERAL RADIOLOGY | Facility: HOSPITAL | Age: 70
Discharge: HOME | End: 2025-02-10
Admitting: NURSE PRACTITIONER
Payer: MEDICARE

## 2025-02-10 DIAGNOSIS — G45.9 TIA (TRANSIENT ISCHEMIC ATTACK): ICD-10-CM

## 2025-02-10 DIAGNOSIS — R13.10 DYSPHAGIA, UNSPECIFIED TYPE: ICD-10-CM

## 2025-02-10 PROCEDURE — 74220 X-RAY XM ESOPHAGUS 1CNTRST: CPT

## 2025-02-10 RX ADMIN — BARIUM SULFATE 120 ML: 980 POWDER, FOR SUSPENSION ORAL at 09:58

## 2025-02-10 RX ADMIN — ANTACID/ANTIFLATULENT 1 PACKET: 380; 550; 10; 10 GRANULE, EFFERVESCENT ORAL at 09:58

## 2025-02-13 ENCOUNTER — HOSPITAL ENCOUNTER (OUTPATIENT)
Dept: ULTRASOUND IMAGING | Facility: HOSPITAL | Age: 70
Discharge: HOME | End: 2025-02-13
Payer: MEDICARE

## 2025-02-13 DIAGNOSIS — E04.1 RIGHT THYROID NODULE: ICD-10-CM

## 2025-02-13 DIAGNOSIS — R13.10 DYSPHAGIA, UNSPECIFIED TYPE: ICD-10-CM

## 2025-02-13 DIAGNOSIS — E04.1 THYROID NODULE: ICD-10-CM

## 2025-02-13 PROCEDURE — 88112 CYTOPATH CELL ENHANCE TECH: CPT | Performed by: NURSE PRACTITIONER

## 2025-02-13 PROCEDURE — 76942 ECHO GUIDE FOR BIOPSY: CPT

## 2025-02-13 PROCEDURE — 88177 CYTP FNA EVAL EA ADDL: CPT | Performed by: NURSE PRACTITIONER

## 2025-02-13 PROCEDURE — 88172 CYTP DX EVAL FNA 1ST EA SITE: CPT | Performed by: NURSE PRACTITIONER

## 2025-02-14 LAB
BEAKER LAB AP INTRAOPERATIVE CONSULTATION: NORMAL
CYTO UR: NORMAL
LAB AP CASE REPORT: NORMAL
LAB AP DIAGNOSIS COMMENT: NORMAL
Lab: NORMAL
PATH REPORT.FINAL DX SPEC: NORMAL
PATH REPORT.GROSS SPEC: NORMAL

## 2025-02-20 ENCOUNTER — TELEPHONE (OUTPATIENT)
Dept: INTERVENTIONAL RADIOLOGY/VASCULAR | Facility: HOSPITAL | Age: 70
End: 2025-02-20
Payer: MEDICARE

## 2025-02-25 ENCOUNTER — TELEPHONE (OUTPATIENT)
Dept: PAIN MANAGEMENT | Age: 70
End: 2025-02-25

## 2025-02-25 NOTE — TELEPHONE ENCOUNTER
FYI- follow up call regarding Physical Therapy order of 1/28/2025. Pt. Cancelled 2 appointments to physical therapy. Pt. Reports, she has a $40.00 copay per visit and can not afford that on her fixed income. Pt. Plans to discuss this further at her 3/17/2025 office visit. .esign

## 2025-03-03 DIAGNOSIS — G89.29 CHRONIC BILATERAL LOW BACK PAIN WITHOUT SCIATICA: ICD-10-CM

## 2025-03-03 DIAGNOSIS — M54.50 CHRONIC BILATERAL LOW BACK PAIN WITHOUT SCIATICA: ICD-10-CM

## 2025-03-03 RX ORDER — HYDROCODONE BITARTRATE AND ACETAMINOPHEN 7.5; 325 MG/1; MG/1
1 TABLET ORAL EVERY 8 HOURS PRN
Qty: 90 TABLET | Refills: 0 | Status: SHIPPED | OUTPATIENT
Start: 2025-03-03 | End: 2025-04-02

## 2025-03-03 NOTE — TELEPHONE ENCOUNTER
Last seen in office visit: 01/28/25  Next scheduled office visit: 03/17/25  Last UDS results: 01/28/25  Any discrepancies on Jono (such as refills from another provider): no

## 2025-03-18 ENCOUNTER — HOSPITAL ENCOUNTER (OUTPATIENT)
Dept: GENERAL RADIOLOGY | Age: 70
Discharge: HOME | End: 2025-03-18
Payer: MEDICARE

## 2025-03-18 ENCOUNTER — OFFICE VISIT (OUTPATIENT)
Dept: PAIN MANAGEMENT | Age: 70
End: 2025-03-18
Payer: MEDICARE

## 2025-03-18 VITALS
SYSTOLIC BLOOD PRESSURE: 147 MMHG | BODY MASS INDEX: 42.32 KG/M2 | WEIGHT: 254 LBS | OXYGEN SATURATION: 95 % | HEART RATE: 73 BPM | DIASTOLIC BLOOD PRESSURE: 77 MMHG | TEMPERATURE: 98.2 F | RESPIRATION RATE: 18 BRPM | HEIGHT: 65 IN

## 2025-03-18 DIAGNOSIS — G89.29 CHRONIC NECK AND BACK PAIN: Primary | ICD-10-CM

## 2025-03-18 DIAGNOSIS — M54.2 CHRONIC NECK AND BACK PAIN: Primary | ICD-10-CM

## 2025-03-18 DIAGNOSIS — M54.16 LUMBAR RADICULOPATHY: ICD-10-CM

## 2025-03-18 DIAGNOSIS — M54.12 CERVICAL RADICULOPATHY: ICD-10-CM

## 2025-03-18 DIAGNOSIS — M79.18 MYOFASCIAL MUSCLE PAIN: ICD-10-CM

## 2025-03-18 DIAGNOSIS — Z79.891 ENCOUNTER FOR MONITORING OPIOID MAINTENANCE THERAPY: ICD-10-CM

## 2025-03-18 DIAGNOSIS — Z51.81 ENCOUNTER FOR MONITORING OPIOID MAINTENANCE THERAPY: ICD-10-CM

## 2025-03-18 DIAGNOSIS — M54.9 CHRONIC NECK AND BACK PAIN: Primary | ICD-10-CM

## 2025-03-18 PROCEDURE — 1090F PRES/ABSN URINE INCON ASSESS: CPT

## 2025-03-18 PROCEDURE — 3078F DIAST BP <80 MM HG: CPT

## 2025-03-18 PROCEDURE — G8417 CALC BMI ABV UP PARAM F/U: HCPCS

## 2025-03-18 PROCEDURE — 1036F TOBACCO NON-USER: CPT

## 2025-03-18 PROCEDURE — 72040 X-RAY EXAM NECK SPINE 2-3 VW: CPT

## 2025-03-18 PROCEDURE — 3017F COLORECTAL CA SCREEN DOC REV: CPT

## 2025-03-18 PROCEDURE — 1125F AMNT PAIN NOTED PAIN PRSNT: CPT

## 2025-03-18 PROCEDURE — 72100 X-RAY EXAM L-S SPINE 2/3 VWS: CPT

## 2025-03-18 PROCEDURE — 1159F MED LIST DOCD IN RCRD: CPT

## 2025-03-18 PROCEDURE — G8399 PT W/DXA RESULTS DOCUMENT: HCPCS

## 2025-03-18 PROCEDURE — 1160F RVW MEDS BY RX/DR IN RCRD: CPT

## 2025-03-18 PROCEDURE — G8427 DOCREV CUR MEDS BY ELIG CLIN: HCPCS

## 2025-03-18 PROCEDURE — 99214 OFFICE O/P EST MOD 30 MIN: CPT

## 2025-03-18 PROCEDURE — 1123F ACP DISCUSS/DSCN MKR DOCD: CPT

## 2025-03-18 PROCEDURE — 3077F SYST BP >= 140 MM HG: CPT

## 2025-03-18 ASSESSMENT — PATIENT HEALTH QUESTIONNAIRE - PHQ9
SUM OF ALL RESPONSES TO PHQ QUESTIONS 1-9: 4
10. IF YOU CHECKED OFF ANY PROBLEMS, HOW DIFFICULT HAVE THESE PROBLEMS MADE IT FOR YOU TO DO YOUR WORK, TAKE CARE OF THINGS AT HOME, OR GET ALONG WITH OTHER PEOPLE: SOMEWHAT DIFFICULT
SUM OF ALL RESPONSES TO PHQ QUESTIONS 1-9: 4
4. FEELING TIRED OR HAVING LITTLE ENERGY: MORE THAN HALF THE DAYS
9. THOUGHTS THAT YOU WOULD BE BETTER OFF DEAD, OR OF HURTING YOURSELF: NOT AT ALL
8. MOVING OR SPEAKING SO SLOWLY THAT OTHER PEOPLE COULD HAVE NOTICED. OR THE OPPOSITE, BEING SO FIGETY OR RESTLESS THAT YOU HAVE BEEN MOVING AROUND A LOT MORE THAN USUAL: NOT AT ALL
3. TROUBLE FALLING OR STAYING ASLEEP: MORE THAN HALF THE DAYS
1. LITTLE INTEREST OR PLEASURE IN DOING THINGS: NOT AT ALL
2. FEELING DOWN, DEPRESSED OR HOPELESS: NOT AT ALL
SUM OF ALL RESPONSES TO PHQ QUESTIONS 1-9: 4
5. POOR APPETITE OR OVEREATING: NOT AT ALL
7. TROUBLE CONCENTRATING ON THINGS, SUCH AS READING THE NEWSPAPER OR WATCHING TELEVISION: NOT AT ALL
6. FEELING BAD ABOUT YOURSELF - OR THAT YOU ARE A FAILURE OR HAVE LET YOURSELF OR YOUR FAMILY DOWN: NOT AT ALL
SUM OF ALL RESPONSES TO PHQ QUESTIONS 1-9: 4

## 2025-03-18 ASSESSMENT — ENCOUNTER SYMPTOMS
EYES NEGATIVE: 1
BACK PAIN: 1
RESPIRATORY NEGATIVE: 1
GASTROINTESTINAL NEGATIVE: 1

## 2025-03-18 NOTE — PROGRESS NOTES
Abdomen is soft.   Musculoskeletal:         General: Tenderness present.      Cervical back: Spasms and tenderness present. Pain with movement present. Decreased range of motion.      Thoracic back: Spasms and tenderness present.      Lumbar back: Spasms and tenderness present. Decreased range of motion. Positive right straight leg raise test and positive left straight leg raise test.   Skin:     General: Skin is warm and dry.   Neurological:      General: No focal deficit present.      Mental Status: She is alert and oriented to person, place, and time. Mental status is at baseline.      Deep Tendon Reflexes: Reflexes are normal and symmetric.      Reflex Scores:       Tricep reflexes are 2+ on the right side and 2+ on the left side.       Bicep reflexes are 2+ on the right side and 2+ on the left side.       Brachioradialis reflexes are 2+ on the right side and 2+ on the left side.       Patellar reflexes are 2+ on the right side and 2+ on the left side.       Achilles reflexes are 2+ on the right side and 2+ on the left side.       Physical Exam       Assistive Devices: none    LABS:   Hemoglobin A1C   Date Value Ref Range Status   07/16/2024 5.4 4.0 - 6.0 % Final     Comment:     HbA1c levels >6% are an indication of hyperglycemia during the preceding 2  to 3 months or longer.    HbA1c levels may reach 20% or higher in poorly controlled diabetes.  Therapeutic action is suggested at levels above 8%.    Diabetes patients with HbA1c levels below 7% meet the goal of the American  Diabetes Association.    HbA1c levels below the established reference range may indicate recent  episodes of hypoglycemia, the presence of Hb variants, or shortened lifetime  of erythrocytes.          Lab Results   Component Value Date/Time     07/16/2024 04:10 PM    K 4.5 07/16/2024 04:10 PM    K 4.4 08/12/2020 05:38 AM     07/16/2024 04:10 PM    CO2 30 07/16/2024 04:10 PM    BUN 20 07/16/2024 04:10 PM    CREATININE 1.0

## 2025-03-31 DIAGNOSIS — Z79.891 ENCOUNTER FOR MONITORING OPIOID MAINTENANCE THERAPY: ICD-10-CM

## 2025-03-31 DIAGNOSIS — Z51.81 ENCOUNTER FOR MONITORING OPIOID MAINTENANCE THERAPY: ICD-10-CM

## 2025-04-14 ENCOUNTER — APPOINTMENT (OUTPATIENT)
Dept: GENERAL RADIOLOGY | Facility: HOSPITAL | Age: 70
End: 2025-04-14
Payer: MEDICARE

## 2025-04-14 ENCOUNTER — APPOINTMENT (OUTPATIENT)
Dept: ULTRASOUND IMAGING | Facility: HOSPITAL | Age: 70
End: 2025-04-14
Payer: MEDICARE

## 2025-04-14 ENCOUNTER — APPOINTMENT (OUTPATIENT)
Dept: CT IMAGING | Facility: HOSPITAL | Age: 70
End: 2025-04-14
Payer: MEDICARE

## 2025-04-14 ENCOUNTER — HOSPITAL ENCOUNTER (EMERGENCY)
Facility: HOSPITAL | Age: 70
Discharge: HOME | End: 2025-04-14
Admitting: FAMILY MEDICINE
Payer: MEDICARE

## 2025-04-14 VITALS
HEIGHT: 65 IN | SYSTOLIC BLOOD PRESSURE: 126 MMHG | BODY MASS INDEX: 44.57 KG/M2 | WEIGHT: 267.5 LBS | HEART RATE: 69 BPM | TEMPERATURE: 98.3 F | DIASTOLIC BLOOD PRESSURE: 55 MMHG | OXYGEN SATURATION: 91 % | RESPIRATION RATE: 16 BRPM

## 2025-04-14 DIAGNOSIS — S00.83XA CONTUSION OF FACE, INITIAL ENCOUNTER: ICD-10-CM

## 2025-04-14 DIAGNOSIS — S80.02XA HEMATOMA OF LEFT KNEE REGION: ICD-10-CM

## 2025-04-14 DIAGNOSIS — S00.81XA ABRASION OF FACE, INITIAL ENCOUNTER: Primary | ICD-10-CM

## 2025-04-14 PROCEDURE — 72125 CT NECK SPINE W/O DYE: CPT

## 2025-04-14 PROCEDURE — 90715 TDAP VACCINE 7 YRS/> IM: CPT | Performed by: NURSE PRACTITIONER

## 2025-04-14 PROCEDURE — 73590 X-RAY EXAM OF LOWER LEG: CPT

## 2025-04-14 PROCEDURE — 90471 IMMUNIZATION ADMIN: CPT | Performed by: NURSE PRACTITIONER

## 2025-04-14 PROCEDURE — 93971 EXTREMITY STUDY: CPT | Performed by: SURGERY

## 2025-04-14 PROCEDURE — 93971 EXTREMITY STUDY: CPT

## 2025-04-14 PROCEDURE — 25010000002 TETANUS-DIPHTH-ACELL PERTUSSIS 5-2.5-18.5 LF-MCG/0.5 SUSPENSION PREFILLED SYRINGE: Performed by: NURSE PRACTITIONER

## 2025-04-14 PROCEDURE — 70486 CT MAXILLOFACIAL W/O DYE: CPT

## 2025-04-14 PROCEDURE — 73700 CT LOWER EXTREMITY W/O DYE: CPT

## 2025-04-14 PROCEDURE — 99284 EMERGENCY DEPT VISIT MOD MDM: CPT

## 2025-04-14 PROCEDURE — 73562 X-RAY EXAM OF KNEE 3: CPT

## 2025-04-14 PROCEDURE — 70450 CT HEAD/BRAIN W/O DYE: CPT

## 2025-04-14 RX ORDER — HYDROCODONE BITARTRATE AND ACETAMINOPHEN 5; 325 MG/1; MG/1
1 TABLET ORAL ONCE
Refills: 0 | Status: COMPLETED | OUTPATIENT
Start: 2025-04-14 | End: 2025-04-14

## 2025-04-14 RX ORDER — HYDROCODONE BITARTRATE AND ACETAMINOPHEN 5; 325 MG/1; MG/1
1 TABLET ORAL EVERY 6 HOURS PRN
Qty: 15 TABLET | Refills: 0 | Status: SHIPPED | OUTPATIENT
Start: 2025-04-14

## 2025-04-14 RX ADMIN — HYDROCODONE BITARTRATE AND ACETAMINOPHEN 1 TABLET: 5; 325 TABLET ORAL at 17:24

## 2025-04-14 RX ADMIN — TETANUS TOXOID, REDUCED DIPHTHERIA TOXOID AND ACELLULAR PERTUSSIS VACCINE, ADSORBED 0.5 ML: 5; 2.5; 8; 8; 2.5 SUSPENSION INTRAMUSCULAR at 17:25

## 2025-04-14 NOTE — DISCHARGE INSTRUCTIONS
Return to ER if symptoms worsen   Elevate/ ice to left knee   Follow up with Dr. Akbar - call tomorrow for appointment

## 2025-04-14 NOTE — ED PROVIDER NOTES
Subjective   History of Present Illness  Patient is a 69-year-old female presents to the emergency department after falling 3 days ago.  She states she fell tripping inside her home injuring her left knee.  She struck her head on the floor as well.  She has an abrasion to her left ear and hematoma to the left side of her head as well.  No loss of consciousness.  She denies neck pain.  She complains of left knee pain and left lower leg pain.  She does have a history of left knee replacement several years ago.  She states that she continues to have increased swelling to the left lower leg.  She has a moderate hematoma to the left lower leg as well.  Denies any abdominal pain or chest pain.  She is not on blood thinner except for aspirin.  She has a history of cervical spondylosis, diabetes, hypertension, cervical radiculopathy.    History provided by:  Patient   used: No        Review of Systems   Constitutional: Negative.    HENT:  Positive for ear pain.         Patient is a 69-year-old female presents to the emergency department after falling 3 days ago.  She states she fell tripping inside her home injuring her left knee.  She struck her head on the floor as well.  She has an abrasion to her left ear and hematoma to the left side of her head as well.  No loss of consciousness.  She denies neck pain.  She complains of left knee pain and left lower leg pain.  She does have a history of left knee replacement several years ago.  She states that she continues to have increased swelling to the left lower leg.  She has a moderate hematoma to the left lower leg as well.  Denies any abdominal pain or chest pain.  She is not on blood thinner except for aspirin.  She has a history of cervical spondylosis, diabetes, hypertension, cervical radiculopathy.     Eyes: Negative.    Respiratory: Negative.     Cardiovascular: Negative.    Gastrointestinal: Negative.    Endocrine: Negative.    Genitourinary:  Negative.    Musculoskeletal: Negative.         Left knee pain and lower leg pain and swelling    Skin: Negative.    Allergic/Immunologic: Negative.    Neurological: Negative.    Hematological: Negative.    Psychiatric/Behavioral: Negative.     All other systems reviewed and are negative.      Past Medical History:   Diagnosis Date    Cervical spondylosis with radiculopathy     Cervicalgia     Diabetes mellitus, type 2     Hypertension     Obesity due to excess calories     Prescription refill     Radiculopathy, cervical        Allergies   Allergen Reactions    Penicillins        Past Surgical History:   Procedure Laterality Date    CHOLECYSTECTOMY      HYSTERECTOMY      REPLACEMENT TOTAL KNEE Left 08/2016       Family History   Problem Relation Age of Onset    Cancer Mother     Hypertension Father     Stroke Father     Arthritis Paternal Grandfather        Social History     Socioeconomic History    Marital status:    Tobacco Use    Smoking status: Never    Smokeless tobacco: Never   Vaping Use    Vaping status: Never Used   Substance and Sexual Activity    Alcohol use: No    Drug use: No    Sexual activity: Yes     Partners: Male     Birth control/protection: None       Prior to Admission medications    Medication Sig Start Date End Date Taking? Authorizing Provider   amLODIPine (NORVASC) 5 MG tablet Take 1 tablet by mouth Daily.    ProviderDane MD   busPIRone (BUSPAR) 10 MG tablet Take 1 tablet by mouth 3 (Three) Times a Day.    Dane Stone MD   celecoxib (CeleBREX) 200 MG capsule Take 1 capsule by mouth 2 (Two) Times a Day.    Dane Stone MD   colesevelam (WELCHOL) 625 MG tablet Take 2 tablets by mouth 2 (Two) Times a Day With Meals.    Dane Stone MD   HYDROcodone-acetaminophen (NORCO) 5-325 MG per tablet Take 1 tablet by mouth Every 8 (Eight) Hours As Needed for Moderate Pain.    Dane Stone MD   levothyroxine (SYNTHROID, LEVOTHROID) 150 MCG tablet Take  "1 tablet by mouth Every Morning.    ProviderDane MD   metFORMIN (GLUCOPHAGE) 1000 MG tablet Take 1 tablet by mouth 2 (Two) Times a Day With Meals. 8/10/17   Kyle Cole DO   pioglitazone (ACTOS) 45 MG tablet Take 1 tablet by mouth Daily.    ProviderDane MD   Semaglutide, 2 MG/DOSE, (Ozempic, 2 MG/DOSE,) 8 MG/3ML solution pen-injector Inject 2 mg under the skin into the appropriate area as directed 1 (One) Time Per Week.    ProviderDane MD   sertraline (ZOLOFT) 100 MG tablet Take 1 tablet by mouth Daily. 10/30/17   Kyle Cole DO       /55   Pulse 65   Temp 98.3 °F (36.8 °C) (Oral)   Resp 16   Ht 165.1 cm (65\")   Wt 121 kg (267 lb 8 oz)   SpO2 97%   BMI 44.51 kg/m²     Objective   Physical Exam  Vitals and nursing note reviewed.   Constitutional:       Appearance: She is well-developed.      Comments: Non toxic appearing. No acute distress   HENT:      Head: Normocephalic.      Comments: Abrasion to top of left ear. Scabbing noted. No signs of infection noted. No hemotympanum noted. Hematoma noted to left side of scalp. No trismus noted. Opnes and closes mouth without diffic. No malocclusion noted  Eyes:      Conjunctiva/sclera: Conjunctivae normal.      Pupils: Pupils are equal, round, and reactive to light.   Neck:      Thyroid: No thyromegaly.      Trachea: No tracheal deviation.   Cardiovascular:      Rate and Rhythm: Normal rate and regular rhythm.      Heart sounds: Normal heart sounds.   Pulmonary:      Effort: Pulmonary effort is normal. No respiratory distress.      Breath sounds: Normal breath sounds. No wheezing or rales.   Chest:      Chest wall: No tenderness.   Abdominal:      General: Bowel sounds are normal.      Palpations: Abdomen is soft.   Musculoskeletal:      Cervical back: Normal range of motion and neck supple. Tenderness present. No rigidity.      Comments: LLE: moderate soft tissue swelling noted to left knee and lower leg with " moderate hematoma noted. Pedal pulses palp. Scarring noted from previous knee replacement    Lymphadenopathy:      Cervical: No cervical adenopathy.   Skin:     General: Skin is warm and dry.   Neurological:      Mental Status: She is alert and oriented to person, place, and time.      Cranial Nerves: No cranial nerve deficit.      Deep Tendon Reflexes: Reflexes are normal and symmetric.   Psychiatric:         Behavior: Behavior normal.         Thought Content: Thought content normal.         Judgment: Judgment normal.         Procedures         Lab Results (last 24 hours)       ** No results found for the last 24 hours. **            CT Lower Extremity Left Without Contrast   Final Result       1.  No evidence of an acute fracture.       2.  There is a large hematoma along the anterolateral fat fascial   interface at the knee and lower leg. Differential considerations include   a large hematoma and possibly a Marshall Marilia lesion. This measures 19   x 7 x 2 cm.       3.  Knee arthroplasty hardware noted without evidence of periprosthetic   lucency or fracture.       4.   Chronic spurring along the lateral aspect of the calcaneus, likely   related to the adjacent traversing peroneal tendons.       5.  There is probable chronic tendinopathy of the distal Achilles.           This report was signed and finalized on 4/14/2025 4:04 PM by Dr. Hayes Orozco MD.          US Venous Doppler Lower Extremity Left (duplex)   Final Result   Impression: There is no evidence of deep venous thrombosis or   superficial thrombophlebitis of the left lower extremity.       Comments: Left lower extremity venous duplex exam was performed using   color Doppler flow, Doppler wave form analysis, and grayscale imaging,   with and without compression. There is no evidence of deep venous   thrombosis of the common femoral, superficial femoral, popliteal,   posterior tibial, and peroneal veins. There is no thrombus identified in   the  saphenofemoral junction or the greater saphenous vein.            This report was signed and finalized on 4/14/2025 3:02 PM by Dr. Eduard Willett MD.          CT Head Without Contrast   Final Result   1. No acute intracranial abnormality is seen.               This report was signed and finalized on 4/14/2025 2:31 PM by Dr. Deven Anguiano MD.          CT Facial Bones Without Contrast   Final Result   1. No facial bone fracture.           This report was signed and finalized on 4/14/2025 2:33 PM by Dr. Deven Anguiano MD.          CT Cervical Spine Without Contrast   Final Result       No acute fracture or traumatic malalignment.       Advanced spondylotic changes as above.               This report was signed and finalized on 4/14/2025 2:34 PM by Bulmaro Park.          XR Tibia Fibula 2 View Left   Final Result   1. Soft tissue swelling at the ankle and question linear lucency at the   lateral malleolus may be artifactual however nondisplaced fracture is   not excluded. CT could be performed for further evaluation.       This report was signed and finalized on 4/14/2025 2:23 PM by George Bateman.          XR Knee 3 View Left   Final Result   1. Osteopenia and left total knee arthroplasty without fracture or joint   effusion.   2. Edema in the soft tissues       This report was signed and finalized on 4/14/2025 2:20 PM by George Bateman.              ED Course  ED Course as of 04/14/25 1621   Mon Apr 14, 2025   1615 IMPRESSION:     1.  No evidence of an acute fracture.     2.  There is a large hematoma along the anterolateral fat fascial  interface at the knee and lower leg. Differential considerations include  a large hematoma and possibly a Marshall Marilia lesion. This measures 19  x 7 x 2 cm.     3.  Knee arthroplasty hardware noted without evidence of periprosthetic  lucency or fracture.     4.   Chronic spurring along the lateral aspect of the calcaneus, likely  related to the adjacent traversing  peroneal tendons.     5.  There is probable chronic tendinopathy of the distal Achilles.        This report was signed and finalized on 4/14/2025 4:04 PM by Dr. Hayes Orozco MD.   [CW]   1615 CT of the head is negative for anything acute.  CT of the facial bones was negative for anything acute.  CT of the cervical spine shows spondylosis no acute fracture.  Vascular studies negative for DVT.  X-ray of the left knee no acute fracture.  X-ray of the left tib-fib was questionable for a ankle fracture so a CT of the left lower extremity was done.  There was no acute fracture noted.  There is a large hematoma along the anterior lateral fat fascial interface of the knee and lower leg.  Measures 19 x 7 x 2 cm.  Knee arthroplasty hardware noted without evidence of periprosthetic lucency or fracture.  Reviewed results of testing with the patient and her family.  Advised the patient she needs to follow-up with her orthopedic specialist Dr. Akbar.  Advised to elevate and apply ice to the area.  Will send the patient home with a small amount of pain medication for acute pain.  Reviewed side effects and potential for abuse.  Bayron report completed and there is no signs of suspicious activity noted.  Patient is in agreement with the care plan and voices understanding of instructions.  Patient will be discharged shortly in stable condition. [CW]      ED Course User Index  [CW] Shahla Elizondo APRN        Medical Decision Making  Patient is a 69-year-old female presents to the emergency department after falling 3 days ago.  She states she fell tripping inside her home injuring her left knee.  She struck her head on the floor as well.  She has an abrasion to her left ear and hematoma to the left side of her head as well.  No loss of consciousness.  She denies neck pain.  She complains of left knee pain and left lower leg pain.  She does have a history of left knee replacement several years ago.  She states that she  continues to have increased swelling to the left lower leg.  She has a moderate hematoma to the left lower leg as well.  Denies any abdominal pain or chest pain.  She is not on blood thinner except for aspirin.  She has a history of cervical spondylosis, diabetes, hypertension, cervical radiculopathy.  Course of treatment in the er: non toxic appearing. No acute distress. Lungs cta. Cv nsr. Hematoma noted to left side of scalp. Abrasion noted to left ear with scabbing noted. No signs of infection noted. No hemotympanum noted. No cervical spine tenderness. Lungs cta cv nsr. Abd soft, non distended. Non tender. LLE: moderate soft tissue swelling and hematoma noted to left knee and lower leg. Pedal pulses palp. Dtrs intact.  Ct head, facial bones, cervical spine, xray of knee, tib/fib, vascular studies ordered  Differential diagnosis to include but not related to: ich; skull fx; facial bone fracture; cervical spine fracture; knee fracture; tib fib fracture; hematoma; dislocation ; and other   Labs Reviewed - No data to display  CT Lower Extremity Left Without Contrast   Final Result         1.  No evidence of an acute fracture.         2.  There is a large hematoma along the anterolateral fat fascial    interface at the knee and lower leg. Differential considerations include    a large hematoma and possibly a Marshall Marilia lesion. This measures 19    x 7 x 2 cm.         3.  Knee arthroplasty hardware noted without evidence of periprosthetic    lucency or fracture.         4.   Chronic spurring along the lateral aspect of the calcaneus, likely    related to the adjacent traversing peroneal tendons.         5.  There is probable chronic tendinopathy of the distal Achilles.              This report was signed and finalized on 4/14/2025 4:04 PM by Dr. Hayes Orozco MD.          US Venous Doppler Lower Extremity Left (duplex)   Final Result    Impression: There is no evidence of deep venous thrombosis or     superficial thrombophlebitis of the left lower extremity.         Comments: Left lower extremity venous duplex exam was performed using    color Doppler flow, Doppler wave form analysis, and grayscale imaging,    with and without compression. There is no evidence of deep venous    thrombosis of the common femoral, superficial femoral, popliteal,    posterior tibial, and peroneal veins. There is no thrombus identified in    the saphenofemoral junction or the greater saphenous vein.               This report was signed and finalized on 4/14/2025 3:02 PM by Dr. Eduard Willett MD.          CT Head Without Contrast   Final Result    1. No acute intracranial abnormality is seen.                   This report was signed and finalized on 4/14/2025 2:31 PM by Dr. Deven Anguiano MD.          CT Facial Bones Without Contrast   Final Result    1. No facial bone fracture.              This report was signed and finalized on 4/14/2025 2:33 PM by Dr. Deven Anguiano MD.          CT Cervical Spine Without Contrast   Final Result         No acute fracture or traumatic malalignment.         Advanced spondylotic changes as above.                   This report was signed and finalized on 4/14/2025 2:34 PM by Bulmaro Park.          XR Tibia Fibula 2 View Left   Final Result    1. Soft tissue swelling at the ankle and question linear lucency at the    lateral malleolus may be artifactual however nondisplaced fracture is    not excluded. CT could be performed for further evaluation.         This report was signed and finalized on 4/14/2025 2:23 PM by George Bateman.          XR Knee 3 View Left   Final Result    1. Osteopenia and left total knee arthroplasty without fracture or joint    effusion.    2. Edema in the soft tissues         This report was signed and finalized on 4/14/2025 2:20 PM by George Bateman.          CT of the head is negative for anything acute.  CT of the facial bones was negative for anything  acute.  CT of the cervical spine shows spondylosis no acute fracture.  Vascular studies negative for DVT.  X-ray of the left knee no acute fracture.  X-ray of the left tib-fib was questionable for a ankle fracture so a CT of the left lower extremity was done.  There was no acute fracture noted.  There is a large hematoma along the anterior lateral fat fascial interface of the knee and lower leg.  Measures 19 x 7 x 2 cm.  Knee arthroplasty hardware noted without evidence of periprosthetic lucency or fracture.  Reviewed results of testing with the patient and her family.  Advised the patient she needs to follow-up with her orthopedic specialist Dr. Akbar.  Advised to elevate and apply ice to the area.  Will send the patient home with a small amount of pain medication for acute pain.  Reviewed side effects and potential for abuse.  Bayron report completed and there is no signs of suspicious activity noted.  Patient is in agreement with the care plan and voices understanding of instructions.  Patient will be discharged shortly in stable condition.      Problems Addressed:  Abrasion of face, initial encounter: complicated acute illness or injury  Contusion of face, initial encounter: complicated acute illness or injury  Hematoma of left knee region: complicated acute illness or injury    Amount and/or Complexity of Data Reviewed  Radiology: ordered. Decision-making details documented in ED Course.    Risk  Prescription drug management.         Final diagnoses:   Abrasion of face, initial encounter   Contusion of face, initial encounter   Hematoma of left knee region          Shahla Elizondo, APRN  04/14/25 7701

## 2025-04-16 DIAGNOSIS — M54.50 CHRONIC BILATERAL LOW BACK PAIN WITHOUT SCIATICA: ICD-10-CM

## 2025-04-16 DIAGNOSIS — G89.29 CHRONIC BILATERAL LOW BACK PAIN WITHOUT SCIATICA: ICD-10-CM

## 2025-04-16 NOTE — TELEPHONE ENCOUNTER
Last seen in office visit: 3/18/25  Next scheduled office visit: 4/22/25 with Reina  Last UDS results: compliant  Any discrepancies on Jono (such as refills from another provider): none

## 2025-04-21 ENCOUNTER — TELEPHONE (OUTPATIENT)
Age: 70
End: 2025-04-21

## 2025-04-22 RX ORDER — HYDROCODONE BITARTRATE AND ACETAMINOPHEN 7.5; 325 MG/1; MG/1
1 TABLET ORAL EVERY 8 HOURS PRN
Qty: 90 TABLET | Refills: 0 | Status: SHIPPED | OUTPATIENT
Start: 2025-04-22 | End: 2025-05-22

## 2025-04-23 ENCOUNTER — OFFICE VISIT (OUTPATIENT)
Age: 70
End: 2025-04-23
Payer: MEDICARE

## 2025-04-23 VITALS — WEIGHT: 267 LBS | HEIGHT: 65 IN | BODY MASS INDEX: 44.48 KG/M2

## 2025-04-23 DIAGNOSIS — Z91.81 STATUS POST FALL: ICD-10-CM

## 2025-04-23 DIAGNOSIS — S80.12XA CONTUSION OF LEFT LOWER LEG, INITIAL ENCOUNTER: ICD-10-CM

## 2025-04-23 DIAGNOSIS — M79.662 PAIN OF LEFT LOWER LEG: Primary | ICD-10-CM

## 2025-04-23 DIAGNOSIS — Z96.652 HISTORY OF LEFT KNEE REPLACEMENT: ICD-10-CM

## 2025-04-23 PROCEDURE — 3017F COLORECTAL CA SCREEN DOC REV: CPT | Performed by: PHYSICIAN ASSISTANT

## 2025-04-23 PROCEDURE — 1090F PRES/ABSN URINE INCON ASSESS: CPT | Performed by: PHYSICIAN ASSISTANT

## 2025-04-23 PROCEDURE — G8427 DOCREV CUR MEDS BY ELIG CLIN: HCPCS | Performed by: PHYSICIAN ASSISTANT

## 2025-04-23 PROCEDURE — G8399 PT W/DXA RESULTS DOCUMENT: HCPCS | Performed by: PHYSICIAN ASSISTANT

## 2025-04-23 PROCEDURE — 1159F MED LIST DOCD IN RCRD: CPT | Performed by: PHYSICIAN ASSISTANT

## 2025-04-23 PROCEDURE — 1036F TOBACCO NON-USER: CPT | Performed by: PHYSICIAN ASSISTANT

## 2025-04-23 PROCEDURE — 99203 OFFICE O/P NEW LOW 30 MIN: CPT | Performed by: PHYSICIAN ASSISTANT

## 2025-04-23 PROCEDURE — 1123F ACP DISCUSS/DSCN MKR DOCD: CPT | Performed by: PHYSICIAN ASSISTANT

## 2025-04-23 PROCEDURE — G8417 CALC BMI ABV UP PARAM F/U: HCPCS | Performed by: PHYSICIAN ASSISTANT

## 2025-04-23 RX ORDER — CEPHALEXIN 500 MG/1
500 CAPSULE ORAL 2 TIMES DAILY
Qty: 14 CAPSULE | Refills: 0 | Status: SHIPPED | OUTPATIENT
Start: 2025-04-23 | End: 2025-04-30

## 2025-04-23 RX ORDER — METHYLPREDNISOLONE 4 MG/1
TABLET ORAL
Qty: 21 TABLET | Refills: 0 | Status: SHIPPED | OUTPATIENT
Start: 2025-04-23

## 2025-04-23 RX ORDER — ALBUTEROL SULFATE 90 UG/1
INHALANT RESPIRATORY (INHALATION)
COMMUNITY
Start: 2025-03-24

## 2025-04-23 ASSESSMENT — ENCOUNTER SYMPTOMS
COLOR CHANGE: 1
SHORTNESS OF BREATH: 0

## 2025-04-23 NOTE — PROGRESS NOTES
[facility]  [facility]  [address] [facility] [place] [address]  Dept: [phone]  Dept Fax: [phone]  Loc: [phone]     Domitila Corrales (:  1955) is a 69 y.o. female,New patient, here for evaluation of the following:    Chief Complaint   Patient presents with    Knee Pain     L knee  DOI: 04/10 &            Subjective   Patient is a 69-year-old  female came to the clinic with left lower leg pain.  She does have a history of total knee arthroplasty with Dr. Busby in  of the left knee.  She reports she has fallen twice this month.  On 4/10/2025 as well as 2025.  The second fall was more significant.  She was seen at [facility] on 2025.  They performed x-rays and a CT scan.  Those images do not reveal any fractures.  The CT shows no lucencies in the arthroplasty.  There is a large hematoma and contusions of the lower leg.  She has tenderness on the outside of her lower leg and the outside of her ankle.        Allergies   Allergen Reactions    Pcn [Penicillins] Rash     childhood     Past Surgical History:   Procedure Laterality Date    CARPAL TUNNEL RELEASE Left 3/25/2022    LEFT CARPAL TUNNEL RELEASE performed by Brooks Bell MD at [facility] OR    CHOLECYSTECTOMY      COLONOSCOPY N/A 2019    Dr KATELYN Rosas-[facility]    HYSTERECTOMY (CERVIX STATUS UNKNOWN)      TOTAL KNEE ARTHROPLASTY Left 2016    KNEE TOTAL ARTHROPLASTY performed by Roberth Busby MD at [facility] OR    TOTAL KNEE ARTHROPLASTY Right 2020    RIGHT TOTAL KNEE REPLACEMENT performed by Roberth Busby MD at [facility] OR    UPPER GASTROINTESTINAL ENDOSCOPY N/A 2019    Dr KATELYN Rosas-Gastritis, duodenitis    US BIOPSY THYROID  2025    US BIOPSY THYROID     Social History     Tobacco Use    Smoking status: Never    Smokeless tobacco: Never   Vaping Use    Vaping status: Never Used   Substance Use Topics    Alcohol use:  Sig: Apply twice daily for 5 days Sig: Take one pill twice daily Sig: Wash affected areas daily. Sig: Apply a thin layer to the affected areas twice daily Sig: Apply pea sized amount per area at night Sig: Apply to affected areas twice daily Sig: Apply a thin layer to the itching areas twice daily as needed Sig: Apply nightly to warts nightly under occlusion Sig: Apply a thin layer to the areas with decreased hair density 1-2 times daily Sig: Apply a thin layer to the affected skin twice daily Sig: Apply to affected areas on face twice daily Sig: Apply a thin layer to the scar daily Sig: Apply to the affected skin twice daily Sig: Take one pill daily Intro Statement: I recommended the following products: Sig: Take one twice daily Sig: Use as directed when washing hair Sig: Apply a thin layer to the affected nails daily Sig: Apply a thin layer to the affected areas daily Detail Level: Zone Product Type (1): Rosacea Rosacea Medicines Prescription 1: Azelaic Acid 15%, Ivermectin 1%, Metronidazole 1% Cream

## 2025-05-08 ENCOUNTER — OFFICE VISIT (OUTPATIENT)
Age: 70
End: 2025-05-08
Payer: MEDICARE

## 2025-05-08 VITALS — WEIGHT: 267 LBS | HEIGHT: 65 IN | BODY MASS INDEX: 44.48 KG/M2

## 2025-05-08 DIAGNOSIS — L03.116 CELLULITIS OF LEFT LOWER EXTREMITY: Primary | ICD-10-CM

## 2025-05-08 PROCEDURE — G8417 CALC BMI ABV UP PARAM F/U: HCPCS | Performed by: PHYSICIAN ASSISTANT

## 2025-05-08 PROCEDURE — 1090F PRES/ABSN URINE INCON ASSESS: CPT | Performed by: PHYSICIAN ASSISTANT

## 2025-05-08 PROCEDURE — 1123F ACP DISCUSS/DSCN MKR DOCD: CPT | Performed by: PHYSICIAN ASSISTANT

## 2025-05-08 PROCEDURE — 1159F MED LIST DOCD IN RCRD: CPT | Performed by: PHYSICIAN ASSISTANT

## 2025-05-08 PROCEDURE — G8399 PT W/DXA RESULTS DOCUMENT: HCPCS | Performed by: PHYSICIAN ASSISTANT

## 2025-05-08 PROCEDURE — 3017F COLORECTAL CA SCREEN DOC REV: CPT | Performed by: PHYSICIAN ASSISTANT

## 2025-05-08 PROCEDURE — 99213 OFFICE O/P EST LOW 20 MIN: CPT | Performed by: PHYSICIAN ASSISTANT

## 2025-05-08 PROCEDURE — G8427 DOCREV CUR MEDS BY ELIG CLIN: HCPCS | Performed by: PHYSICIAN ASSISTANT

## 2025-05-08 PROCEDURE — 96372 THER/PROPH/DIAG INJ SC/IM: CPT | Performed by: PHYSICIAN ASSISTANT

## 2025-05-08 PROCEDURE — 1036F TOBACCO NON-USER: CPT | Performed by: PHYSICIAN ASSISTANT

## 2025-05-08 RX ORDER — TRIAMCINOLONE ACETONIDE 40 MG/ML
40 INJECTION, SUSPENSION INTRA-ARTICULAR; INTRAMUSCULAR ONCE
Status: COMPLETED | OUTPATIENT
Start: 2025-05-08 | End: 2025-05-08

## 2025-05-08 RX ORDER — SULFAMETHOXAZOLE AND TRIMETHOPRIM 400; 80 MG/1; MG/1
1 TABLET ORAL 2 TIMES DAILY
Qty: 14 TABLET | Refills: 0 | Status: SHIPPED | OUTPATIENT
Start: 2025-05-08 | End: 2025-05-15

## 2025-05-08 RX ORDER — DEXAMETHASONE SODIUM PHOSPHATE 4 MG/ML
4 INJECTION, SOLUTION INTRAMUSCULAR; INTRAVENOUS ONCE
Status: COMPLETED | OUTPATIENT
Start: 2025-05-08 | End: 2025-05-08

## 2025-05-08 RX ADMIN — DEXAMETHASONE SODIUM PHOSPHATE 4 MG: 4 INJECTION, SOLUTION INTRAMUSCULAR; INTRAVENOUS at 11:06

## 2025-05-08 RX ADMIN — TRIAMCINOLONE ACETONIDE 40 MG: 40 INJECTION, SUSPENSION INTRA-ARTICULAR; INTRAMUSCULAR at 11:06

## 2025-05-08 ASSESSMENT — ENCOUNTER SYMPTOMS: SHORTNESS OF BREATH: 0

## 2025-05-08 NOTE — PROGRESS NOTES
[facility]  [facility]  [address] [facility] [place] [address]  Dept: [phone]  Dept Fax: [phone]  Loc: [phone]     Domitila Corrales (:  1955) is a 69 y.o. female,Established patient, here for evaluation of the following:    Chief Complaint   Patient presents with    Follow-up     L lower leg           Subjective   Patient is a 69-year-old  female came to the clinic with left lower leg pain.  She does have a history of total knee arthroplasty with Dr. Busby in  of the left knee.  She reports she has fallen twice this month.  On 4/10/2025 as well as 2025.  The second fall was more significant.  She was seen at [facility] ER on 2025.  They performed x-rays and a CT scan.  Those images do not reveal any fractures.  The CT shows no lucencies in the arthroplasty.   At last office visit she was prescribed cephalexin 500 mg as well as a Medrol Dosepak.  She was also advised to be nonweightbearing.  She reports today that she is doing much better.  She is able to walk without pain.  She still has tightness of her skin itching and pain in the back of her calf.  She reports it as a pulling sensation.        Allergies   Allergen Reactions    Pcn [Penicillins] Rash     childhood     Past Surgical History:   Procedure Laterality Date    CARPAL TUNNEL RELEASE Left 3/25/2022    LEFT CARPAL TUNNEL RELEASE performed by Brooks Bell MD at [facility] OR    CHOLECYSTECTOMY      COLONOSCOPY N/A 2019    Dr KATELYN Rosas-[facility]    HYSTERECTOMY (CERVIX STATUS UNKNOWN)      TOTAL KNEE ARTHROPLASTY Left 2016    KNEE TOTAL ARTHROPLASTY performed by Roberth Busby MD at [facility] OR    TOTAL KNEE ARTHROPLASTY Right 2020    RIGHT TOTAL KNEE REPLACEMENT performed by Roberth Busby MD at [facility] OR    UPPER GASTROINTESTINAL ENDOSCOPY N/A 2019    Dr KATELYN Rosas-Gastritis, duodenitis    US BIOPSY THYROID  2025

## 2025-05-12 ENCOUNTER — OFFICE VISIT (OUTPATIENT)
Dept: PAIN MANAGEMENT | Age: 70
End: 2025-05-12
Payer: MEDICARE

## 2025-05-12 VITALS
BODY MASS INDEX: 42.99 KG/M2 | OXYGEN SATURATION: 94 % | TEMPERATURE: 97.6 F | RESPIRATION RATE: 16 BRPM | WEIGHT: 258 LBS | SYSTOLIC BLOOD PRESSURE: 161 MMHG | HEIGHT: 65 IN | DIASTOLIC BLOOD PRESSURE: 76 MMHG | HEART RATE: 84 BPM

## 2025-05-12 DIAGNOSIS — M54.9 CHRONIC NECK AND BACK PAIN: Primary | ICD-10-CM

## 2025-05-12 DIAGNOSIS — M79.18 MYOFASCIAL MUSCLE PAIN: ICD-10-CM

## 2025-05-12 DIAGNOSIS — G89.29 CHRONIC BILATERAL LOW BACK PAIN WITHOUT SCIATICA: ICD-10-CM

## 2025-05-12 DIAGNOSIS — F11.90 CHRONIC, CONTINUOUS USE OF OPIOIDS: ICD-10-CM

## 2025-05-12 DIAGNOSIS — M54.16 LUMBAR RADICULOPATHY: ICD-10-CM

## 2025-05-12 DIAGNOSIS — G89.29 CHRONIC NECK AND BACK PAIN: Primary | ICD-10-CM

## 2025-05-12 DIAGNOSIS — M54.50 CHRONIC BILATERAL LOW BACK PAIN WITHOUT SCIATICA: ICD-10-CM

## 2025-05-12 DIAGNOSIS — M54.12 CERVICAL RADICULOPATHY: ICD-10-CM

## 2025-05-12 DIAGNOSIS — M54.2 CHRONIC NECK AND BACK PAIN: Primary | ICD-10-CM

## 2025-05-12 PROCEDURE — 1159F MED LIST DOCD IN RCRD: CPT

## 2025-05-12 PROCEDURE — 3077F SYST BP >= 140 MM HG: CPT

## 2025-05-12 PROCEDURE — 1160F RVW MEDS BY RX/DR IN RCRD: CPT

## 2025-05-12 PROCEDURE — G8399 PT W/DXA RESULTS DOCUMENT: HCPCS

## 2025-05-12 PROCEDURE — 1125F AMNT PAIN NOTED PAIN PRSNT: CPT

## 2025-05-12 PROCEDURE — 99214 OFFICE O/P EST MOD 30 MIN: CPT

## 2025-05-12 PROCEDURE — G8417 CALC BMI ABV UP PARAM F/U: HCPCS

## 2025-05-12 PROCEDURE — 1036F TOBACCO NON-USER: CPT

## 2025-05-12 PROCEDURE — G8427 DOCREV CUR MEDS BY ELIG CLIN: HCPCS

## 2025-05-12 PROCEDURE — 1123F ACP DISCUSS/DSCN MKR DOCD: CPT

## 2025-05-12 PROCEDURE — 3017F COLORECTAL CA SCREEN DOC REV: CPT

## 2025-05-12 PROCEDURE — 3078F DIAST BP <80 MM HG: CPT

## 2025-05-12 PROCEDURE — 1090F PRES/ABSN URINE INCON ASSESS: CPT

## 2025-05-12 RX ORDER — HYDROCODONE BITARTRATE AND ACETAMINOPHEN 7.5; 325 MG/1; MG/1
1 TABLET ORAL EVERY 8 HOURS PRN
Qty: 90 TABLET | Refills: 0 | Status: SHIPPED | OUTPATIENT
Start: 2025-05-22 | End: 2025-06-21

## 2025-05-12 ASSESSMENT — ENCOUNTER SYMPTOMS
RESPIRATORY NEGATIVE: 1
GASTROINTESTINAL NEGATIVE: 1
BACK PAIN: 1
EYES NEGATIVE: 1

## 2025-05-12 NOTE — PROGRESS NOTES
antibiotics and steroids to manage the swelling.    MEDICATIONS  Hydrocodone       Results  Imaging  X-ray lumbar spine showed multilevel disk height loss, end plate osteophytes and facet degeneration, grade 1 retrolisthesis of L1 on L2 and grade 1 anterolisthesis of L4 on L5, mild dextroscoliosis.  X-ray cervical spine showed grade 1 anterolisthesis of C2 on C3, trace retrolisthesis of C4 on C5 and multilevel disk height loss, end plate osteophytes uncover and covered hypertrophy facet degeneration.  X-ray of the knee showed some osteopenia, swelling and soft tissue injury, but all hardware from previous knee replacement was intact.  CT scan of the knee showed a large hematoma, but no fracture.       Current MME: 22.5    opioid medication: Hydrocodone    1.) How is the patient taking their opioid medication: 3 times daily    2.) Is it improving the pain and function of the patient? yes  3.) What is the patient able to do specifically that they would not be able to do without the opioid medication. yes  4.) Is the patient having any side effects from opioids? no  5.) All patients on MME > 50, high risk patients and/or patients with concurrent benzo therapy should be given narcan and notes should indicate that the patient was instructed on how to use this. no        Current PEG:       3/18/2025 5/12/2025   PEG 3-ITEM PAIN SCORE   What number best describes your pain on average in the past week? 9 7   What number best describes how, during the past week, pain has interfered with your enjoyment of life? 7 5   What number best describes how, during the past week, pain has interfered with your general activity? 7 8   PEG Pain Total Score 7.67 6.67         ORT Score: 2    PHQ-9 Score: 4    Current Pain Assessment         5/12/2025     3:18 PM   AMB PAIN ASSESSMENT   Location of Pain Back   Location Modifiers Inferior;Medial   Severity of Pain 7   Quality of Pain Sharp;Aching   Duration of Pain Persistent   Frequency of

## 2025-05-29 ENCOUNTER — HOSPITAL ENCOUNTER (OUTPATIENT)
Dept: MRI IMAGING | Age: 70
Discharge: HOME | End: 2025-05-29
Payer: MEDICARE

## 2025-05-29 DIAGNOSIS — M54.12 CERVICAL RADICULOPATHY: ICD-10-CM

## 2025-05-29 DIAGNOSIS — M54.16 LUMBAR RADICULOPATHY: ICD-10-CM

## 2025-05-29 PROCEDURE — 72141 MRI NECK SPINE W/O DYE: CPT

## 2025-05-29 PROCEDURE — 72148 MRI LUMBAR SPINE W/O DYE: CPT

## 2025-06-17 DIAGNOSIS — K52.9 CHRONIC DIARRHEA: ICD-10-CM

## 2025-06-17 DIAGNOSIS — K90.89 BILE SALT-INDUCED DIARRHEA: ICD-10-CM

## 2025-06-17 RX ORDER — COLESEVELAM 180 1/1
1250 TABLET ORAL 2 TIMES DAILY WITH MEALS
Qty: 120 TABLET | Refills: 5 | Status: SHIPPED | OUTPATIENT
Start: 2025-06-17

## 2025-06-24 DIAGNOSIS — M54.50 CHRONIC BILATERAL LOW BACK PAIN WITHOUT SCIATICA: ICD-10-CM

## 2025-06-24 DIAGNOSIS — G89.29 CHRONIC BILATERAL LOW BACK PAIN WITHOUT SCIATICA: ICD-10-CM

## 2025-06-24 RX ORDER — HYDROCODONE BITARTRATE AND ACETAMINOPHEN 7.5; 325 MG/1; MG/1
1 TABLET ORAL EVERY 8 HOURS PRN
Qty: 90 TABLET | Refills: 0 | Status: SHIPPED | OUTPATIENT
Start: 2025-06-24 | End: 2025-07-24

## 2025-06-24 NOTE — TELEPHONE ENCOUNTER
Last seen in office visit: 05/12/25  Next scheduled office visit: 07/14/25  Last UDS results: 03/18/25  Any discrepancies on Jono (such as refills from another provider): no    Pt called 06/23/25 @7270

## 2025-07-14 ENCOUNTER — OFFICE VISIT (OUTPATIENT)
Dept: PAIN MANAGEMENT | Age: 70
End: 2025-07-14
Payer: MEDICARE

## 2025-07-14 VITALS
SYSTOLIC BLOOD PRESSURE: 138 MMHG | HEART RATE: 73 BPM | WEIGHT: 268 LBS | DIASTOLIC BLOOD PRESSURE: 74 MMHG | OXYGEN SATURATION: 95 % | TEMPERATURE: 97 F | BODY MASS INDEX: 44.65 KG/M2 | RESPIRATION RATE: 18 BRPM | HEIGHT: 65 IN

## 2025-07-14 DIAGNOSIS — M54.15 RADICULOPATHY OF THORACOLUMBAR REGION: ICD-10-CM

## 2025-07-14 DIAGNOSIS — M54.2 CHRONIC NECK AND BACK PAIN: Primary | ICD-10-CM

## 2025-07-14 DIAGNOSIS — F11.90 CHRONIC, CONTINUOUS USE OF OPIOIDS: ICD-10-CM

## 2025-07-14 DIAGNOSIS — G89.29 CHRONIC BILATERAL LOW BACK PAIN WITHOUT SCIATICA: ICD-10-CM

## 2025-07-14 DIAGNOSIS — G89.29 CHRONIC NECK AND BACK PAIN: Primary | ICD-10-CM

## 2025-07-14 DIAGNOSIS — M54.16 LUMBAR RADICULOPATHY: ICD-10-CM

## 2025-07-14 DIAGNOSIS — M54.50 CHRONIC BILATERAL LOW BACK PAIN WITHOUT SCIATICA: ICD-10-CM

## 2025-07-14 DIAGNOSIS — M54.9 CHRONIC NECK AND BACK PAIN: Primary | ICD-10-CM

## 2025-07-14 PROCEDURE — 3075F SYST BP GE 130 - 139MM HG: CPT

## 2025-07-14 PROCEDURE — 1125F AMNT PAIN NOTED PAIN PRSNT: CPT

## 2025-07-14 PROCEDURE — 1159F MED LIST DOCD IN RCRD: CPT

## 2025-07-14 PROCEDURE — G8417 CALC BMI ABV UP PARAM F/U: HCPCS

## 2025-07-14 PROCEDURE — 3017F COLORECTAL CA SCREEN DOC REV: CPT

## 2025-07-14 PROCEDURE — 1090F PRES/ABSN URINE INCON ASSESS: CPT

## 2025-07-14 PROCEDURE — G8399 PT W/DXA RESULTS DOCUMENT: HCPCS

## 2025-07-14 PROCEDURE — G8427 DOCREV CUR MEDS BY ELIG CLIN: HCPCS

## 2025-07-14 PROCEDURE — 99214 OFFICE O/P EST MOD 30 MIN: CPT

## 2025-07-14 PROCEDURE — 1123F ACP DISCUSS/DSCN MKR DOCD: CPT

## 2025-07-14 PROCEDURE — 1036F TOBACCO NON-USER: CPT

## 2025-07-14 PROCEDURE — 1160F RVW MEDS BY RX/DR IN RCRD: CPT

## 2025-07-14 PROCEDURE — 3078F DIAST BP <80 MM HG: CPT

## 2025-07-14 RX ORDER — HYDROCODONE BITARTRATE AND ACETAMINOPHEN 7.5; 325 MG/1; MG/1
1 TABLET ORAL EVERY 8 HOURS PRN
Qty: 90 TABLET | Refills: 0 | Status: CANCELLED | OUTPATIENT
Start: 2025-07-14 | End: 2025-08-13

## 2025-07-14 RX ORDER — GABAPENTIN 100 MG/1
CAPSULE ORAL
Qty: 126 CAPSULE | Refills: 0 | Status: SHIPPED | OUTPATIENT
Start: 2025-07-14 | End: 2025-08-04

## 2025-07-14 ASSESSMENT — ENCOUNTER SYMPTOMS
RESPIRATORY NEGATIVE: 1
EYES NEGATIVE: 1
GASTROINTESTINAL NEGATIVE: 1
BACK PAIN: 1

## 2025-07-14 NOTE — PROGRESS NOTES
[facility]  [address]  [place] [address]  Phone: [phone]  Fax: [phone]    Follow Up Office Visit    Patient Name: Domitila Corrales    MR #: [id number]    Account #:    : 1955    Age: 69 y.o.    Sex: female    Date: 2025     PCP: Gabbie Shah APRN - CNP    Chief Complaint:   Chief Complaint   Patient presents with    Follow-up    Back Pain     8/10    Neck Pain     7/10       History of Present Illness:   History of Present Illness  The patient presents for a routine visit for management of chronic low back pain and chronic neck pain.    She reports that her back pain is more severe than her neck pain, rating it as an 8 out of 10 today. The pain is primarily located in the mid to lower back, extending towards the bra strap area and occasionally radiating into the hips, with the right side being more affected. This discomfort hinders her daily activities, such as sweeping the floor, requiring her to take breaks and rest before resuming. She has not received any injections since 10/2024.    Her neck pain is rated as a 7 out of 10 today and is localized to the left side, sometimes extending down her arms, accompanied by numbness and tingling sensations.    She is currently on Zoloft and Celebrex. She also takes hydrocodone 1 to 2 times a day, primarily when the pain intensifies or before bedtime.       Results  Imaging   - MRI of lumbar spine: Multilevel degenerative changes, severe spinal canal stenosis at L3-4 and L4-5, and foraminal stenosis at T11-T12 with some right neuroforaminal stenosis.   - MRI of cervical spine: Advanced degenerative changes, canal stenosis from C4-5 throughout C5-6 and C7-T1, and foraminal stenosis throughout those levels, worse on the left.       Current MME:  22.5 MME    opioid medication: Hydrocodone    1.) How is the patient taking their opioid medication: 1-2 times daily    2.) Is it improving the pain and

## 2025-07-30 ENCOUNTER — TELEPHONE (OUTPATIENT)
Dept: PAIN MANAGEMENT | Age: 70
End: 2025-07-30

## 2025-07-30 NOTE — TELEPHONE ENCOUNTER
Call placed to patient to remind her to hold her asa x5 days starting tomorrow for TESI next Tuesday 8/5/2025. Patient states understanding.

## 2025-08-05 ENCOUNTER — HOSPITAL ENCOUNTER (OUTPATIENT)
Dept: PAIN MANAGEMENT | Age: 70
Discharge: HOME | End: 2025-08-05
Payer: MEDICARE

## 2025-08-05 VITALS
TEMPERATURE: 96.4 F | OXYGEN SATURATION: 97 % | RESPIRATION RATE: 18 BRPM | HEART RATE: 57 BPM | DIASTOLIC BLOOD PRESSURE: 65 MMHG | SYSTOLIC BLOOD PRESSURE: 141 MMHG

## 2025-08-05 DIAGNOSIS — R52 PAIN MANAGEMENT: ICD-10-CM

## 2025-08-05 PROCEDURE — 6360000002 HC RX W HCPCS

## 2025-08-05 PROCEDURE — 62321 NJX INTERLAMINAR CRV/THRC: CPT

## 2025-08-05 PROCEDURE — 2500000003 HC RX 250 WO HCPCS

## 2025-08-05 RX ORDER — LIDOCAINE HYDROCHLORIDE 10 MG/ML
5 INJECTION, SOLUTION EPIDURAL; INFILTRATION; INTRACAUDAL; PERINEURAL ONCE
Status: DISCONTINUED | OUTPATIENT
Start: 2025-08-05 | End: 2025-08-07 | Stop reason: HOSPADM

## 2025-08-05 RX ORDER — SODIUM CHLORIDE 9 MG/ML
5 INJECTION, SOLUTION INTRAMUSCULAR; INTRAVENOUS; SUBCUTANEOUS ONCE
Status: DISCONTINUED | OUTPATIENT
Start: 2025-08-05 | End: 2025-08-07 | Stop reason: HOSPADM

## 2025-08-05 RX ORDER — DEXAMETHASONE SODIUM PHOSPHATE 10 MG/ML
20 INJECTION, SOLUTION INTRAMUSCULAR; INTRAVENOUS ONCE
Status: DISCONTINUED | OUTPATIENT
Start: 2025-08-05 | End: 2025-08-07 | Stop reason: HOSPADM

## 2025-08-05 ASSESSMENT — PAIN - FUNCTIONAL ASSESSMENT: PAIN_FUNCTIONAL_ASSESSMENT: 0-10

## 2025-08-11 ENCOUNTER — TELEPHONE (OUTPATIENT)
Dept: PAIN MANAGEMENT | Age: 70
End: 2025-08-11

## 2025-08-12 DIAGNOSIS — M54.50 CHRONIC BILATERAL LOW BACK PAIN WITHOUT SCIATICA: ICD-10-CM

## 2025-08-12 DIAGNOSIS — M54.15 RADICULOPATHY OF THORACOLUMBAR REGION: ICD-10-CM

## 2025-08-12 DIAGNOSIS — G89.29 CHRONIC BILATERAL LOW BACK PAIN WITHOUT SCIATICA: ICD-10-CM

## 2025-08-12 DIAGNOSIS — M54.16 LUMBAR RADICULOPATHY: ICD-10-CM

## 2025-08-12 RX ORDER — GABAPENTIN 300 MG/1
300 CAPSULE ORAL 3 TIMES DAILY
Qty: 90 CAPSULE | Refills: 2 | Status: SHIPPED | OUTPATIENT
Start: 2025-08-13 | End: 2025-11-11

## 2025-08-21 ENCOUNTER — TELEPHONE (OUTPATIENT)
Dept: GASTROENTEROLOGY | Age: 70
End: 2025-08-21

## 2025-08-21 DIAGNOSIS — K90.89 BILE SALT-INDUCED DIARRHEA: ICD-10-CM

## 2025-08-21 DIAGNOSIS — M54.50 CHRONIC BILATERAL LOW BACK PAIN WITHOUT SCIATICA: ICD-10-CM

## 2025-08-21 DIAGNOSIS — G89.29 CHRONIC BILATERAL LOW BACK PAIN WITHOUT SCIATICA: ICD-10-CM

## 2025-08-21 DIAGNOSIS — K52.9 CHRONIC DIARRHEA: ICD-10-CM

## 2025-08-21 RX ORDER — COLESEVELAM 180 1/1
1250 TABLET ORAL 2 TIMES DAILY WITH MEALS
Qty: 120 TABLET | Refills: 11 | Status: SHIPPED | OUTPATIENT
Start: 2025-08-21

## 2025-08-21 RX ORDER — HYDROCODONE BITARTRATE AND ACETAMINOPHEN 7.5; 325 MG/1; MG/1
1 TABLET ORAL EVERY 8 HOURS PRN
Qty: 90 TABLET | Refills: 0 | Status: SHIPPED | OUTPATIENT
Start: 2025-08-21 | End: 2025-09-20

## (undated) DEVICE — GLOVE SURG SZ 85 L12IN FNGR THK94MIL TRNSLUC YEL LTX

## (undated) DEVICE — SYSTEM SKIN CLSR 22CM DERMBND PRINEO

## (undated) DEVICE — AMBU AURAONCE U SIZE 4: Brand: AURAONCE

## (undated) DEVICE — GLOVE SURG SZ 75 L12IN FNGR THK94MIL TRNSLUC YEL LTX

## (undated) DEVICE — SURGICAL PROCEDURE PACK KNEE TOT DBD CDS LOURDES HOSP LF

## (undated) DEVICE — DUAL CUT SAGITTAL BLADE

## (undated) DEVICE — SHEET,DRAPE,53X77,STERILE: Brand: MEDLINE

## (undated) DEVICE — LARYNGOSCOPE BLDE MAC HNDL M SZ 35 ST CURAPLEX CURAVIEW LED

## (undated) DEVICE — SOLUTION IV IRRIG POUR BRL 0.9% SODIUM CHL 2F7124

## (undated) DEVICE — FORCEPS BX L240CM DIA2.4MM L NDL RAD JAW 4 133340

## (undated) DEVICE — TUBE ET 7.5MM NSL ORAL BASIC CUF INTMED MURPHY EYE RADPQ

## (undated) DEVICE — Z INACTIVE USE 2660664 SOLUTION IRRIG 3000ML 0.9% SOD CHL USP UROMATIC PLAS CONT

## (undated) DEVICE — BIPOLAR CORD: Brand: DEROYAL

## (undated) DEVICE — 3M™ IOBAN™ 2 ANTIMICROBIAL INCISE DRAPE 6651EZ: Brand: IOBAN™ 2

## (undated) DEVICE — ZIMMER® STERILE DISPOSABLE TOURNIQUET CUFF WITH PLC, DUAL PORT, SINGLE BLADDER, 34 IN. (86 CM)

## (undated) DEVICE — DRAPE,U/ SHT,SPLIT,PLAS,STERIL: Brand: MEDLINE

## (undated) DEVICE — MAJOR BSIN SETUP PK

## (undated) DEVICE — DRESSING FOAM W4XL12IN SIL RECT ADH WTRPRF FLM BK W/ BORD

## (undated) DEVICE — SUTURE ABSRB BRAID COAT UD CP NO 2 27IN VCRL J195H

## (undated) DEVICE — MASK ANES AD M SZ 5 PREM TAIL VLV INFL PRT UNSCENTED HK RNG

## (undated) DEVICE — GAUZE,SPONGE,FLUFF,6"X6.75",STRL,10/TRAY: Brand: MEDLINE

## (undated) DEVICE — GLOVE SURG SZ 85 CRM LTX FREE POLYISOPRENE POLYMER BEAD ANTI

## (undated) DEVICE — SUTURE VCRL SZ 3-0 L27IN ABSRB UD L19MM PS-2 3/8 CIR PRIM J427H

## (undated) DEVICE — SOLUTION IRRIG 1000ML 09% SOD CHL USP PIC PLAS CONTAINER

## (undated) DEVICE — CHLORAPREP 26ML ORANGE

## (undated) DEVICE — DRESSING,GAUZE,XEROFORM,CURAD,5"X9",ST: Brand: CURAD

## (undated) DEVICE — GLOVE SURG SZ 85 L12IN FNGR ORTHO 126MIL CRM LTX FREE

## (undated) DEVICE — BANDAGE COMPR W3INXL15FT BGE E SGL LAYERED CLP CLSR

## (undated) DEVICE — CEMENT MIXING SYSTEM WITH FEMORAL BREAKWAY NOZZLE: Brand: REVOLUTION

## (undated) DEVICE — TRAY EPI 25GA L3.5IN 0.75% BIPIVCAIN 8.25% D CONTAIN BPA

## (undated) DEVICE — CIRCUIT AD PLN SWVL WYE

## (undated) DEVICE — GLOVE SURG SZ 85 L12IN FNGR THK79MIL GRN LTX FREE

## (undated) DEVICE — RECIPROCATING BLADE DOUBLE SIDE (74.0 X 0.77MM)

## (undated) DEVICE — DRAPE,EXTREMITY,89X128,STERILE: Brand: MEDLINE

## (undated) DEVICE — SUTURE VCRL SZ 2-0 L36IN ABSRB UD L36MM CT-1 1/2 CIR J945H

## (undated) DEVICE — SUTURE ETHLN SZ 4-0 L18IN NONABSORBABLE BLK L19MM PS-2 3/8 1667H

## (undated) DEVICE — ENDO KIT,LOURDES HOSPITAL: Brand: MEDLINE INDUSTRIES, INC.